# Patient Record
Sex: FEMALE | Race: WHITE | NOT HISPANIC OR LATINO | Employment: OTHER | URBAN - METROPOLITAN AREA
[De-identification: names, ages, dates, MRNs, and addresses within clinical notes are randomized per-mention and may not be internally consistent; named-entity substitution may affect disease eponyms.]

---

## 2023-05-05 ENCOUNTER — TELEPHONE (OUTPATIENT)
Dept: PRIMARY CARE | Facility: CLINIC | Age: 78
End: 2023-05-05
Payer: MEDICARE

## 2023-05-09 DIAGNOSIS — Z87.891 HISTORY OF TOBACCO ABUSE: ICD-10-CM

## 2023-05-09 DIAGNOSIS — I10 BENIGN ESSENTIAL HYPERTENSION: ICD-10-CM

## 2023-05-09 DIAGNOSIS — E78.5 DYSLIPIDEMIA: Primary | ICD-10-CM

## 2023-05-12 ENCOUNTER — LAB (OUTPATIENT)
Dept: LAB | Facility: LAB | Age: 78
End: 2023-05-12
Payer: MEDICARE

## 2023-05-12 DIAGNOSIS — Z87.891 HISTORY OF TOBACCO ABUSE: ICD-10-CM

## 2023-05-12 DIAGNOSIS — I10 BENIGN ESSENTIAL HYPERTENSION: ICD-10-CM

## 2023-05-12 DIAGNOSIS — E78.5 DYSLIPIDEMIA: ICD-10-CM

## 2023-05-12 LAB
ALANINE AMINOTRANSFERASE (SGPT) (U/L) IN SER/PLAS: 79 U/L (ref 7–45)
ANION GAP IN SER/PLAS: 13 MMOL/L (ref 10–20)
ASPARTATE AMINOTRANSFERASE (SGOT) (U/L) IN SER/PLAS: 45 U/L (ref 9–39)
CALCIUM (MG/DL) IN SER/PLAS: 9.4 MG/DL (ref 8.6–10.3)
CARBON DIOXIDE, TOTAL (MMOL/L) IN SER/PLAS: 23 MMOL/L (ref 21–32)
CHLORIDE (MMOL/L) IN SER/PLAS: 108 MMOL/L (ref 98–107)
CHOLESTEROL (MG/DL) IN SER/PLAS: 182 MG/DL (ref 0–199)
CHOLESTEROL IN HDL (MG/DL) IN SER/PLAS: 57.1 MG/DL
CHOLESTEROL/HDL RATIO: 3.2
CREATININE (MG/DL) IN SER/PLAS: 1 MG/DL (ref 0.5–1.05)
ERYTHROCYTE DISTRIBUTION WIDTH (RATIO) BY AUTOMATED COUNT: 13.6 % (ref 11.5–14.5)
ERYTHROCYTE MEAN CORPUSCULAR HEMOGLOBIN CONCENTRATION (G/DL) BY AUTOMATED: 31.7 G/DL (ref 32–36)
ERYTHROCYTE MEAN CORPUSCULAR VOLUME (FL) BY AUTOMATED COUNT: 92 FL (ref 80–100)
ERYTHROCYTES (10*6/UL) IN BLOOD BY AUTOMATED COUNT: 4.58 X10E12/L (ref 4–5.2)
GFR FEMALE: 58 ML/MIN/1.73M2
GLUCOSE (MG/DL) IN SER/PLAS: 85 MG/DL (ref 74–99)
HEMATOCRIT (%) IN BLOOD BY AUTOMATED COUNT: 42 % (ref 36–46)
HEMOGLOBIN (G/DL) IN BLOOD: 13.3 G/DL (ref 12–16)
LDL: 104 MG/DL (ref 0–99)
LEUKOCYTES (10*3/UL) IN BLOOD BY AUTOMATED COUNT: 6.2 X10E9/L (ref 4.4–11.3)
PLATELETS (10*3/UL) IN BLOOD AUTOMATED COUNT: 213 X10E9/L (ref 150–450)
POTASSIUM (MMOL/L) IN SER/PLAS: 3.8 MMOL/L (ref 3.5–5.3)
SODIUM (MMOL/L) IN SER/PLAS: 140 MMOL/L (ref 136–145)
TRIGLYCERIDE (MG/DL) IN SER/PLAS: 103 MG/DL (ref 0–149)
UREA NITROGEN (MG/DL) IN SER/PLAS: 19 MG/DL (ref 6–23)
VLDL: 21 MG/DL (ref 0–40)

## 2023-05-12 PROCEDURE — 80048 BASIC METABOLIC PNL TOTAL CA: CPT

## 2023-05-12 PROCEDURE — 84450 TRANSFERASE (AST) (SGOT): CPT

## 2023-05-12 PROCEDURE — 36415 COLL VENOUS BLD VENIPUNCTURE: CPT

## 2023-05-12 PROCEDURE — 85027 COMPLETE CBC AUTOMATED: CPT

## 2023-05-12 PROCEDURE — 84460 ALANINE AMINO (ALT) (SGPT): CPT

## 2023-05-12 PROCEDURE — 80061 LIPID PANEL: CPT

## 2023-05-15 PROBLEM — H53.8 BLURRY VISION: Status: ACTIVE | Noted: 2023-05-15

## 2023-05-15 PROBLEM — E78.5 DYSLIPIDEMIA: Status: ACTIVE | Noted: 2023-05-15

## 2023-05-15 PROBLEM — Z72.0 TOBACCO ABUSE: Status: ACTIVE | Noted: 2023-05-15

## 2023-05-15 PROBLEM — F32.A DEPRESSION: Status: ACTIVE | Noted: 2023-05-15

## 2023-05-15 PROBLEM — E55.9 VITAMIN D DEFICIENCY: Status: ACTIVE | Noted: 2023-05-15

## 2023-05-15 PROBLEM — H47.20 BILATERAL OPTIC NERVE ATROPHY: Status: ACTIVE | Noted: 2023-05-15

## 2023-05-15 PROBLEM — I10 BENIGN ESSENTIAL HTN: Status: ACTIVE | Noted: 2023-05-15

## 2023-05-15 NOTE — PROGRESS NOTES
Subjective   Roslyn Cabrera is a 77 y.o. female who presents for follow-up.  HPI  77-year-old female known history of hypertension dyslipidemia tobacco abuse rheumatoid arthritis patient is here for follow-up, denies chest pain shortness of breath fever chills nausea vomiting constipation diarrhea this urgency or frequency.  Takes medication prescribed.  Review of Systems  10 system review pertinent as above  Objective     Visit Vitals  /80   Pulse 76   Temp 36.5 °C (97.7 °F)   Resp 16      Physical Exam  HEENT: Atraumatic normocephalic the pupils are equal and round and reactive to light the sclerae nonicteric extraocular motion are intact.  Neck: Is supple without JVD no carotid bruits the trachea is midline there are no masses pulses are equal and bilateral with normal upstroke.  Skin: Normal.  Skin good texture.  Moist.  Good turgor.  No lesions, no rashes.  Lymph: No lymphadenopathy appreciated, no masses, no lesions  Lungs: Are clear to auscultation and percussion, good breath sounds bilaterally, no rhonchi, no wheezing, good diaphragmatic excursion.  Heart: Normal rate and normal rhythm S1, S2, no S3, no gallop, murmur or rub.  Abdomen: Soft, nontender, no organomegaly, good bowel sounds.    Extremities: Full range of motion, good pulses bilateral.  No cyanosis, no clubbing or edema.  Neuro: Cranial nerves II-XII are grossly intact there is no sensory or motor deficits.  Able to move all extremities.      Assessment/Plan     Rt Inguinal Hernia  General surgery   Dr vasquez no new issues to report   surgery completed Sep 9th    Tobacco abuse  Xray chest consulted with patient re smoking secession     B12 deficiency  B12 1000 mcg monthly 12/09/2022  Needs replaced     Glaucoma bilateral green laser treatment  will follow with Ophthalmology 08/08/22  Dr. Wells  on eye drops Timolol and Latanoprost        Hypertension   No added salt diet, do not and salt to your food  Try to exercise every other day for  30 minutes  Losartan to 100 mg daily  Carvedilol 12.5 mg twice daily   And the hydralazine 25 mg p.o.     tobacco abuse  consulted with patient  must stop smoking immediately  consulted regarding smoking secession  we spoke at great length regarding smoking secession     post knee replacement april 30 th  doing well. He is to follow with orthopedic surgery   Dr Gastelum doing well no new complaints     Cholesterol   Continue with the low-fat, low-cholesterol diet,  I recommended Mediterranean diet, which include fish, chicken, vegetables and olive oil  Exercise daily for 30 minutes at least 3 times a week  Continue home medications atorvastatin, calcium 10 mg Increase to Monday wed and Friday  needs lipid panel     Return in four months     Problem List Items Addressed This Visit          Nervous    Bilateral optic nerve atrophy       Circulatory    Benign essential HTN    Relevant Orders    XR chest 2 views       Endocrine/Metabolic    Vitamin D deficiency       Other    Depression    Blurry vision    Dyslipidemia    Tobacco abuse - Primary    Relevant Orders    XR chest 2 views     Other Visit Diagnoses       B12 deficiency        Relevant Medications    cyanocobalamin (Vitamin B-12) injection 1,000 mcg (Start on 5/17/2023 12:00 PM)    Other Relevant Orders    Vitamin B12              Oscar Milner MD

## 2023-05-17 ENCOUNTER — OFFICE VISIT (OUTPATIENT)
Dept: PRIMARY CARE | Facility: CLINIC | Age: 78
End: 2023-05-17
Payer: MEDICARE

## 2023-05-17 VITALS
WEIGHT: 129 LBS | HEIGHT: 59 IN | TEMPERATURE: 97.7 F | RESPIRATION RATE: 16 BRPM | HEART RATE: 76 BPM | BODY MASS INDEX: 26 KG/M2 | SYSTOLIC BLOOD PRESSURE: 128 MMHG | DIASTOLIC BLOOD PRESSURE: 80 MMHG

## 2023-05-17 DIAGNOSIS — E78.5 DYSLIPIDEMIA: ICD-10-CM

## 2023-05-17 DIAGNOSIS — Z72.0 TOBACCO ABUSE: Primary | ICD-10-CM

## 2023-05-17 DIAGNOSIS — I10 BENIGN ESSENTIAL HTN: ICD-10-CM

## 2023-05-17 DIAGNOSIS — E55.9 VITAMIN D DEFICIENCY: ICD-10-CM

## 2023-05-17 DIAGNOSIS — F32.A DEPRESSION, UNSPECIFIED DEPRESSION TYPE: ICD-10-CM

## 2023-05-17 DIAGNOSIS — H47.20 BILATERAL OPTIC NERVE ATROPHY: ICD-10-CM

## 2023-05-17 DIAGNOSIS — H53.8 BLURRY VISION: ICD-10-CM

## 2023-05-17 DIAGNOSIS — E53.8 B12 DEFICIENCY: ICD-10-CM

## 2023-05-17 PROCEDURE — 99214 OFFICE O/P EST MOD 30 MIN: CPT | Performed by: INTERNAL MEDICINE

## 2023-05-17 PROCEDURE — 1159F MED LIST DOCD IN RCRD: CPT | Performed by: INTERNAL MEDICINE

## 2023-05-17 PROCEDURE — 3074F SYST BP LT 130 MM HG: CPT | Performed by: INTERNAL MEDICINE

## 2023-05-17 PROCEDURE — 82607 VITAMIN B-12: CPT | Performed by: INTERNAL MEDICINE

## 2023-05-17 PROCEDURE — 96372 THER/PROPH/DIAG INJ SC/IM: CPT | Performed by: INTERNAL MEDICINE

## 2023-05-17 PROCEDURE — 4004F PT TOBACCO SCREEN RCVD TLK: CPT | Performed by: INTERNAL MEDICINE

## 2023-05-17 PROCEDURE — 3079F DIAST BP 80-89 MM HG: CPT | Performed by: INTERNAL MEDICINE

## 2023-05-17 RX ORDER — LOSARTAN POTASSIUM 100 MG/1
1 TABLET ORAL DAILY
COMMUNITY
Start: 2018-07-09 | End: 2024-02-27 | Stop reason: SDUPTHER

## 2023-05-17 RX ORDER — LATANOPROST 50 UG/ML
SOLUTION/ DROPS OPHTHALMIC
COMMUNITY
End: 2023-10-06 | Stop reason: SDUPTHER

## 2023-05-17 RX ORDER — ATORVASTATIN CALCIUM 10 MG/1
1 TABLET, FILM COATED ORAL DAILY
COMMUNITY
Start: 2018-05-06 | End: 2024-01-15

## 2023-05-17 RX ORDER — CHOLECALCIFEROL (VITAMIN D3) 125 MCG
CAPSULE ORAL
COMMUNITY
End: 2024-03-21 | Stop reason: WASHOUT

## 2023-05-17 RX ORDER — CYANOCOBALAMIN 1000 UG/ML
1000 INJECTION, SOLUTION INTRAMUSCULAR; SUBCUTANEOUS ONCE
Status: COMPLETED | OUTPATIENT
Start: 2023-05-17 | End: 2023-05-17

## 2023-05-17 RX ORDER — HYDRALAZINE HYDROCHLORIDE 25 MG/1
1 TABLET, FILM COATED ORAL 2 TIMES DAILY
COMMUNITY
Start: 2022-07-29 | End: 2024-02-27 | Stop reason: SDUPTHER

## 2023-05-17 RX ORDER — TIMOLOL MALEATE 5 MG/ML
1 SOLUTION/ DROPS OPHTHALMIC DAILY
COMMUNITY
End: 2024-02-27

## 2023-05-17 RX ORDER — CARVEDILOL 12.5 MG/1
1 TABLET ORAL 2 TIMES DAILY
COMMUNITY
Start: 2020-09-23 | End: 2024-02-27 | Stop reason: SDUPTHER

## 2023-05-17 RX ORDER — TRIAMCINOLONE ACETONIDE 55 UG/1
2 SPRAY, METERED NASAL DAILY
COMMUNITY
Start: 2023-03-06 | End: 2024-03-21 | Stop reason: WASHOUT

## 2023-05-17 RX ORDER — EPINEPHRINE 0.22MG
AEROSOL WITH ADAPTER (ML) INHALATION
COMMUNITY

## 2023-05-17 RX ADMIN — CYANOCOBALAMIN 1000 MCG: 1000 INJECTION, SOLUTION INTRAMUSCULAR; SUBCUTANEOUS at 12:10

## 2023-05-17 ASSESSMENT — PAIN SCALES - GENERAL: PAINLEVEL: 0-NO PAIN

## 2023-05-19 ENCOUNTER — TELEPHONE (OUTPATIENT)
Dept: PRIMARY CARE | Facility: CLINIC | Age: 78
End: 2023-05-19
Payer: MEDICARE

## 2023-10-06 DIAGNOSIS — H40.20X0 PRIMARY ANGLE CLOSURE GLAUCOMA OF BOTH EYES, UNSPECIFIED GLAUCOMA STAGE, UNSPECIFIED PRIMARY ANGLE-CLOSURE GLAUCOMA TYPE: Primary | ICD-10-CM

## 2023-10-09 RX ORDER — LATANOPROST 50 UG/ML
SOLUTION/ DROPS OPHTHALMIC
Qty: 7.5 ML | Refills: 3 | Status: SHIPPED | OUTPATIENT
Start: 2023-10-09 | End: 2024-02-27 | Stop reason: SDUPTHER

## 2023-10-20 PROBLEM — E53.8 COBALAMIN DEFICIENCY: Status: ACTIVE | Noted: 2023-10-20

## 2023-10-20 PROBLEM — E78.5 HYPERLIPIDEMIA: Status: ACTIVE | Noted: 2023-10-20

## 2023-10-20 PROBLEM — H53.19 VISUAL DISTORTIONS: Status: ACTIVE | Noted: 2023-10-20

## 2023-10-20 PROBLEM — M81.0 OP (OSTEOPOROSIS): Status: ACTIVE | Noted: 2023-10-20

## 2023-10-20 PROBLEM — L82.0 INFLAMED SEBORRHEIC KERATOSIS: Status: ACTIVE | Noted: 2020-06-04

## 2023-10-20 PROBLEM — F32.A ANXIETY AND DEPRESSION: Status: ACTIVE | Noted: 2023-10-20

## 2023-10-20 PROBLEM — S89.91XA TRAUMATIC INJURY OF RIGHT LOWER EXTREMITY: Status: ACTIVE | Noted: 2023-10-20

## 2023-10-20 PROBLEM — L60.9 NAIL ABNORMALITY: Status: ACTIVE | Noted: 2023-10-20

## 2023-10-20 PROBLEM — M85.80 OSTEOPENIA: Status: ACTIVE | Noted: 2023-10-20

## 2023-10-20 PROBLEM — T81.30XA WOUND DEHISCENCE: Status: ACTIVE | Noted: 2023-10-20

## 2023-10-20 PROBLEM — M06.9 RHEUMATOID ARTHRITIS (MULTI): Status: ACTIVE | Noted: 2023-10-20

## 2023-10-20 PROBLEM — L03.116 CELLULITIS OF LEG, LEFT: Status: ACTIVE | Noted: 2023-10-20

## 2023-10-20 PROBLEM — L81.4 OTHER MELANIN HYPERPIGMENTATION: Status: ACTIVE | Noted: 2020-06-04

## 2023-10-20 PROBLEM — A15.9 TUBERCULOSIS: Status: ACTIVE | Noted: 2023-10-20

## 2023-10-20 PROBLEM — R44.2: Status: ACTIVE | Noted: 2023-10-20

## 2023-10-20 PROBLEM — H40.1130 PRIMARY OPEN ANGLE GLAUCOMA (POAG) OF BOTH EYES: Status: ACTIVE | Noted: 2023-10-20

## 2023-10-20 PROBLEM — H90.3 SENSORINEURAL HEARING LOSS, BILATERAL: Status: ACTIVE | Noted: 2023-10-20

## 2023-10-20 PROBLEM — H54.7 VISION IMPAIRMENT: Status: ACTIVE | Noted: 2023-10-20

## 2023-10-20 PROBLEM — M17.0 OSTEOARTHRITIS OF BOTH KNEES: Status: ACTIVE | Noted: 2023-10-20

## 2023-10-20 PROBLEM — H35.3132 INTERMEDIATE STAGE NONEXUDATIVE AGE-RELATED MACULAR DEGENERATION OF BOTH EYES: Status: ACTIVE | Noted: 2023-10-20

## 2023-10-20 PROBLEM — L03.115 CELLULITIS OF RIGHT LOWER EXTREMITY: Status: ACTIVE | Noted: 2023-10-20

## 2023-10-20 PROBLEM — L03.90 WOUND CELLULITIS: Status: ACTIVE | Noted: 2023-10-20

## 2023-10-20 PROBLEM — M25.473 ANKLE EDEMA: Status: ACTIVE | Noted: 2023-10-20

## 2023-10-20 PROBLEM — F41.9 ANXIETY AND DEPRESSION: Status: ACTIVE | Noted: 2023-10-20

## 2023-10-20 PROBLEM — K40.90 RIGHT INGUINAL HERNIA: Status: ACTIVE | Noted: 2023-10-20

## 2023-10-20 PROBLEM — D18.01 HEMANGIOMA OF SKIN AND SUBCUTANEOUS TISSUE: Status: ACTIVE | Noted: 2020-06-04

## 2023-10-20 PROBLEM — I10 UNCONTROLLED HYPERTENSION: Status: ACTIVE | Noted: 2023-10-20

## 2023-10-20 RX ORDER — CEPHALEXIN 500 MG/1
500 CAPSULE ORAL 2 TIMES DAILY
COMMUNITY
Start: 2023-08-11 | End: 2024-01-15 | Stop reason: ALTCHOICE

## 2023-10-20 RX ORDER — OXYCODONE HYDROCHLORIDE 5 MG/1
5 TABLET ORAL EVERY 6 HOURS PRN
COMMUNITY
Start: 2023-08-11 | End: 2024-03-21 | Stop reason: WASHOUT

## 2023-10-24 ENCOUNTER — OFFICE VISIT (OUTPATIENT)
Dept: GYNECOLOGIC ONCOLOGY | Facility: CLINIC | Age: 78
End: 2023-10-24
Payer: MEDICARE

## 2023-10-24 VITALS
SYSTOLIC BLOOD PRESSURE: 151 MMHG | WEIGHT: 129.63 LBS | TEMPERATURE: 97 F | RESPIRATION RATE: 16 BRPM | BODY MASS INDEX: 26.18 KG/M2 | HEART RATE: 59 BPM | DIASTOLIC BLOOD PRESSURE: 80 MMHG | OXYGEN SATURATION: 96 %

## 2023-10-24 DIAGNOSIS — C53.9 MALIGNANT NEOPLASM OF CERVIX, UNSPECIFIED SITE (MULTI): ICD-10-CM

## 2023-10-24 DIAGNOSIS — Z12.31 OTHER SCREENING MAMMOGRAM: Primary | ICD-10-CM

## 2023-10-24 PROCEDURE — 87624 HPV HI-RISK TYP POOLED RSLT: CPT | Performed by: NURSE PRACTITIONER

## 2023-10-24 PROCEDURE — 3077F SYST BP >= 140 MM HG: CPT | Performed by: NURSE PRACTITIONER

## 2023-10-24 PROCEDURE — 4004F PT TOBACCO SCREEN RCVD TLK: CPT | Performed by: NURSE PRACTITIONER

## 2023-10-24 PROCEDURE — 3079F DIAST BP 80-89 MM HG: CPT | Performed by: NURSE PRACTITIONER

## 2023-10-24 PROCEDURE — 99214 OFFICE O/P EST MOD 30 MIN: CPT | Performed by: NURSE PRACTITIONER

## 2023-10-24 PROCEDURE — 88175 CYTOPATH C/V AUTO FLUID REDO: CPT | Mod: TC,GCY | Performed by: NURSE PRACTITIONER

## 2023-10-24 PROCEDURE — 1159F MED LIST DOCD IN RCRD: CPT | Performed by: NURSE PRACTITIONER

## 2023-10-24 PROCEDURE — 1126F AMNT PAIN NOTED NONE PRSNT: CPT | Performed by: NURSE PRACTITIONER

## 2023-10-24 ASSESSMENT — PATIENT HEALTH QUESTIONNAIRE - PHQ9
4. FEELING TIRED OR HAVING LITTLE ENERGY: NOT AT ALL
1. LITTLE INTEREST OR PLEASURE IN DOING THINGS: 0
2. FEELING DOWN, DEPRESSED OR HOPELESS: NOT AT ALL
10. IF YOU CHECKED OFF ANY PROBLEMS, HOW DIFFICULT HAVE THESE PROBLEMS MADE IT FOR YOU TO DO YOUR WORK, TAKE CARE OF THINGS AT HOME, OR GET ALONG WITH OTHER PEOPLE: NOT DIFFICULT AT ALL
4. FEELING TIRED OR HAVING LITTLE ENERGY: 0
1. LITTLE INTEREST OR PLEASURE IN DOING THINGS: NOT AT ALL
6. FEELING BAD ABOUT YOURSELF - OR THAT YOU ARE A FAILURE OR HAVE LET YOURSELF OR YOUR FAMILY DOWN: NOT AT ALL
3. TROUBLE FALLING OR STAYING ASLEEP OR SLEEPING TOO MUCH: 0
7. TROUBLE CONCENTRATING ON THINGS, SUCH AS READING THE NEWSPAPER OR WATCHING TELEVISION: NOT AT ALL
SUM OF ALL RESPONSES TO PHQ QUESTIONS 1-9: 0
2. FEELING DOWN, DEPRESSED OR HOPELESS: NOT AT ALL
9. THOUGHTS THAT YOU WOULD BE BETTER OFF DEAD, OR OF HURTING YOURSELF: NOT AT ALL
1. LITTLE INTEREST OR PLEASURE IN DOING THINGS: NOT AT ALL
9. THOUGHTS THAT YOU WOULD BE BETTER OFF DEAD, OR OF HURTING YOURSELF: 0
9. THOUGHTS THAT YOU WOULD BE BETTER OFF DEAD, OR OF HURTING YOURSELF: NOT AT ALL
3. TROUBLE FALLING OR STAYING ASLEEP OR SLEEPING TOO MUCH: NOT AT ALL
SUM OF ALL RESPONSES TO PHQ9 QUESTIONS 1 AND 2: 0
2. FEELING DOWN, DEPRESSED, IRRITABLE, OR HOPELESS: 0
6. FEELING BAD ABOUT YOURSELF - OR THAT YOU ARE A FAILURE OR HAVE LET YOURSELF OR YOUR FAMILY DOWN: NOT AT ALL
1. LITTLE INTEREST OR PLEASURE IN DOING THINGS: NOT AT ALL
SUM OF ALL RESPONSES TO PHQ QUESTIONS 1-9: 0
3. TROUBLE FALLING OR STAYING ASLEEP OR SLEEPING TOO MUCH: NOT AT ALL
10. IF YOU CHECKED OFF ANY PROBLEMS, HOW DIFFICULT HAVE THESE PROBLEMS MADE IT FOR YOU TO DO YOUR WORK, TAKE CARE OF THINGS AT HOME, OR GET ALONG WITH OTHER PEOPLE: NOT DIFFICULT AT ALL
8. MOVING OR SPEAKING SO SLOWLY THAT OTHER PEOPLE COULD HAVE NOTICED. OR THE OPPOSITE, BEING SO FIGETY OR RESTLESS THAT YOU HAVE BEEN MOVING AROUND A LOT MORE THAN USUAL: 0
5. POOR APPETITE OR OVEREATING: NOT AT ALL
2. FEELING DOWN, DEPRESSED, IRRITABLE, OR HOPELESS: NOT AT ALL
7. TROUBLE CONCENTRATING ON THINGS, SUCH AS READING THE NEWSPAPER OR WATCHING TELEVISION: NOT AT ALL
5. POOR APPETITE OR OVEREATING: NOT AT ALL
5. POOR APPETITE OR OVEREATING: 0
7. TROUBLE CONCENTRATING ON THINGS, SUCH AS READING THE NEWSPAPER OR WATCHING TELEVISION: 0
4. FEELING TIRED OR HAVING LITTLE ENERGY: NOT AT ALL
8. MOVING OR SPEAKING SO SLOWLY THAT OTHER PEOPLE COULD HAVE NOTICED. OR THE OPPOSITE, BEING SO FIGETY OR RESTLESS THAT YOU HAVE BEEN MOVING AROUND A LOT MORE THAN USUAL: NOT AT ALL
6. FEELING BAD ABOUT YOURSELF - OR THAT YOU ARE A FAILURE OR HAVE LET YOURSELF OR YOUR FAMILY DOWN: 0
8. MOVING OR SPEAKING SO SLOWLY THAT OTHER PEOPLE COULD HAVE NOTICED. OR THE OPPOSITE, BEING SO FIGETY OR RESTLESS THAT YOU HAVE BEEN MOVING AROUND A LOT MORE THAN USUAL: NOT AT ALL

## 2023-10-24 ASSESSMENT — ENCOUNTER SYMPTOMS
OCCASIONAL FEELINGS OF UNSTEADINESS: 0
LOSS OF SENSATION IN FEET: 0
DEPRESSION: 0

## 2023-10-24 ASSESSMENT — PAIN SCALES - GENERAL: PAINLEVEL: 0-NO PAIN

## 2023-10-24 NOTE — PROGRESS NOTES
Patient ID: Roslyn Cabrera is a 78 y.o. female.  Primary Care Provider: Oscar Milner MD    Subjective    Patient with long standing history of cervical dysplasia with definitive total vaginal hysterectomy performed on 5/30/13.      Objective    Visit Vitals  /80   Pulse 59   Temp 36.1 °C (97 °F)   Resp 16        Interval history:  Patient is a 78 year old female with cervical dysplasia s/p vaginal hysterectomy 5/30/13.  Last pap 10/22 was negative, HPV-.  Here for annual pap.  Patient denies any vaginal bleeding, pink tinged discharge. Patient denies any constipation or diarrhea.  Appetite has been good.  Energy levels are baseline.  Patient denies hematuria.  Patient denies urinary leakage or incontinence. Patient has glaucoma, having light sensitivity, not able to drive at night.  Patient overdue for mammogram.  Patient is not currently sexually active.       Physical Exam:    Constitutional: Doing well. KODY  Eyes: PERRL  ENMT: Moist mucus membranes  Head/Neck: Supple. Symmetrical  Cardiovascular: Regular, rate and rhythm. 2+ equal pulses of the extremities  Respiratory/Thorax: CTA. RRR. Chest rise symmetrical.  Gastrointestinal: Non-distended, soft, non-tender  Genitourinary:   Normal external female genitalia. No vulvar lesions noted  Speculum exam: Smooth vaginal walls without lesions or masses. Vaginal cuff visualized without lesions. Surgically absent cervix.  Bimanual exam: Smooth vaginal wall without lesions or masses.  Surgically absent uterus, cervix, and adnexa.  Rectovaginal exam: smooth rectovaginal septum without lesions or masses  Musculoskeletal: ROM intact, no joint swelling, normal strength  Extremities: No edema  Neurological: Alert and oriented x 3. Pleasant and cooperative.  Lymphatic: No lymphadenopathy. No lymphedema  Psychological: Appropriate mood and behavior  Skin: Warm and dry, no lesions, no rashes    A complete review of systems was performed and all systems were normal except  what is noted in the interval history.          Assessment/Plan   Patient is a 78 year old female with cervical dysplasia s/p vaginal hysterectomy 5/30/13. KODY 10 years.     PLAN:  Pap today, F/U results  If negative, F/U in 1 year or as needed  Mammogram ordered  Physical examination was within normal limits today.  She is currently KODY.  We reviewed signs and symptoms of possible recurrence with the patient and she will call our office should she experience any of these.

## 2023-11-14 LAB
CYTOLOGY CMNT CVX/VAG CYTO-IMP: NORMAL
HPV HR 12 DNA GENITAL QL NAA+PROBE: NEGATIVE
HPV HR GENOTYPES PNL CVX NAA+PROBE: NEGATIVE
HPV16 DNA SPEC QL NAA+PROBE: NEGATIVE
HPV18 DNA SPEC QL NAA+PROBE: NEGATIVE
LAB AP HISTORY OF MALIGNANCY: NORMAL
LAB AP HPV GENOTYPE QUESTION: YES
LAB AP HPV HR: NORMAL
LABORATORY COMMENT REPORT: NORMAL
PATH REPORT.TOTAL CANCER: NORMAL

## 2023-11-15 ENCOUNTER — TELEPHONE (OUTPATIENT)
Dept: GYNECOLOGIC ONCOLOGY | Facility: HOSPITAL | Age: 78
End: 2023-11-15
Payer: MEDICARE

## 2023-11-15 NOTE — TELEPHONE ENCOUNTER
Phoned patient to notify that pap results are negative and that Bhumi Carl CNP recommends follow up in 1 year.

## 2023-11-20 ENCOUNTER — APPOINTMENT (OUTPATIENT)
Dept: RADIOLOGY | Facility: CLINIC | Age: 78
End: 2023-11-20
Payer: MEDICARE

## 2023-12-04 ENCOUNTER — TELEPHONE (OUTPATIENT)
Dept: PRIMARY CARE | Facility: CLINIC | Age: 78
End: 2023-12-04
Payer: MEDICARE

## 2023-12-04 DIAGNOSIS — I10 BENIGN ESSENTIAL HTN: Primary | ICD-10-CM

## 2023-12-04 DIAGNOSIS — F32.A ANXIETY AND DEPRESSION: ICD-10-CM

## 2023-12-04 DIAGNOSIS — M06.9 RHEUMATOID ARTHRITIS INVOLVING MULTIPLE SITES, UNSPECIFIED WHETHER RHEUMATOID FACTOR PRESENT (MULTI): ICD-10-CM

## 2023-12-04 DIAGNOSIS — F41.9 ANXIETY AND DEPRESSION: ICD-10-CM

## 2023-12-04 DIAGNOSIS — E55.9 VITAMIN D DEFICIENCY: ICD-10-CM

## 2023-12-04 DIAGNOSIS — E78.5 DYSLIPIDEMIA: ICD-10-CM

## 2023-12-07 ENCOUNTER — LAB (OUTPATIENT)
Dept: LAB | Facility: LAB | Age: 78
End: 2023-12-07
Payer: MEDICARE

## 2023-12-07 DIAGNOSIS — M06.9 RHEUMATOID ARTHRITIS INVOLVING MULTIPLE SITES, UNSPECIFIED WHETHER RHEUMATOID FACTOR PRESENT (MULTI): ICD-10-CM

## 2023-12-07 DIAGNOSIS — E78.5 DYSLIPIDEMIA: ICD-10-CM

## 2023-12-07 DIAGNOSIS — I10 BENIGN ESSENTIAL HTN: ICD-10-CM

## 2023-12-07 DIAGNOSIS — E55.9 VITAMIN D DEFICIENCY: ICD-10-CM

## 2023-12-07 LAB
25(OH)D3 SERPL-MCNC: 94 NG/ML (ref 30–100)
ALT SERPL W P-5'-P-CCNC: 12 U/L (ref 7–45)
ANION GAP SERPL CALC-SCNC: 11 MMOL/L (ref 10–20)
AST SERPL W P-5'-P-CCNC: 13 U/L (ref 9–39)
BUN SERPL-MCNC: 22 MG/DL (ref 6–23)
CALCIUM SERPL-MCNC: 9.3 MG/DL (ref 8.6–10.3)
CHLORIDE SERPL-SCNC: 109 MMOL/L (ref 98–107)
CHOLEST SERPL-MCNC: 186 MG/DL (ref 0–199)
CHOLESTEROL/HDL RATIO: 2.8
CO2 SERPL-SCNC: 25 MMOL/L (ref 21–32)
CREAT SERPL-MCNC: 1.08 MG/DL (ref 0.5–1.05)
ERYTHROCYTE [DISTWIDTH] IN BLOOD BY AUTOMATED COUNT: 14.3 % (ref 11.5–14.5)
GFR SERPL CREATININE-BSD FRML MDRD: 53 ML/MIN/1.73M*2
GLUCOSE SERPL-MCNC: 99 MG/DL (ref 74–99)
HCT VFR BLD AUTO: 43.2 % (ref 36–46)
HDLC SERPL-MCNC: 66.5 MG/DL
HGB BLD-MCNC: 13.9 G/DL (ref 12–16)
LDLC SERPL CALC-MCNC: 97 MG/DL
MCH RBC QN AUTO: 30 PG (ref 26–34)
MCHC RBC AUTO-ENTMCNC: 32.2 G/DL (ref 32–36)
MCV RBC AUTO: 93 FL (ref 80–100)
NON HDL CHOLESTEROL: 120 MG/DL (ref 0–149)
NRBC BLD-RTO: 0 /100 WBCS (ref 0–0)
PLATELET # BLD AUTO: 205 X10*3/UL (ref 150–450)
POTASSIUM SERPL-SCNC: 4.2 MMOL/L (ref 3.5–5.3)
RBC # BLD AUTO: 4.63 X10*6/UL (ref 4–5.2)
SODIUM SERPL-SCNC: 141 MMOL/L (ref 136–145)
TRIGL SERPL-MCNC: 111 MG/DL (ref 0–149)
VLDL: 22 MG/DL (ref 0–40)
WBC # BLD AUTO: 8.8 X10*3/UL (ref 4.4–11.3)

## 2023-12-07 PROCEDURE — 80048 BASIC METABOLIC PNL TOTAL CA: CPT

## 2023-12-07 PROCEDURE — 36415 COLL VENOUS BLD VENIPUNCTURE: CPT

## 2023-12-07 PROCEDURE — 80061 LIPID PANEL: CPT

## 2023-12-07 PROCEDURE — 82306 VITAMIN D 25 HYDROXY: CPT

## 2023-12-07 PROCEDURE — 84460 ALANINE AMINO (ALT) (SGPT): CPT

## 2023-12-07 PROCEDURE — 85027 COMPLETE CBC AUTOMATED: CPT

## 2023-12-07 PROCEDURE — 84450 TRANSFERASE (AST) (SGOT): CPT

## 2023-12-07 NOTE — PROGRESS NOTES
Subjective   Roslyn Cabrera is a 78 y.o. female who presents for follow-up.  HPI  77-year-old female known history of hypertension dyslipidemia tobacco abuse rheumatoid arthritis patient is here for follow-up, denies chest pain shortness of breath fever chills nausea vomiting constipation diarrhea this urgency or frequency.  Takes medication prescribed.  Review of Systems  10 system review pertinent as above  Objective     Visit Vitals  /80   Pulse 78   Temp 36.8 °C (98.2 °F)   Resp 16      Physical Exam  HEENT: Atraumatic normocephalic the pupils are equal and round and reactive to light the sclerae nonicteric extraocular motion are intact.  Neck: Is supple without JVD no carotid bruits the trachea is midline there are no masses pulses are equal and bilateral with normal upstroke.  Skin: Normal.  Skin good texture.  Moist.  Good turgor.  No lesions, no rashes.  Lymph: No lymphadenopathy appreciated, no masses, no lesions  Lungs: Are clear to auscultation and percussion, good breath sounds bilaterally, no rhonchi, no wheezing, good diaphragmatic excursion.  Heart: Normal rate and normal rhythm S1, S2, no S3, no gallop, murmur or rub.  Abdomen: Soft, nontender, no organomegaly, good bowel sounds.    Extremities: Full range of motion, good pulses bilateral.  No cyanosis, no clubbing or edema.  Neuro: Cranial nerves II-XII are grossly intact there is no sensory or motor deficits.  Able to move all extremities.      Assessment/Plan     Here for follow-up fasting blood works  Reviewed  BW 12/2023  All questions answered to satisfaction     Immunizations  Flu vaccine local pharmacy  Pneumonia vaccine 12/2022 Next 2027  [place] vaccine declined  Shingles vaccine 2/2    Prevention  Colonoscopy cologurd negative   Mammogram per Gyn  Bone density per gyn    Gyn Follows with Bhumi Carl   Routine follow.     History of glaucoma bilateral green laser treatment  will follow with Ophthalmology 08/08/22  Dr. Wells  on  eye drops Timolol and Latanoprost      Hypertension well controlled  High on occasion due to anxiety   No added salt diet, do not and salt to your food  Try to exercise every other day for 30 minutes  Continue current medications    tobacco abuse not ready to quit  consulted with patient  must stop smoking immediately  consulted regarding smoking secession  we spoke at great length regarding smoking secession     post knee replacement april 30 th   doing well. He is to follow with orthopedic surgery   Dr Gastelum doing well no new complaints     Cholesterol stable  Continue with the low-fat, low-cholesterol diet,  I recommended Mediterranean diet, which include fish, chicken, vegetables and olive oil  Exercise daily for 30 minutes at least 3 times a week  Continue home medications atorvastatin, calcium 10 mg Increase to Monday wed and Friday  needs lipid panel     Return in four months     Problem List Items Addressed This Visit    None      Oscar Milner MD

## 2023-12-12 ENCOUNTER — OFFICE VISIT (OUTPATIENT)
Dept: PRIMARY CARE | Facility: CLINIC | Age: 78
End: 2023-12-12
Payer: MEDICARE

## 2023-12-12 VITALS
TEMPERATURE: 98.2 F | HEART RATE: 78 BPM | SYSTOLIC BLOOD PRESSURE: 132 MMHG | WEIGHT: 133 LBS | RESPIRATION RATE: 16 BRPM | DIASTOLIC BLOOD PRESSURE: 80 MMHG | HEIGHT: 59 IN | BODY MASS INDEX: 26.81 KG/M2

## 2023-12-12 DIAGNOSIS — E78.5 DYSLIPIDEMIA: ICD-10-CM

## 2023-12-12 DIAGNOSIS — E55.9 VITAMIN D DEFICIENCY: ICD-10-CM

## 2023-12-12 DIAGNOSIS — I10 BENIGN ESSENTIAL HTN: Primary | ICD-10-CM

## 2023-12-12 DIAGNOSIS — E78.2 MODERATE MIXED HYPERLIPIDEMIA NOT REQUIRING STATIN THERAPY: ICD-10-CM

## 2023-12-12 PROCEDURE — 3075F SYST BP GE 130 - 139MM HG: CPT | Performed by: INTERNAL MEDICINE

## 2023-12-12 PROCEDURE — 4004F PT TOBACCO SCREEN RCVD TLK: CPT | Performed by: INTERNAL MEDICINE

## 2023-12-12 PROCEDURE — 1159F MED LIST DOCD IN RCRD: CPT | Performed by: INTERNAL MEDICINE

## 2023-12-12 PROCEDURE — 3079F DIAST BP 80-89 MM HG: CPT | Performed by: INTERNAL MEDICINE

## 2023-12-12 PROCEDURE — 1126F AMNT PAIN NOTED NONE PRSNT: CPT | Performed by: INTERNAL MEDICINE

## 2023-12-12 PROCEDURE — 99214 OFFICE O/P EST MOD 30 MIN: CPT | Performed by: INTERNAL MEDICINE

## 2023-12-12 ASSESSMENT — PAIN SCALES - GENERAL: PAINLEVEL: 0-NO PAIN

## 2024-01-15 ENCOUNTER — APPOINTMENT (OUTPATIENT)
Dept: RADIOLOGY | Facility: HOSPITAL | Age: 79
DRG: 041 | End: 2024-01-15
Payer: MEDICARE

## 2024-01-15 ENCOUNTER — CLINICAL SUPPORT (OUTPATIENT)
Dept: EMERGENCY MEDICINE | Facility: HOSPITAL | Age: 79
DRG: 041 | End: 2024-01-15
Payer: MEDICARE

## 2024-01-15 ENCOUNTER — HOSPITAL ENCOUNTER (INPATIENT)
Facility: HOSPITAL | Age: 79
LOS: 5 days | Discharge: NURSING, REHAB OR LONG-TERM CARE | DRG: 041 | End: 2024-01-20
Attending: EMERGENCY MEDICINE | Admitting: STUDENT IN AN ORGANIZED HEALTH CARE EDUCATION/TRAINING PROGRAM
Payer: MEDICARE

## 2024-01-15 DIAGNOSIS — I10 BENIGN ESSENTIAL HTN: ICD-10-CM

## 2024-01-15 DIAGNOSIS — I63.9 ACUTE ISCHEMIC STROKE (MULTI): Primary | ICD-10-CM

## 2024-01-15 DIAGNOSIS — R47.01 APHASIA: ICD-10-CM

## 2024-01-15 DIAGNOSIS — I63.412 STROKE DUE TO EMBOLISM OF LEFT MIDDLE CEREBRAL ARTERY (MULTI): ICD-10-CM

## 2024-01-15 LAB
ALBUMIN SERPL BCP-MCNC: 4.1 G/DL (ref 3.4–5)
ALP SERPL-CCNC: 84 U/L (ref 33–136)
ALT SERPL W P-5'-P-CCNC: 10 U/L (ref 7–45)
AMPHETAMINES UR QL SCN: NORMAL
ANION GAP SERPL CALC-SCNC: 13 MMOL/L (ref 10–20)
APPEARANCE UR: CLEAR
APTT PPP: 26 SECONDS (ref 27–38)
AST SERPL W P-5'-P-CCNC: 15 U/L (ref 9–39)
ATRIAL RATE: 72 BPM
BARBITURATES UR QL SCN: NORMAL
BASOPHILS # BLD AUTO: 0.04 X10*3/UL (ref 0–0.1)
BASOPHILS NFR BLD AUTO: 0.4 %
BENZODIAZ UR QL SCN: NORMAL
BILIRUB SERPL-MCNC: 0.4 MG/DL (ref 0–1.2)
BILIRUB UR STRIP.AUTO-MCNC: NEGATIVE MG/DL
BNP SERPL-MCNC: 115 PG/ML (ref 0–99)
BUN SERPL-MCNC: 11 MG/DL (ref 6–23)
BZE UR QL SCN: NORMAL
CALCIUM SERPL-MCNC: 9.2 MG/DL (ref 8.6–10.6)
CANNABINOIDS UR QL SCN: NORMAL
CARDIAC TROPONIN I PNL SERPL HS: 8 NG/L (ref 0–34)
CHLORIDE SERPL-SCNC: 106 MMOL/L (ref 98–107)
CO2 SERPL-SCNC: 24 MMOL/L (ref 21–32)
COLOR UR: NORMAL
CREAT SERPL-MCNC: 0.82 MG/DL (ref 0.5–1.05)
EGFRCR SERPLBLD CKD-EPI 2021: 74 ML/MIN/1.73M*2
EOSINOPHIL # BLD AUTO: 0.14 X10*3/UL (ref 0–0.4)
EOSINOPHIL NFR BLD AUTO: 1.5 %
ERYTHROCYTE [DISTWIDTH] IN BLOOD BY AUTOMATED COUNT: 14.1 % (ref 11.5–14.5)
EST. AVERAGE GLUCOSE BLD GHB EST-MCNC: 117 MG/DL
FENTANYL+NORFENTANYL UR QL SCN: NORMAL
GLUCOSE BLD MANUAL STRIP-MCNC: 90 MG/DL (ref 74–99)
GLUCOSE SERPL-MCNC: 92 MG/DL (ref 74–99)
GLUCOSE UR STRIP.AUTO-MCNC: NEGATIVE MG/DL
HBA1C MFR BLD: 5.7 %
HCT VFR BLD AUTO: 40 % (ref 36–46)
HGB BLD-MCNC: 13.2 G/DL (ref 12–16)
IMM GRANULOCYTES # BLD AUTO: 0.04 X10*3/UL (ref 0–0.5)
IMM GRANULOCYTES NFR BLD AUTO: 0.4 % (ref 0–0.9)
INR PPP: 1 (ref 0.9–1.1)
KETONES UR STRIP.AUTO-MCNC: NEGATIVE MG/DL
LEUKOCYTE ESTERASE UR QL STRIP.AUTO: NEGATIVE
LYMPHOCYTES # BLD AUTO: 1.79 X10*3/UL (ref 0.8–3)
LYMPHOCYTES NFR BLD AUTO: 19 %
MCH RBC QN AUTO: 29.1 PG (ref 26–34)
MCHC RBC AUTO-ENTMCNC: 33 G/DL (ref 32–36)
MCV RBC AUTO: 88 FL (ref 80–100)
MONOCYTES # BLD AUTO: 1.13 X10*3/UL (ref 0.05–0.8)
MONOCYTES NFR BLD AUTO: 12 %
NEUTROPHILS # BLD AUTO: 6.26 X10*3/UL (ref 1.6–5.5)
NEUTROPHILS NFR BLD AUTO: 66.7 %
NITRITE UR QL STRIP.AUTO: NEGATIVE
NRBC BLD-RTO: 0 /100 WBCS (ref 0–0)
OPIATES UR QL SCN: NORMAL
OXYCODONE+OXYMORPHONE UR QL SCN: NORMAL
P AXIS: 50 DEGREES
P OFFSET: 200 MS
P ONSET: 149 MS
PCP UR QL SCN: NORMAL
PH UR STRIP.AUTO: 6 [PH]
PLATELET # BLD AUTO: 160 X10*3/UL (ref 150–450)
POTASSIUM SERPL-SCNC: 3.9 MMOL/L (ref 3.5–5.3)
PR INTERVAL: 150 MS
PROT SERPL-MCNC: 5.9 G/DL (ref 6.4–8.2)
PROT UR STRIP.AUTO-MCNC: NEGATIVE MG/DL
PROTHROMBIN TIME: 10.7 SECONDS (ref 9.8–12.8)
Q ONSET: 224 MS
QRS COUNT: 12 BEATS
QRS DURATION: 86 MS
QT INTERVAL: 412 MS
QTC CALCULATION(BAZETT): 451 MS
QTC FREDERICIA: 438 MS
R AXIS: 8 DEGREES
RBC # BLD AUTO: 4.54 X10*6/UL (ref 4–5.2)
RBC # UR STRIP.AUTO: NEGATIVE /UL
SODIUM SERPL-SCNC: 139 MMOL/L (ref 136–145)
SP GR UR STRIP.AUTO: 1.02
T AXIS: 29 DEGREES
T OFFSET: 430 MS
UROBILINOGEN UR STRIP.AUTO-MCNC: <2 MG/DL
VENTRICULAR RATE: 72 BPM
WBC # BLD AUTO: 9.4 X10*3/UL (ref 4.4–11.3)

## 2024-01-15 PROCEDURE — 70496 CT ANGIOGRAPHY HEAD: CPT | Performed by: STUDENT IN AN ORGANIZED HEALTH CARE EDUCATION/TRAINING PROGRAM

## 2024-01-15 PROCEDURE — 70553 MRI BRAIN STEM W/O & W/DYE: CPT

## 2024-01-15 PROCEDURE — 70551 MRI BRAIN STEM W/O DYE: CPT

## 2024-01-15 PROCEDURE — 2550000001 HC RX 255 CONTRASTS: Performed by: EMERGENCY MEDICINE

## 2024-01-15 PROCEDURE — 70498 CT ANGIOGRAPHY NECK: CPT

## 2024-01-15 PROCEDURE — 83880 ASSAY OF NATRIURETIC PEPTIDE: CPT

## 2024-01-15 PROCEDURE — 80307 DRUG TEST PRSMV CHEM ANLYZR: CPT | Performed by: STUDENT IN AN ORGANIZED HEALTH CARE EDUCATION/TRAINING PROGRAM

## 2024-01-15 PROCEDURE — 84484 ASSAY OF TROPONIN QUANT: CPT | Performed by: STUDENT IN AN ORGANIZED HEALTH CARE EDUCATION/TRAINING PROGRAM

## 2024-01-15 PROCEDURE — 85610 PROTHROMBIN TIME: CPT | Performed by: STUDENT IN AN ORGANIZED HEALTH CARE EDUCATION/TRAINING PROGRAM

## 2024-01-15 PROCEDURE — 36415 COLL VENOUS BLD VENIPUNCTURE: CPT | Performed by: STUDENT IN AN ORGANIZED HEALTH CARE EDUCATION/TRAINING PROGRAM

## 2024-01-15 PROCEDURE — 93005 ELECTROCARDIOGRAM TRACING: CPT

## 2024-01-15 PROCEDURE — 83036 HEMOGLOBIN GLYCOSYLATED A1C: CPT

## 2024-01-15 PROCEDURE — 81003 URINALYSIS AUTO W/O SCOPE: CPT | Performed by: STUDENT IN AN ORGANIZED HEALTH CARE EDUCATION/TRAINING PROGRAM

## 2024-01-15 PROCEDURE — A9575 INJ GADOTERATE MEGLUMI 0.1ML: HCPCS | Performed by: EMERGENCY MEDICINE

## 2024-01-15 PROCEDURE — 80053 COMPREHEN METABOLIC PANEL: CPT | Performed by: STUDENT IN AN ORGANIZED HEALTH CARE EDUCATION/TRAINING PROGRAM

## 2024-01-15 PROCEDURE — 2500000001 HC RX 250 WO HCPCS SELF ADMINISTERED DRUGS (ALT 637 FOR MEDICARE OP)

## 2024-01-15 PROCEDURE — 85730 THROMBOPLASTIN TIME PARTIAL: CPT | Performed by: STUDENT IN AN ORGANIZED HEALTH CARE EDUCATION/TRAINING PROGRAM

## 2024-01-15 PROCEDURE — 82947 ASSAY GLUCOSE BLOOD QUANT: CPT

## 2024-01-15 PROCEDURE — 85025 COMPLETE CBC W/AUTO DIFF WBC: CPT | Performed by: STUDENT IN AN ORGANIZED HEALTH CARE EDUCATION/TRAINING PROGRAM

## 2024-01-15 PROCEDURE — 93010 ELECTROCARDIOGRAM REPORT: CPT | Performed by: EMERGENCY MEDICINE

## 2024-01-15 PROCEDURE — 70498 CT ANGIOGRAPHY NECK: CPT | Performed by: STUDENT IN AN ORGANIZED HEALTH CARE EDUCATION/TRAINING PROGRAM

## 2024-01-15 PROCEDURE — 70553 MRI BRAIN STEM W/O & W/DYE: CPT | Performed by: STUDENT IN AN ORGANIZED HEALTH CARE EDUCATION/TRAINING PROGRAM

## 2024-01-15 PROCEDURE — 2550000001 HC RX 255 CONTRASTS: Performed by: STUDENT IN AN ORGANIZED HEALTH CARE EDUCATION/TRAINING PROGRAM

## 2024-01-15 PROCEDURE — 70496 CT ANGIOGRAPHY HEAD: CPT

## 2024-01-15 PROCEDURE — 70450 CT HEAD/BRAIN W/O DYE: CPT

## 2024-01-15 PROCEDURE — 99291 CRITICAL CARE FIRST HOUR: CPT | Performed by: EMERGENCY MEDICINE

## 2024-01-15 PROCEDURE — 70551 MRI BRAIN STEM W/O DYE: CPT | Performed by: STUDENT IN AN ORGANIZED HEALTH CARE EDUCATION/TRAINING PROGRAM

## 2024-01-15 PROCEDURE — 1200000002 HC GENERAL ROOM WITH TELEMETRY DAILY

## 2024-01-15 RX ORDER — TIMOLOL MALEATE 5 MG/ML
1 SOLUTION/ DROPS OPHTHALMIC DAILY
Status: DISCONTINUED | OUTPATIENT
Start: 2024-01-16 | End: 2024-01-20 | Stop reason: HOSPADM

## 2024-01-15 RX ORDER — HYDRALAZINE HYDROCHLORIDE 20 MG/ML
10 INJECTION INTRAMUSCULAR; INTRAVENOUS
Status: DISCONTINUED | OUTPATIENT
Start: 2024-01-15 | End: 2024-01-17

## 2024-01-15 RX ORDER — LABETALOL HYDROCHLORIDE 5 MG/ML
10 INJECTION, SOLUTION INTRAVENOUS EVERY 10 MIN PRN
Status: DISCONTINUED | OUTPATIENT
Start: 2024-01-15 | End: 2024-01-17

## 2024-01-15 RX ORDER — PANTOPRAZOLE SODIUM 40 MG/1
40 TABLET, DELAYED RELEASE ORAL
Status: DISCONTINUED | OUTPATIENT
Start: 2024-01-16 | End: 2024-01-20 | Stop reason: HOSPADM

## 2024-01-15 RX ORDER — HYDRALAZINE HYDROCHLORIDE 25 MG/1
25 TABLET, FILM COATED ORAL EVERY 6 HOURS PRN
Status: DISCONTINUED | OUTPATIENT
Start: 2024-01-17 | End: 2024-01-17

## 2024-01-15 RX ORDER — LATANOPROST 50 UG/ML
1 SOLUTION/ DROPS OPHTHALMIC NIGHTLY
Status: DISCONTINUED | OUTPATIENT
Start: 2024-01-15 | End: 2024-01-20 | Stop reason: HOSPADM

## 2024-01-15 RX ORDER — LEVETIRACETAM 500 MG/1
500 TABLET ORAL 2 TIMES DAILY
Status: DISCONTINUED | OUTPATIENT
Start: 2024-01-16 | End: 2024-01-16

## 2024-01-15 RX ORDER — CARVEDILOL 12.5 MG/1
12.5 TABLET ORAL 2 TIMES DAILY
Status: DISCONTINUED | OUTPATIENT
Start: 2024-01-15 | End: 2024-01-20 | Stop reason: HOSPADM

## 2024-01-15 RX ORDER — POLYETHYLENE GLYCOL 3350 17 G/17G
17 POWDER, FOR SOLUTION ORAL DAILY
Status: DISCONTINUED | OUTPATIENT
Start: 2024-01-15 | End: 2024-01-20 | Stop reason: HOSPADM

## 2024-01-15 RX ORDER — LOSARTAN POTASSIUM 100 MG/1
100 TABLET ORAL DAILY
Status: DISCONTINUED | OUTPATIENT
Start: 2024-01-16 | End: 2024-01-19

## 2024-01-15 RX ORDER — GADOTERATE MEGLUMINE 376.9 MG/ML
12 INJECTION INTRAVENOUS
Status: COMPLETED | OUTPATIENT
Start: 2024-01-15 | End: 2024-01-15

## 2024-01-15 RX ORDER — CHOLECALCIFEROL (VITAMIN D3) 25 MCG
2000 TABLET ORAL DAILY
Status: DISCONTINUED | OUTPATIENT
Start: 2024-01-16 | End: 2024-01-20 | Stop reason: HOSPADM

## 2024-01-15 RX ORDER — HYDRALAZINE HYDROCHLORIDE 25 MG/1
25 TABLET, FILM COATED ORAL 2 TIMES DAILY
Status: DISCONTINUED | OUTPATIENT
Start: 2024-01-15 | End: 2024-01-20 | Stop reason: HOSPADM

## 2024-01-15 RX ORDER — ASPIRIN 81 MG/1
81 TABLET ORAL DAILY
Status: DISCONTINUED | OUTPATIENT
Start: 2024-01-15 | End: 2024-01-20 | Stop reason: HOSPADM

## 2024-01-15 RX ORDER — LEVETIRACETAM 15 MG/ML
1500 INJECTION INTRAVASCULAR ONCE
Status: COMPLETED | OUTPATIENT
Start: 2024-01-15 | End: 2024-01-16

## 2024-01-15 RX ORDER — ATORVASTATIN CALCIUM 40 MG/1
40 TABLET, FILM COATED ORAL NIGHTLY
Status: DISCONTINUED | OUTPATIENT
Start: 2024-01-15 | End: 2024-01-20 | Stop reason: HOSPADM

## 2024-01-15 RX ORDER — PANTOPRAZOLE SODIUM 40 MG/10ML
40 INJECTION, POWDER, LYOPHILIZED, FOR SOLUTION INTRAVENOUS
Status: DISCONTINUED | OUTPATIENT
Start: 2024-01-16 | End: 2024-01-20 | Stop reason: HOSPADM

## 2024-01-15 RX ADMIN — IOHEXOL 78 ML: 350 INJECTION, SOLUTION INTRAVENOUS at 18:20

## 2024-01-15 RX ADMIN — GADOTERATE MEGLUMINE 12 ML: 376.9 INJECTION INTRAVENOUS at 22:21

## 2024-01-15 RX ADMIN — ATORVASTATIN CALCIUM 40 MG: 40 TABLET, FILM COATED ORAL at 21:15

## 2024-01-15 RX ADMIN — HYDRALAZINE HYDROCHLORIDE 25 MG: 25 TABLET ORAL at 21:14

## 2024-01-15 RX ADMIN — CARVEDILOL 12.5 MG: 12.5 TABLET, FILM COATED ORAL at 21:15

## 2024-01-15 ASSESSMENT — LIFESTYLE VARIABLES
EVER HAD A DRINK FIRST THING IN THE MORNING TO STEADY YOUR NERVES TO GET RID OF A HANGOVER: NO
REASON UNABLE TO ASSESS: NO
EVER FELT BAD OR GUILTY ABOUT YOUR DRINKING: NO
HAVE YOU EVER FELT YOU SHOULD CUT DOWN ON YOUR DRINKING: NO
HAVE PEOPLE ANNOYED YOU BY CRITICIZING YOUR DRINKING: NO

## 2024-01-15 ASSESSMENT — VISUAL ACUITY: METHOD_CF: 1

## 2024-01-15 ASSESSMENT — ACTIVITIES OF DAILY LIVING (ADL)
HEARING - LEFT EAR: FUNCTIONAL
PATIENT'S MEMORY ADEQUATE TO SAFELY COMPLETE DAILY ACTIVITIES?: NO
WALKS IN HOME: INDEPENDENT
DRESSING YOURSELF: INDEPENDENT
JUDGMENT_ADEQUATE_SAFELY_COMPLETE_DAILY_ACTIVITIES: NO
TOILETING: INDEPENDENT
GROOMING: INDEPENDENT
BATHING: INDEPENDENT
HEARING - RIGHT EAR: FUNCTIONAL
FEEDING YOURSELF: INDEPENDENT
ADEQUATE_TO_COMPLETE_ADL: YES

## 2024-01-15 ASSESSMENT — COGNITIVE AND FUNCTIONAL STATUS - GENERAL
MOBILITY SCORE: 24
PATIENT BASELINE BEDBOUND: NO
DAILY ACTIVITIY SCORE: 24

## 2024-01-15 ASSESSMENT — PAIN SCALES - GENERAL
PAINLEVEL_OUTOF10: 0 - NO PAIN

## 2024-01-15 ASSESSMENT — PAIN - FUNCTIONAL ASSESSMENT
PAIN_FUNCTIONAL_ASSESSMENT: 0-10
PAIN_FUNCTIONAL_ASSESSMENT: 0-10

## 2024-01-15 NOTE — H&P
History Of Present Illness  Roslyn Cabrera is a 78 y.o. 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 Per mobile stroke team, patient went to feed her cats around 1200 and couldn't remember how to do so. Patient then took a nap and woke up around 1630 and called EMS. Mobile stroke reported a NIHSS of 3 scored for slight LLE drift, aphasia, and dysarthria. Pt was transferred to [facility] for stroke eval. BAT was called at 6:07pm On neurology exam,, NIHSS of 1 scored for aphasia. CT head and CTAH*N were repeated - and known LVO was found.      At baseline, patient lives alone and is able to perform all ADLs without assistance.   MRS:0  Last known well: 11:30AM  Had stroke symptoms resolved at time of presentation: No  No TNK as OOW, no MT as no LVO    Past Medical History  Past Medical History:   Diagnosis Date    Cataract extraction status, left eye 03/09/2016    Status post left cataract extraction    Cataract extraction status, right eye 03/09/2016    Status post right cataract extraction    Pain in leg, unspecified 03/18/2015    Leg pain    Peripheral vascular disease, unspecified (CMS/[facility]) 06/08/2016    Claudication    Personal history of nicotine dependence 03/18/2015    Quit smoking    Sciatica, unspecified side 09/16/2015    Acute sciatica    Tobacco use 06/08/2016    Tobacco abuse     Surgical History  Past Surgical History:   Procedure Laterality Date    OTHER SURGICAL HISTORY  02/24/2022    Knee replacement    OTHER SURGICAL HISTORY  02/24/2022    Cataract surgery     Social History  Social History     Tobacco Use    Smoking status: Every Day     Packs/day: .5     Types: Cigarettes     Passive exposure: Past    Smokeless tobacco: Never   Substance Use Topics    Alcohol use: Never    Drug use: Never     Allergies  Patient has no known  allergies.  Home Medications  (Not in a hospital admission)      Review of Systems  Neurological Exam  Mental Status  Awake, alert and oriented to person, place and time. Oriented to person, place, time and situation. Expressive aphasia present. Follows complex commands.    Cranial Nerves  CN II: Near vision tested. Right visual acuity: Counts fingers. Left visual acuity: Counts fingers.  CN III, IV, VI: Extraocular movements intact bilaterally. Pupils equal round and reactive to light bilaterally.  CN V: Facial sensation is normal.  CN VII: Full and symmetric facial movement.  CN VIII: Hearing is normal.  CN IX, X: Palate elevates symmetrically  CN XI: Shoulder shrug strength is normal.  CN XII: Tongue midline without atrophy or fasciculations.    Motor   Strength is 5/5 throughout all four extremities.    Physical Exam  Eyes:      Extraocular Movements: Extraocular movements intact.      Pupils: Pupils are equal, round, and reactive to light.   Neurological:      Motor: Motor strength is normal.    Reflexes 2+ throughout  FTN intact on RUE, was not able to test LUE    Last Recorded Vitals  Blood pressure (!) 196/96, pulse 69, temperature 36.6 °C (97.8 °F), resp. rate 16, height 1.524 m (5'), weight 59 kg (130 lb), SpO2 95 %.        Relevant Results  Scheduled medications    Continuous medications    PRN medications    Results for orders placed or performed during the hospital encounter of 01/15/24 (from the past 24 hour(s))   CBC and Auto Differential   Result Value Ref Range    WBC 9.4 4.4 - 11.3 x10*3/uL    nRBC 0.0 0.0 - 0.0 /100 WBCs    RBC 4.54 4.00 - 5.20 x10*6/uL    Hemoglobin 13.2 12.0 - 16.0 g/dL    Hematocrit 40.0 36.0 - 46.0 %    MCV 88 80 - 100 fL    MCH 29.1 26.0 - 34.0 pg    MCHC 33.0 32.0 - 36.0 g/dL    RDW 14.1 11.5 - 14.5 %    Platelets 160 150 - 450 x10*3/uL    Neutrophils % 66.7 40.0 - 80.0 %    Immature Granulocytes %, Automated 0.4 0.0 - 0.9 %    Lymphocytes % 19.0 13.0 - 44.0 %    Monocytes %  "12.0 2.0 - 10.0 %    Eosinophils % 1.5 0.0 - 6.0 %    Basophils % 0.4 0.0 - 2.0 %    Neutrophils Absolute 6.26 (H) 1.60 - 5.50 x10*3/uL    Immature Granulocytes Absolute, Automated 0.04 0.00 - 0.50 x10*3/uL    Lymphocytes Absolute 1.79 0.80 - 3.00 x10*3/uL    Monocytes Absolute 1.13 (H) 0.05 - 0.80 x10*3/uL    Eosinophils Absolute 0.14 0.00 - 0.40 x10*3/uL    Basophils Absolute 0.04 0.00 - 0.10 x10*3/uL   Protime-INR   Result Value Ref Range    Protime 10.7 9.8 - 12.8 seconds    INR 1.0 0.9 - 1.1   APTT   Result Value Ref Range    aPTT 26 (L) 27 - 38 seconds       NIH Stroke Scale  1A. Level of Consciousness: Alert, Keenly Responsive  1B. Ask Month and Age: Both Questions Right  1C. Blink Eyes & Squeeze Hands: Performs Both Tasks  2. Best Gaze: Normal  3. Visual: No Visual Loss  4. Facial Palsy: Normal Symmetrical Movements  5A. Motor - Left Arm: No Drift  5B. Motor - Right Arm: No Drift  6A. Motor - Left Leg: No Drift  6B. Motor - Right Leg: No Drift  7. Limb Ataxia: Absent  8. Sensory Loss: Normal  9. Best Language: Mild-to-Moderate Aphasia  10. Dysarthria: Normal  11. Extinction and Inattention: No Abnormality  NIH Stroke Scale: 1           [place] Coma Scale  Best Eye Response: Spontaneous  Best Verbal Response: Oriented  Best Motor Response: Follows commands  [place] Coma Scale Score: 15                 No MRI head results found for the past 14 days  No CT head results found for the past 14 days  No echocardiogram results found for the past 14 days        No results found for: \"BNP\"  I have personally reviewed the following imaging results CT BRAIN ATTACK WO IVCON    Result Date: 1/15/2024  * * *Final Report* * * DATE OF EXAM: Meet 15 2024  5:32PM   MUC   0502  -  CT BRAIN ATTACK WO IVCON  / ACCESSION #  [id number] PROCEDURE REASON: Focal neuro deficit, new, fixed, or worsening, < 4.5 hours, stroke suspected      * * * * Physician Interpretation * * * *  EXAMINATION: CT BRAIN ATTACK WO IVCON CLINICAL HISTORY: " Slurred speech Brain attack. TECHNIQUE: Routine CT of the brain without IV contrast. MQ: CTBA_4 CT Radiation dose: Integrated CT Dose-Length Product (DLP) for this visit =  1022 mGy*cm CT Dose Reduction Employed: No dose reduction techniques were required COMPARISON: None. RESULT: Acute ischemic change: Subtle hypoattenuation with indistinct gray-white differentiation in the left inferior parietal lobule and temporoparietal junction.  ASPECT Score = 9 Hemorrhage: No evidence of acute intracranial hemorrhage. ECASS hemorrhagic transformation score: Not Applicable Mass Lesion / Mass Effect: There is no evidence of an intracranial mass   or extraaxial fluid collection.   No significant mass effect. Chronic change: Scattered patchy foci of low attenuation are present within white matter which is a nonspecific finding but likely represents mild microvascular ischemia. Parenchyma: There is moderate generalized volume loss for age.  The brain parenchyma is otherwise within normal limits for age. Ventricles: The lateral, third, and fourth ventricles are enlarged, which could be seen in the setting of normal pressure hydrocephalus. Other: The visualized calvarium, skull base, orbits and extracranial soft tissues are normal.  (topogram) images: No significant findings.    IMPRESSION: Subtle transcortical low-attenuation in the left inferior parietal lobule and temporoparietal junction could represent acute infarct or volume averaging. CRITICAL TEST/RESULTS: Notification initiated at 1/15/2024 5:35 PM.   Communicated with Dr. Moscoso on 1/15/2024 5:35 PM . CR_1 : BISHNU   Transcribe Date/Time: Meet 15 2024  5:37P Dictated by : MARCO ANTONIO BRANDT MD This examination was interpreted and the report reviewed and electronically signed by: MARCO ANTONIO BRANDT MD on Meet 15 2024  5:41PM  EST  .     Stroke Alert CT/MRI review: Was interpreted as it was being performed     IV Thrombolysis IV Thrombolysis Checklist  IV  Thrombolysis Given: No; Thrombolysis contraindication reason: Time from Last Known Well (or stroke onset) is >4.5 hours      Assessment/Plan   Active Problems:  There are no active Hospital Problems.    Mr. Roslyn Cabrera is a 79 YO F with PMH of HTN, HLD, tobacco abuse, glaucoma complicated by visual impairment presenting with acute onset confusion and speech difficulty. On examination, patient found to have persistent wernicke's type aphasia (NIHSS 1). Per Hx - pt had Left sided weakness with drift which had resolved when examined by Neurology at [facility]. CT head w/o contrast shows global atrophy and advanced white matter disease. CTA shows no significant evidence of intracranial or extracranial disease. MR brain from 03/22 shows left occipital encephalomalacia suggestive of old infarct. Patient has history of visual hallucinations previously thought to be secondary to glaucoma and macular degeneration. At this time, we wonder if what we are witnessing is a inter/leander-ictal phenomenon secondary to seizure in the left hemisphere where a visual aura would be largely overlooked. Given history of positive T-spot with unknown treatment status, we would like contrasted imaging work-up prior to EEG.     Plan  #Wernicke's Type Aphasia of unclear etiology  #Encephalopathy   :: Stroke vs. Seizure vs. Infectious lesion  ::LDL 12/7/23: 97  ::Echo 2019: 70-75% EF, increased LV mass  - HbA1c sent  - Follow up lipid panel  - Follow up complete echo  - MR with contrast to r/o infectious lesions  - >1hr EEG following Imaging to evaluate for sz  - Sz precuations in place: Do not to drive, use power tools or operate heavy machinery, and should not be on ladders. Use the shower and not the bath. Likewise, refrain from any activity which could result in injury to themselves or others if they had a seizure or lost consciousness. These restrictions should continue until instructed by a doctor to do otherwise.  - Load Keppra 1.5 g IV  stat  - Start Keppra 500 mg BID (PO) following IV load    #History of Stroke  #Cerebrovascular disease  - Old lesions seen on CT head as well as MRI on Left thalamus, L occipital lobe  - Start Aspirin 81mg daily    #HLD  - Increase home Atorvastatin to 40 mg QHS    #Hx ofHTN  #Hypertensive Emergency  - SBP goal < 220  - Home hypertensives held   - PRN Hydralazine and Labetolol   - Home:  PO 25mg twice a day,  losartan PO 100mg every day, carvedilol PO 12.5mg every day     #Hx of Glaucoma  - Continue home latanoprost .005% 1 drop in each eye once a day  - Continue home timolol 0.5% 1 drop into each eye once a day    #Vitamin D, B12 deficiency  - Continue home cholecalciferol PO 125MCG once a day  - Last B12 shot in 05/23    F: Bolus PRN  E: K>4 Mg>2 Phos>3  N: Normal Diet  C: Full (Assumed)  A: PIV  P: SCDs    Gwyn Mendoza, MS3    I have reviewed and edited the information above and I agree with the assessment and plan as outlined by the medical student.    Briefly this is a 78-year-old female with significant vascular risk factors as well as history of tuberculosis treatment status unknown as well as bilateral optic nerve degeneration (follows with Dr. Dumont) who presents with acute onset speech difficulties.  Presented as a brain attack found to have Warnicke's type aphasia with NIH SS 1.  TNK deferred as patient was outside of the treatment window at time of presentation.  MRI brain without contrast reveals DWI signal in the left parietal/occipital with ADC correlate.  FLAIR sequences show mild involvement in the same area with some poorly demarcated edematous signal.  Vessel imaging grossly unremarkable.  Patient appears to have a clear mismatch between her clinical presentation and imaging to date.  Our differential at this time remains unilateral PRES versus acute infarct.  At this time we will proceed with routine stroke care with additional studies including contrasted MRI and EEG.    Case discussed briefly  with Dr. Jaquez. Plan finalized one signed by attending.     Shameka Cordero MD  PGY-3 Neurology  Stroke [id number]

## 2024-01-15 NOTE — ED TRIAGE NOTES
Patient to ED with [facility] mobile stroke. Squad reports patient's last known well around 1130 today. Patient reports sudden onset confusion- went to feed her cats and couldn't remember how to do so. Patient then took a nap and woke up around 1630 and called EMS. Mobile stroke reportsd an NIH of 3- scored for slight LLE drift, aphasia, and dysarthria. Patient straight to CT with stroke team. NIH of 1- scored for aphasia. No immediate intervention per neuro-stroke at bedside.

## 2024-01-16 ENCOUNTER — APPOINTMENT (OUTPATIENT)
Dept: NEUROLOGY | Facility: HOSPITAL | Age: 79
DRG: 041 | End: 2024-01-16
Payer: MEDICARE

## 2024-01-16 ENCOUNTER — APPOINTMENT (OUTPATIENT)
Dept: CARDIOLOGY | Facility: HOSPITAL | Age: 79
DRG: 041 | End: 2024-01-16
Payer: MEDICARE

## 2024-01-16 PROBLEM — I63.9 ACUTE ISCHEMIC STROKE (MULTI): Status: ACTIVE | Noted: 2024-01-15

## 2024-01-16 PROBLEM — I63.412 STROKE DUE TO EMBOLISM OF LEFT MIDDLE CEREBRAL ARTERY (MULTI): Status: ACTIVE | Noted: 2024-01-15

## 2024-01-16 LAB
AORTIC VALVE PEAK VELOCITY: 1.41
AV PEAK GRADIENT: 8
AVA (PEAK VEL): 2.87
BODY SURFACE AREA: 1.61 M2
EJECTION FRACTION APICAL 4 CHAMBER: 77.5
EJECTION FRACTION: 79
GLUCOSE BLD MANUAL STRIP-MCNC: 148 MG/DL (ref 74–99)
GLUCOSE BLD MANUAL STRIP-MCNC: 77 MG/DL (ref 74–99)
HOLD SPECIMEN: NORMAL
LEFT ATRIUM VOLUME AREA LENGTH INDEX BSA: 53
LEFT VENTRICLE INTERNAL DIMENSION DIASTOLE: 4.3 (ref 3.5–6)
LEFT VENTRICULAR OUTFLOW TRACT DIAMETER: 2
MITRAL VALVE E/A RATIO: 0.59
MITRAL VALVE E/E' RATIO: 17.11
RIGHT VENTRICLE FREE WALL PEAK S': 12.8
RIGHT VENTRICLE PEAK SYSTOLIC PRESSURE: 26
TRICUSPID ANNULAR PLANE SYSTOLIC EXCURSION: 1.9

## 2024-01-16 PROCEDURE — 95813 EEG EXTND MNTR 61-119 MIN: CPT

## 2024-01-16 PROCEDURE — 99222 1ST HOSP IP/OBS MODERATE 55: CPT | Performed by: PHYSICIAN ASSISTANT

## 2024-01-16 PROCEDURE — 97535 SELF CARE MNGMENT TRAINING: CPT | Mod: GO

## 2024-01-16 PROCEDURE — 1200000002 HC GENERAL ROOM WITH TELEMETRY DAILY

## 2024-01-16 PROCEDURE — 2500000004 HC RX 250 GENERAL PHARMACY W/ HCPCS (ALT 636 FOR OP/ED): Mod: MUE

## 2024-01-16 PROCEDURE — 0JH602Z INSERTION OF MONITORING DEVICE INTO CHEST SUBCUTANEOUS TISSUE AND FASCIA, OPEN APPROACH: ICD-10-PCS | Performed by: INTERNAL MEDICINE

## 2024-01-16 PROCEDURE — 97530 THERAPEUTIC ACTIVITIES: CPT | Mod: GP | Performed by: PHYSICAL THERAPIST

## 2024-01-16 PROCEDURE — 33285 INSJ SUBQ CAR RHYTHM MNTR: CPT | Performed by: INTERNAL MEDICINE

## 2024-01-16 PROCEDURE — 2500000001 HC RX 250 WO HCPCS SELF ADMINISTERED DRUGS (ALT 637 FOR MEDICARE OP)

## 2024-01-16 PROCEDURE — 97162 PT EVAL MOD COMPLEX 30 MIN: CPT | Mod: GP | Performed by: PHYSICAL THERAPIST

## 2024-01-16 PROCEDURE — 2500000004 HC RX 250 GENERAL PHARMACY W/ HCPCS (ALT 636 FOR OP/ED)

## 2024-01-16 PROCEDURE — 93306 TTE W/DOPPLER COMPLETE: CPT | Performed by: INTERNAL MEDICINE

## 2024-01-16 PROCEDURE — 97165 OT EVAL LOW COMPLEX 30 MIN: CPT | Mod: GO

## 2024-01-16 PROCEDURE — C1764 EVENT RECORDER, CARDIAC: HCPCS | Performed by: INTERNAL MEDICINE

## 2024-01-16 PROCEDURE — 95813 EEG EXTND MNTR 61-119 MIN: CPT | Performed by: PSYCHIATRY & NEUROLOGY

## 2024-01-16 PROCEDURE — 99223 1ST HOSP IP/OBS HIGH 75: CPT | Performed by: STUDENT IN AN ORGANIZED HEALTH CARE EDUCATION/TRAINING PROGRAM

## 2024-01-16 PROCEDURE — 2500000004 HC RX 250 GENERAL PHARMACY W/ HCPCS (ALT 636 FOR OP/ED): Performed by: INTERNAL MEDICINE

## 2024-01-16 PROCEDURE — 2780000003 HC OR 278 NO HCPCS: Performed by: INTERNAL MEDICINE

## 2024-01-16 PROCEDURE — 92523 SPEECH SOUND LANG COMPREHEN: CPT | Mod: GN

## 2024-01-16 PROCEDURE — 93306 TTE W/DOPPLER COMPLETE: CPT

## 2024-01-16 PROCEDURE — 82947 ASSAY GLUCOSE BLOOD QUANT: CPT

## 2024-01-16 PROCEDURE — 2500000004 HC RX 250 GENERAL PHARMACY W/ HCPCS (ALT 636 FOR OP/ED): Performed by: STUDENT IN AN ORGANIZED HEALTH CARE EDUCATION/TRAINING PROGRAM

## 2024-01-16 DEVICE — ICM LNQ22 LINQ II USA
Type: IMPLANTABLE DEVICE | Site: CHEST  WALL | Status: FUNCTIONAL
Brand: LINQ II™

## 2024-01-16 RX ORDER — BUPIVACAINE HYDROCHLORIDE 5 MG/ML
INJECTION, SOLUTION EPIDURAL; INTRACAUDAL AS NEEDED
Status: DISCONTINUED | OUTPATIENT
Start: 2024-01-16 | End: 2024-01-16 | Stop reason: HOSPADM

## 2024-01-16 RX ADMIN — LEVETIRACETAM 1500 MG: 15 INJECTION INTRAVASCULAR at 02:07

## 2024-01-16 RX ADMIN — Medication 2000 UNITS: at 08:48

## 2024-01-16 RX ADMIN — TIMOLOL MALEATE 1 DROP: 5 SOLUTION/ DROPS OPHTHALMIC at 12:18

## 2024-01-16 RX ADMIN — PERFLUTREN 1.5 ML OF DILUTION: 6.52 INJECTION, SUSPENSION INTRAVENOUS at 14:54

## 2024-01-16 RX ADMIN — ASPIRIN 81 MG: 81 TABLET, COATED ORAL at 08:48

## 2024-01-16 RX ADMIN — LATANOPROST 1 DROP: 50 SOLUTION OPHTHALMIC at 20:18

## 2024-01-16 RX ADMIN — PANTOPRAZOLE SODIUM 40 MG: 40 TABLET, DELAYED RELEASE ORAL at 08:48

## 2024-01-16 RX ADMIN — POLYETHYLENE GLYCOL 3350 17 G: 17 POWDER, FOR SOLUTION ORAL at 08:57

## 2024-01-16 RX ADMIN — ATORVASTATIN CALCIUM 40 MG: 40 TABLET, FILM COATED ORAL at 21:06

## 2024-01-16 SDOH — ECONOMIC STABILITY: INCOME INSECURITY: IN THE LAST 12 MONTHS, WAS THERE A TIME WHEN YOU WERE NOT ABLE TO PAY THE MORTGAGE OR RENT ON TIME?: NO

## 2024-01-16 SDOH — SOCIAL STABILITY: SOCIAL INSECURITY: WITHIN THE LAST YEAR, HAVE YOU BEEN HUMILIATED OR EMOTIONALLY ABUSED IN OTHER WAYS BY YOUR PARTNER OR EX-PARTNER?: NO

## 2024-01-16 SDOH — SOCIAL STABILITY: SOCIAL NETWORK
DO YOU BELONG TO ANY CLUBS OR ORGANIZATIONS SUCH AS CHURCH GROUPS UNIONS, FRATERNAL OR ATHLETIC GROUPS, OR SCHOOL GROUPS?: NO

## 2024-01-16 SDOH — ECONOMIC STABILITY: INCOME INSECURITY: HOW HARD IS IT FOR YOU TO PAY FOR THE VERY BASICS LIKE FOOD, HOUSING, MEDICAL CARE, AND HEATING?: VERY HARD

## 2024-01-16 SDOH — SOCIAL STABILITY: SOCIAL INSECURITY: ABUSE: ADULT

## 2024-01-16 SDOH — SOCIAL STABILITY: SOCIAL NETWORK: ARE YOU MARRIED, WIDOWED, DIVORCED, SEPARATED, NEVER MARRIED, OR LIVING WITH A PARTNER?: NEVER MARRIED

## 2024-01-16 SDOH — SOCIAL STABILITY: SOCIAL INSECURITY: ARE YOU OR HAVE YOU BEEN THREATENED OR ABUSED PHYSICALLY, EMOTIONALLY, OR SEXUALLY BY ANYONE?: NO

## 2024-01-16 SDOH — HEALTH STABILITY: PHYSICAL HEALTH: ON AVERAGE, HOW MANY DAYS PER WEEK DO YOU ENGAGE IN MODERATE TO STRENUOUS EXERCISE (LIKE A BRISK WALK)?: 5 DAYS

## 2024-01-16 SDOH — SOCIAL STABILITY: SOCIAL INSECURITY
WITHIN THE LAST YEAR, HAVE TO BEEN RAPED OR FORCED TO HAVE ANY KIND OF SEXUAL ACTIVITY BY YOUR PARTNER OR EX-PARTNER?: NO

## 2024-01-16 SDOH — ECONOMIC STABILITY: FOOD INSECURITY: WITHIN THE PAST 12 MONTHS, THE FOOD YOU BOUGHT JUST DIDN'T LAST AND YOU DIDN'T HAVE MONEY TO GET MORE.: NEVER TRUE

## 2024-01-16 SDOH — ECONOMIC STABILITY: HOUSING INSECURITY
IN THE LAST 12 MONTHS, WAS THERE A TIME WHEN YOU DID NOT HAVE A STEADY PLACE TO SLEEP OR SLEPT IN A SHELTER (INCLUDING NOW)?: NO

## 2024-01-16 SDOH — SOCIAL STABILITY: SOCIAL INSECURITY
WITHIN THE LAST YEAR, HAVE YOU BEEN KICKED, HIT, SLAPPED, OR OTHERWISE PHYSICALLY HURT BY YOUR PARTNER OR EX-PARTNER?: NO

## 2024-01-16 SDOH — HEALTH STABILITY: MENTAL HEALTH: HOW OFTEN DO YOU HAVE 6 OR MORE DRINKS ON ONE OCCASION?: NEVER

## 2024-01-16 SDOH — SOCIAL STABILITY: SOCIAL INSECURITY: HAS ANYONE EVER THREATENED TO HURT YOUR FAMILY OR YOUR PETS?: NO

## 2024-01-16 SDOH — HEALTH STABILITY: PHYSICAL HEALTH: ON AVERAGE, HOW MANY DAYS PER WEEK DO YOU ENGAGE IN MODERATE TO STRENUOUS EXERCISE (LIKE A BRISK WALK)?: 0 DAYS

## 2024-01-16 SDOH — ECONOMIC STABILITY: FOOD INSECURITY: WITHIN THE PAST 12 MONTHS, YOU WORRIED THAT YOUR FOOD WOULD RUN OUT BEFORE YOU GOT MONEY TO BUY MORE.: NEVER TRUE

## 2024-01-16 SDOH — SOCIAL STABILITY: SOCIAL INSECURITY: DO YOU FEEL UNSAFE GOING BACK TO THE PLACE WHERE YOU ARE LIVING?: NO

## 2024-01-16 SDOH — SOCIAL STABILITY: SOCIAL NETWORK: HOW OFTEN DO YOU ATTEND CHURCH OR RELIGIOUS SERVICES?: NEVER

## 2024-01-16 SDOH — SOCIAL STABILITY: SOCIAL INSECURITY: DOES ANYONE TRY TO KEEP YOU FROM HAVING/CONTACTING OTHER FRIENDS OR DOING THINGS OUTSIDE YOUR HOME?: NO

## 2024-01-16 SDOH — HEALTH STABILITY: MENTAL HEALTH
STRESS IS WHEN SOMEONE FEELS TENSE, NERVOUS, ANXIOUS, OR CAN'T SLEEP AT NIGHT BECAUSE THEIR MIND IS TROUBLED. HOW STRESSED ARE YOU?: ONLY A LITTLE

## 2024-01-16 SDOH — SOCIAL STABILITY: SOCIAL NETWORK: HOW OFTEN DO YOU GET TOGETHER WITH FRIENDS OR RELATIVES?: MORE THAN THREE TIMES A WEEK

## 2024-01-16 SDOH — SOCIAL STABILITY: SOCIAL INSECURITY: WITHIN THE LAST YEAR, HAVE YOU BEEN AFRAID OF YOUR PARTNER OR EX-PARTNER?: NO

## 2024-01-16 SDOH — SOCIAL STABILITY: SOCIAL INSECURITY: WERE YOU ABLE TO COMPLETE ALL THE BEHAVIORAL HEALTH SCREENINGS?: YES

## 2024-01-16 SDOH — HEALTH STABILITY: MENTAL HEALTH
STRESS IS WHEN SOMEONE FEELS TENSE, NERVOUS, ANXIOUS, OR CAN'T SLEEP AT NIGHT BECAUSE THEIR MIND IS TROUBLED. HOW STRESSED ARE YOU?: NOT AT ALL

## 2024-01-16 SDOH — ECONOMIC STABILITY: TRANSPORTATION INSECURITY
IN THE PAST 12 MONTHS, HAS THE LACK OF TRANSPORTATION KEPT YOU FROM MEDICAL APPOINTMENTS OR FROM GETTING MEDICATIONS?: NO

## 2024-01-16 SDOH — SOCIAL STABILITY: SOCIAL NETWORK
DO YOU BELONG TO ANY CLUBS OR ORGANIZATIONS SUCH AS CHURCH GROUPS UNIONS, FRATERNAL OR ATHLETIC GROUPS, OR SCHOOL GROUPS?: YES

## 2024-01-16 SDOH — HEALTH STABILITY: PHYSICAL HEALTH: ON AVERAGE, HOW MANY MINUTES DO YOU ENGAGE IN EXERCISE AT THIS LEVEL?: 50 MIN

## 2024-01-16 SDOH — ECONOMIC STABILITY: TRANSPORTATION INSECURITY
IN THE PAST 12 MONTHS, HAS LACK OF TRANSPORTATION KEPT YOU FROM MEETINGS, WORK, OR FROM GETTING THINGS NEEDED FOR DAILY LIVING?: NO

## 2024-01-16 SDOH — ECONOMIC STABILITY: HOUSING INSECURITY: IN THE LAST 12 MONTHS, HOW MANY PLACES HAVE YOU LIVED?: 1

## 2024-01-16 SDOH — SOCIAL STABILITY: SOCIAL INSECURITY: HAVE YOU HAD THOUGHTS OF HARMING ANYONE ELSE?: NO

## 2024-01-16 SDOH — HEALTH STABILITY: MENTAL HEALTH: HOW OFTEN DO YOU HAVE A DRINK CONTAINING ALCOHOL?: NEVER

## 2024-01-16 SDOH — SOCIAL STABILITY: SOCIAL NETWORK: HOW OFTEN DO YOU ATTENT MEETINGS OF THE CLUB OR ORGANIZATION YOU BELONG TO?: NEVER

## 2024-01-16 SDOH — SOCIAL STABILITY: SOCIAL NETWORK
IN A TYPICAL WEEK, HOW MANY TIMES DO YOU TALK ON THE PHONE WITH FAMILY, FRIENDS, OR NEIGHBORS?: MORE THAN THREE TIMES A WEEK

## 2024-01-16 SDOH — SOCIAL STABILITY: SOCIAL NETWORK: HOW OFTEN DO YOU GET TOGETHER WITH FRIENDS OR RELATIVES?: ONCE A WEEK

## 2024-01-16 SDOH — ECONOMIC STABILITY: INCOME INSECURITY: IN THE PAST 12 MONTHS, HAS THE ELECTRIC, GAS, OIL, OR WATER COMPANY THREATENED TO SHUT OFF SERVICE IN YOUR HOME?: NO

## 2024-01-16 SDOH — SOCIAL STABILITY: SOCIAL INSECURITY: DO YOU FEEL ANYONE HAS EXPLOITED OR TAKEN ADVANTAGE OF YOU FINANCIALLY OR OF YOUR PERSONAL PROPERTY?: NO

## 2024-01-16 SDOH — HEALTH STABILITY: MENTAL HEALTH: HOW MANY STANDARD DRINKS CONTAINING ALCOHOL DO YOU HAVE ON A TYPICAL DAY?: PATIENT DOES NOT DRINK

## 2024-01-16 SDOH — HEALTH STABILITY: PHYSICAL HEALTH: ON AVERAGE, HOW MANY MINUTES DO YOU ENGAGE IN EXERCISE AT THIS LEVEL?: 0 MIN

## 2024-01-16 SDOH — SOCIAL STABILITY: SOCIAL NETWORK: HOW OFTEN DO YOU ATTENT MEETINGS OF THE CLUB OR ORGANIZATION YOU BELONG TO?: MORE THAN 4 TIMES PER YEAR

## 2024-01-16 SDOH — SOCIAL STABILITY: SOCIAL INSECURITY: ARE THERE ANY APPARENT SIGNS OF INJURIES/BEHAVIORS THAT COULD BE RELATED TO ABUSE/NEGLECT?: NO

## 2024-01-16 SDOH — SOCIAL STABILITY: SOCIAL NETWORK: IN A TYPICAL WEEK, HOW MANY TIMES DO YOU TALK ON THE PHONE WITH FAMILY, FRIENDS, OR NEIGHBORS?: ONCE A WEEK

## 2024-01-16 SDOH — SOCIAL STABILITY: SOCIAL NETWORK: HOW OFTEN DO YOU ATTEND CHURCH OR RELIGIOUS SERVICES?: MORE THAN 4 TIMES PER YEAR

## 2024-01-16 ASSESSMENT — PATIENT HEALTH QUESTIONNAIRE - PHQ9
1. LITTLE INTEREST OR PLEASURE IN DOING THINGS: NOT AT ALL
2. FEELING DOWN, DEPRESSED OR HOPELESS: NOT AT ALL
SUM OF ALL RESPONSES TO PHQ9 QUESTIONS 1 & 2: 0

## 2024-01-16 ASSESSMENT — COGNITIVE AND FUNCTIONAL STATUS - GENERAL
CLIMB 3 TO 5 STEPS WITH RAILING: TOTAL
MOVING TO AND FROM BED TO CHAIR: A LITTLE
DRESSING REGULAR LOWER BODY CLOTHING: A LITTLE
PERSONAL GROOMING: A LITTLE
MOVING TO AND FROM BED TO CHAIR: A LITTLE
EATING MEALS: A LITTLE
MOBILITY SCORE: 16
TURNING FROM BACK TO SIDE WHILE IN FLAT BAD: A LITTLE
MOVING FROM LYING ON BACK TO SITTING ON SIDE OF FLAT BED WITH BEDRAILS: A LITTLE
HELP NEEDED FOR BATHING: A LITTLE
DRESSING REGULAR UPPER BODY CLOTHING: A LITTLE
TURNING FROM BACK TO SIDE WHILE IN FLAT BAD: A LITTLE
EATING MEALS: A LITTLE
STANDING UP FROM CHAIR USING ARMS: A LITTLE
WALKING IN HOSPITAL ROOM: A LITTLE
PERSONAL GROOMING: A LITTLE
DRESSING REGULAR LOWER BODY CLOTHING: A LITTLE
TOILETING: A LITTLE
MOVING FROM LYING ON BACK TO SITTING ON SIDE OF FLAT BED WITH BEDRAILS: A LITTLE
STANDING UP FROM CHAIR USING ARMS: A LITTLE
DAILY ACTIVITIY SCORE: 18
TOILETING: A LITTLE
MOBILITY SCORE: 16
DRESSING REGULAR UPPER BODY CLOTHING: A LITTLE
HELP NEEDED FOR BATHING: A LITTLE
WALKING IN HOSPITAL ROOM: A LITTLE
DAILY ACTIVITIY SCORE: 18
CLIMB 3 TO 5 STEPS WITH RAILING: TOTAL

## 2024-01-16 ASSESSMENT — COLUMBIA-SUICIDE SEVERITY RATING SCALE - C-SSRS
1. IN THE PAST MONTH, HAVE YOU WISHED YOU WERE DEAD OR WISHED YOU COULD GO TO SLEEP AND NOT WAKE UP?: NO
2. HAVE YOU ACTUALLY HAD ANY THOUGHTS OF KILLING YOURSELF?: NO
6. HAVE YOU EVER DONE ANYTHING, STARTED TO DO ANYTHING, OR PREPARED TO DO ANYTHING TO END YOUR LIFE?: NO

## 2024-01-16 ASSESSMENT — LIFESTYLE VARIABLES
SKIP TO QUESTIONS 9-10: 1
PRESCIPTION_ABUSE_PAST_12_MONTHS: NO
SKIP TO QUESTIONS 9-10: 1
SUBSTANCE_ABUSE_PAST_12_MONTHS: NO
AUDIT-C TOTAL SCORE: 0
AUDIT-C TOTAL SCORE: 0
HOW MANY STANDARD DRINKS CONTAINING ALCOHOL DO YOU HAVE ON A TYPICAL DAY: PATIENT DOES NOT DRINK
AUDIT-C TOTAL SCORE: 0
HOW OFTEN DO YOU HAVE 6 OR MORE DRINKS ON ONE OCCASION: NEVER
HOW OFTEN DO YOU HAVE A DRINK CONTAINING ALCOHOL: NEVER

## 2024-01-16 ASSESSMENT — PAIN - FUNCTIONAL ASSESSMENT
PAIN_FUNCTIONAL_ASSESSMENT: 0-10

## 2024-01-16 ASSESSMENT — ACTIVITIES OF DAILY LIVING (ADL)
LACK_OF_TRANSPORTATION: NO
HOME_MANAGEMENT_TIME_ENTRY: 10
LACK_OF_TRANSPORTATION: NO

## 2024-01-16 ASSESSMENT — PAIN SCALES - GENERAL
PAINLEVEL_OUTOF10: 0 - NO PAIN

## 2024-01-16 NOTE — ED PROVIDER NOTES
HPI   Chief Complaint   Patient presents with    Stroke       HPI     Patient is a 78-year-old female with a past medical history of hypertension, dyslipidemia, rheumatoid arthritis, glaucoma, latent TB presenting to the emergency department as a code stroke.  Patient was initially picked up by the mobile stroke team and reports that the patient's last known normal was 1200 on 1/15/2024.  It took a nap and then woke up with new symptoms of left lower extremity drift, aphasia, and dysarthria.  CT head was reported by the mobile stroke team is being negative in their unit.  On presentation, patient's NIH was 1 for aphasia, GCS of 14 1 off for confusion.  No outside historians immediately available for further history except for EMS.  Patient was sent to CT for further evaluation as a code stroke.  Van positive given her borderline left lower extremity drift combined with aphasia.               Friend Coma Scale Score: 14         NIH Stroke Scale: 3          Patient History   Past Medical History:   Diagnosis Date    Cataract extraction status, left eye 03/09/2016    Status post left cataract extraction    Cataract extraction status, right eye 03/09/2016    Status post right cataract extraction    COPD (chronic obstructive pulmonary disease) (CMS/[facility])     Hypertension     Pain in leg, unspecified 03/18/2015    Leg pain    Peripheral vascular disease, unspecified (CMS/[facility]) 06/08/2016    Claudication    Personal history of nicotine dependence 03/18/2015    Quit smoking    Sciatica, unspecified side 09/16/2015    Acute sciatica    Tobacco use 06/08/2016    Tobacco abuse     Past Surgical History:   Procedure Laterality Date    EYE SURGERY      JOINT REPLACEMENT      OTHER SURGICAL HISTORY  02/24/2022    Knee replacement    OTHER SURGICAL HISTORY  02/24/2022    Cataract surgery     No family history on file.  Social History     Tobacco Use    Smoking status: Every Day     Packs/day: .5     Types: Cigarettes     Passive  exposure: Past    Smokeless tobacco: Never   Substance Use Topics    Alcohol use: Never    Drug use: Never       Physical Exam   ED Triage Vitals   Temp Heart Rate Resp BP   01/15/24 1829 01/15/24 1829 01/15/24 1829 01/15/24 1829   36.6 °C (97.8 °F) 72 16 (!) 113/96      SpO2 Temp Source Heart Rate Source Patient Position   01/15/24 1829 01/15/24 2341 01/15/24 1858 01/15/24 1858   95 % Temporal Monitor Lying      BP Location FiO2 (%)     01/15/24 1858 --     Left arm        Physical Exam  Constitutional:       General: She is not in acute distress.     Appearance: She is normal weight. She is not toxic-appearing or diaphoretic.   HENT:      Head: Normocephalic and atraumatic.      Nose: Nose normal.   Eyes:      General: No scleral icterus.        Right eye: No discharge.         Left eye: No discharge.      Conjunctiva/sclera: Conjunctivae normal.   Cardiovascular:      Rate and Rhythm: Normal rate.      Heart sounds: No murmur heard.     No friction rub. No gallop.   Pulmonary:      Effort: No respiratory distress.      Breath sounds: Normal breath sounds.   Abdominal:      General: There is no distension.      Palpations: Abdomen is soft.   Musculoskeletal:         General: No deformity or signs of injury.      Cervical back: Neck supple. No rigidity.   Skin:     General: Skin is warm and dry.   Neurological:      General: No focal deficit present.      Mental Status: She is alert and oriented to person, place, and time.      Comments: Confused, GCS of 14.  NIH stroke scale of 1 for aphasia.  5 out of 5 strength of bilateral biceps, triceps, dorsiflexion, plantarflexion.  Intact sensation to light touch in all 4 extremities.  No facial droop, arm drift.   Psychiatric:         Mood and Affect: Mood normal.         Behavior: Behavior normal.         ED Course & MDM   Diagnoses as of 01/16/24 0151   Aphasia   EKG showing normal sinus rate and rhythm, normal axis, normal intervals, no signs of acute ST elevation or  depression.  T wave inversions in leads V1-V6 and 3 are unchanged when compared to previous EKG 25 August 2022    Medical Decision Making  Patient is a 78-year-old female presenting to the emergency department initially as a code stroke.  Patient was outside of the treatment window for TNK, so this was not given.  VAN positive for send so sent for CT head and CTA imaging.  This resulted and showed no evidence of a large vessel occlusion.  Given this not a thrombectomy candidate.  Patient was discussed with neurology who had a greater suspicion for a possible seizure or other nonacute etiology.  Patient was sent for MRI imaging which resulted and did show more definitive evidence of a stroke.  Patient will be admitted to the neurology service for further Po stroke management as well as further EEG and MRI with contrast to evaluate for possible alternative etiologies to explain the patient's symptoms.  Patient was admitted in hemodynamically stable condition for further evaluation and management as well as close neurological monitoring.    Procedure  Critical Care    Performed by: Abhi Aguilar MD MPH  Authorized by: Abhi Aguilar MD MPH    Critical care provider statement:     Critical care time (minutes):  35    Critical care time was exclusive of:  Separately billable procedures and treating other patients and teaching time    Critical care was necessary to treat or prevent imminent or life-threatening deterioration of the following conditions:  CNS failure or compromise (acute stroke)    Critical care was time spent personally by me on the following activities:  Blood draw for specimens, discussions with consultants, examination of patient, interpretation of cardiac output measurements, pulse oximetry, re-evaluation of patient's condition, review of old charts, ordering and review of radiographic studies and ordering and review of laboratory studies    I assumed direction of critical care for this  patient from another provider in my specialty: no      Care discussed with: admitting provider         Kevyn Rodriguez MD  Resident  01/16/24 0157      -------------------------------------------  This patient was seen by the resident physician.  I have seen and examined the patient, agree with the workup, evaluation, management and diagnosis. I reviewed and edited the above documentation where necessary.     I have looked at the EKG and agree with the interpretation.    Abhi Aguilar MD   Attending Physician        Abhi Aguilar MD MPH  01/17/24 9712       Abhi Aguilar MD MPH  01/17/24 2473

## 2024-01-16 NOTE — PROGRESS NOTES
Occupational Therapy                 Therapy Communication Note    Patient Name: Roslyn Cabrera  MRN: [id number]  Today's Date: 1/16/2024     Discipline: Occupational Therapy    Missed Visit Reason: Missed Visit Reason:  (hold per RN 1 HR EEG will re att time permitting)    Missed Time: Attempt

## 2024-01-16 NOTE — PROGRESS NOTES
"Speech-Language Pathology  Adult Inpatient Speech/Language/Cognitive Evaluation    Patient Name: Roslyn Cabrera  MRN: [id number]  Today's Date: 2024   Start Time: 1200  Stop Time: 1230  Time Calculation (min): 30    History of Present Illness:   79 YO F with PMH of HTN, HLD, tobacco abuse, glaucoma complicated by visual impairment presenting with acute onset confusion and speech difficulty. On examination, patient found to have persistent wernicke's type aphasia (NIHSS 1). Per Hx - pt had Left sided weakness with drift which had resolved when examined by Neurology at [facility]. CT head w/o contrast shows global atrophy and advanced white matter disease. CTA shows no significant evidence of intracranial or extracranial disease. MR brain from  shows left occipital encephalomalacia suggestive of old infarct. Repeat MR with contrast shows new diffusion restriction in left parietal and left occipital regions in distal MCA distribution. Patient has history of visual hallucinations previously thought to be secondary to glaucoma and macular degeneration. Patient's wernicke's type aphasia can be attributed to affected infarcted region.     Assessment:   Language evaluation completed.     Receptive Language:  Simple commands:   0/4  Biographical y/n:   4/4 - answered \"oui\" with all affirmative answers.   Complex y/n:   0/4    Expressive Language:   Automatic speech:  0/3 - attempted answers in Turkish  Word repetition:   0/4  Responsive namin/4  Confrontational namin/5    Written Language:  Unable to write name  Unable to write birthday    Patient able to converse in daily pleasantries appropriately; spontaneous speech largely appropriate with intermittent moments of anomia/neologisms.      When demands placed on patient for language evaluation, patient speaking in Turkish with fluency and neologisms.  Perseveration noted throughout session.  Family present stating patient did take Turkish lessons but was not " "primary language.  Patient demonstrated awareness of errors throughout session.     During simple commands task (e.g., 'point to the ceiling') patient would shift eyes up to ceiling and say \"ok.\"  Modeling of point for requested behavior; patient required hand over hand to achieve.  No carryover with subsequent tasks.      Patient is demonstrating fluent aphasia with characteristics of bilingual aphasia and ?ideomotor apraxia.       Recommend Speech Therapy Services:   Yes; Targeting Language    Goal(s):   Patient will participate in speech/language treatment sessions in 100% of visits during length of stay.     Plan:  SLP Services Indicated: Yes  Frequency: 2x week  Discussed POC with patient and daughter  SLP - OK to Discharge    Pain:   0-10  0 = No pain.     Inpatient Education:  Extensive education provided to patient and daughter regarding current speech/language/cognitive function, recommendations/results, and POC.      Consultations/Referrals/Coordination of Services:   N/A    "

## 2024-01-16 NOTE — PROGRESS NOTES
"Roslyn Cabrera is a 78 y.o. female on day 1 of admission presenting with Aphasia.    Subjective     Interval Events- None       Objective     Last Recorded Vitals  Blood pressure 125/77, pulse 67, temperature 36.2 °C (97.2 °F), resp. rate 18, height 1.575 m (5' 2\"), weight 59.4 kg (131 lb 1 oz), SpO2 91 %.    Physical Exam  Eyes:      Extraocular Movements: Extraocular movements intact.      Pupils: Pupils are equal, round, and reactive to light.   Cardiovascular:      Rate and Rhythm: Normal rate and regular rhythm.   Pulmonary:      Effort: Pulmonary effort is normal.      Breath sounds: Normal breath sounds. No wheezing or rales.   Abdominal:      General: Abdomen is flat.      Palpations: Abdomen is soft.   Musculoskeletal:         General: No swelling.   Neurological:      Motor: Motor strength is normal.     Deep Tendon Reflexes:      Reflex Scores:       Tricep reflexes are 2+ on the right side and 2+ on the left side.       Bicep reflexes are 2+ on the right side and 2+ on the left side.       Brachioradialis reflexes are 2+ on the right side and 2+ on the left side.       Patellar reflexes are 2+ on the right side and 2+ on the left side.       Achilles reflexes are 2+ on the right side and 2+ on the left side.      Neurological Exam  Mental Status   Level of consciousness: Oriented to self and place, limited by mixed aphasia. Mixed aphasia present. Obeys / mimics some commands.    Cranial Nerves  CN II: Visual fields full to confrontation.  CN III, IV, VI: Extraocular movements intact bilaterally. Pupils equal round and reactive to light bilaterally.  CN V: Facial sensation is normal.  CN VII: Full and symmetric facial movement.  CN VIII: Hearing is normal.    Motor   Strength is 5/5 throughout all four extremities.    Sensory  Light touch is normal in upper and lower extremities.     Reflexes                                            Right                      Left  Brachioradialis                    " 2+                         2+  Biceps                                 2+                         2+  Triceps                                2+                         2+  Patellar                                2+                         2+  Achilles                                2+                         2+  Right Plantar: mute  Left Plantar: mute    Coordination  Right: Finger-to-nose normal.Left: Finger-to-nose normal.        NIH Stroke Scale  1A. Level of Consciousness: Alert, Keenly Responsive  1B. Ask Month and Age: No Questions Right  1C. Blink Eyes & Squeeze Hands: Performs Both Tasks  2. Best Gaze: Normal  3. Visual: No Visual Loss  4. Facial Palsy: Normal Symmetrical Movements  5A. Motor - Left Arm: No Drift  5B. Motor - Right Arm: No Drift  6A. Motor - Left Leg: No Drift  6B. Motor - Right Leg: No Drift  7. Limb Ataxia: Absent  8. Sensory Loss: Normal  9. Best Language: Mild-to-Moderate Aphasia  10. Dysarthria: Normal  11. Extinction and Inattention: No Abnormality  NIH Stroke Scale: 3           Pk Coma Scale  Best Eye Response: Spontaneous  Best Verbal Response: Confused  Best Motor Response: Follows commands  [place] Coma Scale Score: 14            Scheduled medications  aspirin, 81 mg, oral, Daily  atorvastatin, 40 mg, oral, Nightly  [Held by provider] carvedilol, 12.5 mg, oral, BID  cholecalciferol, 2,000 Units, oral, Daily  [Held by provider] hydrALAZINE, 25 mg, oral, BID  latanoprost, 1 drop, Both Eyes, Nightly  levETIRAcetam, 500 mg, oral, BID  [Held by provider] losartan, 100 mg, oral, Daily  pantoprazole, 40 mg, oral, Daily before breakfast   Or  pantoprazole, 40 mg, intravenous, Daily before breakfast  polyethylene glycol, 17 g, oral, Daily  timolol, 1 drop, Both Eyes, Daily      Continuous medications     PRN medications  PRN medications: hydrALAZINE **FOLLOWED BY** [START ON 1/17/2024] hydrALAZINE, labetaloL, oxygen    Relevant Results  Results for orders placed or performed during the  hospital encounter of 01/15/24 (from the past 24 hour(s))   CBC and Auto Differential   Result Value Ref Range    WBC 9.4 4.4 - 11.3 x10*3/uL    nRBC 0.0 0.0 - 0.0 /100 WBCs    RBC 4.54 4.00 - 5.20 x10*6/uL    Hemoglobin 13.2 12.0 - 16.0 g/dL    Hematocrit 40.0 36.0 - 46.0 %    MCV 88 80 - 100 fL    MCH 29.1 26.0 - 34.0 pg    MCHC 33.0 32.0 - 36.0 g/dL    RDW 14.1 11.5 - 14.5 %    Platelets 160 150 - 450 x10*3/uL    Neutrophils % 66.7 40.0 - 80.0 %    Immature Granulocytes %, Automated 0.4 0.0 - 0.9 %    Lymphocytes % 19.0 13.0 - 44.0 %    Monocytes % 12.0 2.0 - 10.0 %    Eosinophils % 1.5 0.0 - 6.0 %    Basophils % 0.4 0.0 - 2.0 %    Neutrophils Absolute 6.26 (H) 1.60 - 5.50 x10*3/uL    Immature Granulocytes Absolute, Automated 0.04 0.00 - 0.50 x10*3/uL    Lymphocytes Absolute 1.79 0.80 - 3.00 x10*3/uL    Monocytes Absolute 1.13 (H) 0.05 - 0.80 x10*3/uL    Eosinophils Absolute 0.14 0.00 - 0.40 x10*3/uL    Basophils Absolute 0.04 0.00 - 0.10 x10*3/uL   Troponin I, High Sensitivity   Result Value Ref Range    Troponin I, High Sensitivity 8 0 - 34 ng/L   Protime-INR   Result Value Ref Range    Protime 10.7 9.8 - 12.8 seconds    INR 1.0 0.9 - 1.1   APTT   Result Value Ref Range    aPTT 26 (L) 27 - 38 seconds   Hemoglobin A1C   Result Value Ref Range    Hemoglobin A1C 5.7 (H) see below %    Estimated Average Glucose 117 Not Established mg/dL   B-Type Natriuretic Peptide   Result Value Ref Range     (H) 0 - 99 pg/mL   ECG 12 lead   Result Value Ref Range    Ventricular Rate 72 BPM    Atrial Rate 72 BPM    UT Interval 150 ms    QRS Duration 86 ms    QT Interval 412 ms    QTC Calculation(Bazett) 451 ms    P Axis 50 degrees    R Axis 8 degrees    T Axis 29 degrees    QRS Count 12 beats    Q Onset 224 ms    P Onset 149 ms    P Offset 200 ms    T Offset 430 ms    QTC Fredericia 438 ms   Drug Screen, Urine   Result Value Ref Range    Amphetamine Screen, Urine Presumptive Negative Presumptive Negative    Barbiturate  Screen, Urine Presumptive Negative Presumptive Negative    Benzodiazepines Screen, Urine Presumptive Negative Presumptive Negative    Cannabinoid Screen, Urine Presumptive Negative Presumptive Negative    Cocaine Metabolite Screen, Urine Presumptive Negative Presumptive Negative    Fentanyl Screen, Urine Presumptive Negative Presumptive Negative    Opiate Screen, Urine Presumptive Negative Presumptive Negative    Oxycodone Screen, Urine Presumptive Negative Presumptive Negative    PCP Screen, Urine Presumptive Negative Presumptive Negative   Urinalysis with Reflex Culture and Microscopic   Result Value Ref Range    Color, Urine Straw Straw, Yellow    Appearance, Urine Clear Clear    Specific Gravity, Urine 1.020 1.005 - 1.035    pH, Urine 6.0 5.0, 5.5, 6.0, 6.5, 7.0, 7.5, 8.0    Protein, Urine NEGATIVE NEGATIVE mg/dL    Glucose, Urine NEGATIVE NEGATIVE mg/dL    Blood, Urine NEGATIVE NEGATIVE    Ketones, Urine NEGATIVE NEGATIVE mg/dL    Bilirubin, Urine NEGATIVE NEGATIVE    Urobilinogen, Urine <2.0 <2.0 mg/dL    Nitrite, Urine NEGATIVE NEGATIVE    Leukocyte Esterase, Urine NEGATIVE NEGATIVE   POCT GLUCOSE   Result Value Ref Range    POCT Glucose 90 74 - 99 mg/dL   Comprehensive metabolic panel   Result Value Ref Range    Glucose 92 74 - 99 mg/dL    Sodium 139 136 - 145 mmol/L    Potassium 3.9 3.5 - 5.3 mmol/L    Chloride 106 98 - 107 mmol/L    Bicarbonate 24 21 - 32 mmol/L    Anion Gap 13 10 - 20 mmol/L    Urea Nitrogen 11 6 - 23 mg/dL    Creatinine 0.82 0.50 - 1.05 mg/dL    eGFR 74 >60 mL/min/1.73m*2    Calcium 9.2 8.6 - 10.6 mg/dL    Albumin 4.1 3.4 - 5.0 g/dL    Alkaline Phosphatase 84 33 - 136 U/L    Total Protein 5.9 (L) 6.4 - 8.2 g/dL    AST 15 9 - 39 U/L    Bilirubin, Total 0.4 0.0 - 1.2 mg/dL    ALT 10 7 - 45 U/L   Extra Urine Gray Tube   Result Value Ref Range    Extra Tube Hold for add-ons.      MR brain w and wo IV contrast    Result Date: 1/15/2024  Redemonstration of left parieto-occipital infarct,  essentially unchanged in appearance to prior same day MRI. No evidence of hemorrhagic transformation or pathologic intracranial enhancement.   I personally reviewed the images/study and I agree with the resident Gwyn Rodriguez's findings as stated. This study was interpreted at [place], [place].   MACRO: None   Signed by: Jayne De La Vega 1/15/2024 11:18 PM Dictation workstation:   [id number]    MR brain wo IV contrast    Result Date: 1/15/2024  1. Acute or early subacute infarction is present in the left parietal lobe, with some involvement of the left occipital lobe. There is slight local mass effect with effacement of the adjacent sulci, without evidence of midline shift or hemorrhagic transformation. 2. Scattered areas of hyperintense FLAIR and T2 signal are present in the periventricular and subcortical white matter of bilateral cerebral hemispheres, nonspecific findings favored to represent sequela of microvascular disease.     I personally reviewed the images/study and I agree with the resident Gwyn Rodriguez's findings as stated. This study was interpreted at [facility], [place], [place].   MACRO: Gwyn Rodriguez discussed the significance and urgency of this critical finding by telephone with Gwyn DUNBAR on 1/15/2024 at 8:26 pm.  (**-RCF-**) Findings:  See findings.   Signed by: Jayne De La Vega 1/15/2024 9:05 PM Dictation workstation:   [id number]    ECG 12 lead    Result Date: 1/15/2024  Normal sinus rhythm ST & T wave abnormality, consider anterolateral ischemia Abnormal ECG When compared with ECG of 15-DILAN-2024 18:40, No significant change was found See ED provider note for full interpretation and clinical correlation Confirmed by Sylvia Landrum ([id number]) on 1/15/2024 7:58:55 PM    CT brain attack head wo IV contrast    Result Date: 1/15/2024  1. Noncontrast CT images of the head demonstrate  subtle loss of gray-white differentiation in the left parietal lobe, suspicious for acute to early subacute infarct no evidence of hemorrhage is present, MRI of the brain can be considered for further characterization. 2. No large vessel occlusion is identified in the intracranial circulation, although early venous contamination limits evaluation of small cortical arteries. 3. Atherosclerotic plaques are present in the extracranial internal carotid arteries bilaterally, contributing up to 20-30% stenosis by NASCET criteria on the right and up to 10-20% stenosis by NASCET criteria on the left. 4. Subtle attenuation changes present in the periventricular and subcortical white matter of bilateral cerebral hemispheres are nonspecific, but favored to represent sequela of microvascular disease. 5. Right thyroid nodule, incompletely characterized on current exam, consider dedicated ultrasound of the thyroid for further evaluation.          Assessment/Plan      Principal Problem:    Aphasia    Mr. Roslyn Cabrera is a 77 YO F with PMH of HTN, HLD, tobacco abuse, glaucoma complicated by visual impairment presenting with acute onset confusion and speech difficulty. On examination, patient found to have persistent wernicke's type aphasia (NIHSS 1). Per Hx - pt had Left sided weakness with drift which had resolved when examined by Neurology at [facility]. CT head w/o contrast shows global atrophy and advanced white matter disease. CTA shows no significant evidence of intracranial or extracranial disease. MR brain from 03/22 shows left occipital encephalomalacia suggestive of old infarct. Repeat MR with contrast shows new diffusion restriction in left parietal and left occipital regions in distal MCA distribution. Patient has history of visual hallucinations previously thought to be secondary to glaucoma and macular degeneration. Patient's wernicke's type aphasia can be attributed to affected infarcted region.    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  ::LDL 12/7/23: 97  ::Echo 2019: 70-75% EF, increased LV mass  - CTH with L parieto-occipital hypodensity  - MRI with wedge shaped territory in the L parieto-occipital region with diffusion restriction, no contrast enhancement or bleeding  - HbA1c 5.7%  - Follow up lipid panel  - Follow up complete echo  - No concern for seizures, no need for EEG or Keppra  - Start Aspirin 81mg daily, continue home Atorvastatin 40mg at bedtime   - EP consult for loop recorder placement  - PT, OT, speech therapy       #Hx ofHTN  #Hypertensive Emergency  - SBP goal < 220 for 24h (until 6pm 1/16)  - Home hypertensives held   - PRN Hydralazine and Labetolol   - Home: hydralazine PO 25mg twice a day,  losartan PO 100mg every day, carvedilol PO 12.5mg twice a day      #Hx of Glaucoma  - Continue home latanoprost .005% 1 drop in each eye once a day  - Continue home timolol 0.5% 1 drop into each eye once a day     #Vitamin D, B12 deficiency  - Continue home cholecalciferol PO 125MCG once a day  - Last B12 shot in 05/23     F: Bolus PRN  E: K>4 Mg>2 Phos>3  N: Normal Diet  A: PIV  P: SCDs     NOK: daughterJeimy ([phone])  CODE STATUS: DNR, DNI (confirmed on admission with pt and family)      Gwyn Mendoza    Reviewed by Thiago Monsalve, PGY2 Neurology  Neurology PGY-2  Stroke team pager [phone]

## 2024-01-16 NOTE — CARE PLAN
The patient's goals for the shift include free from falls    The clinical goals for the shift include pt will participate in neuro exam this shift    Over the shift, the patient made progress towards goals with no barriers

## 2024-01-16 NOTE — PROGRESS NOTES
01/16/24        Transitional Care Coordination Progress Note:   Patient discussed during interdisciplinary rounds.   Team members present: RN JONI SOSA MD   Plan per Medical/Surgical team: PMHx of hypertension, dyslipidemia, rheumatoid arthritis, glaucoma and visual impairment and distortion,   Discharge disposition: TBD   Status-Inpatient   Payer- Payor: [employer] [facility] MEDICARE / Plan: [employer] [facility] MEDICARE / Product Type: *No Product type* /    Potential Barriers: None   ADOD: 1/19/2024  Samson Weber RN [facility] [phone]

## 2024-01-16 NOTE — PROGRESS NOTES
Occupational Therapy    Evaluation/Treatment    Patient Name: Roslyn Cabrera  MRN: [id number]  : 1945  Today's Date: 24  Time Calculation  Start Time: 1106  Stop Time: 1134  Time Calculation (min): 28 min       Assessment:  OT Assessment: difficulty I/ADLs, safety, fxnl mob  Prognosis: Good  Evaluation/Treatment Tolerance: Patient tolerated treatment well  Medical Staff Made Aware: Yes  End of Session Communication: Bedside nurse  End of Session Patient Position: Bed, 3 rail up, Alarm on (dtr present)  OT Assessment Results: Decreased ADL status, Decreased cognition, Decreased endurance, Visual deficit, Decreased fine motor control, Decreased functional mobility, Decreased gross motor control, Decreased IADLs, Decreased safe judgment during ADL  Prognosis: Good  Evaluation/Treatment Tolerance: Patient tolerated treatment well  Medical Staff Made Aware: Yes  Strengths: Attitude of self  Plan:  Treatment Interventions: ADL retraining, Visual perceptual retraining, Functional transfer training, Endurance training, Cognitive reorientation, Patient/family training, Equipment evaluation/education, Neuromuscular reeducation, Compensatory technique education, Fine motor coordination activities  OT Frequency: 3 times per week  OT Discharge Recommendations: High intensity level of continued care  OT Recommended Transfer Status: Minimal assist, Assist of 1  OT - OK to Discharge: Yes  Treatment Interventions: ADL retraining, Visual perceptual retraining, Functional transfer training, Endurance training, Cognitive reorientation, Patient/family training, Equipment evaluation/education, Neuromuscular reeducation, Compensatory technique education, Fine motor coordination activities    Subjective   Current Problem:  1. Acute ischemic stroke (CMS/HCC)  Transthoracic Echo (TTE) Complete    Transthoracic Echo (TTE) Complete    CANCELED: Transthoracic Echo (TTE) Complete    CANCELED: Transthoracic Echo (TTE) Complete       2. Aphasia        3. 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 On examination, patient found to have persistent wernicke's type aphasia (NIHSS 1)  Missed Visit: Yes  Missed Visit Reason:  (hold per RN 1 HR EEG will re att time permitting)  Family/Caregiver Present: Yes  Caregiver Feedback: dtr agreeable to rehab post d/c, family works  Prior to Session Communication: Bedside nurse  Patient Position Received: Bed, 3 rail up, Alarm on  General Comment: pt impulsive in session, appears expressive aphasia >receptive but decreased insights to deficits. Mod-max VC's to slow down movement 2/2 appears R side decreased attention  Precautions:  Medical Precautions: Fall precautions, Cardiac precautions  Vital Signs:  Heart Rate: 67  BP: 100/65  BP Location: Right arm  Patient Position: Lying  Pain:  Pain Assessment  Pain Assessment: 0-10  Pain Score: 0 - No pain    Objective   Cognition:  Overall Cognitive Status: Impaired  Orientation Level:  (Pt able to reply correctly to yes/no questions, with increased frustation decreased speech. Pt OX3 with yes/no options)  Problem Solving: Exceptions to WFL  Complex Functional Tasks: Impaired  Managing Finances: Impaired  Managing Medications: Impaired  Simple Functional Tasks: Impaired  Verbal Reasoning Skills: Impaired  Abstract Reasoning:  (OT asked pt to draw clock 2x, pt unable to complete dircle, 12 and 1 next to each other in lower middle of clock, other numbers illegible.)  Safety/Judgement: Exceptions to WFL  Complex  "Functional Tasks: Moderate  Novel Situations: Minimal  Routine Tasks: Moderate  Insight: Moderate  Flexibility of Thought: Reduced flexibility  Organization: Mildly disorganized  Cognition Test Scores  Cognition Tests:  (pt did report in session \"definitely better today than yesterday\" other sentences appear expressive aphasia)        Home Living:  Type of Home: Condo  Lives With: Alone  Home Adaptive Equipment:  (WhW, cane, grab bars shower, shower chair)  Home Layout:  (elevator, 1 level condo, walkin shower)  Prior Function:  Level of [place]: Independent with ADLs and functional transfers, Independent with homemaking with ambulation  Vocational: Retired  Leisure: cats  Hand Dominance: Right  IADL History:  IADL Comments: I I/ADLs, +drives  ADL:  Eating Assistance: Stand by (drank I, anticipated setup required R side)  Grooming Assistance:  (hand washing CGA balance standing sink side, mod tactile and verbal cues to find soap dispenser on R side, OT had to tap paper towels on R side to find dispenser)  Bathing Assistance:  (min A antiicpated)  UE Dressing Assistance:  (adjusted gown standing min A balance)  LE Dressing Assistance:  (min A anticipated)  Toileting Assistance with Device:  (min A transfer 2x att for safety, seated toileting CGA, mod A un wrap toilet paper around hands and leander care seated CGA)  Functional Deficit: Setup, Steadying, Verbal cueing, Supervision/safety  Activities of Daily Living:      Grooming  Grooming Level of Assistance:  (hand washing CGA balance standing sink side, mod tactile and verbal cues to find soap dispenser on R side, OT had to tap paper towels on R side to find dispenser)       Toileting  Toileting Level of Assistance:  (min A transfer 2x att for safety, seated toileting CGA, mod A un wrap toilet paper around hands and leander care seated CGA)  Activity Tolerance:  Endurance:  (good)  Bed Mobility/Transfers: Bed Mobility  Bed Mobility: Yes  Bed Mobility 1  Level of " Assistance 1:  (sup/sit CGA, VC's to slow down movement and monitor proprioception)           Ambulation/Gait Training:  Ambulation/Gait Training  Ambulation/Gait Training Performed:  (pt performed fxnl mob to/from bed around room/bathroom min A HHA impulsive)  Sitting Balance:  Static Sitting Balance  Static Sitting-Level of Assistance:  (cga)  Dynamic Sitting Balance  Dynamic Sitting-Balance:  (cga)  Standing Balance:  Static Standing Balance  Static Standing-Level of Assistance: Contact guard  Dynamic Standing Balance  Dynamic Standing-Balance:  (min)         Balance/Neuromuscular Re-Education  Balance/Neuromuscular Re-Education Activity Performed:  (OT provided education and cues for relaxation techniques with increased frustation and decreased speech with attempting to move quickly. Education provided with fatigue and overstimulaiton speech may wax/wean)     Vision:Vision - Basic Assessment  Current Vision:  (appears R side decreased attention, OT had to place pen past midline on L side for pt to grasp it in front of her)  Sensation:  Light Touch: No apparent deficits  Strength:  Strength Comments: BUE WFL      Hand Function:  Hand Function  Gross Grasp: Functional  Extremities: RUE   RUE : Within Functional Limits and LUE   LUE: Within Functional Limits      Outcome Measures: [facility] Daily Activity  Putting on and taking off regular lower body clothing: A little  Bathing (including washing, rinsing, drying): A little  Putting on and taking off regular upper body clothing: A little  Toileting, which includes using toilet, bedpan or urinal: A little  Taking care of personal grooming such as brushing teeth: A little  Eating Meals: A little  Daily Activity - Total Score: 18         and OT Adult Other Outcome Measures  4AT: -    Education Documentation  Body Mechanics, taught by Tran Cerrato OT at 1/16/2024  1:01 PM.  Learner: Family, Patient  Readiness: Acceptance  Method: Explanation  Response: Verbalizes  Understanding, Needs Reinforcement    Precautions, taught by Tran Cerrato OT at 1/16/2024  1:01 PM.  Learner: Family, Patient  Readiness: Acceptance  Method: Explanation  Response: Verbalizes Understanding, Needs Reinforcement    ADL Training, taught by Tran Cerrato OT at 1/16/2024  1:01 PM.  Learner: Family, Patient  Readiness: Acceptance  Method: Explanation  Response: Verbalizes Understanding, Needs Reinforcement    Education Comments  No comments found.        OP EDUCATION:       Goals:  Encounter Problems       Encounter Problems (Active)       ADLs       Patient will perform UB and LB bathing  with independent level        Start:  01/16/24    Expected End:  02/06/24            Patient with complete upper body dressing with independent        Start:  01/16/24    Expected End:  02/06/24            Patient with complete lower body dressing with independent        Start:  01/16/24    Expected End:  02/06/24            Patient will complete daily grooming tasks  with independent        Start:  01/16/24    Expected End:  02/06/24            Patient will complete toileting including hygiene clothing management/hygiene with independent level       Start:  01/16/24    Expected End:  02/06/24               COGNITION/SAFETY       Patient will score WFL on standardized cognitive assessment with min cues and within reasonable time frame       Start:  01/16/24    Expected End:  02/06/24               MOBILITY       Patient will perform Functional mobility max Household distances/Community Distances with independent level of assistance       Start:  01/16/24    Expected End:  02/06/24               TRANSFERS       Patient will perform bed mobility independent       Start:  01/16/24    Expected End:  02/06/24            Patient will complete functional transfersindependent level of assistance.       Start:  01/16/24    Expected End:  02/06/24               VISION       Patient will complete  Visual Scanning Task task  regarding all areas of activity with independent level of assistance.       Start:  01/16/24    Expected End:  02/06/24

## 2024-01-16 NOTE — CONSULTS
Cardiac Electrophysiology Consult    77yo F with HTN, HLD, RA, ongoing tobacco abuse and glaucoma complicated by visual impairment presenting with acute onset confusion and speech difficulty. Found to have persistent wernicke's type aphasia (NIHSS 1). Per Hx - pt had Left sided weakness with drift which had resolved when examined by Neurology at [facility]. CT head w/o contrast shows global atrophy and advanced white matter disease. CTA shows no significant evidence of intracranial or extracranial disease. MR brain from 03/22 shows left occipital encephalomalacia suggestive of old infarct. Repeat MR with contrast shows new diffusion restriction in left parietal and left occipital regions in distal MCA distribution. Patient has history of visual hallucinations previously thought to be secondary to glaucoma and macular degeneration. Aphasia can be attributed to affected infarcted region.     EP being consulted for loop recorder (ILR) implant for cryptogenic stroke. Pt aphasic thus hx obtained by chart review and daughter (dtr)Wendi at bedside. Per dtr, pt has no hx of AFib or any arrhythmia, palps, CP, syncope, PND, orthopnea, or LE edema. Pt lives alone independently in an apartment Reportedly + TB test a year ago but dtr states no treatment since CXR negative (CXR report from 5/2023 was NAD). EKG 1/15/24 shows SR, NL intervals with precordial and lead III TWI more prominent than 8/25/22. Tele here with SR 60s-70s, no AF by alarm review. No prior MI or LHC per dtr. Pt to have complete Echo today.    CARDIAC studies:   -Lexiscan nuc stress 2019(pre-op): NL perfusion, NL EF  -Echo 3/2019: Hyperdynamic EF 70-75%, increased LV mass/NL cavity size, mod incre LV septal and mildly incre posterior wall thickness, DD, NL RV size/fxn, mild LAE/NL RA, mod MAC/mild MR, tr-mild TR, rvsp 24.9mm, no effusion    PMH/PSH: As above    FH: no known AF    SH: lives in apt independently  Has smoked cigarettes for at least 50 yrs per dtr;  "rare EtOH, no recreational drugs       Allergies:  No Known Allergies   Outpatient Medications:  Current Outpatient Medications   Medication Instructions    carvedilol (Coreg) 12.5 mg tablet 1 tablet, oral, 2 times daily    cholecalciferol (Vitamin D-3) 125 MCG (5000 UT) capsule oral    coenzyme Q-10 100 mg capsule oral    hydrALAZINE (Apresoline) 25 mg tablet 1 tablet, oral, 2 times daily    latanoprost (Xalatan) 0.005 % ophthalmic solution INSTILL 1 DROP INTO EACH AFFECTED EYE EVERYDAY    losartan (Cozaar) 100 mg tablet 1 tablet, oral, Daily    Nasacort 55 mcg nasal inhaler 2 sprays, nasal, Daily    oxyCODONE (ROXICODONE) 5 mg, oral, Every 6 hours PRN    timolol (Timoptic) 0.5 % ophthalmic solution 1 drop, Both Eyes, Daily      Last Recorded Vitals:      1/15/2024    11:41 PM 1/16/2024    12:00 AM 1/16/2024     1:00 AM 1/16/2024     4:00 AM 1/16/2024     8:24 AM 1/16/2024    11:06 AM 1/16/2024    11:52 AM   Vitals   Systolic 146 114  108 125 100 101   Diastolic 94 91  54 77 65 59   Heart Rate 74 77  67 67 67 61   Temp 36.9 °C (98.4 °F) 37.8 °C (100 °F)  36.8 °C (98.2 °F) 36.2 °C (97.2 °F)  36.3 °C (97.3 °F)   Resp 18 20  18 18  18   Height (in) 1.575 m (5' 2\")         Weight (lb)   131.06       BMI 23.78 kg/m2  23.97 kg/m2       BSA (m2) 1.61 m2  1.61 m2        Visit Vitals  /59   Pulse 61   Temp 36.3 °C (97.3 °F)   Resp 18   Ht 1.575 m (5' 2\")   Wt 59.4 kg (131 lb 1 oz)   LMP  (LMP Unknown)   SpO2 94%   BMI 23.97 kg/m²   OB Status Menopausal   Smoking Status Every Day   BSA 1.61 m²      Results from last 7 days   Lab Units 01/15/24  1835   WBC AUTO x10*3/uL 9.4   HEMOGLOBIN g/dL 13.2   HEMATOCRIT % 40.0   PLATELETS AUTO x10*3/uL 160     Results from last 7 days   Lab Units 01/15/24  2123   SODIUM mmol/L 139   POTASSIUM mmol/L 3.9   CHLORIDE mmol/L 106   CO2 mmol/L 24   BUN mg/dL 11   CREATININE mg/dL 0.82   GLUCOSE mg/dL 92   CALCIUM mg/dL 9.2     EKG as per HPI    Echo 1/16/24: technically difficult study " (supine); hyperdynamic EF 70-75%, no WMA, mild to mod cLVH, DD, NL RV size/fxn, severe LAE/NL RA, moderate MAC/trace MR, trace [place]; rvsp 26mm; no signif change from 3/2019.    Physical Exam:  Gen-A+O x 3  Skin-W+D; no rash on L chest  Lungs-CTAB  CV-RRR  Abd-soft, NT/ND, +BS  Extrem-no pedal edema (SCDs on bilat)  Neuro-exam deferred to primary team (neuro stroke)    Assessment/Plan   77yo F with HTN, HLD, glaucoma, ongoing tobacco use with cryptogenic/embolic stroke. EP was consulted for implantable loop recorder (ILR) implant. EKG and tele with SR. Case was d/w Dr Naranjo who reviewed EKGs. No infection concerns per primary team.     Pt is a good candidate for ILR implant; also has severely dilated left atrium making AFib more likely. Daughter is agreeable and will provide consent given pt's aphasia. Will do ILR today 1/16 but after Echo since Echo would be painful if ILR done 1st (and chance of popping it out if Echo done right after implant).     -No specific prep for ILR (no need for NPO as no sedation is used, only local anesthetic to L chest wall)  -Safe for MRIs; there is no interference w/airport security  -Usually steri-strips are placed at time of implant or occasionally skin glue  -Watch for s/s of infection at ILR site  -ILR has typical battery life of ~3 years  -Device nurse will give pt verbal + written instructions post ILR implant; they will set up remote monitoring  - Device clinic: [phone]  -I added post ILR instructions to the AVS  -EP will sign off once ILR implanted  -D/w neuro resident Thiago Monsalve MD and pt's nurse    Code Status:  DNR and No Intubation    I spent 60 minutes in the professional and overall care of this patient.    KIRBY Lovell, PAMosheC, [facility]  Inpatient Cardiac Electrophysiology  Personal pager: [phone] (Tues-Fri)

## 2024-01-16 NOTE — PROGRESS NOTES
Physical Therapy    Physical Therapy Evaluation & Treatment    Patient Name: Roslyn Cabrera  MRN: [id number]  Today's Date: 1/16/2024   Time Calculation  Start Time: 1234  Stop Time: 1258  Time Calculation (min): 24 min    Assessment/Plan   PT Assessment  PT Assessment Results: Decreased strength, Decreased endurance, Impaired balance, Decreased mobility, Decreased cognition, Impaired judgement, Decreased safety awareness  Rehab Prognosis: Good  End of Session Communication: Bedside nurse  End of Session Patient Position: Bed, 3 rail up, Alarm on   IP OR SWING BED PT PLAN  Inpatient or Swing Bed: Inpatient  PT Plan  Treatment/Interventions: Bed mobility, Transfer training, Gait training, Balance training, Neuromuscular re-education, Strengthening, Endurance training, Therapeutic exercise, Therapeutic activity, Home exercise program  PT Plan: Skilled PT  PT Frequency: 5 times per week  PT Discharge Recommendations: High intensity level of continued care  PT Recommended Transfer Status: Assist x1  PT - OK to Discharge: Yes      Subjective     General Visit Information:  General  Reason for Referral: 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 Found to have persistent wernicke's type aphasia (NIHSS 1). Per Hx - pt had Left sided weakness with drift which had resolved when examined by Neurology at [facility].  Family/Caregiver Present: Yes  Caregiver Feedback: Dtr  Prior to Session Communication: Bedside nurse  Patient Position Received: Bed, 3 rail up, Alarm on  General Comment: Pt supine in bed upon arrival and agreeable to participate in PT session. Patient impulsive and required VC's to slow down. Expressive aphasia. Pt is able to identify colors and number of digit,s  but cannot read clock on wall accurately. Has R side decreased attention.  Home  Living:  Home Living  Type of Home: [place]  Lives With: Alone  Home Adaptive Equipment: Walker rolling or standard, Cane  Home Layout: One level (elevator)  Prior Level of Function:  Prior Function Per Pt/Caregiver Report  Level of [place]: Independent with ADLs and functional transfers, Independent with homemaking with ambulation  Vocational: Retired  Hand Dominance: Right  Precautions:  Precautions  Medical Precautions: Fall precautions, Cardiac precautions  Precautions Comment: SBP goal < 220 for 24h  Vital Signs:  Vital Signs  Heart Rate: 79  Resp: 16  BP: 121/64    Objective   Pain:  Pain Assessment  Pain Assessment: 0-10  Pain Score: 0 - No pain  Cognition:  Cognition  Overall Cognitive Status: Impaired  Orientation Level: Disoriented to time, Disoriented to situation    General Assessments:    Sensation  Light Touch: No apparent deficits    Strength  Strength Comments: BLE's 4/5 based on mobility       Static Sitting Balance  Static Sitting-Level of Assistance:  (SBA)  Dynamic Sitting Balance  Dynamic Sitting-Balance:  (SBA)  Static Standing Balance  Static Sitting-Level of Assistance:  (CGA with walker)  Dynamic Standing Balance  Dynamic Standing alance:  (Fahad with walker)       Functional Assessments:  Bed Mobility  Bed Mobility: Yes  Bed Mobility 1  Bed Mobility 1: Supine to sitting  Level of Assistance 1: Contact guard  Bed Mobility Comments 1: HOB elevated  Bed Mobility 2  Bed Mobility  2: Sitting to supine  Level of Assistance 2: Contact guard    Transfers  Transfer: Yes  Transfer 1  Transfer From 1: Sit to  Transfer to 1: Stand  Technique 1: Sit to stand  Transfer Device 1: Walker  Transfer Level of Assistance 1: Minimum assistance  Trials/Comments 1: cues for safe hand placement, sequencing and walker safety (x3 from EOB)  Transfers 2  Transfer From 2: Stand to  Transfer to 2: Sit  Technique 2: Stand to sit  Transfer Device 2: Walker  Transfer Level of Assistance 2: Minimum  assistance    Ambulation/Gait Training  Ambulation/Gait Training Performed: Yes  Ambulation/Gait Training 1  Device 1: Rolling walker  Assistance 1: Minimum assistance, Minimal verbal cues  Comments/Distance (ft) 1: 8 ft and 10 ft (cues for sequencing, safety and walker management)  Extremity/Trunk Assessments:  RLE   RLE : Within Functional Limits  LLE   LLE : Within Functional Limits  Treatments:  Therapeutic Exercise  Therapeutic Exercise Performed: Yes  Therapeutic Exercise Activity 1: Supine BLE AP and seated AP, LAQ 1x10 reps. Cued for techniques.    Ambulation/Gait Training  Ambulation/Gait Training Performed: Yes  Ambulation/Gait Training 1  Device 1: Rolling walker  Assistance 1: Minimum assistance, Minimal verbal cues  Comments/Distance (ft) 1: 8 ft and 10 ft (cues for sequencing, safety and walker management)  Transfers  Transfer: Yes  Transfer 1  Transfer From 1: Sit to  Transfer to 1: Stand  Technique 1: Sit to stand  Transfer Device 1: Walker  Transfer Level of Assistance 1: Minimum assistance  Trials/Comments 1: cues for safe hand placement, sequencing and walker safety (x3 from EOB)  Transfers 2  Transfer From 2: Stand to  Transfer to 2: Sit  Technique 2: Stand to sit  Transfer Device 2: Walker  Transfer Level of Assistance 2: Minimum assistance  Outcome Measures:  [facility] Basic Mobility  Turning from your back to your side while in a flat bed without using bedrails: A little  Moving from lying on your back to sitting on the side of a flat bed without using bedrails: A little  Moving to and from bed to chair (including a wheelchair): A little  Standing up from a chair using your arms (e.g. wheelchair or bedside chair): A little  To walk in hospital room: A little  Climbing 3-5 steps with railing: Total  Basic Mobility - Total Score: 16    Encounter Problems       Encounter Problems (Active)       Mobility       Pt will be SBA for ambulation 75 ft with LRAD (Progressing)       Start:  01/16/24     Expected End:  01/30/24               Pain - Adult          Transfers       Pt will be SBA for sit to stand and bed to chair transfers with LRAD (Progressing)       Start:  01/16/24    Expected End:  01/30/24               Transfers       Pt will be Mario with bed mobility (Progressing)       Start:  01/16/24    Expected End:  01/30/24                   Education Documentation  Precautions, taught by Kelsie Johnson PT at 1/16/2024  4:12 PM.  Learner: Patient  Readiness: Acceptance  Method: Explanation, Demonstration  Response: Needs Reinforcement, Verbalizes Understanding    Mobility Training, taught by Kelsie Johnson PT at 1/16/2024  4:12 PM.  Learner: Patient  Readiness: Acceptance  Method: Explanation, Demonstration  Response: Needs Reinforcement, Verbalizes Understanding    Education Comments  No comments found.

## 2024-01-16 NOTE — PROGRESS NOTES
Spoke with patient daughter Jeimy: [phone]  and foc is [facility] referral sent

## 2024-01-16 NOTE — DISCHARGE INSTRUCTIONS
Instructions after Implantable Loop Recorder (ILR) placement:    Incision:  Please keep your incision dry and open to air for 5 days after implant. After 5 days, you may wash the site with soap and water and remove paper steri-strips that cover your incision.  Inspect your incision each day.  If you note increased redness, swelling, or drainage, or if you develop a fever, please call your doctor IMMEDIATELY.     Pain:  It is normal for the area around the incision to be tender for a few weeks following implant.  Pain relievers such as Tylenol are usually sufficient for pain relief.  The pain should get better each day, if it gets worse, please contact your doctor.    Activity -First week after implant: Do not pick-up weights greater than 15 lbs; and avoid activities such as golf or swimming.    ID Card:  It is important that you carry your ID card with you at all times. Inform all doctors and healthcare providers that you have an implanted heart monitor.      Electromagnetic Interference:    Microwave ovens are safe to use. MRI machines are safe. There is no interference with airport security. You may call the device clinic or the patient service department of the  with questions about specific electrical appliances and interference problems.    Please keep your doctor informed about changes in your incision or how you are feeling.      Home Monitoring:  You will be provided with a home monitor that will automatically send us information on scheduled dates. There will be automatic downloads every 3 months. Your device will automatically send our device clinic information also if your heart rates are slow, fast or out of rhythm. In addition you may send in transmissions when you have symptoms. You are more than welcome to use this feature, all we ask is a courtesy phone call with our nurse on call so we know what to look for and discuss the findings with you.  Our number is: Device Clinic: [phone].  Call for any questions or concerns.    It is your responsibility to make and keep appointments. After each visit on your way out, make an appointment for your next visit. Please follow the recommendations of your doctor for the frequency of appointments.    Appointment Scheduling: [phone]

## 2024-01-16 NOTE — CARE PLAN
Problem: Fall/Injury  Goal: Not fall by end of shift  Outcome: Progressing     Problem: Pain - Adult  Goal: Verbalizes/displays adequate comfort level or baseline comfort level  Outcome: Progressing     Problem: Safety - Adult  Goal: Free from fall injury  Outcome: Progressing   The patient's goals for the shift include free from falls    The clinical goals for the shift include free from falls    Over the shift, the patient did make progress toward the following goals.

## 2024-01-17 ENCOUNTER — TELEPHONE (OUTPATIENT)
Dept: PRIMARY CARE | Facility: CLINIC | Age: 79
End: 2024-01-17
Payer: MEDICARE

## 2024-01-17 LAB
ALBUMIN SERPL BCP-MCNC: 3.8 G/DL (ref 3.4–5)
ANION GAP SERPL CALC-SCNC: 14 MMOL/L (ref 10–20)
BASOPHILS # BLD AUTO: 0.05 X10*3/UL (ref 0–0.1)
BASOPHILS NFR BLD AUTO: 0.6 %
BUN SERPL-MCNC: 15 MG/DL (ref 6–23)
CALCIUM SERPL-MCNC: 9.1 MG/DL (ref 8.6–10.6)
CHLORIDE SERPL-SCNC: 109 MMOL/L (ref 98–107)
CO2 SERPL-SCNC: 22 MMOL/L (ref 21–32)
CREAT SERPL-MCNC: 0.99 MG/DL (ref 0.5–1.05)
EGFRCR SERPLBLD CKD-EPI 2021: 59 ML/MIN/1.73M*2
EOSINOPHIL # BLD AUTO: 0.21 X10*3/UL (ref 0–0.4)
EOSINOPHIL NFR BLD AUTO: 2.5 %
ERYTHROCYTE [DISTWIDTH] IN BLOOD BY AUTOMATED COUNT: 13.7 % (ref 11.5–14.5)
GLUCOSE BLD MANUAL STRIP-MCNC: 101 MG/DL (ref 74–99)
GLUCOSE BLD MANUAL STRIP-MCNC: 118 MG/DL (ref 74–99)
GLUCOSE BLD MANUAL STRIP-MCNC: 182 MG/DL (ref 74–99)
GLUCOSE BLD MANUAL STRIP-MCNC: 95 MG/DL (ref 74–99)
GLUCOSE SERPL-MCNC: 107 MG/DL (ref 74–99)
HCT VFR BLD AUTO: 40.4 % (ref 36–46)
HGB BLD-MCNC: 13.3 G/DL (ref 12–16)
IMM GRANULOCYTES # BLD AUTO: 0.02 X10*3/UL (ref 0–0.5)
IMM GRANULOCYTES NFR BLD AUTO: 0.2 % (ref 0–0.9)
LYMPHOCYTES # BLD AUTO: 2.66 X10*3/UL (ref 0.8–3)
LYMPHOCYTES NFR BLD AUTO: 31.3 %
MAGNESIUM SERPL-MCNC: 2.11 MG/DL (ref 1.6–2.4)
MCH RBC QN AUTO: 29.4 PG (ref 26–34)
MCHC RBC AUTO-ENTMCNC: 32.9 G/DL (ref 32–36)
MCV RBC AUTO: 89 FL (ref 80–100)
MONOCYTES # BLD AUTO: 0.93 X10*3/UL (ref 0.05–0.8)
MONOCYTES NFR BLD AUTO: 10.9 %
NEUTROPHILS # BLD AUTO: 4.63 X10*3/UL (ref 1.6–5.5)
NEUTROPHILS NFR BLD AUTO: 54.5 %
NRBC BLD-RTO: 0 /100 WBCS (ref 0–0)
PHOSPHATE SERPL-MCNC: 3.7 MG/DL (ref 2.5–4.9)
PLATELET # BLD AUTO: 195 X10*3/UL (ref 150–450)
POTASSIUM SERPL-SCNC: 4 MMOL/L (ref 3.5–5.3)
RBC # BLD AUTO: 4.53 X10*6/UL (ref 4–5.2)
SODIUM SERPL-SCNC: 141 MMOL/L (ref 136–145)
WBC # BLD AUTO: 8.5 X10*3/UL (ref 4.4–11.3)

## 2024-01-17 PROCEDURE — 83735 ASSAY OF MAGNESIUM: CPT

## 2024-01-17 PROCEDURE — 94760 N-INVAS EAR/PLS OXIMETRY 1: CPT

## 2024-01-17 PROCEDURE — 2500000001 HC RX 250 WO HCPCS SELF ADMINISTERED DRUGS (ALT 637 FOR MEDICARE OP)

## 2024-01-17 PROCEDURE — 36415 COLL VENOUS BLD VENIPUNCTURE: CPT

## 2024-01-17 PROCEDURE — 99233 SBSQ HOSP IP/OBS HIGH 50: CPT

## 2024-01-17 PROCEDURE — 2500000004 HC RX 250 GENERAL PHARMACY W/ HCPCS (ALT 636 FOR OP/ED): Mod: MUE

## 2024-01-17 PROCEDURE — 82947 ASSAY GLUCOSE BLOOD QUANT: CPT

## 2024-01-17 PROCEDURE — 1200000002 HC GENERAL ROOM WITH TELEMETRY DAILY

## 2024-01-17 PROCEDURE — 85025 COMPLETE CBC W/AUTO DIFF WBC: CPT

## 2024-01-17 PROCEDURE — 80069 RENAL FUNCTION PANEL: CPT

## 2024-01-17 RX ADMIN — Medication 2000 UNITS: at 09:10

## 2024-01-17 RX ADMIN — ASPIRIN 81 MG: 81 TABLET, COATED ORAL at 09:10

## 2024-01-17 RX ADMIN — ATORVASTATIN CALCIUM 40 MG: 40 TABLET, FILM COATED ORAL at 20:12

## 2024-01-17 RX ADMIN — LATANOPROST 1 DROP: 50 SOLUTION OPHTHALMIC at 20:19

## 2024-01-17 RX ADMIN — PANTOPRAZOLE SODIUM 40 MG: 40 TABLET, DELAYED RELEASE ORAL at 06:31

## 2024-01-17 RX ADMIN — CARVEDILOL 12.5 MG: 12.5 TABLET, FILM COATED ORAL at 09:29

## 2024-01-17 RX ADMIN — CARVEDILOL 12.5 MG: 12.5 TABLET, FILM COATED ORAL at 20:12

## 2024-01-17 RX ADMIN — POLYETHYLENE GLYCOL 3350 17 G: 17 POWDER, FOR SOLUTION ORAL at 09:17

## 2024-01-17 RX ADMIN — TIMOLOL MALEATE 1 DROP: 5 SOLUTION/ DROPS OPHTHALMIC at 09:00

## 2024-01-17 ASSESSMENT — PAIN - FUNCTIONAL ASSESSMENT
PAIN_FUNCTIONAL_ASSESSMENT: 0-10
PAIN_FUNCTIONAL_ASSESSMENT: 0-10

## 2024-01-17 ASSESSMENT — PAIN SCALES - GENERAL
PAINLEVEL_OUTOF10: 0 - NO PAIN
PAINLEVEL_OUTOF10: 0 - NO PAIN

## 2024-01-17 NOTE — TELEPHONE ENCOUNTER
Pt daughter Jeimy called, Roslyn is in the hospital and she is requesting to speak with you regarding her mom care  Please call once you have time. [phone]

## 2024-01-17 NOTE — CARE PLAN
The patient's goals for the shift include free from falls    The clinical goals for the shift include pt will remain HDS this shift    Over the shift, the patient made progress toward the following goals. Barriers to progression include impulsivity and expressive aphasia. Recommendations to address these barriers include increased rounding, bed alarm on while family is not present in room.

## 2024-01-17 NOTE — PROGRESS NOTES
"Roslyn Cabrera is a 78 y.o. female on day 2 of admission presenting with Aphasia.    Subjective     Interval Events- EEG showed left hemisphere structural defect with no evidence of epileptiform activity  Echo was significant for severe dilation of LA.   Loop recorder was placed by EP yesterday. No Afib on telemetry.       Objective     Last Recorded Vitals  Blood pressure 136/75, pulse 66, temperature 36.5 °C (97.7 °F), temperature source Temporal, resp. rate 18, height 1.575 m (5' 2\"), weight 59.4 kg (131 lb 1 oz), SpO2 92 %.    Physical Exam  Eyes:      Extraocular Movements: Extraocular movements intact.      Pupils: Pupils are equal, round, and reactive to light.   Cardiovascular:      Rate and Rhythm: Normal rate and regular rhythm.   Pulmonary:      Effort: Pulmonary effort is normal.      Breath sounds: Normal breath sounds. No wheezing or rales.   Abdominal:      General: Abdomen is flat.      Palpations: Abdomen is soft.   Musculoskeletal:         General: No swelling.   Neurological:      Motor: Motor strength is normal.    Neurological Exam  Mental Status  Awake, alert and oriented to person, place and time. Level of consciousness: Oriented to self and place, limited by mixed aphasia. Mixed aphasia present. Able to repeat, read and write. Follows complex commands. Obeys / mimics some commands.    Cranial Nerves  CN II: Visual fields full to confrontation.  CN III, IV, VI: Extraocular movements intact bilaterally. Pupils equal round and reactive to light bilaterally.  CN V: Facial sensation is normal.  CN VII: Full and symmetric facial movement.  CN VIII: Hearing is normal.    Motor   Strength is 5/5 throughout all four extremities.    Sensory  Light touch is normal in upper and lower extremities.     Coordination  Right: Finger-to-nose normal.Left: Finger-to-nose normal.        NIH Stroke Scale  1A. Level of Consciousness: Alert, Keenly Responsive  1B. Ask Month and Age: Both Questions Right  1C. Blink " Eyes & Squeeze Hands: Performs Both Tasks  2. Best Gaze: Normal  3. Visual: No Visual Loss  4. Facial Palsy: Normal Symmetrical Movements  5A. Motor - Left Arm: No Drift  5B. Motor - Right Arm: No Drift  6A. Motor - Left Leg: No Drift  6B. Motor - Right Leg: No Drift  7. Limb Ataxia: Absent  8. Sensory Loss: Normal  9. Best Language: Mild-to-Moderate Aphasia  10. Dysarthria: Normal  11. Extinction and Inattention: No Abnormality  NIH Stroke Scale: 1           Pk Coma Scale  Best Eye Response: Spontaneous  Best Verbal Response: Confused  Best Motor Response: Follows commands  Pk Coma Scale Score: 14            Scheduled medications  aspirin, 81 mg, oral, Daily  atorvastatin, 40 mg, oral, Nightly  carvedilol, 12.5 mg, oral, BID  cholecalciferol, 2,000 Units, oral, Daily  [Held by provider] hydrALAZINE, 25 mg, oral, BID  latanoprost, 1 drop, Both Eyes, Nightly  [Held by provider] losartan, 100 mg, oral, Daily  pantoprazole, 40 mg, oral, Daily before breakfast   Or  pantoprazole, 40 mg, intravenous, Daily before breakfast  polyethylene glycol, 17 g, oral, Daily  timolol, 1 drop, Both Eyes, Daily      Continuous medications     PRN medications  PRN medications: oxygen    Relevant Results  Results for orders placed or performed during the hospital encounter of 01/15/24 (from the past 24 hour(s))   Transthoracic Echo (TTE) Complete   Result Value Ref Range    AV pk melanie 1.41     LVOT diam 2.00     LV biplane EF 79     MV avg E/e' ratio 17.11     MV E/A ratio 0.59     LA vol index A/L 53.0     Tricuspid annular plane systolic excursion 1.9     RV free wall pk S' 12.80     LVIDd 4.30     RVSP 26.0     Aortic Valve Area by Continuity of Peak Velocity 2.87     AV pk grad 8.0     LV A4C EF 77.5    Cardiac Device Check - Inpatient   Result Value Ref Range    BSA 1.61 m2   POCT GLUCOSE   Result Value Ref Range    POCT Glucose 77 74 - 99 mg/dL   POCT GLUCOSE   Result Value Ref Range    POCT Glucose 148 (H) 74 - 99 mg/dL      Electrophysiology procedure    Result Date: 1/16/2024  Loop recorder implantation Loop recorder implanted: Medtronic LINQ II LNQ22 Serial No: [id number] The indications, risks, benefits, alternatives, and details of the procedure were explained to the patient who expressed understanding of the risks including but are not limited to: pain, bleeding, and infection for which the risk is less than 1%. More serious risks, including cardiac perforation and tamponade, vascular trauma, and/or death, for which the overall risk ranges 0.1-1%. After all questions were answered, the patient provided informed and written consent. The patient was prepped and draped in the usual sterile fashion. Local anesthesia with 1% lidocaine was achieved, and a small incision was made at the left fourth intercostal space. A dissection plane was created subcutaneously in the direction of the apex of the heart, and the implantable loop recorder was introduced under the skin. The edges of the incision were approximated and (suture/Dermabond) with an island dressing was applied. Hemostasis was achieved. Conclusion: Successful implantation of an implantable loop recorder. The patient tolerated the procedure well with no immediate complications.     Transthoracic Echo (TTE) Complete    Result Date: 1/16/2024   [facility], [address] [place] [address]                Tel [phone] and Fax [phone] TRANSTHORACIC ECHOCARDIOGRAM REPORT  Patient Name:      RENEE Villarreal Physician:    [id number] Arash Amezquita MD Study Date:        1/16/2024           Ordering Provider:    [id number] SHAHNAZ SAUNDERS MRN/PID:           [id number]            Fellow: Accession#:        [id number]        Nurse:                Jeimy Jain RN Date of Birth/Age: 1945 / 78 years Sonographer:          Errol Ureña                                                               [facility] Gender:            F                   Additional Staff: Height:            157.48 cm           Admit Date:           1/15/2024 Weight:            59.42 kg            Admission Status:     Inpatient -                                                              Priority discharge BSA:               1.60 m2             Encounter#:           [id number]                                        Department Location:  [facility] Non                                                              Invasive Blood Pressure: 101 /59 mmHg Study Type:    TRANSTHORACIC ECHO (TTE) COMPLETE Diagnosis/ICD: Cerebral Infarction, unspecified-I63.9; Cerebral infarction due                to embolism of left middle cerebral artery-I63.412 Indication:    acute ischemic stroke, stroke due to embolism of left middle                cerebral artery CPT Code:      Echo Complete w Full Doppler-[id number] Patient History: Pertinent         Smoker, HTN, HLD, RA, wernicke's aphasia, hypertensive History:          emergency. Study Detail: The following Echo studies were performed: 2D, M-Mode, Doppler and               color flow. Technically challenging study due to patient lying in               supine position. Definity used as a contrast agent for endocardial               border definition and agitated saline used as a contrast agent for               intraseptal flow evaluation. Total contrast used for this               procedure was 1.5 mL via IV push. Unable to obtain suprasternal               notch view.  PHYSICIAN INTERPRETATION: Left Ventricle: The left ventricular systolic function is hyperdynamic, with an estimated ejection fraction of 70-75%. There are no regional wall motion abnormalities. The left ventricular cavity size is normal. There is mild to moderate concentric left ventricular hypertrophy. Spectral Doppler shows an impaired relaxation pattern of left ventricular diastolic filling. There are elevated left atrial and left  ventricular end diastolic pressures. Left Atrium: The left atrium is severely dilated. Right Ventricle: The right ventricle is normal in size. There is normal right ventricular global systolic function. Right Atrium: The right atrium is normal in size. Aortic Valve: The aortic valve appears structurally normal. There is no evidence of aortic valve regurgitation. The peak instantaneous gradient of the aortic valve is 8.0 mmHg. Mitral Valve: The mitral valve is normal in structure. There is moderate mitral annular calcification. There is trace mitral valve regurgitation. Tricuspid Valve: The tricuspid valve is structurally normal. No evidence of tricuspid regurgitation. The Doppler estimated RVSP is within normal limits at 26.0 mmHg. Pulmonic Valve: The pulmonic valve is structurally normal. There is trace pulmonic valve regurgitation. Pericardium: There is no pericardial effusion noted. Aorta: The aortic root is normal. Systemic Veins: The inferior vena cava appears to be of normal size. There is IVC inspiratory collapse greater than 50%. In comparison to the previous echocardiogram(s): Compared with study from 3/27/2019, no significant change.  CONCLUSIONS:  1. Left ventricular systolic function is hyperdynamic with a 70-75% estimated ejection fraction.  2. There is mild to moderate concentric left ventricular hypertrophy.  3. Spectral Doppler shows an impaired relaxation pattern of left ventricular diastolic filling.  4. There are elevated left atrial and left ventricular end diastolic pressures.  5. The left atrium is severely dilated.  6. RVSP within normal limits.  7. There is moderate mitral annular calcification. QUANTITATIVE DATA SUMMARY: 2D MEASUREMENTS:                           Normal Ranges: Ao Root d:     3.50 cm    (2.0-3.7cm) LAs:           4.00 cm    (2.7-4.0cm) IVSd:          1.30 cm    (0.6-1.1cm) LVPWd:         1.30 cm    (0.6-1.1cm) LVIDd:         4.30 cm    (3.9-5.9cm) LVIDs:         2.70 cm LV  Mass Index: 130.1 g/m2 LV % FS        37.2 % LA VOLUME:                               Normal Ranges: LA Vol A4C:        83.1 ml    (22+/-6mL/m2) LA Vol A2C:        85.6 ml LA Vol BP:         84.6 ml LA Vol Index A4C:  52.0ml/m2 LA Vol Index A2C:  53.6 ml/m2 LA Vol Index BP:   53.0 ml/m2 LA Area A4C:       24.1 cm2 LA Area A2C:       24.4 cm2 LA Major Axis A4C: 5.9 cm LA Major Axis A2C: 5.9 cm LA Volume Index:   52.9 ml/m2 AORTA MEASUREMENTS:                    Normal Ranges: Asc Ao, d: 3.40 cm (2.1-3.4cm) LV SYSTOLIC FUNCTION BY 2D PLANIMETRY (MOD):                     Normal Ranges: EF-A4C View: 77.5 % (>=55%) EF-A2C View: 80.8 % EF-Biplane:  79.1 % LV DIASTOLIC FUNCTION:                           Normal Ranges: MV Peak E:    0.87 m/s    (0.7-1.2 m/s) MV Peak A:    1.47 m/s    (0.42-0.7 m/s) E/A Ratio:    0.59        (1.0-2.2) MV e'         0.05 m/s    (>8.0) MV lateral e' 0.05 m/s MV medial e'  0.05 m/s MV A Dur:     145.00 msec E/e' Ratio:   17.11       (<8.0) a'            0.09 m/s MV DT:        321 msec    (150-240 msec) MITRAL VALVE:                 Normal Ranges: MV DT: 321 msec (150-240msec) AORTIC VALVE:                         Normal Ranges: AoV Vmax:      1.41 m/s (<=1.7m/s) AoV Peak P.0 mmHg (<20mmHg) LVOT Max Juan:  1.29 m/s (<=1.1m/s) LVOT VTI:      27.10 cm LVOT Diameter: 2.00 cm  (1.8-2.4cm) AoV Area,Vmax: 2.87 cm2 (2.5-4.5cm2)  RIGHT VENTRICLE: TAPSE: 18.7 mm RV s'  0.13 m/s TRICUSPID VALVE/RVSP:                             Normal Ranges: Peak TR Velocity: 2.40 m/s RV Syst Pressure: 26.0 mmHg (< 30mmHg) IVC Diam:         1.80 cm PULMONIC VALVE:                         Normal Ranges: PV Accel Time: 153 msec (>120ms) PV Max Juan:    0.7 m/s  (0.6-0.9m/s) PV Max P.2 mmHg  [id number] Eduardoupradha Amezquita MD Electronically signed on 2024 at 3:29:32 PM  ** Final **     EEG    IMPRESSION This 62 minute awake and sleep EEG is indicative of a structural lesion in the left hemisphere. No epileptiform  abnormalities or seizures noted. A full report will be scanned into the patient's chart at a later time. This report has been interpreted and electronically signed by          Assessment/Plan      Principal Problem:    Aphasia  Active Problems:    Acute ischemic stroke (CMS/HCC)    Stroke due to embolism of left middle cerebral artery (CMS/HCC)    Mr. Roslyn Cabrera is a 79 YO F with PMH of HTN, HLD, tobacco abuse, glaucoma complicated by visual impairment presenting with acute onset confusion and speech difficulty. On examination, patient found to have persistent wernicke's type aphasia (NIHSS 1). Later appeared to be mixed aphasia (impaired fluency, comprehension, and repetition). Per Hx - pt had Left sided weakness with drift which had resolved when examined by Neurology at [facility]. CT head w/o contrast shows global atrophy and advanced white matter disease. CTA shows no significant evidence of intracranial or extracranial disease. MR brain from 03/22 shows left occipital encephalomalacia suggestive of old infarct. Repeat MR with contrast shows new diffusion restriction in left parietal and left occipital regions in distal MCA distribution. Patient's aphasia can be attributed to affected infarcted region. 1h EEG negative for epileptiform discharges. Echo revealed severely dilated left atrium, no afib on telemetry. Pt had implanted loop recorder 1/16.    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  ::LDL 12/7/23: 97  ::Echo 1/17/2024: 70-75% EF, increased LV mass, severe dilation of left atrium  - CTH with L parieto-occipital hypodensity  - MRI with wedge shaped  territory in the L parieto-occipital region with diffusion restriction, no contrast enhancement or bleeding  - HbA1c 5.7%  - Follow up lipid panel  - Start Aspirin 81mg daily, continue home Atorvastatin 40mg at bedtime   - PT, OT, speech therapy  - Implanted loop recorder 1/16, see EP note     #Hx ofHTN  #Hypertensive Emergency  - SBP goal < 220 for 24h (until 6pm 1/16)  - Resume coreg 12.5mg BID 1/17  - PRN Hydralazine and Labetolol   - Home: hydralazine PO 25mg twice a day,  losartan PO 100mg every day, carvedilol PO 12.5mg twice a day      #Hx of Glaucoma  - Continue home latanoprost .005% 1 drop in each eye once a day  - Continue home timolol 0.5% 1 drop into each eye once a day     #Vitamin D, B12 deficiency  - Continue home cholecalciferol PO 125MCG once a day  - Last B12 shot in 05/23     F: Bolus PRN  E: K>4 Mg>2 Phos>3  N: Normal Diet  A: PIV  P: SCDs     NOK: daughterJeimy ([phone])  CODE STATUS: DNR, DNI (confirmed on admission with pt and family)    Gwyn Mendoza, MS3    Reviewed by Thiago Monsalve, Neurology PGY-2  Stroke team pager [phone]

## 2024-01-18 LAB
GLUCOSE BLD MANUAL STRIP-MCNC: 118 MG/DL (ref 74–99)
GLUCOSE BLD MANUAL STRIP-MCNC: 123 MG/DL (ref 74–99)
GLUCOSE BLD MANUAL STRIP-MCNC: 89 MG/DL (ref 74–99)
GLUCOSE BLD MANUAL STRIP-MCNC: 94 MG/DL (ref 74–99)

## 2024-01-18 PROCEDURE — 2500000004 HC RX 250 GENERAL PHARMACY W/ HCPCS (ALT 636 FOR OP/ED)

## 2024-01-18 PROCEDURE — 2500000001 HC RX 250 WO HCPCS SELF ADMINISTERED DRUGS (ALT 637 FOR MEDICARE OP)

## 2024-01-18 PROCEDURE — 97535 SELF CARE MNGMENT TRAINING: CPT | Mod: GO

## 2024-01-18 PROCEDURE — 97110 THERAPEUTIC EXERCISES: CPT | Mod: GP | Performed by: PHYSICAL THERAPIST

## 2024-01-18 PROCEDURE — 97116 GAIT TRAINING THERAPY: CPT | Mod: GP | Performed by: PHYSICAL THERAPIST

## 2024-01-18 PROCEDURE — 1200000002 HC GENERAL ROOM WITH TELEMETRY DAILY

## 2024-01-18 PROCEDURE — 82947 ASSAY GLUCOSE BLOOD QUANT: CPT

## 2024-01-18 PROCEDURE — 97530 THERAPEUTIC ACTIVITIES: CPT | Mod: GO

## 2024-01-18 PROCEDURE — 99233 SBSQ HOSP IP/OBS HIGH 50: CPT

## 2024-01-18 RX ORDER — PANTOPRAZOLE SODIUM 40 MG/1
40 TABLET, DELAYED RELEASE ORAL
Qty: 90 TABLET | Refills: 3 | Status: SHIPPED | OUTPATIENT
Start: 2024-01-19 | End: 2024-01-20 | Stop reason: HOSPADM

## 2024-01-18 RX ORDER — ATORVASTATIN CALCIUM 40 MG/1
40 TABLET, FILM COATED ORAL NIGHTLY
Qty: 90 TABLET | Refills: 3 | Status: SHIPPED | OUTPATIENT
Start: 2024-01-18 | End: 2024-02-27 | Stop reason: SDUPTHER

## 2024-01-18 RX ORDER — ASPIRIN 81 MG/1
81 TABLET ORAL DAILY
Qty: 90 TABLET | Refills: 3 | Status: SHIPPED | OUTPATIENT
Start: 2024-01-19

## 2024-01-18 RX ORDER — AMOXICILLIN 250 MG
1 CAPSULE ORAL 2 TIMES DAILY
Status: DISCONTINUED | OUTPATIENT
Start: 2024-01-18 | End: 2024-01-20 | Stop reason: HOSPADM

## 2024-01-18 RX ADMIN — CARVEDILOL 12.5 MG: 12.5 TABLET, FILM COATED ORAL at 21:14

## 2024-01-18 RX ADMIN — SENNOSIDES AND DOCUSATE SODIUM 1 TABLET: 8.6; 5 TABLET ORAL at 21:14

## 2024-01-18 RX ADMIN — HYDRALAZINE HYDROCHLORIDE 25 MG: 25 TABLET ORAL at 08:23

## 2024-01-18 RX ADMIN — LATANOPROST 1 DROP: 50 SOLUTION OPHTHALMIC at 21:14

## 2024-01-18 RX ADMIN — POLYETHYLENE GLYCOL 3350 17 G: 17 POWDER, FOR SOLUTION ORAL at 08:24

## 2024-01-18 RX ADMIN — SENNOSIDES AND DOCUSATE SODIUM 1 TABLET: 8.6; 5 TABLET ORAL at 08:23

## 2024-01-18 RX ADMIN — Medication 2000 UNITS: at 08:23

## 2024-01-18 RX ADMIN — HYDRALAZINE HYDROCHLORIDE 25 MG: 25 TABLET ORAL at 21:14

## 2024-01-18 RX ADMIN — PANTOPRAZOLE SODIUM 40 MG: 40 TABLET, DELAYED RELEASE ORAL at 08:23

## 2024-01-18 RX ADMIN — TIMOLOL MALEATE 1 DROP: 5 SOLUTION/ DROPS OPHTHALMIC at 08:26

## 2024-01-18 RX ADMIN — ASPIRIN 81 MG: 81 TABLET, COATED ORAL at 08:23

## 2024-01-18 RX ADMIN — ATORVASTATIN CALCIUM 40 MG: 40 TABLET, FILM COATED ORAL at 21:14

## 2024-01-18 RX ADMIN — CARVEDILOL 12.5 MG: 12.5 TABLET, FILM COATED ORAL at 08:23

## 2024-01-18 ASSESSMENT — PAIN - FUNCTIONAL ASSESSMENT
PAIN_FUNCTIONAL_ASSESSMENT: 0-10

## 2024-01-18 ASSESSMENT — COGNITIVE AND FUNCTIONAL STATUS - GENERAL
TOILETING: A LITTLE
DAILY ACTIVITIY SCORE: 18
WALKING IN HOSPITAL ROOM: A LITTLE
STANDING UP FROM CHAIR USING ARMS: A LITTLE
CLIMB 3 TO 5 STEPS WITH RAILING: TOTAL
DRESSING REGULAR LOWER BODY CLOTHING: A LITTLE
MOBILITY SCORE: 16
MOVING TO AND FROM BED TO CHAIR: A LITTLE
DAILY ACTIVITIY SCORE: 18
HELP NEEDED FOR BATHING: A LITTLE
CLIMB 3 TO 5 STEPS WITH RAILING: TOTAL
MOBILITY SCORE: 16
EATING MEALS: A LITTLE
PERSONAL GROOMING: A LITTLE
DRESSING REGULAR LOWER BODY CLOTHING: A LITTLE
MOVING TO AND FROM BED TO CHAIR: A LITTLE
MOVING FROM LYING ON BACK TO SITTING ON SIDE OF FLAT BED WITH BEDRAILS: A LITTLE
STANDING UP FROM CHAIR USING ARMS: A LITTLE
TOILETING: A LITTLE
TURNING FROM BACK TO SIDE WHILE IN FLAT BAD: A LITTLE
EATING MEALS: A LITTLE
HELP NEEDED FOR BATHING: A LITTLE
MOVING FROM LYING ON BACK TO SITTING ON SIDE OF FLAT BED WITH BEDRAILS: A LITTLE
PERSONAL GROOMING: A LITTLE
DRESSING REGULAR UPPER BODY CLOTHING: A LITTLE
TURNING FROM BACK TO SIDE WHILE IN FLAT BAD: A LITTLE
DRESSING REGULAR UPPER BODY CLOTHING: A LITTLE
WALKING IN HOSPITAL ROOM: A LITTLE

## 2024-01-18 ASSESSMENT — ACTIVITIES OF DAILY LIVING (ADL): HOME_MANAGEMENT_TIME_ENTRY: 30

## 2024-01-18 ASSESSMENT — PAIN SCALES - GENERAL
PAINLEVEL_OUTOF10: 0 - NO PAIN

## 2024-01-18 NOTE — DOCUMENTATION CLARIFICATION NOTE
"    PATIENT:               RENEE PIEDRA  ACCT #:                  [id number]  MRN:                       [id number]  :                       1945  ADMIT DATE:       1/15/2024 6:09 PM  DISCH DATE:  RESPONDING PROVIDER #:        [id number]          PROVIDER RESPONSE TEXT:    Midline shift or mass effect without brain compression    CDI QUERY TEXT:    [facility]_Brain Compression    Instruction:    Based on your assessment of the patient and the clinical information, please provide the requested documentation by clicking on the appropriate radio button and enter any additional information if prompted.    Question: Please further clarify if there is a diagnosis related to the clinical information    When answering this query, please exercise your independent professional judgment. The fact that a question is being asked, does not imply that any particular answer is desired or expected.    The patient's clinical indicators include:  Clinical Information: 77 YO F with PMH?of HTN, HLD, tobacco abuse,?glaucoma complicated by visual impairment presenting with acute onset confusion and speech difficulty. MRI  shows new diffusion restriction in left parietal and left occipital regions in distal MCA distribution    Clinical Indicators: 1/15 MRI \"Acute or early subacute infarction is present in the left parietal  lobe, with some involvement of the left occipital lobe. There is  slight local mass effect with effacement of the adjacent sulci,  without evidence of midline shift or hemorrhagic transformation.\"     PN by Dr. Monsalve \"Oriented to self and place, limited by mixed aphasia. Mixed aphasia present. Obeys / mimics some commands.\"  \"CTH with L parieto-occipital hypodensity\" \"MRI with wedge shaped territory in the L parieto-occipital region with diffusion restriction, no contrast enhancement or bleeding\"  \"Mixed Aphasia 2/2 embolic stroke\"    Treatment: MRI, Q 8 hour neuro checks    Risk Factors: Stroke  Options provided:  -- " Brain compression  -- Midline shift or mass effect without brain compression  -- Other - I will add my own diagnosis  -- Refer to Clinical Documentation Reviewer    Query created by: Theresa Barrow on 1/17/2024 7:54 AM      Electronically signed by:  ANNABEL HUFF MD 1/17/2024 7:31 PM

## 2024-01-18 NOTE — CARE PLAN
Problem: Fall/Injury  Goal: Not fall by end of shift  Outcome: Progressing     Problem: Pain - Adult  Goal: Verbalizes/displays adequate comfort level or baseline comfort level  Outcome: Progressing     Problem: Safety - Adult  Goal: Free from fall injury  Outcome: Progressing     Problem: Chronic Conditions and Co-morbidities  Goal: Patient's chronic conditions and co-morbidity symptoms are monitored and maintained or improved  Outcome: Progressing     Problem: General Stroke  Goal: Demonstrate improvement in neurological exam throughout the shift  Outcome: Progressing  Goal: Maintain BP within ordered limits throughout shift  Outcome: Progressing   The patient's goals for the shift include free from falls    The clinical goals for the shift include pt will remain HDS this shift    Over the shift, the patient did make progress toward the following goals.

## 2024-01-18 NOTE — PROGRESS NOTES
"Roslyn Cabrera is a 78 y.o. female on day 3 of admission presenting with Aphasia.    Subjective     Interval Events- No major events    Restarted home hydralazine due to increased blood pressure         Objective     Last Recorded Vitals  Blood pressure 167/81, pulse 55, temperature 36.4 °C (97.5 °F), resp. rate 15, height 1.575 m (5' 2\"), weight 59.4 kg (131 lb 1 oz), SpO2 91 %.    Physical Exam  Eyes:      Extraocular Movements: Extraocular movements intact.      Pupils: Pupils are equal, round, and reactive to light.   Cardiovascular:      Rate and Rhythm: Normal rate and regular rhythm.   Pulmonary:      Effort: Pulmonary effort is normal.      Breath sounds: Normal breath sounds. No wheezing or rales.   Abdominal:      General: Abdomen is flat.      Palpations: Abdomen is soft.   Musculoskeletal:         General: No swelling.   Neurological:      Motor: Motor strength is normal.    Neurological Exam  Mental Status  Awake, alert and oriented to person, place and time. Level of consciousness: Oriented to self and place, limited by mixed aphasia. Mixed aphasia present. Follows complex commands. Obeys / mimics some commands.    Cranial Nerves  CN II: Visual fields full to confrontation.  CN III, IV, VI: Extraocular movements intact bilaterally. Pupils equal round and reactive to light bilaterally.  CN V: Facial sensation is normal.  CN VII: Full and symmetric facial movement.  CN VIII: Hearing is normal.    Motor   Strength is 5/5 throughout all four extremities.    Sensory  Light touch is normal in upper and lower extremities.     Coordination  Right: Finger-to-nose normal.Left: Finger-to-nose normal.    Speech  Improvements in automatic speech      NIH Stroke Scale  1A. Level of Consciousness: Alert, Keenly Responsive  1B. Ask Month and Age: 1 Question Right  1C. Blink Eyes & Squeeze Hands: Performs Both Tasks  2. Best Gaze: Normal  3. Visual: No Visual Loss  4. Facial Palsy: Normal Symmetrical Movements  5A. " Motor - Left Arm: No Drift  5B. Motor - Right Arm: No Drift  6A. Motor - Left Leg: No Drift  6B. Motor - Right Leg: No Drift  7. Limb Ataxia: Absent  8. Sensory Loss: Normal  9. Best Language: Mild-to-Moderate Aphasia  10. Dysarthria: Normal  11. Extinction and Inattention: No Abnormality  NIH Stroke Scale: 2           Pk Coma Scale  Best Eye Response: Spontaneous  Best Verbal Response: Confused  Best Motor Response: Follows commands  Pk Coma Scale Score: 14            Scheduled medications  aspirin, 81 mg, oral, Daily  atorvastatin, 40 mg, oral, Nightly  carvedilol, 12.5 mg, oral, BID  cholecalciferol, 2,000 Units, oral, Daily  hydrALAZINE, 25 mg, oral, BID  latanoprost, 1 drop, Both Eyes, Nightly  [Held by provider] losartan, 100 mg, oral, Daily  pantoprazole, 40 mg, oral, Daily before breakfast   Or  pantoprazole, 40 mg, intravenous, Daily before breakfast  polyethylene glycol, 17 g, oral, Daily  sennosides-docusate sodium, 1 tablet, oral, BID  timolol, 1 drop, Both Eyes, Daily      Continuous medications     PRN medications  PRN medications: oxygen    Relevant Results  Results for orders placed or performed during the hospital encounter of 01/15/24 (from the past 24 hour(s))   Magnesium   Result Value Ref Range    Magnesium 2.11 1.60 - 2.40 mg/dL   Renal Function Panel   Result Value Ref Range    Glucose 107 (H) 74 - 99 mg/dL    Sodium 141 136 - 145 mmol/L    Potassium 4.0 3.5 - 5.3 mmol/L    Chloride 109 (H) 98 - 107 mmol/L    Bicarbonate 22 21 - 32 mmol/L    Anion Gap 14 10 - 20 mmol/L    Urea Nitrogen 15 6 - 23 mg/dL    Creatinine 0.99 0.50 - 1.05 mg/dL    eGFR 59 (L) >60 mL/min/1.73m*2    Calcium 9.1 8.6 - 10.6 mg/dL    Phosphorus 3.7 2.5 - 4.9 mg/dL    Albumin 3.8 3.4 - 5.0 g/dL   CBC and Auto Differential   Result Value Ref Range    WBC 8.5 4.4 - 11.3 x10*3/uL    nRBC 0.0 0.0 - 0.0 /100 WBCs    RBC 4.53 4.00 - 5.20 x10*6/uL    Hemoglobin 13.3 12.0 - 16.0 g/dL    Hematocrit 40.4 36.0 - 46.0 %     MCV 89 80 - 100 fL    MCH 29.4 26.0 - 34.0 pg    MCHC 32.9 32.0 - 36.0 g/dL    RDW 13.7 11.5 - 14.5 %    Platelets 195 150 - 450 x10*3/uL    Neutrophils % 54.5 40.0 - 80.0 %    Immature Granulocytes %, Automated 0.2 0.0 - 0.9 %    Lymphocytes % 31.3 13.0 - 44.0 %    Monocytes % 10.9 2.0 - 10.0 %    Eosinophils % 2.5 0.0 - 6.0 %    Basophils % 0.6 0.0 - 2.0 %    Neutrophils Absolute 4.63 1.60 - 5.50 x10*3/uL    Immature Granulocytes Absolute, Automated 0.02 0.00 - 0.50 x10*3/uL    Lymphocytes Absolute 2.66 0.80 - 3.00 x10*3/uL    Monocytes Absolute 0.93 (H) 0.05 - 0.80 x10*3/uL    Eosinophils Absolute 0.21 0.00 - 0.40 x10*3/uL    Basophils Absolute 0.05 0.00 - 0.10 x10*3/uL   POCT GLUCOSE   Result Value Ref Range    POCT Glucose 118 (H) 74 - 99 mg/dL   POCT GLUCOSE   Result Value Ref Range    POCT Glucose 95 74 - 99 mg/dL   POCT GLUCOSE   Result Value Ref Range    POCT Glucose 101 (H) 74 - 99 mg/dL   POCT GLUCOSE   Result Value Ref Range    POCT Glucose 182 (H) 74 - 99 mg/dL   POCT GLUCOSE   Result Value Ref Range    POCT Glucose 94 74 - 99 mg/dL     Electrophysiology procedure    Result Date: 1/16/2024  Loop recorder implantation Loop recorder implanted: Medtronic LINQ II LNQ22 Serial No: [id number] The indications, risks, benefits, alternatives, and details of the procedure were explained to the patient who expressed understanding of the risks including but are not limited to: pain, bleeding, and infection for which the risk is less than 1%. More serious risks, including cardiac perforation and tamponade, vascular trauma, and/or death, for which the overall risk ranges 0.1-1%. After all questions were answered, the patient provided informed and written consent. The patient was prepped and draped in the usual sterile fashion. Local anesthesia with 1% lidocaine was achieved, and a small incision was made at the left fourth intercostal space. A dissection plane was created subcutaneously in the direction of the apex  of the heart, and the implantable loop recorder was introduced under the skin. The edges of the incision were approximated and (suture/Dermabond) with an island dressing was applied. Hemostasis was achieved. Conclusion: Successful implantation of an implantable loop recorder. The patient tolerated the procedure well with no immediate complications.     Transthoracic Echo (TTE) Complete    Result Date: 1/16/2024   [facility], [address] [place] [address]                Tel [phone] and Fax [phone] TRANSTHORACIC ECHOCARDIOGRAM REPORT  Patient Name:      RENEE PIEDRA    Reading Physician:    [id number] Arash Amezquita MD Study Date:        1/16/2024           Ordering Provider:    [id number] SHAHNAZ SAUNDERS MRN/PID:           [id number]            Fellow: Accession#:        [id number]        Nurse:                Jeimy Jain RN Date of Birth/Age: 1945 / 78 years Sonographer:          Errol Ureña RDCS Gender:            F                   Additional Staff: Height:            157.48 cm           Admit Date:           1/15/2024 Weight:            59.42 kg            Admission Status:     Inpatient -                                                              Priority discharge BSA:               1.60 m2             Encounter#:           [id number]                                        Department Location:  [facility] Non                                                              Invasive Blood Pressure: 101 /59 mmHg Study Type:    TRANSTHORACIC ECHO (TTE) COMPLETE Diagnosis/ICD: Cerebral Infarction, unspecified-I63.9; Cerebral infarction due                to embolism of left middle cerebral artery-I63.412 Indication:    acute ischemic stroke, stroke due to embolism of left middle                cerebral artery CPT Code:      Echo Complete w Full  [id number] Patient History: Pertinent         Smoker, HTN, HLD, RA, wernicke's aphasia, hypertensive History:          emergency. Study Detail: The following Echo studies were performed: 2D, M-Mode, Doppler and               color flow. Technically challenging study due to patient lying in               supine position. Definity used as a contrast agent for endocardial               border definition and agitated saline used as a contrast agent for               intraseptal flow evaluation. Total contrast used for this               procedure was 1.5 mL via IV push. Unable to obtain suprasternal               notch view.  PHYSICIAN INTERPRETATION: Left Ventricle: The left ventricular systolic function is hyperdynamic, with an estimated ejection fraction of 70-75%. There are no regional wall motion abnormalities. The left ventricular cavity size is normal. There is mild to moderate concentric left ventricular hypertrophy. Spectral Doppler shows an impaired relaxation pattern of left ventricular diastolic filling. There are elevated left atrial and left ventricular end diastolic pressures. Left Atrium: The left atrium is severely dilated. Right Ventricle: The right ventricle is normal in size. There is normal right ventricular global systolic function. Right Atrium: The right atrium is normal in size. Aortic Valve: The aortic valve appears structurally normal. There is no evidence of aortic valve regurgitation. The peak instantaneous gradient of the aortic valve is 8.0 mmHg. Mitral Valve: The mitral valve is normal in structure. There is moderate mitral annular calcification. There is trace mitral valve regurgitation. Tricuspid Valve: The tricuspid valve is structurally normal. No evidence of tricuspid regurgitation. The Doppler estimated RVSP is within normal limits at 26.0 mmHg. Pulmonic Valve: The pulmonic valve is structurally normal. There is trace pulmonic valve regurgitation. Pericardium: There is no  pericardial effusion noted. Aorta: The aortic root is normal. Systemic Veins: The inferior vena cava appears to be of normal size. There is IVC inspiratory collapse greater than 50%. In comparison to the previous echocardiogram(s): Compared with study from 3/27/2019, no significant change.  CONCLUSIONS:  1. Left ventricular systolic function is hyperdynamic with a 70-75% estimated ejection fraction.  2. There is mild to moderate concentric left ventricular hypertrophy.  3. Spectral Doppler shows an impaired relaxation pattern of left ventricular diastolic filling.  4. There are elevated left atrial and left ventricular end diastolic pressures.  5. The left atrium is severely dilated.  6. RVSP within normal limits.  7. There is moderate mitral annular calcification. QUANTITATIVE DATA SUMMARY: 2D MEASUREMENTS:                           Normal Ranges: Ao Root d:     3.50 cm    (2.0-3.7cm) LAs:           4.00 cm    (2.7-4.0cm) IVSd:          1.30 cm    (0.6-1.1cm) LVPWd:         1.30 cm    (0.6-1.1cm) LVIDd:         4.30 cm    (3.9-5.9cm) LVIDs:         2.70 cm LV Mass Index: 130.1 g/m2 LV % FS        37.2 % LA VOLUME:                               Normal Ranges: LA Vol A4C:        83.1 ml    (22+/-6mL/m2) LA Vol A2C:        85.6 ml LA Vol BP:         84.6 ml LA Vol Index A4C:  52.0ml/m2 LA Vol Index A2C:  53.6 ml/m2 LA Vol Index BP:   53.0 ml/m2 LA Area A4C:       24.1 cm2 LA Area A2C:       24.4 cm2 LA Major Axis A4C: 5.9 cm LA Major Axis A2C: 5.9 cm LA Volume Index:   52.9 ml/m2 AORTA MEASUREMENTS:                    Normal Ranges: Asc Ao, d: 3.40 cm (2.1-3.4cm) LV SYSTOLIC FUNCTION BY 2D PLANIMETRY (MOD):                     Normal Ranges: EF-A4C View: 77.5 % (>=55%) EF-A2C View: 80.8 % EF-Biplane:  79.1 % LV DIASTOLIC FUNCTION:                           Normal Ranges: MV Peak E:    0.87 m/s    (0.7-1.2 m/s) MV Peak A:    1.47 m/s    (0.42-0.7 m/s) E/A Ratio:    0.59        (1.0-2.2) MV e'         0.05 m/s    (>8.0)  MV lateral e' 0.05 m/s MV medial e'  0.05 m/s MV A Dur:     145.00 msec E/e' Ratio:   17.11       (<8.0) a'            0.09 m/s MV DT:        321 msec    (150-240 msec) MITRAL VALVE:                 Normal Ranges: MV DT: 321 msec (150-240msec) AORTIC VALVE:                         Normal Ranges: AoV Vmax:      1.41 m/s (<=1.7m/s) AoV Peak P.0 mmHg (<20mmHg) LVOT Max Juan:  1.29 m/s (<=1.1m/s) LVOT VTI:      27.10 cm LVOT Diameter: 2.00 cm  (1.8-2.4cm) AoV Area,Vmax: 2.87 cm2 (2.5-4.5cm2)  RIGHT VENTRICLE: TAPSE: 18.7 mm RV s'  0.13 m/s TRICUSPID VALVE/RVSP:                             Normal Ranges: Peak TR Velocity: 2.40 m/s RV Syst Pressure: 26.0 mmHg (< 30mmHg) IVC Diam:         1.80 cm PULMONIC VALVE:                         Normal Ranges: PV Accel Time: 153 msec (>120ms) PV Max Juan:    0.7 m/s  (0.6-0.9m/s) PV Max P.2 mmHg  [id number] Arash Amezquita MD Electronically signed on 2024 at 3:29:32 PM  ** Final **     EEG    IMPRESSION This 62 minute awake and sleep EEG is indicative of a structural lesion in the left hemisphere. No epileptiform abnormalities or seizures noted. A full report will be scanned into the patient's chart at a later time. This report has been interpreted and electronically signed by          Assessment/Plan      Principal Problem:    Aphasia  Active Problems:    Acute ischemic stroke (CMS/HCC)    Stroke due to embolism of left middle cerebral artery (CMS/HCC)    Mr. Roslyn Cabrera is a 79 YO F with PMH of HTN, HLD, tobacco abuse, glaucoma complicated by visual impairment presenting with acute onset confusion and speech difficulty. On examination, patient found to have persistent wernicke's type aphasia (NIHSS 1). Later appeared to be mixed aphasia (impaired fluency, comprehension, and repetition). Per Hx - pt had Left sided weakness with drift which had resolved when examined by Neurology at [facility]. CT head w/o contrast shows global atrophy and advanced white matter disease.  CTA shows no significant evidence of intracranial or extracranial disease. MR brain from 03/22 shows left occipital encephalomalacia suggestive of old infarct. Repeat MR with contrast shows new diffusion restriction in left parietal and left occipital regions in distal MCA distribution. Patient's aphasia can be attributed to affected infarcted region. 1h EEG negative for epileptiform discharges. Echo revealed severely dilated left atrium, no afib on telemetry. Pt had implanted loop recorder 1/16.    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  ::LDL 12/7/23: 97  ::Echo 1/17/2024: 70-75% EF, increased LV mass, severe dilation of left atrium  - CTH with L parieto-occipital hypodensity  - MRI with wedge shaped territory in the L parieto-occipital region with diffusion restriction, no contrast enhancement or bleeding  - HbA1c 5.7%  - Follow up lipid panel  - Start Aspirin 81mg daily, continue home Atorvastatin 40mg at bedtime   - PT, OT, speech therapy  - Implanted loop recorder 1/16, see EP note     #Hx ofHTN  #Hypertensive Emergency  - SBP goal < 220 for 24h (until 6pm 1/16), Current SBP goal < 180   - Resume carvedilol 12.5mg BID 1/17, Resume hydralazine PO 25mg BID 1/18  - PRN Hydralazine and Labetolol   - Home: hydralazine PO 25mg twice a day,  losartan PO 100mg every day, carvedilol PO 12.5mg twice a day      #Hx of Glaucoma  - Continue home latanoprost .005% 1 drop in each eye once a day  - Continue home timolol 0.5% 1 drop into each eye once a day     #Vitamin D, B12 deficiency  - Continue  home cholecalciferol PO 125MCG once a day  - Last B12 shot in 05/23     F: Bolus PRN  E: K>4 Mg>2 Phos>3  N: Normal Diet  A: PIV  P: SCDs     NOK: daughterJeimy ([phone])  CODE STATUS: DNR, DNI (confirmed on admission with pt and family)    Gwyn Mendoza, MS3    Reviewed by Thiago Monsalve, PGY2 Neurology  Stroke team pager [phone]

## 2024-01-18 NOTE — PROGRESS NOTES
Occupational Therapy    Occupational Therapy Treatment    Name: Roslyn Cabrera  MRN: [id number]  : 1945  Date: 24  Time Calculation  Start Time: 1128  Stop Time: 1208  Time Calculation (min): 40 min    Assessment:  OT Assessment: difficulty I/ADLs, safety, fxnl mob  Prognosis: Good  Evaluation/Treatment Tolerance: Patient tolerated treatment well  Medical Staff Made Aware: Yes  End of Session Communication: Bedside nurse  End of Session Patient Position: Bed, 3 rail up, Alarm on (family present)  Plan:  Treatment Interventions: ADL retraining, Visual perceptual retraining, Functional transfer training, Endurance training, Cognitive reorientation, Patient/family training, Equipment evaluation/education, Neuromuscular reeducation, Compensatory technique education  OT Frequency: 3 times per week  OT Discharge Recommendations: High intensity level of continued care  OT Recommended Transfer Status: Minimal assist, Assist of 1  OT - OK to Discharge: Yes    Subjective   Previous Visit Info:  OT Last Visit  OT Received On: 24  General:  General  Reason for Referral: 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 Found to have persistent wernicke's type aphasia (NIHSS 1). Per Hx - pt had Left sided weakness with drift which had resolved when examined by Neurology at [facility].  Family/Caregiver Present: Yes  Caregiver Feedback:  (dtr in law present)  Prior to Session Communication: Bedside nurse  Patient Position Received: Bed, 3 rail up, Alarm off, caregiver present  General Comment: pt appears increased sentences in session, appears expressive aphasia >receptive, relaxation techniques and cues required in session to A with speech  Precautions:  Medical Precautions: Fall precautions, Cardiac precautions  Vitals:  Vital Signs  Heart Rate:  "54  Patient Position:  (aid in session for vitals)  Pain Assessment:  Pain Assessment  Pain Assessment: 0-10  Pain Score: 0 - No pain     Objective   Activities of Daily Living:      Grooming  Grooming Level of Assistance:  (pt performed oral care standing sink side mod A setup open toothpaste, pt started brushing without toothpaste mod VC's to initate task in correct order and with sequencing. Pt washed hands 2x sink side mod VC's and tactile cues to turn head to right side)  Grooming Where Assessed:  (cues and tactile cues 3x find soap R side of sink and paper towel dispenser R side. 2x att and delayed cues to grab paper towel and wipe face.)  Grooming Comments:  (pt verbalized 'need more over here\" pointing to L side of head wanting more shampoo and water on that side of her head, able to communicate when needing more rinsing to get shampoo out)    UE Bathing  UE Bathing Level of Assistance:  (pt performed hair washing standing sink side mod A VC's monitor head position with sink faucet extended time to complete)         UE Dressing  UE Dressing Level of Assistance:  (mod A doff 2 gowns and eladio 2 gowns standing sink side, pt able to verbalize 'something not right' when needing tele cord pulled through front of gown)              Functional Standing Tolerance:     Bed Mobility/Transfers: Bed Mobility 1  Level of Assistance 1:  (sup/sit CGA 2x in session)    Transfer 1  Transfer Level of Assistance 1:  (sit to stand 3x in session CGA HHA)      Ambulation/Gait Training: pt performed fxnl mob to/from bed/bathroom 2x CGA-MIN A HHA          Cognitive Skill Development:  Cognitive Skill Development  Cognitive Skill Development Activity 1: pt watching classic movies on tv reported \"I like old movies\" \"that dwayne looks like the dwayne in [facility]\" then intermittent word finding issues, mod VC's take a deep breath and relaxation technqiues in session  Cognitive Skill Development Activity 2: OT educated pt on deficits, good " motivation and determination with activity, still needing to slow down movements and scan environment to R side with all activity and I/ADLs.     Outcome Measures:  [facility] Daily Activity  Putting on and taking off regular lower body clothing: A little  Bathing (including washing, rinsing, drying): A little  Putting on and taking off regular upper body clothing: A little  Toileting, which includes using toilet, bedpan or urinal: A little  Taking care of personal grooming such as brushing teeth: A little  Eating Meals: A little  Daily Activity - Total Score: 18        Education Documentation  Body Mechanics, taught by Tran Cerrato OT at 1/18/2024 12:18 PM.  Learner: Family, Patient  Readiness: Acceptance  Method: Explanation  Response: Needs Reinforcement    Precautions, taught by Tran Cerrato OT at 1/18/2024 12:18 PM.  Learner: Family, Patient  Readiness: Acceptance  Method: Explanation  Response: Needs Reinforcement    ADL Training, taught by Tran Cerrato OT at 1/18/2024 12:18 PM.  Learner: Family, Patient  Readiness: Acceptance  Method: Explanation  Response: Needs Reinforcement    Education Comments  No comments found.      Goals:  Encounter Problems       Encounter Problems (Active)       ADLs       Patient will perform UB and LB bathing  with independent level  (Progressing)       Start:  01/16/24    Expected End:  02/06/24            Patient with complete upper body dressing with independent  (Progressing)       Start:  01/16/24    Expected End:  02/06/24            Patient with complete lower body dressing with independent  (Progressing)       Start:  01/16/24    Expected End:  02/06/24            Patient will complete daily grooming tasks  with independent  (Progressing)       Start:  01/16/24    Expected End:  02/06/24            Patient will complete toileting including hygiene clothing management/hygiene with independent level (Progressing)       Start:  01/16/24    Expected End:  02/06/24                COGNITION/SAFETY       Patient will score WFL on standardized cognitive assessment with min cues and within reasonable time frame (Progressing)       Start:  01/16/24    Expected End:  02/06/24               MOBILITY       Patient will perform Functional mobility max Household distances/Community Distances with independent level of assistance (Progressing)       Start:  01/16/24    Expected End:  02/06/24                 TRANSFERS       Patient will perform bed mobility independent (Progressing)       Start:  01/16/24    Expected End:  02/06/24            Patient will complete functional transfersindependent level of assistance. (Progressing)       Start:  01/16/24    Expected End:  02/06/24                   VISION       Patient will complete  Visual Scanning Task task regarding all areas of activity with independent level of assistance. (Progressing)       Start:  01/16/24    Expected End:  02/06/24

## 2024-01-18 NOTE — PROGRESS NOTES
Physical Therapy    Physical Therapy Treatment    Patient Name: Roslyn Cabrera  MRN: [id number]  Today's Date: 1/18/2024  Time Calculation  Start Time: 1210  Stop Time: 1233  Time Calculation (min): 23 min       Assessment/Plan   PT Assessment  PT Assessment Results: Decreased strength, Decreased endurance, Impaired balance, Decreased mobility, Decreased cognition, Impaired judgement, Decreased safety awareness  Rehab Prognosis: Good  End of Session Communication: Bedside nurse  End of Session Patient Position: Bed, 3 rail up, Alarm on  PT Plan  Inpatient/Swing Bed or Outpatient: Inpatient  PT Plan  Treatment/Interventions: Bed mobility, Transfer training, Gait training, Balance training, Strengthening, Endurance training, Therapeutic exercise, Therapeutic activity  PT Plan: Skilled PT  PT Frequency: 5 times per week  PT Discharge Recommendations: High intensity level of continued care  PT Recommended Transfer Status: Assist x1  PT - OK to Discharge: Yes      General Visit Information:   PT  Visit  PT Received On: 01/18/24  General  Reason for Referral: 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 Found to have persistent wernicke's type aphasia (NIHSS 1). Per Hx - pt had Left sided weakness with drift which had resolved when examined by Neurology at [facility].  Family/Caregiver Present: No  Prior to Session Communication: Bedside nurse  Patient Position Received: Bed, 3 rail up, Alarm on  Preferred Learning Style: verbal, visual  General Comment: Pt supine in bed upon arrival and agreeable to participate in PT session. Demo'd increased speech and required cues for safety throughout.    Subjective   Precautions:  Precautions  Medical Precautions: Fall precautions, Cardiac precautions  Vital Signs:  Vital Signs  Heart Rate: 58  Resp: 16  BP:  132/68    Objective   Pain:  Pain Assessment  Pain Assessment: 0-10  Pain Score: 0 - No pain  Cognition:  Cognition  Overall Cognitive Status: Impaired  Orientation Level: Disoriented to time, Disoriented to place (Pt able to reply back with choices by repeating the choices)     Treatments:  Therapeutic Exercise  Therapeutic Exercise Performed: Yes  Therapeutic Exercise Activity 1: Seated BLE: AP, LAQ, Hip Abd and Hip F 2x10 reps. Verbal/tactile cues for techniques. Increased processing time required intially for Hip F.    Bed Mobility 2  Bed Mobility  2: Supine to sitting, Sitting to supine  Level of Assistance 2: Contact guard, Minimal verbal cues  Bed Mobility Comments 2: HOB elevated    Ambulation/Gait Training  Ambulation/Gait Training Performed: Yes  Ambulation/Gait Training 1  Device 1:  (HHA)  Assistance 1: Minimal verbal cues (CGA-Ciara)  Comments/Distance (ft) 1: 2x40 ft  Transfers  Transfer: Yes  Transfer 1  Transfer From 1: Sit to  Transfer to 1: Stand  Technique 1: Sit to stand  Transfer Level of Assistance 1: Contact guard, Minimal verbal cues, Minimal tactile cues  Transfers 2  Transfer From 2: Stand to  Transfer to 2: Sit  Technique 2: Stand to sit  Transfer Level of Assistance 2: Contact guard, Minimal verbal cues, Minimal tactile cues    Outcome Measures:  [facility] Basic Mobility  Turning from your back to your side while in a flat bed without using bedrails: A little  Moving from lying on your back to sitting on the side of a flat bed without using bedrails: A little  Moving to and from bed to chair (including a wheelchair): A little  Standing up from a chair using your arms (e.g. wheelchair or bedside chair): A little  To walk in hospital room: A little  Climbing 3-5 steps with railing: Total  Basic Mobility - Total Score: 16    Education Documentation  Home Exercise Program, taught by Kelsie Johnson PT at 1/18/2024  1:39 PM.  Learner: Patient  Readiness: Acceptance  Method: Explanation,  Demonstration  Response: Verbalizes Understanding, Needs Reinforcement    Precautions, taught by Kelsie Johnson PT at 1/18/2024  1:39 PM.  Learner: Patient  Readiness: Acceptance  Method: Explanation, Demonstration  Response: Verbalizes Understanding, Needs Reinforcement    Mobility Training, taught by Kelsie Johnson PT at 1/18/2024  1:39 PM.  Learner: Patient  Readiness: Acceptance  Method: Explanation, Demonstration  Response: Verbalizes Understanding, Needs Reinforcement    Education Comments  No comments found.        OP EDUCATION:       Encounter Problems       Encounter Problems (Active)       Mobility       Pt will be SBA for ambulation 75 ft with LRAD (Progressing)       Start:  01/16/24    Expected End:  01/30/24               Pain - Adult          Transfers       Pt will be SBA for sit to stand and bed to chair transfers with LRAD (Progressing)       Start:  01/16/24    Expected End:  01/30/24               Transfers       Pt will be Mario with bed mobility (Progressing)       Start:  01/16/24    Expected End:  01/30/24

## 2024-01-19 ENCOUNTER — TELEPHONE (OUTPATIENT)
Dept: PRIMARY CARE | Facility: CLINIC | Age: 79
End: 2024-01-19
Payer: MEDICARE

## 2024-01-19 PROCEDURE — 97110 THERAPEUTIC EXERCISES: CPT | Mod: GP | Performed by: PHYSICAL THERAPIST

## 2024-01-19 PROCEDURE — 97116 GAIT TRAINING THERAPY: CPT | Mod: GP | Performed by: PHYSICAL THERAPIST

## 2024-01-19 PROCEDURE — 2500000001 HC RX 250 WO HCPCS SELF ADMINISTERED DRUGS (ALT 637 FOR MEDICARE OP)

## 2024-01-19 PROCEDURE — 94760 N-INVAS EAR/PLS OXIMETRY 1: CPT

## 2024-01-19 PROCEDURE — C9113 INJ PANTOPRAZOLE SODIUM, VIA: HCPCS

## 2024-01-19 PROCEDURE — 97530 THERAPEUTIC ACTIVITIES: CPT | Mod: GO

## 2024-01-19 PROCEDURE — 92507 TX SP LANG VOICE COMM INDIV: CPT | Mod: GN

## 2024-01-19 PROCEDURE — 99233 SBSQ HOSP IP/OBS HIGH 50: CPT

## 2024-01-19 PROCEDURE — 1200000002 HC GENERAL ROOM WITH TELEMETRY DAILY

## 2024-01-19 PROCEDURE — 97535 SELF CARE MNGMENT TRAINING: CPT | Mod: GO

## 2024-01-19 PROCEDURE — 2500000004 HC RX 250 GENERAL PHARMACY W/ HCPCS (ALT 636 FOR OP/ED)

## 2024-01-19 RX ORDER — HYDROXYZINE HYDROCHLORIDE 25 MG/1
25 TABLET, FILM COATED ORAL ONCE
Status: COMPLETED | OUTPATIENT
Start: 2024-01-19 | End: 2024-01-19

## 2024-01-19 RX ORDER — BISACODYL 5 MG
5 TABLET, DELAYED RELEASE (ENTERIC COATED) ORAL DAILY PRN
Status: DISCONTINUED | OUTPATIENT
Start: 2024-01-19 | End: 2024-01-20 | Stop reason: HOSPADM

## 2024-01-19 RX ORDER — LOSARTAN POTASSIUM 50 MG/1
50 TABLET ORAL DAILY
Status: DISCONTINUED | OUTPATIENT
Start: 2024-01-19 | End: 2024-01-20 | Stop reason: HOSPADM

## 2024-01-19 RX ADMIN — SENNOSIDES AND DOCUSATE SODIUM 1 TABLET: 8.6; 5 TABLET ORAL at 09:14

## 2024-01-19 RX ADMIN — SENNOSIDES AND DOCUSATE SODIUM 1 TABLET: 8.6; 5 TABLET ORAL at 20:40

## 2024-01-19 RX ADMIN — HYDRALAZINE HYDROCHLORIDE 25 MG: 25 TABLET ORAL at 09:13

## 2024-01-19 RX ADMIN — HYDROXYZINE HYDROCHLORIDE 25 MG: 25 TABLET, FILM COATED ORAL at 23:19

## 2024-01-19 RX ADMIN — LOSARTAN POTASSIUM 50 MG: 50 TABLET, FILM COATED ORAL at 09:14

## 2024-01-19 RX ADMIN — ASPIRIN 81 MG: 81 TABLET, COATED ORAL at 09:14

## 2024-01-19 RX ADMIN — CARVEDILOL 12.5 MG: 12.5 TABLET, FILM COATED ORAL at 20:40

## 2024-01-19 RX ADMIN — LATANOPROST 1 DROP: 50 SOLUTION OPHTHALMIC at 20:41

## 2024-01-19 RX ADMIN — ATORVASTATIN CALCIUM 40 MG: 40 TABLET, FILM COATED ORAL at 20:40

## 2024-01-19 RX ADMIN — TIMOLOL MALEATE 1 DROP: 5 SOLUTION/ DROPS OPHTHALMIC at 09:13

## 2024-01-19 RX ADMIN — POLYETHYLENE GLYCOL 3350 17 G: 17 POWDER, FOR SOLUTION ORAL at 09:14

## 2024-01-19 RX ADMIN — Medication 2000 UNITS: at 09:14

## 2024-01-19 RX ADMIN — HYDRALAZINE HYDROCHLORIDE 25 MG: 25 TABLET ORAL at 20:40

## 2024-01-19 RX ADMIN — CARVEDILOL 12.5 MG: 12.5 TABLET, FILM COATED ORAL at 09:13

## 2024-01-19 RX ADMIN — PANTOPRAZOLE SODIUM 40 MG: 40 INJECTION, POWDER, FOR SOLUTION INTRAVENOUS at 07:02

## 2024-01-19 ASSESSMENT — COGNITIVE AND FUNCTIONAL STATUS - GENERAL
MOVING FROM LYING ON BACK TO SITTING ON SIDE OF FLAT BED WITH BEDRAILS: A LITTLE
PERSONAL GROOMING: A LITTLE
WALKING IN HOSPITAL ROOM: A LITTLE
CLIMB 3 TO 5 STEPS WITH RAILING: A LOT
MOBILITY SCORE: 18
MOBILITY SCORE: 17
HELP NEEDED FOR BATHING: A LITTLE
CLIMB 3 TO 5 STEPS WITH RAILING: A LITTLE
STANDING UP FROM CHAIR USING ARMS: A LITTLE
DRESSING REGULAR UPPER BODY CLOTHING: A LITTLE
MOBILITY SCORE: 23
DAILY ACTIVITIY SCORE: 24
STANDING UP FROM CHAIR USING ARMS: A LITTLE
HELP NEEDED FOR BATHING: A LITTLE
TOILETING: A LITTLE
WALKING IN HOSPITAL ROOM: A LITTLE
DRESSING REGULAR LOWER BODY CLOTHING: A LITTLE
MOVING FROM LYING ON BACK TO SITTING ON SIDE OF FLAT BED WITH BEDRAILS: A LITTLE
TOILETING: A LITTLE
TURNING FROM BACK TO SIDE WHILE IN FLAT BAD: A LITTLE
MOVING TO AND FROM BED TO CHAIR: A LITTLE
TURNING FROM BACK TO SIDE WHILE IN FLAT BAD: A LITTLE
MOVING TO AND FROM BED TO CHAIR: A LITTLE
PERSONAL GROOMING: A LITTLE
DRESSING REGULAR UPPER BODY CLOTHING: A LITTLE
DAILY ACTIVITIY SCORE: 19
DRESSING REGULAR LOWER BODY CLOTHING: A LITTLE
DAILY ACTIVITIY SCORE: 19
CLIMB 3 TO 5 STEPS WITH RAILING: A LITTLE

## 2024-01-19 ASSESSMENT — ACTIVITIES OF DAILY LIVING (ADL): HOME_MANAGEMENT_TIME_ENTRY: 10

## 2024-01-19 ASSESSMENT — PAIN SCALES - GENERAL
PAINLEVEL_OUTOF10: 0 - NO PAIN

## 2024-01-19 ASSESSMENT — PAIN - FUNCTIONAL ASSESSMENT
PAIN_FUNCTIONAL_ASSESSMENT: 0-10

## 2024-01-19 NOTE — TELEPHONE ENCOUNTER
Pt daughter called states she missed your call while talking to hospital. Can you please contact Boy

## 2024-01-19 NOTE — PROGRESS NOTES
"Speech-Language Pathology  Adult Inpatient Speech/Language/Cognitive Treatment    Patient Name: Roslyn Cabrera  MRN: [id number]  Today's Date: 2024   Start Time: 940  Stop Time: 1010  Time Calculation (min): 30    Impression:   Language treatment completed. Pt awake/alert and upright in bed for session. Per eval completed on , pt with fluent aphasia and ? ideomotor apraxia. Per RN, pt with persistent expressive language deficits. Continues to present with fluent expressive aphasia characterized by intermittent anomia, perseveration, semantic paraphasias, and pseudo foreign dialect/language.     No receptive language deficits noted. Expressively/at the conversation level, pt ~60-70% intelligible per SLP. Throughout conversation, pt benefited from repetition of message by SLP to confirm thoughts/feelings; pt nodded or responded \"yes\" when message was successfully relayed + shook head/responded \"no\" and attempted to provide additional clarification as necessary. Pt independently utilized circumlocution to compensate for word-finding difficulties with inconsistent success. Pt reports that her communication partners understand ~25% of her expressive speech.      With structured tasks, pt demonstrated pseudo foreign dialect/language. Results are as follows:    Automatics   Countin% accuracy in Hebrew; unable to respond in English   Days of the Week: 0% accuracy; did not benefit from initiation of sequence by SLP    Y/N Questions: 90% accuracy; responses in both Hebrew and English     Mod improvement in expressive language skills since previous session e/b performance on structured tasks. Continue to recommend SLP services for residual deficits.     Of note, pt aware of deficits and verbalized/demonstrated significant frustration with current language skills e/b sighing, grunting, hitting table, and becoming intermittently teary eyed during conversation. Pt reported she feels as though she does not fully " understand her POC 2/2 not being understood by MDs when she attempts to ask questions re: her current medical status and plan for discharge. SLP provided education re: additional compensatory strategies to utilize during conversation to aid in intelligibility including reducing rate of speech and nonverbal communication. Additionally educated pt on possible therapy she will receive at rehab facility upon discharge. Pt expressed gratitude for education provided.     Recommend Speech Therapy Services:   Yes; Targeting anomia, perseveration, semantic paraphasias, and pseudo foreign dialect/language    Goal(s):   Pt will demonstrate reduced rate of speech at the conversation level to increase intelligibility to 80%.   Pt will demonstrate adequate usage of circumlocution to aid in word-finding at the sentence level with 80% accuracy.   Pt will complete automatic sequences in English with 80% accuracy given max cues.      Plan:  SLP Services Indicated: Yes  Frequency: 2x week  Discussed POC with patient  SLP - OK to Discharge    Pain:   0-10  0 = No pain.     Inpatient Education:  Extensive education provided to patient regarding current speech/language/cognitive function, recommendations/results, and POC.      Consultations/Referrals/Coordination of Services:   N/A

## 2024-01-19 NOTE — PROGRESS NOTES
Occupational Therapy    Occupational Therapy Treatment    Name: Roslyn Cabrera  MRN: [id number]  : 1945  Date: 24  Time Calculation  Start Time: 1400  Stop Time: 1444  Time Calculation (min): 44 min    Assessment:  OT Assessment: difficulty with I/ADLs, safety, fxnl mob  Prognosis: Good  Evaluation/Treatment Tolerance: Patient tolerated treatment well  Medical Staff Made Aware: Yes  End of Session Communication: Bedside nurse  End of Session Patient Position: Bed, 3 rail up, Alarm on  Plan:  Treatment Interventions: ADL retraining, Functional transfer training, Endurance training, Cognitive reorientation, Patient/family training, Neuromuscular reeducation, Compensatory technique education  OT Frequency: 3 times per week  OT Discharge Recommendations: High intensity level of continued care  OT Recommended Transfer Status: Minimal assist, Assist of 1  OT - OK to Discharge: Yes    Subjective   Previous Visit Info:  OT Last Visit  OT Received On: 24  General:  General  Reason for Referral: 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 Found to have persistent wernicke's type aphasia (NIHSS 1). Per Hx - pt had Left sided weakness with drift which had resolved when examined by Neurology at [facility].  Prior to Session Communication: Bedside nurse  Patient Position Received: Bed, 3 rail up, Alarm on  Precautions:  Medical Precautions: Fall precautions, Cardiac precautions  Vitals:  Vital Signs  Heart Rate: 55  Pain Assessment:  Pain Assessment  Pain Assessment: 0-10  Pain Score: 0 - No pain     Objective   Activities of Daily Living:        UE Dressing  UE Dressing Level of Assistance:  (min A eladio posterior gown standing, 3x att LUE into sleeve)          Bed Mobility/Transfers: Bed Mobility 1  Level of Assistance 1:  (sup/sit  "SBA)    Transfer 1  Transfer Level of Assistance 1:  (sit/stand CGA-MIN A HHA)      Ambulation/Gait Training:  Ambulation/Gait Training  Ambulation/Gait Training Performed:  (pt performed fxnl mob to/from room x2 laps around floor and hallways min A HHA, VC's to attend to R side objects)  IADL's:  OT performed fxnl mobility into nutrition room, had pt scan to R to find a cup, and scan to R to find ice/water machine, fill cup with water and take it back to her room. End of session pt had to scan her tray to find her new  cup of ice. Pt also had to look at room numbers to find her room.   Communication:     Therapy/Activity: Therapeutic Activity  Therapeutic Activity Performed:  (Pt performed fxnl mobility around unit with OT HHA, reading each sign and attempting to pronounce the words she sees. Pt said \"Picc\", saw a quote about Vickie Paris on WB and said \"she's not that great\")       Cognitive Skill Development:  Cognitive Skill Development  Cognitive Skill Development Activity 1: pt communicated which foods she liked and doesn't like in vending machines. OT performed fxnl mob to 3 different windows on the floor so pt could see snow and she said \"no wonder my dtr couldn't come today\" pt reported and OT talked about improved word finding this date, practiced relaxation techniques with frustration and attempted to use other words to communicate with gestures as needed.  Cognitive Skill Development Activity 2: OT assited pt with operating phone, clicking on messages and pictures received from family with a picture of a dog pt reported \"I am a cat person\" with multiple attempts. Extended time ambluating around unit finding different signs to read and increase speech.       Outcome Measures:  [facility] Daily Activity  Putting on and taking off regular lower body clothing: A little  Bathing (including washing, rinsing, drying): A little  Putting on and taking off regular upper body clothing: A little  Toileting, which " includes using toilet, bedpan or urinal: A little  Taking care of personal grooming such as brushing teeth: A little  Eating Meals: None  Daily Activity - Total Score: 19        Education Documentation  Body Mechanics, taught by Tran Cerrato OT at 1/19/2024  2:53 PM.  Learner: Patient  Readiness: Acceptance  Method: Explanation  Response: Verbalizes Understanding, Needs Reinforcement    Precautions, taught by Tran Cerrato OT at 1/19/2024  2:53 PM.  Learner: Patient  Readiness: Acceptance  Method: Explanation  Response: Verbalizes Understanding, Needs Reinforcement    ADL Training, taught by Tran Cerrato OT at 1/19/2024  2:53 PM.  Learner: Patient  Readiness: Acceptance  Method: Explanation  Response: Verbalizes Understanding, Needs Reinforcement    Education Comments  No comments found.      Goals:  Encounter Problems       Encounter Problems (Active)       ADLs       Patient will perform UB and LB bathing  with independent level  (Progressing)       Start:  01/16/24    Expected End:  02/06/24            Patient with complete upper body dressing with independent  (Progressing)       Start:  01/16/24    Expected End:  02/06/24            Patient with complete lower body dressing with independent  (Progressing)       Start:  01/16/24    Expected End:  02/06/24            Patient will complete daily grooming tasks  with independent  (Progressing)       Start:  01/16/24    Expected End:  02/06/24            Patient will complete toileting including hygiene clothing management/hygiene with independent level (Progressing)       Start:  01/16/24    Expected End:  02/06/24               COGNITION/SAFETY       Patient will score WFL on standardized cognitive assessment with min cues and within reasonable time frame (Progressing)       Start:  01/16/24    Expected End:  02/06/24               MOBILITY       Patient will perform Functional mobility max Household distances/Community Distances with independent level of  assistance (Progressing)       Start:  01/16/24    Expected End:  02/06/24                 TRANSFERS       Patient will perform bed mobility independent (Progressing)       Start:  01/16/24    Expected End:  02/06/24            Patient will complete functional transfersindependent level of assistance. (Progressing)       Start:  01/16/24    Expected End:  02/06/24                   VISION       Patient will complete  Visual Scanning Task task regarding all areas of activity with independent level of assistance. (Progressing)       Start:  01/16/24    Expected End:  02/06/24

## 2024-01-19 NOTE — CARE PLAN
Problem: Fall/Injury  Goal: Not fall by end of shift  Outcome: Progressing   The patient's goals for the shift include free from falls    The clinical goals for the shift include Pt will will improve communictaion    Over the shift, the patient did make progress toward the following goals.

## 2024-01-19 NOTE — PROGRESS NOTES
Physical Therapy    Physical Therapy Treatment    Patient Name: Roslyn Cabrera  MRN: [id number]  Today's Date: 1/19/2024  Time Calculation  Start Time: 1312  Stop Time: 1338  Time Calculation (min): 26 min       Assessment/Plan   PT Assessment  PT Assessment Results: Decreased strength, Decreased endurance, Impaired balance, Decreased mobility, Decreased safety awareness, Impaired judgement, Decreased cognition  Rehab Prognosis: Good  End of Session Communication: Bedside nurse  End of Session Patient Position: Bed, 3 rail up, Alarm on  PT Plan  Inpatient/Swing Bed or Outpatient: Inpatient  PT Plan  Treatment/Interventions: Bed mobility, Transfer training, Gait training, Balance training, Neuromuscular re-education, Strengthening, Endurance training, Therapeutic exercise, Therapeutic activity, Home exercise program  PT Plan: Skilled PT  PT Frequency: 5 times per week  PT Discharge Recommendations: High intensity level of continued care  PT Recommended Transfer Status: Assist x1  PT - OK to Discharge: Yes      General Visit Information:   PT  Visit  PT Received On: 01/19/24  General  Reason for Referral: 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 Found to have persistent wernicke's type aphasia (NIHSS 1). Per Hx - pt had Left sided weakness with drift which had resolved when examined by Neurology at [facility].  Prior to Session Communication: Bedside nurse  Patient Position Received: Bed, 3 rail up, Alarm on  Preferred Learning Style: visual, verbal  General Comment: Pt supine in bed upon arrival and agreeable to participate in PT session. Pt required cues for safety throughout mobility.    Subjective   Precautions:Fall Precautions, Cardiac Precautions     Vital Signs:  Vital Signs  Heart Rate: 62  Resp: 16  BP: 125/70    Objective   Pain:  Pain  Assessment  Pain Assessment: 0-10  Pain Score: 0 - No pain  Cognition:  Cognition  Orientation Level: Disoriented to time, Disoriented to situation (answer with choices by repetition)     Treatments:  Therapeutic Exercise  Therapeutic Exercise Performed: Yes  Therapeutic Exercise Activity 1: Seated BLE: AP, LAQ, Hip Abd and Hip F 2x10 reps. Verbal/tactile cues for techniques. Increased processing time required intially for Hip F.    Bed Mobility  Bed Mobility: Yes  Bed Mobility 1  Bed Mobility 1: Supine to sitting, Sitting to supine  Level of Assistance 1: Contact guard, Minimal verbal cues    Ambulation/Gait Training  Ambulation/Gait Training Performed: Yes  Ambulation/Gait Training 1  Assistance 1: Hand held assistance, Minimal verbal cues (CGA-Fahad)  Comments/Distance (ft) 1: 2x80 ft  Transfers  Transfer: Yes  Transfer 1  Transfer From 1: Sit to  Transfer to 1: Stand  Technique 1: Sit to stand  Transfer Level of Assistance 1: Contact guard, Minimal verbal cues  Transfers 2  Transfer From 2: Stand to  Transfer to 2: Sit  Technique 2: Stand to sit  Transfer Level of Assistance 2: Contact guard, Minimal verbal cues    Outcome Measures:  [facility] Basic Mobility  Turning from your back to your side while in a flat bed without using bedrails: A little  Moving from lying on your back to sitting on the side of a flat bed without using bedrails: A little  Moving to and from bed to chair (including a wheelchair): A little  Standing up from a chair using your arms (e.g. wheelchair or bedside chair): A little  To walk in hospital room: A little  Climbing 3-5 steps with railing: A lot  Basic Mobility - Total Score: 17    Education Documentation  Home Exercise Program, taught by Kelsie Johnson PT at 1/19/2024  2:49 PM.  Learner: Patient  Readiness: Acceptance  Method: Explanation  Response: Verbalizes Understanding, Needs Reinforcement    Precautions, taught by Kelsie Johnson PT at 1/19/2024  2:49 PM.  Learner: Patient  Readiness:  Acceptance  Method: Explanation  Response: Verbalizes Understanding, Needs Reinforcement    Mobility Training, taught by Kelsie Johnson PT at 1/19/2024  2:49 PM.  Learner: Patient  Readiness: Acceptance  Method: Explanation  Response: Verbalizes Understanding, Needs Reinforcement    Education Comments  No comments found.        OP EDUCATION:       Encounter Problems       Encounter Problems (Active)       Mobility       Pt will be SBA for ambulation 75 ft with LRAD (Progressing)       Start:  01/16/24    Expected End:  01/30/24               Pain - Adult          Transfers       Pt will be SBA for sit to stand and bed to chair transfers with LRAD (Progressing)       Start:  01/16/24    Expected End:  01/30/24               Transfers       Pt will be Mario with bed mobility (Progressing)       Start:  01/16/24    Expected End:  01/30/24

## 2024-01-19 NOTE — PROGRESS NOTES
"Roslyn Cabrera is a 78 y.o. female on day 4 of admission presenting with Aphasia.    Subjective     NAEON, no complaints         Objective     Last Recorded Vitals  Blood pressure 149/67, pulse 59, temperature 36.9 °C (98.4 °F), temperature source Temporal, resp. rate 18, height 1.575 m (5' 2\"), weight 59.4 kg (131 lb 1 oz), SpO2 94 %.    Physical Exam  Eyes:      Extraocular Movements: Extraocular movements intact.      Pupils: Pupils are equal, round, and reactive to light.   Cardiovascular:      Rate and Rhythm: Normal rate and regular rhythm.   Pulmonary:      Effort: Pulmonary effort is normal.      Breath sounds: Normal breath sounds. No wheezing or rales.   Abdominal:      General: Abdomen is flat.      Palpations: Abdomen is soft.   Musculoskeletal:         General: No swelling.   Neurological:      Motor: Motor strength is normal.      Neurological Exam  Mental Status  Awake, alert and oriented to person, place and time. Level of consciousness: Oriented to self and place, limited by mixed aphasia. Mixed aphasia present. Follows complex commands. Obeys / mimics some commands.    Cranial Nerves  CN II: Visual fields full to confrontation.  CN III, IV, VI: Extraocular movements intact bilaterally. Pupils equal round and reactive to light bilaterally.  CN V: Facial sensation is normal.  CN VII: Full and symmetric facial movement.  CN VIII: Hearing is normal.    Motor   Strength is 5/5 throughout all four extremities.    Sensory  Light touch is normal in upper and lower extremities.     Coordination  Right: Finger-to-nose normal.Left: Finger-to-nose normal.    Speech  Improvements in automatic speech      NIH Stroke Scale  1A. Level of Consciousness: Alert, Keenly Responsive  1B. Ask Month and Age: 1 Question Right  1C. Blink Eyes & Squeeze Hands: Performs Both Tasks  2. Best Gaze: Normal  3. Visual: No Visual Loss  4. Facial Palsy: Normal Symmetrical Movements  5A. Motor - Left Arm: No Drift  5B. Motor - " Right Arm: No Drift  6A. Motor - Left Leg: No Drift  6B. Motor - Right Leg: No Drift  7. Limb Ataxia: Absent  8. Sensory Loss: Normal  9. Best Language: Mild-to-Moderate Aphasia  10. Dysarthria: Normal  11. Extinction and Inattention: No Abnormality  NIH Stroke Scale: 2           Trinity Coma Scale  Best Eye Response: Spontaneous  Best Verbal Response: Confused  Best Motor Response: Follows commands  Trinity Coma Scale Score: 14            Scheduled medications  aspirin, 81 mg, oral, Daily  atorvastatin, 40 mg, oral, Nightly  carvedilol, 12.5 mg, oral, BID  cholecalciferol, 2,000 Units, oral, Daily  hydrALAZINE, 25 mg, oral, BID  latanoprost, 1 drop, Both Eyes, Nightly  losartan, 50 mg, oral, Daily  pantoprazole, 40 mg, oral, Daily before breakfast   Or  pantoprazole, 40 mg, intravenous, Daily before breakfast  polyethylene glycol, 17 g, oral, Daily  sennosides-docusate sodium, 1 tablet, oral, BID  timolol, 1 drop, Both Eyes, Daily      Continuous medications     PRN medications  PRN medications: bisacodyl, oxygen    Relevant Results  Results for orders placed or performed during the hospital encounter of 01/15/24 (from the past 24 hour(s))   POCT GLUCOSE   Result Value Ref Range    POCT Glucose 123 (H) 74 - 99 mg/dL   POCT GLUCOSE   Result Value Ref Range    POCT Glucose 89 74 - 99 mg/dL   POCT GLUCOSE   Result Value Ref Range    POCT Glucose 118 (H) 74 - 99 mg/dL     Electrophysiology procedure    Result Date: 1/16/2024  Loop recorder implantation Loop recorder implanted: Medtronic LINQ II LNQ22 Serial No: [id number] The indications, risks, benefits, alternatives, and details of the procedure were explained to the patient who expressed understanding of the risks including but are not limited to: pain, bleeding, and infection for which the risk is less than 1%. More serious risks, including cardiac perforation and tamponade, vascular trauma, and/or death, for which the overall risk ranges 0.1-1%. After all  questions were answered, the patient provided informed and written consent. The patient was prepped and draped in the usual sterile fashion. Local anesthesia with 1% lidocaine was achieved, and a small incision was made at the left fourth intercostal space. A dissection plane was created subcutaneously in the direction of the apex of the heart, and the implantable loop recorder was introduced under the skin. The edges of the incision were approximated and (suture/Dermabond) with an island dressing was applied. Hemostasis was achieved. Conclusion: Successful implantation of an implantable loop recorder. The patient tolerated the procedure well with no immediate complications.     Transthoracic Echo (TTE) Complete    Result Date: 1/16/2024   [facility], [address] [place] [address]                Tel [phone] and Fax [phone] TRANSTHORACIC ECHOCARDIOGRAM REPORT  Patient Name:      RENEE ALCAZAR TADEO Villarreal Physician:    [id number] Arash Amezquita MD Study Date:        1/16/2024           Ordering Provider:    [id number] SHAHNAZ SAUNDERS MRN/PID:           [id number]            Fellow: Accession#:        [id number]        Nurse:                Jeimy Jain RN Date of Birth/Age: 1945 / 78 years Sonographer:          Errol Ureña RDCS Gender:            F                   Additional Staff: Height:            157.48 cm           Admit Date:           1/15/2024 Weight:            59.42 kg            Admission Status:     Inpatient -                                                              Priority discharge BSA:               1.60 m2             Encounter#:           [id number]                                        Department Location:  [facility] Non                                                              Invasive Blood Pressure: 101 /59 mmHg Study Type:     TRANSTHORACIC ECHO (TTE) COMPLETE Diagnosis/ICD: Cerebral Infarction, unspecified-I63.9; Cerebral infarction due                to embolism of left middle cerebral artery-I63.412 Indication:    acute ischemic stroke, stroke due to embolism of left middle                cerebral artery CPT Code:      Echo Complete w Full [id number] Patient History: Pertinent         Smoker, HTN, HLD, RA, wernicke's aphasia, hypertensive History:          emergency. Study Detail: The following Echo studies were performed: 2D, M-Mode, Doppler and               color flow. Technically challenging study due to patient lying in               supine position. Definity used as a contrast agent for endocardial               border definition and agitated saline used as a contrast agent for               intraseptal flow evaluation. Total contrast used for this               procedure was 1.5 mL via IV push. Unable to obtain suprasternal               notch view.  PHYSICIAN INTERPRETATION: Left Ventricle: The left ventricular systolic function is hyperdynamic, with an estimated ejection fraction of 70-75%. There are no regional wall motion abnormalities. The left ventricular cavity size is normal. There is mild to moderate concentric left ventricular hypertrophy. Spectral Doppler shows an impaired relaxation pattern of left ventricular diastolic filling. There are elevated left atrial and left ventricular end diastolic pressures. Left Atrium: The left atrium is severely dilated. Right Ventricle: The right ventricle is normal in size. There is normal right ventricular global systolic function. Right Atrium: The right atrium is normal in size. Aortic Valve: The aortic valve appears structurally normal. There is no evidence of aortic valve regurgitation. The peak instantaneous gradient of the aortic valve is 8.0 mmHg. Mitral Valve: The mitral valve is normal in structure. There is moderate mitral annular calcification. There is trace  mitral valve regurgitation. Tricuspid Valve: The tricuspid valve is structurally normal. No evidence of tricuspid regurgitation. The Doppler estimated RVSP is within normal limits at 26.0 mmHg. Pulmonic Valve: The pulmonic valve is structurally normal. There is trace pulmonic valve regurgitation. Pericardium: There is no pericardial effusion noted. Aorta: The aortic root is normal. Systemic Veins: The inferior vena cava appears to be of normal size. There is IVC inspiratory collapse greater than 50%. In comparison to the previous echocardiogram(s): Compared with study from 3/27/2019, no significant change.  CONCLUSIONS:  1. Left ventricular systolic function is hyperdynamic with a 70-75% estimated ejection fraction.  2. There is mild to moderate concentric left ventricular hypertrophy.  3. Spectral Doppler shows an impaired relaxation pattern of left ventricular diastolic filling.  4. There are elevated left atrial and left ventricular end diastolic pressures.  5. The left atrium is severely dilated.  6. RVSP within normal limits.  7. There is moderate mitral annular calcification. QUANTITATIVE DATA SUMMARY: 2D MEASUREMENTS:                           Normal Ranges: Ao Root d:     3.50 cm    (2.0-3.7cm) LAs:           4.00 cm    (2.7-4.0cm) IVSd:          1.30 cm    (0.6-1.1cm) LVPWd:         1.30 cm    (0.6-1.1cm) LVIDd:         4.30 cm    (3.9-5.9cm) LVIDs:         2.70 cm LV Mass Index: 130.1 g/m2 LV % FS        37.2 % LA VOLUME:                               Normal Ranges: LA Vol A4C:        83.1 ml    (22+/-6mL/m2) LA Vol A2C:        85.6 ml LA Vol BP:         84.6 ml LA Vol Index A4C:  52.0ml/m2 LA Vol Index A2C:  53.6 ml/m2 LA Vol Index BP:   53.0 ml/m2 LA Area A4C:       24.1 cm2 LA Area A2C:       24.4 cm2 LA Major Axis A4C: 5.9 cm LA Major Axis A2C: 5.9 cm LA Volume Index:   52.9 ml/m2 AORTA MEASUREMENTS:                    Normal Ranges: Asc Ao, d: 3.40 cm (2.1-3.4cm) LV SYSTOLIC FUNCTION BY 2D PLANIMETRY  (MOD):                     Normal Ranges: EF-A4C View: 77.5 % (>=55%) EF-A2C View: 80.8 % EF-Biplane:  79.1 % LV DIASTOLIC FUNCTION:                           Normal Ranges: MV Peak E:    0.87 m/s    (0.7-1.2 m/s) MV Peak A:    1.47 m/s    (0.42-0.7 m/s) E/A Ratio:    0.59        (1.0-2.2) MV e'         0.05 m/s    (>8.0) MV lateral e' 0.05 m/s MV medial e'  0.05 m/s MV A Dur:     145.00 msec E/e' Ratio:   17.11       (<8.0) a'            0.09 m/s MV DT:        321 msec    (150-240 msec) MITRAL VALVE:                 Normal Ranges: MV DT: 321 msec (150-240msec) AORTIC VALVE:                         Normal Ranges: AoV Vmax:      1.41 m/s (<=1.7m/s) AoV Peak P.0 mmHg (<20mmHg) LVOT Max Juan:  1.29 m/s (<=1.1m/s) LVOT VTI:      27.10 cm LVOT Diameter: 2.00 cm  (1.8-2.4cm) AoV Area,Vmax: 2.87 cm2 (2.5-4.5cm2)  RIGHT VENTRICLE: TAPSE: 18.7 mm RV s'  0.13 m/s TRICUSPID VALVE/RVSP:                             Normal Ranges: Peak TR Velocity: 2.40 m/s RV Syst Pressure: 26.0 mmHg (< 30mmHg) IVC Diam:         1.80 cm PULMONIC VALVE:                         Normal Ranges: PV Accel Time: 153 msec (>120ms) PV Max Juan:    0.7 m/s  (0.6-0.9m/s) PV Max P.2 mmHg  [id number] Arash Amezquita MD Electronically signed on 2024 at 3:29:32 PM  ** Final **     EEG    IMPRESSION This 62 minute awake and sleep EEG is indicative of a structural lesion in the left hemisphere. No epileptiform abnormalities or seizures noted. A full report will be scanned into the patient's chart at a later time. This report has been interpreted and electronically signed by          Assessment/Plan      Principal Problem:    Aphasia  Active Problems:    Acute ischemic stroke (CMS/HCC)    Stroke due to embolism of left middle cerebral artery (CMS/HCC)    Mr. Roslyn Cabrera is a 79 YO F with PMH of HTN, HLD, tobacco abuse, glaucoma complicated by visual impairment presenting with acute onset confusion and speech difficulty. On examination, patient  found to have persistent wernicke's type aphasia (NIHSS 1). Later appeared to be mixed aphasia (impaired fluency, comprehension, and repetition). Per Hx - pt had Left sided weakness with drift which had resolved when examined by Neurology at [facility]. CT head w/o contrast shows global atrophy and advanced white matter disease. CTA shows no significant evidence of intracranial or extracranial disease. MR brain from 03/22 shows left occipital encephalomalacia suggestive of old infarct. Repeat MR with contrast shows new diffusion restriction in left parietal and left occipital regions in distal MCA distribution. Patient's aphasia can be attributed to affected infarcted region. 1h EEG negative for epileptiform discharges. Echo revealed severely dilated left atrium, no afib on telemetry. Pt had implanted loop recorder 1/16.    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  ::LDL 12/7/23: 97  ::Echo 1/17/2024: 70-75% EF, increased LV mass, severe dilation of left atrium  - CTH with L parieto-occipital hypodensity  - MRI with wedge shaped territory in the L parieto-occipital region with diffusion restriction, no contrast enhancement or bleeding  - HbA1c 5.7%  - Follow up lipid panel  - Start Aspirin 81mg daily, continue home Atorvastatin 40mg at bedtime   - PT, OT, speech therapy  - Implanted loop recorder 1/16, see EP note     #Hx ofHTN  #Hypertensive Emergency  - SBP goal < 220 for 24h (until 6pm 1/16), Current SBP goal < 180   - Resume carvedilol 12.5mg BID 1/17, Resume hydralazine PO 25mg BID 1/18, resume  losartan at half home dose (50mg) 1/19  - PRN Hydralazine and Labetolol   - Home: hydralazine PO 25mg twice a day,  losartan PO 100mg every day, carvedilol PO 12.5mg twice a day      #Hx of Glaucoma  - Continue home latanoprost .005% 1 drop in each eye once a day  - Continue home timolol 0.5% 1 drop into each eye once a day     #Vitamin D, B12 deficiency  - Continue home cholecalciferol PO 125MCG once a day  - Last B12 shot in 05/23     F: Bolus PRN  E: K>4 Mg>2 Phos>3  N: Normal Diet  A: PIV  P: SCDs     NOK: daughterJeimy ([phone])  CODE STATUS: DNR, DNI (confirmed on admission with pt and family)    Thiago Monsalve MD, MS3    Reviewed by Thiago Monsalve, PGY2 Neurology  Stroke team pager [phone]

## 2024-01-20 VITALS
DIASTOLIC BLOOD PRESSURE: 71 MMHG | SYSTOLIC BLOOD PRESSURE: 118 MMHG | TEMPERATURE: 97.5 F | OXYGEN SATURATION: 96 % | HEART RATE: 53 BPM | WEIGHT: 131.06 LBS | BODY MASS INDEX: 24.12 KG/M2 | RESPIRATION RATE: 19 BRPM | HEIGHT: 62 IN

## 2024-01-20 PROCEDURE — 99232 SBSQ HOSP IP/OBS MODERATE 35: CPT | Performed by: STUDENT IN AN ORGANIZED HEALTH CARE EDUCATION/TRAINING PROGRAM

## 2024-01-20 PROCEDURE — 2500000004 HC RX 250 GENERAL PHARMACY W/ HCPCS (ALT 636 FOR OP/ED)

## 2024-01-20 PROCEDURE — 2500000001 HC RX 250 WO HCPCS SELF ADMINISTERED DRUGS (ALT 637 FOR MEDICARE OP)

## 2024-01-20 PROCEDURE — 94760 N-INVAS EAR/PLS OXIMETRY 1: CPT

## 2024-01-20 RX ORDER — LOSARTAN POTASSIUM 50 MG/1
100 TABLET ORAL DAILY
Qty: 60 TABLET | Refills: 11
Start: 2024-01-21 | End: 2024-01-20 | Stop reason: HOSPADM

## 2024-01-20 RX ADMIN — Medication 2000 UNITS: at 09:12

## 2024-01-20 RX ADMIN — LOSARTAN POTASSIUM 50 MG: 50 TABLET, FILM COATED ORAL at 09:12

## 2024-01-20 RX ADMIN — HYDRALAZINE HYDROCHLORIDE 25 MG: 25 TABLET ORAL at 09:12

## 2024-01-20 RX ADMIN — POLYETHYLENE GLYCOL 3350 17 G: 17 POWDER, FOR SOLUTION ORAL at 09:11

## 2024-01-20 RX ADMIN — CARVEDILOL 12.5 MG: 12.5 TABLET, FILM COATED ORAL at 09:12

## 2024-01-20 RX ADMIN — TIMOLOL MALEATE 1 DROP: 5 SOLUTION/ DROPS OPHTHALMIC at 09:11

## 2024-01-20 RX ADMIN — PANTOPRAZOLE SODIUM 40 MG: 40 TABLET, DELAYED RELEASE ORAL at 07:00

## 2024-01-20 RX ADMIN — SENNOSIDES AND DOCUSATE SODIUM 1 TABLET: 8.6; 5 TABLET ORAL at 09:12

## 2024-01-20 RX ADMIN — ASPIRIN 81 MG: 81 TABLET, COATED ORAL at 09:12

## 2024-01-20 ASSESSMENT — COGNITIVE AND FUNCTIONAL STATUS - GENERAL
MOVING FROM LYING ON BACK TO SITTING ON SIDE OF FLAT BED WITH BEDRAILS: A LITTLE
MOBILITY SCORE: 18
TOILETING: A LITTLE
EATING MEALS: A LITTLE
HELP NEEDED FOR BATHING: A LITTLE
CLIMB 3 TO 5 STEPS WITH RAILING: A LITTLE
MOVING TO AND FROM BED TO CHAIR: A LITTLE
PERSONAL GROOMING: A LITTLE
DRESSING REGULAR UPPER BODY CLOTHING: A LITTLE
WALKING IN HOSPITAL ROOM: A LITTLE
DRESSING REGULAR LOWER BODY CLOTHING: A LITTLE
STANDING UP FROM CHAIR USING ARMS: A LITTLE
DAILY ACTIVITIY SCORE: 18
TURNING FROM BACK TO SIDE WHILE IN FLAT BAD: A LITTLE

## 2024-01-20 ASSESSMENT — PAIN SCALES - GENERAL: PAINLEVEL_OUTOF10: 0 - NO PAIN

## 2024-01-20 ASSESSMENT — PAIN - FUNCTIONAL ASSESSMENT: PAIN_FUNCTIONAL_ASSESSMENT: 0-10

## 2024-01-20 NOTE — PROGRESS NOTES
"Roslyn Cabrera is a 78 y.o. female on day 5 of admission presenting with Aphasia.    Subjective     NAEON, no complaints         Objective     Last Recorded Vitals  Blood pressure (!) 148/96, pulse 77, temperature 36.4 °C (97.5 °F), temperature source Temporal, resp. rate 18, height 1.575 m (5' 2\"), weight 59.4 kg (131 lb 1 oz), SpO2 95 %.      Neurological Exam  Mental Status  Awake, alert and oriented to person, place and time. Level of consciousness: Oriented to self and place, limited by mixed aphasia. Mixed aphasia present. Follows complex commands. Obeys / mimics some commands.    Cranial Nerves  CN II: Visual fields full to confrontation.  CN III, IV, VI: Extraocular movements intact bilaterally. Pupils equal round and reactive to light bilaterally.  CN V: Facial sensation is normal.  CN VII: Full and symmetric facial movement.  CN VIII: Hearing is normal.    Motor   Strength is 5/5 throughout all four extremities.    Sensory  Light touch is normal in upper and lower extremities.     Coordination  Right: Finger-to-nose normal.Left: Finger-to-nose normal.    Speech  Improvements in automatic speech      NIH Stroke Scale  1A. Level of Consciousness: Alert, Keenly Responsive  1B. Ask Month and Age: Both Questions Right  1C. Blink Eyes & Squeeze Hands: Performs Both Tasks  2. Best Gaze: Normal  3. Visual: No Visual Loss  4. Facial Palsy: Normal Symmetrical Movements  5A. Motor - Left Arm: No Drift  5B. Motor - Right Arm: No Drift  6A. Motor - Left Leg: No Drift  6B. Motor - Right Leg: No Drift  7. Limb Ataxia: Absent  8. Sensory Loss: Normal  9. Best Language: Mild-to-Moderate Aphasia  10. Dysarthria: Normal  11. Extinction and Inattention: No Abnormality  NIH Stroke Scale: 1           Pk Coma Scale  Best Eye Response: Spontaneous  Best Verbal Response: Oriented  Best Motor Response: Follows commands  [place] Coma Scale Score: 15            Scheduled medications  aspirin, 81 mg, oral, Daily  atorvastatin, " 40 mg, oral, Nightly  carvedilol, 12.5 mg, oral, BID  cholecalciferol, 2,000 Units, oral, Daily  hydrALAZINE, 25 mg, oral, BID  latanoprost, 1 drop, Both Eyes, Nightly  losartan, 50 mg, oral, Daily  pantoprazole, 40 mg, oral, Daily before breakfast   Or  pantoprazole, 40 mg, intravenous, Daily before breakfast  polyethylene glycol, 17 g, oral, Daily  sennosides-docusate sodium, 1 tablet, oral, BID  timolol, 1 drop, Both Eyes, Daily      Continuous medications     PRN medications  PRN medications: bisacodyl, oxygen    Relevant Results  No results found for this or any previous visit (from the past 24 hour(s)).    Electrophysiology procedure    Result Date: 1/16/2024  Loop recorder implantation Loop recorder implanted: Flutter LINQ II LNQ22 Serial No: [id number] The indications, risks, benefits, alternatives, and details of the procedure were explained to the patient who expressed understanding of the risks including but are not limited to: pain, bleeding, and infection for which the risk is less than 1%. More serious risks, including cardiac perforation and tamponade, vascular trauma, and/or death, for which the overall risk ranges 0.1-1%. After all questions were answered, the patient provided informed and written consent. The patient was prepped and draped in the usual sterile fashion. Local anesthesia with 1% lidocaine was achieved, and a small incision was made at the left fourth intercostal space. A dissection plane was created subcutaneously in the direction of the apex of the heart, and the implantable loop recorder was introduced under the skin. The edges of the incision were approximated and (suture/Dermabond) with an island dressing was applied. Hemostasis was achieved. Conclusion: Successful implantation of an implantable loop recorder. The patient tolerated the procedure well with no immediate complications.     Transthoracic Echo (TTE) Complete    Result Date: 1/16/2024   [facility],  [address] [place] [address]                Tel [phone] and Fax [phone] TRANSTHORACIC ECHOCARDIOGRAM REPORT  Patient Name:      RENEE Villarreal Physician:    [id number] Arash Amezquita MD Study Date:        1/16/2024           Ordering Provider:    [id number] SHAHNAZ SAUNDERS MRN/PID:           [id number]            Fellow: Accession#:        [id number]        Nurse:                Jeimy Jain RN Date of Birth/Age: 1945 / 78 years Sonographer:          Errol Ureña                                                              RD Gender:            F                   Additional Staff: Height:            157.48 cm           Admit Date:           1/15/2024 Weight:            59.42 kg            Admission Status:     Inpatient -                                                              Priority discharge BSA:               1.60 m2             Encounter#:           [id number]                                        Department Location:  [facility] Non                                                              Invasive Blood Pressure: 101 /59 mmHg Study Type:    TRANSTHORACIC ECHO (TTE) COMPLETE Diagnosis/ICD: Cerebral Infarction, unspecified-I63.9; Cerebral infarction due                to embolism of left middle cerebral artery-I63.412 Indication:    acute ischemic stroke, stroke due to embolism of left middle                cerebral artery CPT Code:      Echo Complete w Full Doppler-[id number] Patient History: Pertinent         Smoker, HTN, HLD, RA, wernicke's aphasia, hypertensive History:          emergency. Study Detail: The following Echo studies were performed: 2D, M-Mode, Doppler and               color flow. Technically challenging study due to patient lying in               supine position. Definity used as a contrast agent for endocardial               border definition and agitated saline used as a contrast  agent for               intraseptal flow evaluation. Total contrast used for this               procedure was 1.5 mL via IV push. Unable to obtain suprasternal               notch view.  PHYSICIAN INTERPRETATION: Left Ventricle: The left ventricular systolic function is hyperdynamic, with an estimated ejection fraction of 70-75%. There are no regional wall motion abnormalities. The left ventricular cavity size is normal. There is mild to moderate concentric left ventricular hypertrophy. Spectral Doppler shows an impaired relaxation pattern of left ventricular diastolic filling. There are elevated left atrial and left ventricular end diastolic pressures. Left Atrium: The left atrium is severely dilated. Right Ventricle: The right ventricle is normal in size. There is normal right ventricular global systolic function. Right Atrium: The right atrium is normal in size. Aortic Valve: The aortic valve appears structurally normal. There is no evidence of aortic valve regurgitation. The peak instantaneous gradient of the aortic valve is 8.0 mmHg. Mitral Valve: The mitral valve is normal in structure. There is moderate mitral annular calcification. There is trace mitral valve regurgitation. Tricuspid Valve: The tricuspid valve is structurally normal. No evidence of tricuspid regurgitation. The Doppler estimated RVSP is within normal limits at 26.0 mmHg. Pulmonic Valve: The pulmonic valve is structurally normal. There is trace pulmonic valve regurgitation. Pericardium: There is no pericardial effusion noted. Aorta: The aortic root is normal. Systemic Veins: The inferior vena cava appears to be of normal size. There is IVC inspiratory collapse greater than 50%. In comparison to the previous echocardiogram(s): Compared with study from 3/27/2019, no significant change.  CONCLUSIONS:  1. Left ventricular systolic function is hyperdynamic with a 70-75% estimated ejection fraction.  2. There is mild to moderate concentric left  ventricular hypertrophy.  3. Spectral Doppler shows an impaired relaxation pattern of left ventricular diastolic filling.  4. There are elevated left atrial and left ventricular end diastolic pressures.  5. The left atrium is severely dilated.  6. RVSP within normal limits.  7. There is moderate mitral annular calcification. QUANTITATIVE DATA SUMMARY: 2D MEASUREMENTS:                           Normal Ranges: Ao Root d:     3.50 cm    (2.0-3.7cm) LAs:           4.00 cm    (2.7-4.0cm) IVSd:          1.30 cm    (0.6-1.1cm) LVPWd:         1.30 cm    (0.6-1.1cm) LVIDd:         4.30 cm    (3.9-5.9cm) LVIDs:         2.70 cm LV Mass Index: 130.1 g/m2 LV % FS        37.2 % LA VOLUME:                               Normal Ranges: LA Vol A4C:        83.1 ml    (22+/-6mL/m2) LA Vol A2C:        85.6 ml LA Vol BP:         84.6 ml LA Vol Index A4C:  52.0ml/m2 LA Vol Index A2C:  53.6 ml/m2 LA Vol Index BP:   53.0 ml/m2 LA Area A4C:       24.1 cm2 LA Area A2C:       24.4 cm2 LA Major Axis A4C: 5.9 cm LA Major Axis A2C: 5.9 cm LA Volume Index:   52.9 ml/m2 AORTA MEASUREMENTS:                    Normal Ranges: Asc Ao, d: 3.40 cm (2.1-3.4cm) LV SYSTOLIC FUNCTION BY 2D PLANIMETRY (MOD):                     Normal Ranges: EF-A4C View: 77.5 % (>=55%) EF-A2C View: 80.8 % EF-Biplane:  79.1 % LV DIASTOLIC FUNCTION:                           Normal Ranges: MV Peak E:    0.87 m/s    (0.7-1.2 m/s) MV Peak A:    1.47 m/s    (0.42-0.7 m/s) E/A Ratio:    0.59        (1.0-2.2) MV e'         0.05 m/s    (>8.0) MV lateral e' 0.05 m/s MV medial e'  0.05 m/s MV A Dur:     145.00 msec E/e' Ratio:   17.11       (<8.0) a'            0.09 m/s MV DT:        321 msec    (150-240 msec) MITRAL VALVE:                 Normal Ranges: MV DT: 321 msec (150-240msec) AORTIC VALVE:                         Normal Ranges: AoV Vmax:      1.41 m/s (<=1.7m/s) AoV Peak P.0 mmHg (<20mmHg) LVOT Max Juan:  1.29 m/s (<=1.1m/s) LVOT VTI:      27.10 cm LVOT Diameter: 2.00 cm   (1.8-2.4cm) AoV Area,Vmax: 2.87 cm2 (2.5-4.5cm2)  RIGHT VENTRICLE: TAPSE: 18.7 mm RV s'  0.13 m/s TRICUSPID VALVE/RVSP:                             Normal Ranges: Peak TR Velocity: 2.40 m/s RV Syst Pressure: 26.0 mmHg (< 30mmHg) IVC Diam:         1.80 cm PULMONIC VALVE:                         Normal Ranges: PV Accel Time: 153 msec (>120ms) PV Max Juan:    0.7 m/s  (0.6-0.9m/s) PV Max P.2 mmHg  [id number] Arash Amezquita MD Electronically signed on 2024 at 3:29:32 PM  ** Final **     EEG    IMPRESSION This 62 minute awake and sleep EEG is indicative of a structural lesion in the left hemisphere. No epileptiform abnormalities or seizures noted. A full report will be scanned into the patient's chart at a later time. This report has been interpreted and electronically signed by          Assessment/Plan      Principal Problem:    Aphasia  Active Problems:    Acute ischemic stroke (CMS/HCC)    Stroke due to embolism of left middle cerebral artery (CMS/HCC)    Mr. Roslyn Cabrera is a 77 YO F with PMH of HTN, HLD, tobacco abuse, glaucoma complicated by visual impairment presenting with acute onset confusion and speech difficulty. On examination, patient found to have persistent wernicke's type aphasia (NIHSS 1). Later appeared to be mixed aphasia (impaired fluency, comprehension, and repetition). Per Hx - pt had Left sided weakness with drift which had resolved when examined by Neurology at [facility]. CT head w/o contrast shows global atrophy and advanced white matter disease. CTA shows no significant evidence of intracranial or extracranial disease. MR brain from  shows left occipital encephalomalacia suggestive of old infarct. Repeat MR with contrast shows new diffusion restriction in left parietal and left occipital regions in distal MCA distribution. Patient's aphasia can be attributed to affected infarcted region. 1h EEG negative for epileptiform discharges. Echo revealed severely dilated left atrium, no afib  on telemetry. Pt had implanted loop recorder 1/16.    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  ::LDL 12/7/23: 97  ::Echo 1/17/2024: 70-75% EF, increased LV mass, severe dilation of left atrium  - CTH with L parieto-occipital hypodensity  - MRI with wedge shaped territory in the L parieto-occipital region with diffusion restriction, no contrast enhancement or bleeding  - HbA1c 5.7%  - Follow up lipid panel  - Start Aspirin 81mg daily, continue home Atorvastatin 40mg at bedtime   - PT, OT, speech therapy  - Implanted loop recorder 1/16, see EP note     #Hx ofHTN  #Hypertensive Emergency  - SBP goal < 220 for 24h (until 6pm 1/16), Current SBP goal < 180   - Resume carvedilol 12.5mg BID 1/17, Resume hydralazine PO 25mg BID 1/18, resume losartan at half home dose (50mg) 1/19  - PRN Hydralazine and Labetolol   - Home: hydralazine PO 25mg twice a day,  losartan PO 100mg every day, carvedilol PO 12.5mg twice a day      #Hx of Glaucoma  - Continue home latanoprost .005% 1 drop in each eye once a day  - Continue home timolol 0.5% 1 drop into each eye once a day     #Vitamin D, B12 deficiency  - Continue home cholecalciferol PO 125MCG once a day  - Last B12 shot in 05/23     F: Bolus PRN  E: K>4 Mg>2 Phos>3  N: Normal Diet  A: PIV  P: SCDs     NOK: daughterJeimy ([phone])  CODE STATUS: DNR, DNI (confirmed on admission with pt and family)    Rubén Navarro MD,

## 2024-01-20 NOTE — NURSING NOTE
Pt discharged to [facility]. All IV's removed. Pt unable to find glasses, described as having square brown frames, but all other belongings accounted for per pt and family. Report called to R[facility] at facility. Pt left floor in stable condition escorted by transport.

## 2024-01-20 NOTE — HOSPITAL COURSE
Mr. Roslyn Cabrera is a 79 YO F with PMH of HTN, HLD, tobacco abuse, glaucoma complicated by visual impairment presenting with acute onset confusion and speech difficulty. On examination, patient found to have persistent wernicke's type aphasia (NIHSS 1). Later appeared to be mixed aphasia (impaired fluency, comprehension, and repetition). Per Hx - pt had Left sided weakness with drift which had resolved when examined by Neurology at [facility].     CT head w/o contrast shows global atrophy and advanced white matter disease.   CTA shows no significant evidence of intracranial or extracranial disease.   MRI brain shows new diffusion restriction in left parietal and left occipital regions in distal MCA distribution. Patient's aphasia can be attributed to affected infarcted region. 1h EEG negative for epileptiform discharges.   Echo revealed severely dilated left atrium, no afib on telemetry.   Pt had implanted loop recorder 1/16.   Mechanism of stroke likely cardioembolic    Discharged to acute rehab on Aspirin and Atorvastatin with loop recorder in place.    Should follow-up with PCP, Neurology for stroke, and Cardiology for Loop recorder

## 2024-01-20 NOTE — DISCHARGE SUMMARY
Discharge Diagnosis  Aphasia    Problem List  Principal Problem:    Aphasia  Active Problems:    Acute ischemic stroke (CMS/HCC)    Stroke due to embolism of left middle cerebral artery (CMS/HCC)      Roslyn Cabrera is a 78 y.o. female who presented to the hospital with an acute L distal MCA stroke. They were diagnosed with a stroke.  Etiology: Ischemic stroke, likely cardioembolic    Relevant hospital complications: None  Discharge antithrombotics: ASA  Pending evaluation:  Loop recorder   Issues Requiring Follow-Up  Loop recorder results    MR brain w and wo IV contrast    Result Date: 1/15/2024  Interpreted By:  Jayne De La Vega and Hanreck James STUDY: MR BRAIN W AND WO IV CONTRAST;  1/15/2024 10:20 pm   INDICATION: Signs/Symptoms:c/f stroke.   COMPARISON: Same day MR brain   ACCESSION NUMBER(S): [id number]   ORDERING CLINICIAN: CARLOS DUNBAR   TECHNIQUE: Axial T2, FLAIR, DWI, gradient echo T2 and T1 weighted images of brain were acquired. Post contrast T1 weighted images were acquired after administration of 12 mL Dotarem gadolinium based intravenous contrast.   FINDINGS: There is similar geographic diffusion restriction in the left parietal lobe with slight extent into the left occipital lobe. There is mild associated T2 FLAIR hyperintense edema and slight sulcal effacement. No new area of diffusion restriction.   No intracranial hemorrhage. No midline shift.   There is mild to moderate diffuse parenchymal volume loss. The ventricles are nondilated. The basal cisterns are patent. No extra-axial fluid collection.   Nonspecific scattered T2 FLAIR hyperintense foci in the periventricular and subcortical white matter of the bilateral cerebral hemispheres favored to represent sequela of microvascular disease. Focal T2 FLAIR hyperintensity in the left thalamus is favored to represent a chronic lacunar infarct.   No abnormal intracranial enhancement.   Right greater than left maxillary mucous secretions.        Redemonstration of left parieto-occipital infarct, essentially unchanged in appearance to prior same day MRI. No evidence of hemorrhagic transformation or pathologic intracranial enhancement.   I personally reviewed the images/study and I agree with the resident Gwyn Rodriguez's findings as stated. This study was interpreted at [facility], [place], [place].   MACRO: None   Signed by: Jayne De La Vega 1/15/2024 11:18 PM Dictation workstation:   [id number]    MR brain wo IV contrast    Result Date: 1/15/2024  Interpreted By:  Jayne De La Vega and Hanreck James STUDY: MR BRAIN WO IV CONTRAST;  1/15/2024 8:18 pm   INDICATION: Signs/Symptoms:c/f stroke.   COMPARISON: Same day CTA head; MRI of the brain dated 03/21/2022   ACCESSION NUMBER(S): [id number]   ORDERING CLINICIAN: CARLOS DUNBAR   TECHNIQUE: Axial T2, FLAIR, DWI, gradient echo T2 and sagittal and coronal T1 weighted images of brain were acquired.   FINDINGS: Geographic area of diffusion restriction is present in the left parietal lobe, with involvement of the parts of the left occipital lobe. Mild associated FLAIR signal changes are present, with slight effacement of the local sulci.   No additional areas of diffusion restriction are present.   There is no evidence of intracranial hemorrhage. No midline shift.   Mild-to-moderate diffuse parenchymal volume loss is noted without abnormal ventricular dilatation. Basal cisterns are patent. No extra-axial fluid collections are identified.   Scattered foci of hyperintense FLAIR and T2 signal are noted in the periventricular and subcortical white matter of bilateral cerebral hemispheres, nonspecific findings favored to represent sequela of microvascular disease.   Focus of hyperintense T2 signal are present in the left thalamus (series 2, image 23) likely represent sequela of chronic lacunar infarct.   Visualized large arterial flow voids are preserved.   Small  amount of mucus/secretions are present in the inferior maxillary sinuses bilaterally.       1. Acute or early subacute infarction is present in the left parietal lobe, with some involvement of the left occipital lobe. There is slight local mass effect with effacement of the adjacent sulci, without evidence of midline shift or hemorrhagic transformation. 2. Scattered areas of hyperintense FLAIR and T2 signal are present in the periventricular and subcortical white matter of bilateral cerebral hemispheres, nonspecific findings favored to represent sequela of microvascular disease.     I personally reviewed the images/study and I agree with the resident Gwyn Rodriguez's findings as stated. This study was interpreted at [facility], [place], [place].   MACRO: Gwyn Rodriguez discussed the significance and urgency of this critical finding by telephone with Gwyn DUNBAR on 1/15/2024 at 8:26 pm.  (**-RCF-**) Findings:  See findings.   Signed by: Jayne De La Vega 1/15/2024 9:05 PM Dictation workstation:   [id number]   No CT head results found for the past 14 days  Transthoracic Echo (TTE) Complete    Result Date: 1/16/2024   [facility], [address] [place] [address]                Tel [phone] and Fax [phone] TRANSTHORACIC ECHOCARDIOGRAM REPORT  Patient Name:      RENEE Villarreal Physician:    [id number] Arash Amezquita MD Study Date:        1/16/2024           Ordering Provider:    [id number] SHAHNAZ SAUNDERS MRN/PID:           [id number]            Fellow: Accession#:        [id number]        Nurse:                Jeiym Jain RN Date of Birth/Age: 1945 / 78 years Sonographer:          Errol Ureña RDCS Gender:            F                   Additional Staff: Height:            157.48 cm           Admit  Date:           1/15/2024 Weight:            59.42 kg            Admission Status:     Inpatient -                                                              Priority discharge BSA:               1.60 m2             Encounter#:           [id number]                                        Department Location:  [facility] Non                                                              Invasive Blood Pressure: 101 /59 mmHg Study Type:    TRANSTHORACIC ECHO (TTE) COMPLETE Diagnosis/ICD: Cerebral Infarction, unspecified-I63.9; Cerebral infarction due                to embolism of left middle cerebral artery-I63.412 Indication:    acute ischemic stroke, stroke due to embolism of left middle                cerebral artery CPT Code:      Echo Complete w Full Doppler-[id number] Patient History: Pertinent         Smoker, HTN, HLD, RA, wernicke's aphasia, hypertensive History:          emergency. Study Detail: The following Echo studies were performed: 2D, M-Mode, Doppler and               color flow. Technically challenging study due to patient lying in               supine position. Definity used as a contrast agent for endocardial               border definition and agitated saline used as a contrast agent for               intraseptal flow evaluation. Total contrast used for this               procedure was 1.5 mL via IV push. Unable to obtain suprasternal               notch view.  PHYSICIAN INTERPRETATION: Left Ventricle: The left ventricular systolic function is hyperdynamic, with an estimated ejection fraction of 70-75%. There are no regional wall motion abnormalities. The left ventricular cavity size is normal. There is mild to moderate concentric left ventricular hypertrophy. Spectral Doppler shows an impaired relaxation pattern of left ventricular diastolic filling. There are elevated left atrial and left ventricular end diastolic pressures. Left Atrium: The left atrium is severely dilated. Right Ventricle: The  right ventricle is normal in size. There is normal right ventricular global systolic function. Right Atrium: The right atrium is normal in size. Aortic Valve: The aortic valve appears structurally normal. There is no evidence of aortic valve regurgitation. The peak instantaneous gradient of the aortic valve is 8.0 mmHg. Mitral Valve: The mitral valve is normal in structure. There is moderate mitral annular calcification. There is trace mitral valve regurgitation. Tricuspid Valve: The tricuspid valve is structurally normal. No evidence of tricuspid regurgitation. The Doppler estimated RVSP is within normal limits at 26.0 mmHg. Pulmonic Valve: The pulmonic valve is structurally normal. There is trace pulmonic valve regurgitation. Pericardium: There is no pericardial effusion noted. Aorta: The aortic root is normal. Systemic Veins: The inferior vena cava appears to be of normal size. There is IVC inspiratory collapse greater than 50%. In comparison to the previous echocardiogram(s): Compared with study from 3/27/2019, no significant change.  CONCLUSIONS:  1. Left ventricular systolic function is hyperdynamic with a 70-75% estimated ejection fraction.  2. There is mild to moderate concentric left ventricular hypertrophy.  3. Spectral Doppler shows an impaired relaxation pattern of left ventricular diastolic filling.  4. There are elevated left atrial and left ventricular end diastolic pressures.  5. The left atrium is severely dilated.  6. RVSP within normal limits.  7. There is moderate mitral annular calcification. QUANTITATIVE DATA SUMMARY: 2D MEASUREMENTS:                           Normal Ranges: Ao Root d:     3.50 cm    (2.0-3.7cm) LAs:           4.00 cm    (2.7-4.0cm) IVSd:          1.30 cm    (0.6-1.1cm) LVPWd:         1.30 cm    (0.6-1.1cm) LVIDd:         4.30 cm    (3.9-5.9cm) LVIDs:         2.70 cm LV Mass Index: 130.1 g/m2 LV % FS        37.2 % LA VOLUME:                               Normal Ranges: LA Vol  A4C:        83.1 ml    (22+/-6mL/m2) LA Vol A2C:        85.6 ml LA Vol BP:         84.6 ml LA Vol Index A4C:  52.0ml/m2 LA Vol Index A2C:  53.6 ml/m2 LA Vol Index BP:   53.0 ml/m2 LA Area A4C:       24.1 cm2 LA Area A2C:       24.4 cm2 LA Major Axis A4C: 5.9 cm LA Major Axis A2C: 5.9 cm LA Volume Index:   52.9 ml/m2 AORTA MEASUREMENTS:                    Normal Ranges: Asc Ao, d: 3.40 cm (2.1-3.4cm) LV SYSTOLIC FUNCTION BY 2D PLANIMETRY (MOD):                     Normal Ranges: EF-A4C View: 77.5 % (>=55%) EF-A2C View: 80.8 % EF-Biplane:  79.1 % LV DIASTOLIC FUNCTION:                           Normal Ranges: MV Peak E:    0.87 m/s    (0.7-1.2 m/s) MV Peak A:    1.47 m/s    (0.42-0.7 m/s) E/A Ratio:    0.59        (1.0-2.2) MV e'         0.05 m/s    (>8.0) MV lateral e' 0.05 m/s MV medial e'  0.05 m/s MV A Dur:     145.00 msec E/e' Ratio:   17.11       (<8.0) a'            0.09 m/s MV DT:        321 msec    (150-240 msec) MITRAL VALVE:                 Normal Ranges: MV DT: 321 msec (150-240msec) AORTIC VALVE:                         Normal Ranges: AoV Vmax:      1.41 m/s (<=1.7m/s) AoV Peak P.0 mmHg (<20mmHg) LVOT Max Juan:  1.29 m/s (<=1.1m/s) LVOT VTI:      27.10 cm LVOT Diameter: 2.00 cm  (1.8-2.4cm) AoV Area,Vmax: 2.87 cm2 (2.5-4.5cm2)  RIGHT VENTRICLE: TAPSE: 18.7 mm RV s'  0.13 m/s TRICUSPID VALVE/RVSP:                             Normal Ranges: Peak TR Velocity: 2.40 m/s RV Syst Pressure: 26.0 mmHg (< 30mmHg) IVC Diam:         1.80 cm PULMONIC VALVE:                         Normal Ranges: PV Accel Time: 153 msec (>120ms) PV Max Juan:    0.7 m/s  (0.6-0.9m/s) PV Max P.2 mmHg  [id number] Arash Amezquita MD Electronically signed on 2024 at 3:29:32 PM  ** Final **        Results from last 7 days   Lab Units 01/15/24  1835   HEMOGLOBIN A1C % 5.7*     BNP   Date/Time Value Ref Range Status   01/15/2024 06:35  (H) 0 - 99 pg/mL Final       Test Results Pending At Discharge  Pending Labs       No  current pending labs.            Hospital Course  Mr. Roslyn Cabrera is a 77 YO F with PMH of HTN, HLD, tobacco abuse, glaucoma complicated by visual impairment presenting with acute onset confusion and speech difficulty. On examination, patient found to have persistent wernicke's type aphasia (NIHSS 1). Later appeared to be mixed aphasia (impaired fluency, comprehension, and repetition). Per Hx - pt had Left sided weakness with drift which had resolved when examined by Neurology at [facility].     CT head w/o contrast shows global atrophy and advanced white matter disease.   CTA shows no significant evidence of intracranial or extracranial disease.   MRI brain shows new diffusion restriction in left parietal and left occipital regions in distal MCA distribution. Patient's aphasia can be attributed to affected infarcted region. 1h EEG negative for epileptiform discharges.   Echo revealed severely dilated left atrium, no afib on telemetry.   Pt had implanted loop recorder 1/16.   Mechanism of stroke likely cardioembolic    Discharged to acute rehab on Aspirin and Atorvastatin with loop recorder in place.    Should follow-up with PCP, Neurology for stroke, and Cardiology for Loop recorder    Pertinent Physical Exam At Time of Discharge  Physical Exam  Neurological Exam  Mental Status  Awake, alert and oriented to person, place and time. Level of consciousness: Oriented to self and place, limited by mixed aphasia. Mixed aphasia present. Follows complex commands. Obeys / mimics some commands.     Cranial Nerves  CN II: Visual fields full to confrontation.  CN III, IV, VI: Extraocular movements intact bilaterally. Pupils equal round and reactive to light bilaterally.  CN V: Facial sensation is normal.  CN VII: Full and symmetric facial movement.  CN VIII: Hearing is normal.     Motor   Strength is 5/5 throughout all four extremities.     Sensory  Light touch is normal in upper and lower extremities.      Coordination  Right:  Finger-to-nose normal.Left: Finger-to-nose normal.    NIH Stroke Scale  1A. Level of Consciousness: Alert, Keenly Responsive  1B. Ask Month and Age: Both Questions Right  1C. Blink Eyes & Squeeze Hands: Performs Both Tasks  2. Best Gaze: Normal  3. Visual: No Visual Loss  4. Facial Palsy: Normal Symmetrical Movements  5A. Motor - Left Arm: No Drift  5B. Motor - Right Arm: No Drift  6A. Motor - Left Leg: No Drift  6B. Motor - Right Leg: No Drift  7. Limb Ataxia: Absent  8. Sensory Loss: Normal  9. Best Language: Mild-to-Moderate Aphasia  10. Dysarthria: Normal  11. Extinction and Inattention: No Abnormality  NIH Stroke Scale: 1     Outpatient Follow-Up  Future Appointments   Date Time Provider Department Center   2/27/2024  1:00 PM Joan Wells MD [id number] [place]   4/15/2024 10:15 AM Miller Pickens MD [id number] [place]   10/29/2024 10:40 AM Bhumi Carl, APRN-CNP [id number] [place]       Rubén Navarro MD

## 2024-01-20 NOTE — CARE PLAN
Problem: Fall/Injury  Goal: Not fall by end of shift  Outcome: Progressing   The patient's goals for the shift include free from falls    The clinical goals for the shift include patient will remain safe and free from falls/injury this shift    Over the shift, the patient did make progress toward the following goals.

## 2024-01-29 PROCEDURE — G0180 MD CERTIFICATION HHA PATIENT: HCPCS | Performed by: INTERNAL MEDICINE

## 2024-01-30 ENCOUNTER — TELEPHONE (OUTPATIENT)
Dept: PRIMARY CARE | Facility: CLINIC | Age: 79
End: 2024-01-30
Payer: MEDICARE

## 2024-01-31 NOTE — TELEPHONE ENCOUNTER
Called Anuja to advise on message from Dr. Milner, not available, left voice message to call office.

## 2024-02-15 ENCOUNTER — TELEPHONE (OUTPATIENT)
Dept: PRIMARY CARE | Facility: CLINIC | Age: 79
End: 2024-02-15
Payer: MEDICARE

## 2024-02-15 DIAGNOSIS — I63.9 CEREBROVASCULAR ACCIDENT (CVA), UNSPECIFIED MECHANISM (MULTI): Primary | ICD-10-CM

## 2024-02-15 DIAGNOSIS — R47.01 EXPRESSIVE APHASIA: ICD-10-CM

## 2024-02-15 NOTE — TELEPHONE ENCOUNTER
Theresa from [facility] called pt will be discharged from the hospital and they will discontinue her speech and physical therapy. She will need an order for outpatient speech therapy starting February 26,2024. It has to have the diagnosis on it and has to say she had a CVA    Fax to [facility]

## 2024-02-26 ENCOUNTER — EVALUATION (OUTPATIENT)
Dept: SPEECH THERAPY | Facility: CLINIC | Age: 79
End: 2024-02-26
Payer: MEDICARE

## 2024-02-26 DIAGNOSIS — I69.390 APRAXIA AS LATE EFFECT OF CEREBROVASCULAR ACCIDENT (CVA): ICD-10-CM

## 2024-02-26 DIAGNOSIS — I69.320 APHASIA AS LATE EFFECT OF CEREBROVASCULAR ACCIDENT: ICD-10-CM

## 2024-02-26 DIAGNOSIS — R47.01 EXPRESSIVE APHASIA: ICD-10-CM

## 2024-02-26 DIAGNOSIS — I63.9 CEREBROVASCULAR ACCIDENT (CVA), UNSPECIFIED MECHANISM (MULTI): Primary | ICD-10-CM

## 2024-02-26 PROCEDURE — 92523 SPEECH SOUND LANG COMPREHEN: CPT | Mod: GN | Performed by: STUDENT IN AN ORGANIZED HEALTH CARE EDUCATION/TRAINING PROGRAM

## 2024-02-26 PROCEDURE — 92507 TX SP LANG VOICE COMM INDIV: CPT | Mod: GN | Performed by: STUDENT IN AN ORGANIZED HEALTH CARE EDUCATION/TRAINING PROGRAM

## 2024-02-26 NOTE — LETTER
February 27, 2024    Oscar Milner MD  [address] [place] [id number]    Patient: Roslyn Cabrera   YOB: 1945   Date of Visit: 2/26/2024       Dear Oscar Milner MD  [address] [place] [address],  [place] [address]    The attached plan of care is being sent to you because your patient’s medical reimbursement requires that you certify the plan of care. Your signature is required to allow uninterrupted insurance coverage.      You may indicate your approval by signing below and faxing this form back to us at Dept Fax: [phone].    Please call Dept: [phone] with any questions or concerns.    Thank you for this referral,        Noemi Barber CCC-SLP  [address] [place] [address]    Payer: Payor: [facility] MEDICARE / Plan: [facility] MEDICARE / Product Type: *No Product type* /                                                                         Date:     Dear Noemi Barber CCC-SLP,     Re: Ms. Roslyn Cabrera, MRN:[id number]    I certify that I have reviewed the attached plan of care and it is medically necessary for Ms. Roslyn Cabrera (1945) who is under my care.          ______________________________________                    _________________  Provider name and credentials                                           Date and time                                                                                      The following plan is in draft form.  Please refer to the current version for the most up-to-date information.                Plan of Care 2/26/24   Effective from: 2/26/2024  Effective to: 5/26/2024    Draft  Plan ID: [id number]               Participants as of 2/27/2024      Name Type Comments Contact Info    Oscar Milner MD PCP - General  [phone]    Noemi Barber CCC-SLP Speech Language Pathologist  [phone]          Last Plan Note       Author:  Noemi Barber, CCC-SLP Status: Incomplete Last edited: 2/26/2024 10:30 AM         Speech-Language Pathology    SLP ADULT Outpatient Speech-Language Cognition    Patient Name: Roslyn Cabrera  MRN: [id number]  Today's Date: 2/27/2024   Time Calculation  Start Time: 1040  Stop Time: 1138  Time Calculation (min): 58 min         Current Problem:   1. Cerebrovascular accident (CVA), unspecified mechanism (CMS/HCC)  Referral to Speech Therapy      2. Expressive aphasia  Referral to Speech Therapy      3. 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 Aphasia as late effect of cerebrovascular accident             SLP Assessment:  Patient presents with moderate expressive aphasia, moderate apraxia of speech and mild receptive language impairment. Speech production is characterized by phonemic errors which increased as the length of utterance increased, rapid rate of speech and anomia. The patient was able to write her full name and only the number 4 when writing to dictation. Receptively the patient had difficulty following complex verbal commands and completing a functional reading task.    SLP Plan:  Skilled speech therapy services are warranted in order for the patient to be able to express wants, needs and opinions and to engage in meaningful communication and conversation with family in friends.  General Information  Chart Reviewed: Yes  Arrival: Accompanied by: __ (daughter)  Reason for Referral: Patient is s/p L CVA (1/15/24)  Past Medical History Relevant to Rehab: 77 YO F with PMH of HTN, HLD, tobacco abuse, glaucoma  Patient Seen During This Visit: Yes  Certification Period Start Date: 02/26/24  Certification Period End Date: 05/26/24  Frequency: SLP Frequency: 2x per week  Duration: Duration: 12 weeks    Goals: Established 2/26/24  Short-term goals:  Patient will complete naming activities (including object naming, generative naming, verb naming, synonyms/antonyms, etc.) with 80% accuracy  given minimal-moderate cues    Patient will complete targeted expressive language tasks (including stating personal information, phrase/sentence completion, open-ended/WH questions) with 80% accuracy given minimal-moderate cues.    Patient will repeat bisyllabic words x3 repetitions with 80% accuracy.    Patient will complete targeted receptive language tasks (including complex yes/no questions, following verbal/written commands, sentence comprehension) with 80% accuracy given minimal-moderate cues.    Long-term goals:  Patient will improve functional global communication skills in order to promote safety and independence with ADLs and communicate wants/needs/preferences/opinions.       General Visit Information:  Reason for referral: Speech and language assessment  Past Medical History: Past Medical History Relevant to Rehab: 79 YO F with PMH of HTN, HLD, tobacco abuse, glaucoma    Subjective:  Patient lives in a condo in [place]. She has a lot of friends. She is friendly and talkative with some awareness of errors. She is a fast talker and is highly motivated.    Pain:  Scale: 0-10   Pain Score: 0 - No pain    Motor Speech Production:  Mild-mod apraxia; Increased phonemic errors as utterances increase    Results for the [place] Evidence-Based Language Test (Standard Level) as follows:    Auditory Comprehension:  15/18     Y/N questions 11/12     Following verbal commands 4/6       Verbal Expression:   28/     Automatic speech  - Required cue to initiate     Sentence completion 2/2     Personal/orientation questions 5/6     Repetition 1/4     Picture Naming 0/4     Naming objects in the room 3/4     Naming gestures 1/2     Picture description      Verbal fluency - Animals      Verbal fluency - F words        Perceptual      Semantic links     Readin/14     Written word to picture matching /     Following written commands 4/     Reading aloud 0/        Functional reading - Medicine  label 2/3       Writin/13     Functional writing - Name 2/2     Functional writing - Address 1/3     Writing to dictation      Written object 0/1     Written gesture 0    TOTAL: 64/169    The [place] EBLT is not based on the premise that a perfect test score is indicative of normal (non-impaired) language. Instead, the test was specifically designed to avoid floor and ceiling effects (where nil or perfect scores are achieved) and be able to reflect variation in language functioning across the entire ability spectrum.  Judgement as to whether a test score constitutes a 'mild', 'moderate' or 'severe' condition is at the discretion of the  assessing clinician.    Education:  Adult Outpatient Education  Individual(s) Educated: Patient, Child  Written Education : Yes  Verbal Education : Yes  Community Resources: Yes  Risk and Benefits Discussed with Patient/Caregiver/Other: yes  Patient/Caregiver Demonstrated Understanding: yes  Patient Response to Education: Patient/Caregiver Verbalized Understanding of Information     Treatment:   SLP provided verbal and written education regarding compensatory speaking strategies with emphasis on slowing down rate of speech. The patient was encouraged to pause and think about the word before trying to speak it. Education was also provided regarding Speak, a community resource to support language skills.

## 2024-02-27 ENCOUNTER — OFFICE VISIT (OUTPATIENT)
Dept: OPHTHALMOLOGY | Facility: CLINIC | Age: 79
End: 2024-02-27
Payer: MEDICARE

## 2024-02-27 DIAGNOSIS — I10 BENIGN ESSENTIAL HYPERTENSION: Primary | ICD-10-CM

## 2024-02-27 DIAGNOSIS — H40.1133 PRIMARY OPEN ANGLE GLAUCOMA (POAG) OF BOTH EYES, SEVERE STAGE: ICD-10-CM

## 2024-02-27 DIAGNOSIS — F32.A ANXIETY AND DEPRESSION: ICD-10-CM

## 2024-02-27 DIAGNOSIS — I63.9 ACUTE ISCHEMIC STROKE (MULTI): ICD-10-CM

## 2024-02-27 DIAGNOSIS — F41.9 ANXIETY AND DEPRESSION: ICD-10-CM

## 2024-02-27 PROBLEM — I69.320 APHASIA AS LATE EFFECT OF CEREBROVASCULAR ACCIDENT: Status: ACTIVE | Noted: 2024-02-27

## 2024-02-27 PROBLEM — I69.390 APRAXIA AS LATE EFFECT OF CEREBROVASCULAR ACCIDENT (CVA): Status: ACTIVE | Noted: 2024-02-27

## 2024-02-27 PROCEDURE — 99214 OFFICE O/P EST MOD 30 MIN: CPT | Performed by: OPHTHALMOLOGY

## 2024-02-27 RX ORDER — CARVEDILOL 12.5 MG/1
12.5 TABLET ORAL 2 TIMES DAILY
Qty: 90 TABLET | Refills: 1 | Status: SHIPPED | OUTPATIENT
Start: 2024-02-27 | End: 2024-05-31 | Stop reason: SDUPTHER

## 2024-02-27 RX ORDER — LOSARTAN POTASSIUM 100 MG/1
100 TABLET ORAL DAILY
Qty: 90 TABLET | Refills: 3 | Status: SHIPPED | OUTPATIENT
Start: 2024-02-27 | End: 2024-04-01 | Stop reason: DRUGHIGH

## 2024-02-27 RX ORDER — VENLAFAXINE HYDROCHLORIDE 75 MG/1
75 CAPSULE, EXTENDED RELEASE ORAL
Qty: 90 CAPSULE | Refills: 1 | Status: SHIPPED | OUTPATIENT
Start: 2024-02-27

## 2024-02-27 RX ORDER — VENLAFAXINE HYDROCHLORIDE 75 MG/1
75 CAPSULE, EXTENDED RELEASE ORAL
COMMUNITY
Start: 2024-01-26 | End: 2024-02-27 | Stop reason: SDUPTHER

## 2024-02-27 RX ORDER — ATORVASTATIN CALCIUM 40 MG/1
40 TABLET, FILM COATED ORAL NIGHTLY
Qty: 90 TABLET | Refills: 3 | Status: SHIPPED | OUTPATIENT
Start: 2024-02-27

## 2024-02-27 RX ORDER — TIMOLOL MALEATE 5 MG/ML
1 SOLUTION/ DROPS OPHTHALMIC
Qty: 30 ML | Refills: 3 | Status: SHIPPED | OUTPATIENT
Start: 2024-02-27 | End: 2024-05-01 | Stop reason: SDUPTHER

## 2024-02-27 RX ORDER — HYDRALAZINE HYDROCHLORIDE 25 MG/1
25 TABLET, FILM COATED ORAL 2 TIMES DAILY
Qty: 90 TABLET | Refills: 1 | Status: SHIPPED | OUTPATIENT
Start: 2024-02-27

## 2024-02-27 RX ORDER — LATANOPROST 50 UG/ML
SOLUTION/ DROPS OPHTHALMIC
Qty: 7.5 ML | Refills: 3 | Status: SHIPPED | OUTPATIENT
Start: 2024-02-27 | End: 2024-05-01 | Stop reason: SDUPTHER

## 2024-02-27 ASSESSMENT — CONF VISUAL FIELD
OD_SUPERIOR_TEMPORAL_RESTRICTION: 0
OS_SUPERIOR_NASAL_RESTRICTION: 0
OD_INFERIOR_TEMPORAL_RESTRICTION: 0
OS_NORMAL: 1
OD_INFERIOR_NASAL_RESTRICTION: 0
OS_INFERIOR_NASAL_RESTRICTION: 0
OD_SUPERIOR_NASAL_RESTRICTION: 0
METHOD: COUNTING FINGERS
OS_INFERIOR_TEMPORAL_RESTRICTION: 0
OD_NORMAL: 1
OS_SUPERIOR_TEMPORAL_RESTRICTION: 0

## 2024-02-27 ASSESSMENT — PACHYMETRY
OS_CT(UM): 564
OD_CT(UM): 556

## 2024-02-27 ASSESSMENT — ENCOUNTER SYMPTOMS
GASTROINTESTINAL NEGATIVE: 0
CONSTITUTIONAL NEGATIVE: 0
PSYCHIATRIC NEGATIVE: 0
RESPIRATORY NEGATIVE: 0
ALLERGIC/IMMUNOLOGIC NEGATIVE: 0
CARDIOVASCULAR NEGATIVE: 0
MUSCULOSKELETAL NEGATIVE: 0
ENDOCRINE NEGATIVE: 0
NEUROLOGICAL NEGATIVE: 0
HEMATOLOGIC/LYMPHATIC NEGATIVE: 0
EYES NEGATIVE: 1

## 2024-02-27 ASSESSMENT — EXTERNAL EXAM - LEFT EYE: OS_EXAM: NORMAL

## 2024-02-27 ASSESSMENT — REFRACTION_WEARINGRX
OS_CYLINDER: -1.50
OD_CYLINDER: -2.00
OD_AXIS: 085
OS_SPHERE: PLANO
OS_ADD: +2.50
OD_SPHERE: +0.50
OS_AXIS: 115
OD_ADD: +2.50

## 2024-02-27 ASSESSMENT — CUP TO DISC RATIO
OD_RATIO: .9
OS_RATIO: .95

## 2024-02-27 ASSESSMENT — EXTERNAL EXAM - RIGHT EYE: OD_EXAM: NORMAL

## 2024-02-27 ASSESSMENT — PAIN - FUNCTIONAL ASSESSMENT: PAIN_FUNCTIONAL_ASSESSMENT: 0-10

## 2024-02-27 ASSESSMENT — VISUAL ACUITY
CORRECTION_TYPE: GLASSES
OD_CC: 20/25-2
METHOD: LEA SYMBOLS - LINEAR
OS_CC: 20/30-2

## 2024-02-27 ASSESSMENT — TONOMETRY
OS_IOP_MMHG: 23
IOP_METHOD: GOLDMANN APPLANATION
OD_IOP_MMHG: 20

## 2024-02-27 ASSESSMENT — PAIN SCALES - GENERAL: PAINLEVEL_OUTOF10: 0 - NO PAIN

## 2024-02-27 ASSESSMENT — SLIT LAMP EXAM - LIDS
COMMENTS: GOOD POSITION, 2+ MGD
COMMENTS: GOOD POSITION, 2+ MGD

## 2024-02-27 NOTE — PROGRESS NOTES
Speech-Language Pathology    SLP ADULT Outpatient Speech-Language Cognition    Patient Name: Roslyn Cabrera  MRN: [id number]  Today's Date: 2/27/2024   Time Calculation  Start Time: 1040  Stop Time: 1138  Time Calculation (min): 58 min         Current Problem:   1. Cerebrovascular accident (CVA), unspecified mechanism (CMS/HCC)  Referral to Speech Therapy      2. Expressive aphasia  Referral to Speech Therapy      3. 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 Aphasia as late effect of cerebrovascular accident             SLP Assessment:  Patient presents with moderate expressive aphasia, moderate apraxia of speech and mild receptive language impairment. Speech production is characterized by phonemic errors which increased as the length of utterance increased, rapid rate of speech and anomia. The patient was able to write her full name and only the number 4 when writing to dictation. Receptively the patient had difficulty following complex verbal commands and completing a functional reading task.    SLP Plan:  Skilled speech therapy services are warranted in order for the patient to be able to express wants, needs and opinions and to engage in meaningful communication and conversation with family in friends.  General Information  Chart Reviewed: Yes  Arrival: Accompanied by: __ (daughter)  Reason for Referral: Patient is s/p L CVA (1/15/24)  Past Medical History Relevant to Rehab: 79 YO F with PMH of HTN, HLD, tobacco abuse, glaucoma  Patient Seen During This Visit: Yes  Certification Period Start Date: 02/26/24  Certification Period End Date: 05/26/24  Frequency: SLP Frequency: 2x per week  Duration: Duration: 12 weeks    Goals: Established 2/26/24  Short-term goals:  Patient will complete naming activities (including object naming, generative naming, verb naming, synonyms/antonyms, etc.) with 80% accuracy given minimal-moderate cues    Patient will complete targeted expressive language tasks  (including stating personal information, phrase/sentence completion, open-ended/WH questions) with 80% accuracy given minimal-moderate cues.    Patient will repeat bisyllabic words x3 repetitions with 80% accuracy.    Patient will complete targeted receptive language tasks (including complex yes/no questions, following verbal/written commands, sentence comprehension) with 80% accuracy given minimal-moderate cues.    Long-term goals:  Patient will improve functional global communication skills in order to promote safety and independence with ADLs and communicate wants/needs/preferences/opinions.       General Visit Information:  Reason for referral: Speech and language assessment  Past Medical History: Past Medical History Relevant to Rehab: 79 YO F with PMH of HTN, HLD, tobacco abuse, glaucoma    Subjective:  Patient lives in a condo in [place]. She has a lot of friends. She is friendly and talkative with some awareness of errors. She is a fast talker and is highly motivated.    Pain:  Scale: 0-10   Pain Score: 0 - No pain    Motor Speech Production:  Mild-mod apraxia; Increased phonemic errors as utterances increase    Results for the [place] Evidence-Based Language Test (Standard Level) as follows:    Auditory Comprehension:  15/18     Y/N questions /12     Following verbal commands 4/6       Verbal Expression:   28/119     Automatic speech / - Required cue to initiate     Sentence completion 2/2     Personal/orientation questions 5/6     Repetition 1/4     Picture Naming 0/4     Naming objects in the room 3/4     Naming gestures 1/2     Picture description      Verbal fluency - Animals      Verbal fluency - F words        Perceptual 5     Semantic links     Readin/14     Written word to picture matching      Following written commands /     Reading aloud 0/1        Functional reading - Medicine label 2/3       Writin/13     Functional writing - Name 2/     Functional writing  - Address 1/3     Writing to dictation 1/6     Written object 0/1     Written gesture 0/1    TOTAL: 64/169    The [place] EBLT is not based on the premise that a perfect test score is indicative of normal (non-impaired) language. Instead, the test was specifically designed to avoid floor and ceiling effects (where nil or perfect scores are achieved) and be able to reflect variation in language functioning across the entire ability spectrum.  Judgement as to whether a test score constitutes a 'mild', 'moderate' or 'severe' condition is at the discretion of the  assessing clinician.    Education:  Adult Outpatient Education  Individual(s) Educated: Patient, Child  Written Education : Yes  Verbal Education : Yes  Community Resources: Yes  Risk and Benefits Discussed with Patient/Caregiver/Other: yes  Patient/Caregiver Demonstrated Understanding: yes  Patient Response to Education: Patient/Caregiver Verbalized Understanding of Information     Treatment:   SLP provided verbal and written education regarding compensatory speaking strategies with emphasis on slowing down rate of speech. The patient was encouraged to pause and think about the word before trying to speak it. Education was also provided regarding Speak, a community resource to support language skills.

## 2024-02-27 NOTE — PROGRESS NOTES
1. Advanced POAG OU   - Prev cared for by Dr. Dave (private practice ophthalmologist in [place]) but never diagnosed with glaucoma   - Tmax 21/24 per our records, /564, FH neg   - No trauma, prolonged steroid use, cbd oil   - Gonio open 360   - HVF 24-2 (8/22/23): OD inf>sup arcuate; OS dense sup>inf arcuate . both stable from previous   - OCT RNFL (8/22/23): sup/inf thinning OU, stable from previous   - s/p SLT OU in August 2022, excellent response  IOP is back up OU   -goal IOP <15   -continue latan qhs OU and qam timolol ou -   - recommend repeat SLT, PATIENT is poor surgical candidate right now given recent stroke      2. Gilbert Bonnet phenomenon    - Pt describing visual hallucinations   - As per Dr. Dumont      3. Pseudophakia OU   - Monitor      4. AMD OU   - Mac OCT (3/17/22): flat OU   - As per Dr. Pickens

## 2024-03-01 ENCOUNTER — APPOINTMENT (OUTPATIENT)
Dept: SPEECH THERAPY | Facility: CLINIC | Age: 79
End: 2024-03-01
Payer: MEDICARE

## 2024-03-01 ENCOUNTER — TREATMENT (OUTPATIENT)
Dept: SPEECH THERAPY | Facility: CLINIC | Age: 79
End: 2024-03-01
Payer: MEDICARE

## 2024-03-01 DIAGNOSIS — I69.320 APHASIA AS LATE EFFECT OF CEREBROVASCULAR ACCIDENT: ICD-10-CM

## 2024-03-01 DIAGNOSIS — I69.390 APRAXIA AS LATE EFFECT OF CEREBROVASCULAR ACCIDENT (CVA): Primary | ICD-10-CM

## 2024-03-01 PROCEDURE — 92507 TX SP LANG VOICE COMM INDIV: CPT | Mod: GN | Performed by: STUDENT IN AN ORGANIZED HEALTH CARE EDUCATION/TRAINING PROGRAM

## 2024-03-01 ASSESSMENT — PAIN - FUNCTIONAL ASSESSMENT: PAIN_FUNCTIONAL_ASSESSMENT: 0-10

## 2024-03-01 ASSESSMENT — PAIN SCALES - GENERAL: PAINLEVEL_OUTOF10: 0 - NO PAIN

## 2024-03-01 NOTE — PROGRESS NOTES
Speech-Language Pathology    Outpatient Speech-Language Pathology Treatment     Patient Name: Roslyn Cabrera  MRN: [id number]  Today's Date: 3/5/2024       Current Problem:   1. 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 Aphasia as late effect of cerebrovascular accident          SLP Assessment:  SLP TX Intervention Outcome: Making Progress Towards Goals  Prognosis: Good.    The patient was actively engaged in therapeutic tasks targeting expressive and receptive language skills. She demonstrated improvement with word repetition and self correction of phonemic errors. She demonstrated difficulty with accuracy of produced word during a naming task. The patient may continue to benefit from skilled speech therapy services in order to improve her ability to express wants, needs and opinions and engage in meaningful conversation in the home, social and clinical settings.    General Visit Information:  Certification Period Start Date: 02/26/24  Certification Period End Date: 05/26/24     Total Number of Visits : 1    Plan:  Plan  Inpatient/Swing Bed or Outpatient: Outpatient  SLP Frequency: 2x per week  Duration: 12 weeks  Discussed POC: Patient  Discussed Risks/Benefits: Yes  Patient/Caregiver Agreeable: Yes    Goals: Established 2/26/24  Short-term goals:  Patient will complete naming activities (including object naming, generative naming, verb naming, synonyms/antonyms, etc.) with 80% accuracy given minimal-moderate cues   Status: Goalstatus: Ongoing  Progress: See objective data below   -Confrontational naming - 68% for initial attempt, increased to 80% when allowed time for self corrections    Patient will complete targeted expressive language tasks (including stating personal information, phrase/sentence completion, open-ended/WH questions) with 80% accuracy given minimal-moderate cues.  Status: Goalstatus: Ongoing  Progress: Goal not addressed this date     Patient will repeat bisyllabic  words x3 repetitions with 80% accuracy.  Status: Goalstatus: Ongoing  Progress: See objective data below   -Bisyllabic word rep x3 - 100%    Patient will complete targeted receptive language tasks (including complex yes/no questions, following verbal/written commands, sentence comprehension) with 80% accuracy given minimal-moderate cues.  Status: Goalstatus: Ongoing  Progress: See objective data below   -Phrase level comprehension: choosing correct phrase from FO4 to match picture - 92%    Long-term goals:  Patient will improve functional global communication skills in order to promote safety and independence with ADLs and communicate wants/needs/preferences/opinions.     Subjective:  Patient is using the Apraxia reese by [employer], [employer] and and has a printed copy of [employer] available.     Pain:  Pain Assessment  Pain Assessment: 0-10  Pain Score: 0 - No pain     Outpatient Education:  Adult Outpatient Education  Individual(s) Educated: Patient, Child  Written Education : Yes  Verbal Education : Yes  Risk and Benefits Discussed with Patient/Caregiver/Other: yes  Patient/Caregiver Demonstrated Understanding: yes  Patient Response to Education: Patient/Caregiver Verbalized Understanding of Information

## 2024-03-05 ENCOUNTER — TREATMENT (OUTPATIENT)
Dept: SPEECH THERAPY | Facility: CLINIC | Age: 79
End: 2024-03-05
Payer: MEDICARE

## 2024-03-05 DIAGNOSIS — I69.390 APRAXIA AS LATE EFFECT OF CEREBROVASCULAR ACCIDENT (CVA): Primary | ICD-10-CM

## 2024-03-05 DIAGNOSIS — I69.320 APHASIA AS LATE EFFECT OF CEREBROVASCULAR ACCIDENT: ICD-10-CM

## 2024-03-05 PROCEDURE — 92507 TX SP LANG VOICE COMM INDIV: CPT | Mod: GN | Performed by: STUDENT IN AN ORGANIZED HEALTH CARE EDUCATION/TRAINING PROGRAM

## 2024-03-05 ASSESSMENT — PAIN - FUNCTIONAL ASSESSMENT: PAIN_FUNCTIONAL_ASSESSMENT: 0-10

## 2024-03-05 ASSESSMENT — PAIN SCALES - GENERAL: PAINLEVEL_OUTOF10: 0 - NO PAIN

## 2024-03-05 NOTE — PROGRESS NOTES
Speech-Language Pathology    Outpatient Speech-Language Pathology Treatment     Patient Name: Roslyn Cabrera  MRN: [id number]  Today's Date: 3/5/2024  Time Calculation  Start Time: 1430  Stop Time: 1515  Time Calculation (min): 45 min    Current Problem:   1. 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 Aphasia as late effect of cerebrovascular accident          SLP Assessment:  SLP TX Intervention Outcome: Making Progress Towards Goals  Prognosis: Good.    The patient was actively engaged in therapeutic tasks targeting expressive language skills. She demonstrated improvement with word finding during a generative naming task. She demonstrated difficulty producing the words on initial attempt but was able to self correct independently or with cues or extended time. The patient is unable to copy words at this point and has been encouraged to complete tracing tasks, primarily her address and the alphabet. The patient may continue to benefit from skilled speech therapy services in order to improve spoken language skills and writing skills to support communication in the home, social and clinical settings.    General Visit Information:  Certification Period Start Date: 02/26/24  Certification Period End Date: 05/26/24     Total Number of Visits : 2    Plan:  Plan  Inpatient/Swing Bed or Outpatient: Outpatient  Treatment/Interventions: Communication functioning, Expressive Language  SLP Plan: Skilled SLP  SLP Frequency: 2x per week  Duration: 12 weeks  Discussed POC: Patient  Discussed Risks/Benefits: Yes  Patient/Caregiver Agreeable: Yes    Goals: Established 2/26/24  Short-term goals:  Patient will complete naming activities (including object naming, generative naming, verb naming, synonyms/antonyms, etc.) with 80% accuracy given minimal-moderate cues   Status: Goalstatus: Ongoing  Progress: See objective data below   -Generative naming: 3 concrete category items; patient was able to generate 3  "items in 100% of opportunities however accuracy of the spoken response required min cues, extended time and/or restarts. SLP educated patient on the value of \"seeing\" the word produced and how this imagery may be helpful when trying to produce the target word    Previous:  -Confrontational naming - 68% for initial attempt, increased to 80% when allowed time for self corrections    Patient will complete targeted expressive language tasks (including stating personal information, phrase/sentence completion, open-ended/WH questions) with 80% accuracy given minimal-moderate cues.  Status: Goalstatus: Ongoing  Progress: See objective data below    -The patient brought in a page from her [employer] book in which she circled the correct answer to complete a sentence. The answers were correct however when the patient tried to copy the word into the blank the responses bared no resemblance to the intentional word. As a result the patient was given a paper with her address in traceable format to use with a sheet protector for repeated practice at home. The patient confirms she also has a trace the [employer] book at home. She is encouraged to use this book, or pages from the book to use with the sheet protector as well.    Patient will repeat bisyllabic words x3 repetitions with 80% accuracy.  Status: Goalstatus: Ongoing  Progress: Goal not addressed this date     Previous:  -Bisyllabic word rep x3 - 100%    Patient will complete targeted receptive language tasks (including complex yes/no questions, following verbal/written commands, sentence comprehension) with 80% accuracy given minimal-moderate cues.  Status: Goalstatus: Ongoing  Progress: Goal not addressed this date     Previous:  -Phrase level comprehension: choosing correct phrase from FO4 to match picture - 92%    Long-term goals:  Patient will improve functional global communication skills in order to promote safety and independence with ADLs and communicate " wants/needs/preferences/opinions.     Subjective:  Patient reports no new or worsening symptoms.     Pain:  Pain Assessment  Pain Assessment: 0-10  Pain Score: 0 - No pain     Outpatient Education:  Adult Outpatient Education  Individual(s) Educated: Patient, Child  Written Education : Yes  Verbal Education : Yes  Risk and Benefits Discussed with Patient/Caregiver/Other: yes  Patient/Caregiver Demonstrated Understanding: yes  Patient Response to Education: Patient/Caregiver Verbalized Understanding of Information

## 2024-03-07 ENCOUNTER — TREATMENT (OUTPATIENT)
Dept: SPEECH THERAPY | Facility: CLINIC | Age: 79
End: 2024-03-07
Payer: MEDICARE

## 2024-03-07 ENCOUNTER — APPOINTMENT (OUTPATIENT)
Dept: SPEECH THERAPY | Facility: CLINIC | Age: 79
End: 2024-03-07
Payer: MEDICARE

## 2024-03-07 DIAGNOSIS — I69.320 APHASIA AS LATE EFFECT OF CEREBROVASCULAR ACCIDENT: ICD-10-CM

## 2024-03-07 DIAGNOSIS — R00.1 BRADYCARDIA ON ECG: ICD-10-CM

## 2024-03-07 DIAGNOSIS — Z95.0 PACEMAKER: Primary | ICD-10-CM

## 2024-03-07 DIAGNOSIS — I69.390 APRAXIA AS LATE EFFECT OF CEREBROVASCULAR ACCIDENT (CVA): Primary | ICD-10-CM

## 2024-03-07 PROCEDURE — 92507 TX SP LANG VOICE COMM INDIV: CPT | Mod: GN | Performed by: STUDENT IN AN ORGANIZED HEALTH CARE EDUCATION/TRAINING PROGRAM

## 2024-03-07 ASSESSMENT — PAIN SCALES - GENERAL: PAINLEVEL_OUTOF10: 0 - NO PAIN

## 2024-03-07 ASSESSMENT — PAIN - FUNCTIONAL ASSESSMENT: PAIN_FUNCTIONAL_ASSESSMENT: 0-10

## 2024-03-07 NOTE — PROGRESS NOTES
Speech-Language Pathology    Outpatient Speech-Language Pathology Treatment     Patient Name: Roslyn Cabrera  MRN: [id number]  Today's Date: 3/7/2024  Time Calculation  Start Time: 1345  Stop Time: 1430  Time Calculation (min): 45 min    Current Problem:   1. 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 Aphasia as late effect of cerebrovascular accident          SLP Assessment:  SLP TX Intervention Outcome: Making Progress Towards Goals  Prognosis: Good.    The patient was actively engaged in therapeutic tasks targeting motor speech and apraxia reduction skills. She demonstrated improvement independently generating CVC/CV/VC words with improved self corrections. She demonstrated difficulty producing 3 reps of trisyllabic words. The patient may continue to benefit from skilled speech therapy services in order to improve speech production and writing skills to support communication in the home, social and clinical settings.    General Visit Information:  Certification Period Start Date: 02/26/24  Certification Period End Date: 05/26/24     Total Number of Visits : 3    Plan:  Plan  Inpatient/Swing Bed or Outpatient: Outpatient  Treatment/Interventions: Communication functioning  SLP Plan: Skilled SLP  SLP Frequency: 2x per week  Duration: 12 weeks  Discussed POC: Patient  Discussed Risks/Benefits: Yes  Patient/Caregiver Agreeable: Yes    Goals: Established 2/26/24  Short-term goals:  Patient will complete naming activities (including object naming, generative naming, verb naming, synonyms/antonyms, etc.) with 80% accuracy given minimal-moderate cues   Status: Goalstatus: Ongoing  Progress: See objective data below   -naming CVC/CV/VC words: >90% accuracy as observed during the motor speech task below    Previous:  -Generative naming: 3 items; 100% of opportunities   -Confrontational naming - 68% for initial attempt, increased to 80% when allowed time for self corrections    Patient will complete  targeted expressive language tasks (including stating personal information, phrase/sentence completion, open-ended/WH questions) with 80% accuracy given minimal-moderate cues.  Status: Goalstatus: Ongoing  Progress: See objective data below    -The patient brought in a page from her [employer] book in which she circled the correct answer to complete a sentence. The answers were correct however when the patient tried to copy the word into the blank the responses bared no resemblance to the intentional word. As a result the patient was given a paper with her address in traceable format to use with a sheet protector for repeated practice at home. The patient confirms she also has a trace the alphabet book at home. She is encouraged to use this book, or pages from the book to use with the sheet protector as well.    Patient will repeat bisyllabic words x3 repetitions with 80% accuracy.  Status: Goalstatus: Ongoing, Progressing toward goal  Progress: See objective data below   -CVC/CV/VC word production - 95% indep with allowance for self corrections   -Trisyllabic words x3 - 80% indep    Previous:  -Bisyllabic word rep x3 - 100%    Patient will complete targeted receptive language tasks (including complex yes/no questions, following verbal/written commands, sentence comprehension) with 80% accuracy given minimal-moderate cues.  Status: Goalstatus: Ongoing  Progress: Goal not addressed this date     Previous:  -Phrase level comprehension: choosing correct phrase from FO4 to match picture - 92%    Long-term goals:  Patient will improve functional global communication skills in order to promote safety and independence with ADLs and communicate wants/needs/preferences/opinions.     Subjective:  Patient reports no new or worsening symptoms.     Pain:  Pain Assessment  Pain Assessment: 0-10  Pain Score: 0 - No pain     Outpatient Education:  Adult Outpatient Education  Individual(s) Educated: Patient, Child  Written Education :  Yes  Verbal Education : Yes  Risk and Benefits Discussed with Patient/Caregiver/Other: yes  Patient/Caregiver Demonstrated Understanding: yes  Patient Response to Education: Patient/Caregiver Verbalized Understanding of Information

## 2024-03-08 ENCOUNTER — APPOINTMENT (OUTPATIENT)
Dept: SPEECH THERAPY | Facility: CLINIC | Age: 79
End: 2024-03-08
Payer: MEDICARE

## 2024-03-08 ENCOUNTER — HOSPITAL ENCOUNTER (OUTPATIENT)
Dept: CARDIOLOGY | Facility: CLINIC | Age: 79
Discharge: HOME | End: 2024-03-08
Payer: MEDICARE

## 2024-03-08 DIAGNOSIS — I63.9 CEREBRAL INFARCTION, UNSPECIFIED (MULTI): ICD-10-CM

## 2024-03-08 PROCEDURE — 93291 INTERROG DEV EVAL SCRMS IP: CPT

## 2024-03-08 PROCEDURE — 93291 INTERROG DEV EVAL SCRMS IP: CPT | Performed by: NURSE PRACTITIONER

## 2024-03-11 ENCOUNTER — OFFICE VISIT (OUTPATIENT)
Dept: OPHTHALMOLOGY | Facility: CLINIC | Age: 79
End: 2024-03-11
Payer: MEDICARE

## 2024-03-11 ENCOUNTER — HOSPITAL ENCOUNTER (OUTPATIENT)
Dept: CARDIOLOGY | Facility: CLINIC | Age: 79
Discharge: HOME | End: 2024-03-11
Payer: MEDICARE

## 2024-03-11 DIAGNOSIS — Z95.0 PACEMAKER: ICD-10-CM

## 2024-03-11 DIAGNOSIS — H40.1133 PRIMARY OPEN ANGLE GLAUCOMA (POAG) OF BOTH EYES, SEVERE STAGE: Primary | ICD-10-CM

## 2024-03-11 DIAGNOSIS — R00.1 BRADYCARDIA ON ECG: ICD-10-CM

## 2024-03-11 PROCEDURE — 65855 TRABECULOPLASTY LASER SURG: CPT | Mod: LEFT SIDE | Performed by: OPHTHALMOLOGY

## 2024-03-11 ASSESSMENT — TONOMETRY
IOP_METHOD: GOLDMANN APPLANATION
OS_IOP_MMHG: 15

## 2024-03-11 ASSESSMENT — ENCOUNTER SYMPTOMS
RESPIRATORY NEGATIVE: 0
CONSTITUTIONAL NEGATIVE: 0
CARDIOVASCULAR NEGATIVE: 0
GASTROINTESTINAL NEGATIVE: 0
EYES NEGATIVE: 0
ENDOCRINE NEGATIVE: 0
PSYCHIATRIC NEGATIVE: 0
ALLERGIC/IMMUNOLOGIC NEGATIVE: 0
MUSCULOSKELETAL NEGATIVE: 0
NEUROLOGICAL NEGATIVE: 0
HEMATOLOGIC/LYMPHATIC NEGATIVE: 0

## 2024-03-11 ASSESSMENT — CONF VISUAL FIELD
OD_SUPERIOR_TEMPORAL_RESTRICTION: 0
OD_SUPERIOR_NASAL_RESTRICTION: 0
OS_INFERIOR_NASAL_RESTRICTION: 0
OD_INFERIOR_NASAL_RESTRICTION: 0
OD_INFERIOR_TEMPORAL_RESTRICTION: 0
OD_NORMAL: 1
OS_NORMAL: 1
OS_SUPERIOR_TEMPORAL_RESTRICTION: 0
OS_INFERIOR_TEMPORAL_RESTRICTION: 0
OS_SUPERIOR_NASAL_RESTRICTION: 0

## 2024-03-11 ASSESSMENT — EXTERNAL EXAM - LEFT EYE: OS_EXAM: NORMAL

## 2024-03-11 ASSESSMENT — CUP TO DISC RATIO
OD_RATIO: .9
OS_RATIO: .95

## 2024-03-11 ASSESSMENT — VISUAL ACUITY
METHOD: SNELLEN - LINEAR
OS_CC: 20/40
OS_PH_CC: 20/30
CORRECTION_TYPE: GLASSES

## 2024-03-11 ASSESSMENT — SLIT LAMP EXAM - LIDS
COMMENTS: GOOD POSITION, 2+ MGD
COMMENTS: GOOD POSITION, 2+ MGD

## 2024-03-11 ASSESSMENT — EXTERNAL EXAM - RIGHT EYE: OD_EXAM: NORMAL

## 2024-03-11 ASSESSMENT — PACHYMETRY
OD_CT(UM): 556
OS_CT(UM): 564

## 2024-03-12 ENCOUNTER — TREATMENT (OUTPATIENT)
Dept: SPEECH THERAPY | Facility: CLINIC | Age: 79
End: 2024-03-12
Payer: MEDICARE

## 2024-03-12 DIAGNOSIS — I69.320 APHASIA AS LATE EFFECT OF CEREBROVASCULAR ACCIDENT: ICD-10-CM

## 2024-03-12 DIAGNOSIS — I69.390 APRAXIA AS LATE EFFECT OF CEREBROVASCULAR ACCIDENT (CVA): Primary | ICD-10-CM

## 2024-03-12 PROCEDURE — 92507 TX SP LANG VOICE COMM INDIV: CPT | Mod: GN | Performed by: STUDENT IN AN ORGANIZED HEALTH CARE EDUCATION/TRAINING PROGRAM

## 2024-03-12 ASSESSMENT — PAIN SCALES - GENERAL: PAINLEVEL_OUTOF10: 0 - NO PAIN

## 2024-03-12 ASSESSMENT — PAIN - FUNCTIONAL ASSESSMENT: PAIN_FUNCTIONAL_ASSESSMENT: 0-10

## 2024-03-12 NOTE — PROGRESS NOTES
Speech-Language Pathology    Outpatient Speech-Language Pathology Treatment     Patient Name: Roslyn Cabrera  MRN: [id number]  Today's Date: 3/12/2024       Current Problem:   1. 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 Aphasia as late effect of cerebrovascular accident          SLP Assessment:  SLP TX Intervention Outcome: Making Progress Towards Goals  Prognosis: Good.    The patient was actively engaged in therapeutic tasks targeting receptive and expressive language skills. She demonstrated improvement with double digit number recognition. She demonstrated difficulty reading bisyllabic words aloud. The patient may continue to benefit from skilled speech therapy services in order to improve speech production, reading skills and writing skills to support communication in the home, social and clinical settings.    General Visit Information:  Certification Period Start Date: 02/26/24  Certification Period End Date: 05/26/24     Total Number of Visits : 4    Plan:  Plan  Inpatient/Swing Bed or Outpatient: Outpatient  Treatment/Interventions: Communication functioning  SLP Plan: Skilled SLP  SLP Frequency: 2x per week  Duration: 12 weeks  Discussed POC: Patient  Discussed Risks/Benefits: Yes  Patient/Caregiver Agreeable: Yes    Goals: Established 2/26/24  Short-term goals:  Patient will complete naming activities (including object naming, generative naming, verb naming, synonyms/antonyms, etc.) with 80% accuracy given minimal-moderate cues   Status: Goalstatus: Ongoing  Progress: See objective data below     Previous:  -naming CVC/CV/VC words: >90% accuracy as observed during the motor speech task below  -Generative naming: 3 items; 100% of opportunities   -Confrontational naming - 68% for initial attempt, increased to 80% when allowed time for self corrections    Patient will complete targeted expressive language tasks (including stating personal information, phrase/sentence completion,  open-ended/WH questions) with 80% accuracy given minimal-moderate cues.  Status: Goalstatus: Ongoing  Progress: See objective data below    -Reading comp (word level): 96%   -Single digit recognition (FO6): 90% (9/10)   -Dbl digit recognition (FO6): 100% (25/25)    Previous:  -The patient brought in a page from her [employer] book in which she circled the correct answer to complete a sentence. The answers were correct however when the patient tried to copy the word into the blank the responses bared no resemblance to the intentional word. As a result the patient was given a paper with her address in traceable format to use with a sheet protector for repeated practice at home. The patient confirms she also has a trace the alphabet book at home. She is encouraged to use this book, or pages from the book to use with the sheet protector as well.    Patient will repeat bisyllabic words x3 repetitions with 80% accuracy.  Status: Goalstatus: Ongoing, Progressing toward goal  Progress: Goal not addressed this date     Previous:  -CVC/CV/VC word production - 95% indep with allowance for self corrections   -Trisyllabic words x3 - 80% indep  -Bisyllabic word rep x3 - 100%    Patient will complete targeted receptive language tasks (including complex yes/no questions, following verbal/written commands, sentence comprehension) with 80% accuracy given minimal-moderate cues.  Status: Goalstatus: Ongoing  Progress: Goal not addressed this date   -Reading words aloud (bisyllabic): 20% increased to 60% when given phonemic cues    Previous:  -Phrase level comprehension: choosing correct phrase from FO4 to match picture - 92%    Long-term goals:  Patient will improve functional global communication skills in order to promote safety and independence with ADLs and communicate wants/needs/preferences/opinions.     Subjective:  Patient reports no new or worsening symptoms. The patient had difficulty finding letters on the QWERTY  keyboard.    Pain:  Pain Assessment  Pain Assessment: 0-10  Pain Score: 0 - No pain     Outpatient Education:  Adult Outpatient Education  Individual(s) Educated: Patient, Child  Written Education : Yes  Verbal Education : Yes  Risk and Benefits Discussed with Patient/Caregiver/Other: yes  Patient/Caregiver Demonstrated Understanding: yes  Patient Response to Education: Patient/Caregiver Verbalized Understanding of Information

## 2024-03-15 ENCOUNTER — TREATMENT (OUTPATIENT)
Dept: SPEECH THERAPY | Facility: CLINIC | Age: 79
End: 2024-03-15
Payer: MEDICARE

## 2024-03-15 DIAGNOSIS — I69.320 APHASIA AS LATE EFFECT OF CEREBROVASCULAR ACCIDENT: ICD-10-CM

## 2024-03-15 DIAGNOSIS — I69.390 APRAXIA AS LATE EFFECT OF CEREBROVASCULAR ACCIDENT (CVA): Primary | ICD-10-CM

## 2024-03-15 PROCEDURE — 92507 TX SP LANG VOICE COMM INDIV: CPT | Mod: GN | Performed by: STUDENT IN AN ORGANIZED HEALTH CARE EDUCATION/TRAINING PROGRAM

## 2024-03-15 ASSESSMENT — PAIN SCALES - GENERAL: PAINLEVEL_OUTOF10: 0 - NO PAIN

## 2024-03-15 ASSESSMENT — PAIN - FUNCTIONAL ASSESSMENT: PAIN_FUNCTIONAL_ASSESSMENT: 0-10

## 2024-03-15 NOTE — PROGRESS NOTES
Speech-Language Pathology    SLP Adult Outpatient Speech-Language Pathology Treatment     Patient Name: Roslyn Cabrera  MRN: [id number]  Today's Date: 3/15/2024  Time Calculation  Start Time: 1520  Stop Time: 1600  Time Calculation (min): 40 min    Current Problem:   1. 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 Aphasia as late effect of cerebrovascular accident          SLP Assessment:  SLP TX Intervention Outcome: Making Progress Towards Goals  Prognosis: Good.    The patient was actively engaged in therapeutic tasks targeting receptive and expressive language skills. She demonstrated improvement with double digit number recognition. She demonstrated difficulty reading bisyllabic words aloud. The patient may continue to benefit from skilled speech therapy services in order to improve speech production, reading skills and writing skills to support communication in the home, social and clinical settings.    General Visit Information:  Certification Period Start Date: 02/26/24  Certification Period End Date: 05/26/24     Total Number of Visits : 5    Plan:  Plan  Inpatient/Swing Bed or Outpatient: Outpatient  Treatment/Interventions: Communication functioning  SLP Plan: Skilled SLP  SLP Frequency: 2x per week  Duration: 12 weeks  Discussed POC: Patient  Discussed Risks/Benefits: Yes  Patient/Caregiver Agreeable: Yes    Goals: Established 2/26/24  Short-term goals:  Patient will complete naming activities (including object naming, generative naming, verb naming, synonyms/antonyms, etc.) with 80% accuracy given minimal-moderate cues   Status: Goalstatus: Ongoing  Progress: Goal not addressed this date     Previous:  -naming CVC/CV/VC words: >90% accuracy as observed during the motor speech task below  -Generative naming: 3 items; 100% of opportunities   -Confrontational naming - 68% for initial attempt, increased to 80% when allowed time for self corrections    Patient will complete targeted  expressive language tasks (including stating personal information, phrase/sentence completion, open-ended/WH questions) with 80% accuracy given minimal-moderate cues.  Status: Goalstatus: Ongoing  Progress: See objective data below    -Complete the sentence (verbally presented), single word responses: 83% increased to 97% with indep self corrections    Previous:  -The patient brought in a page from her [employer] book in which she circled the correct answer to complete a sentence. The answers were correct however when the patient tried to copy the word into the blank the responses bared no resemblance to the intentional word. As a result the patient was given a paper with her address in traceable format to use with a sheet protector for repeated practice at home. The patient confirms she also has a trace the alphabet book at home. She is encouraged to use this book, or pages from the book to use with the sheet protector as well.    Patient will repeat bisyllabic words x3 repetitions with 80% accuracy.  Status: Goalstatus: Ongoing, Progressing toward goal  Progress: Goal not addressed this date     Previous:  -CVC/CV/VC word production - 95% indep with allowance for self corrections   -Trisyllabic words x3 - 80% indep  -Bisyllabic word rep x3 - 100%    Patient will complete targeted receptive language tasks (including complex yes/no questions, following verbal/written commands, sentence comprehension) with 80% accuracy given minimal-moderate cues.  Status: Goalstatus: Ongoing  Progress: See objective data below   -Sentence level comprehension, FO4 choices: 90% (18/20)    Previous:  -Reading comp (word level): 96%   -Single digit recognition (FO6): 90% (9/10)   -Dbl digit recognition (FO6): 100% (25/25)  -Reading words aloud (bisyllabic): 20% increased to 60% when given phonemic cues  -Phrase level comprehension: choosing correct phrase from FO4 to match picture - 92%    Long-term goals:  Patient will improve functional  global communication skills in order to promote safety and independence with ADLs and communicate wants/needs/preferences/opinions.     Subjective:  Patient reports no new or worsening symptoms. Suggested family look for keyboard/typing games for patient to be able to complete independently at home.    Pain:  Pain Assessment  Pain Assessment: 0-10  Pain Score: 0 - No pain     Outpatient Education:  Adult Outpatient Education  Individual(s) Educated: Patient, Child  Written Education : Yes  Verbal Education : Yes  Risk and Benefits Discussed with Patient/Caregiver/Other: yes  Patient/Caregiver Demonstrated Understanding: yes  Patient Response to Education: Patient/Caregiver Verbalized Understanding of Information

## 2024-03-19 ENCOUNTER — TREATMENT (OUTPATIENT)
Dept: SPEECH THERAPY | Facility: CLINIC | Age: 79
End: 2024-03-19
Payer: MEDICARE

## 2024-03-19 DIAGNOSIS — I69.390 APRAXIA AS LATE EFFECT OF CEREBROVASCULAR ACCIDENT (CVA): Primary | ICD-10-CM

## 2024-03-19 DIAGNOSIS — I69.320 APHASIA AS LATE EFFECT OF CEREBROVASCULAR ACCIDENT: ICD-10-CM

## 2024-03-19 PROCEDURE — 92507 TX SP LANG VOICE COMM INDIV: CPT | Mod: GN | Performed by: STUDENT IN AN ORGANIZED HEALTH CARE EDUCATION/TRAINING PROGRAM

## 2024-03-19 ASSESSMENT — PAIN - FUNCTIONAL ASSESSMENT: PAIN_FUNCTIONAL_ASSESSMENT: 0-10

## 2024-03-19 ASSESSMENT — PAIN SCALES - GENERAL: PAINLEVEL_OUTOF10: 0 - NO PAIN

## 2024-03-19 NOTE — PROGRESS NOTES
"Speech-Language Pathology    SLP Adult Outpatient Speech-Language Pathology Treatment     Patient Name: Roslyn Cabrera \"Ivette"  MRN: [id number]  Today's Date: 3/19/2024  Time Calculation  Start Time: 1515  Stop Time: 1600  Time Calculation (min): 45 min    Current Problem:   1. 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 Aphasia as late effect of cerebrovascular accident          SLP Assessment:  SLP TX Intervention Outcome: Making Progress Towards Goals  Prognosis: Good.    The patient was actively engaged in therapeutic tasks targeting expressive language skills. She demonstrated improvement with word finding and motor speech to complete simple sentences. She demonstrated difficulty with repetition this date and became tearful because it was frustrating and she was upset she is by herself so much. The patient may continue to benefit from skilled speech therapy services in order to improve speech production, reading skills and writing skills to support communication in the home, social and clinical settings.    General Visit Information:  Certification Period Start Date: 02/26/24  Certification Period End Date: 05/26/24     Total Number of Visits : 6    Plan:  Plan  Inpatient/Swing Bed or Outpatient: Outpatient  Treatment/Interventions: Communication functioning, Expressive Language  SLP Plan: Skilled SLP  SLP Frequency: 2x per week  Duration: 12 weeks  Discussed POC: Patient  Discussed Risks/Benefits: Yes  Patient/Caregiver Agreeable: Yes    Goals: Established 2/26/24  Short-term goals:  Patient will complete naming activities (including object naming, generative naming, verb naming, synonyms/antonyms, etc.) with 80% accuracy given minimal-moderate cues   Status: Goalstatus: Ongoing  Progress: Goal not addressed this date     Previous:  -naming CVC/CV/VC words: >90% accuracy as observed during the motor speech task below  -Generative naming: 3 items; 100% of opportunities   -Confrontational " naming - 68% for initial attempt, increased to 80% when allowed time for self corrections    Patient will complete targeted expressive language tasks (including stating personal information, phrase/sentence completion, open-ended/WH questions) with 80% accuracy given minimal-moderate cues.  Status: Goalstatus: Ongoing  Progress: See objective data below  -Sentence completion (simple): 76% increased to 88%     Previous:  -Complete the sentence (verbally presented), single word responses: 83% increased to 97% with indep self corrections    Patient will repeat bisyllabic words x3 repetitions with 80% accuracy.  Status: Goalstatus: Ongoing, Progressing toward goal  Progress: Goal not addressed this date   -Trisyllabic words x3 - 20% indep (1/5)  -Bisyllabic word rep x3 - 80% (4/5)    Previous:  -CVC/CV/VC word production - 95% indep with allowance for self corrections   -Trisyllabic words x3 - 80% indep  -Bisyllabic word rep x3 - 100%    Patient will complete targeted receptive language tasks (including complex yes/no questions, following verbal/written commands, sentence comprehension) with 80% accuracy given minimal-moderate cues.  Status: Goalstatus: Ongoing  Progress: Goal not addressed this date     Previous:  -Sentence level comp, FO4 choices: 90% (18/20)  -Reading comp (word level): 96%   -Single digit recognition (FO6): 90% (9/10)   -Dbl digit recognition (FO6): 100% (25/25)  -Reading words aloud (bisyllabic): 20% increased to 60% when given phonemic cues  -Phrase level comprehension: choosing correct phrase from FO4 to match picture - 92%    Long-term goals:  Patient will improve functional global communication skills in order to promote safety and independence with ADLs and communicate wants/needs/preferences/opinions.     Subjective:  Patient reports no new or worsening symptoms. The patient's daughter reported there are family/friends that have offered to come over but the patient says no.     Pain:  Pain  Assessment  Pain Assessment: 0-10  Pain Score: 0 - No pain     Outpatient Education:  Adult Outpatient Education  Individual(s) Educated: Patient, Child  Written Education : Yes  Verbal Education : Yes  Risk and Benefits Discussed with Patient/Caregiver/Other: yes  Patient/Caregiver Demonstrated Understanding: yes  Patient Response to Education: Patient/Caregiver Verbalized Understanding of Information

## 2024-03-21 ENCOUNTER — OFFICE VISIT (OUTPATIENT)
Dept: NEUROLOGY | Facility: CLINIC | Age: 79
End: 2024-03-21
Payer: MEDICARE

## 2024-03-21 VITALS
RESPIRATION RATE: 18 BRPM | BODY MASS INDEX: 24.48 KG/M2 | HEIGHT: 62 IN | DIASTOLIC BLOOD PRESSURE: 71 MMHG | WEIGHT: 133 LBS | HEART RATE: 61 BPM | SYSTOLIC BLOOD PRESSURE: 119 MMHG | TEMPERATURE: 97.3 F

## 2024-03-21 DIAGNOSIS — G47.30 HYPERSOMNIA WITH SLEEP APNEA: ICD-10-CM

## 2024-03-21 DIAGNOSIS — G47.10 HYPERSOMNIA WITH SLEEP APNEA: ICD-10-CM

## 2024-03-21 DIAGNOSIS — I69.390 APRAXIA AS LATE EFFECT OF CEREBROVASCULAR ACCIDENT (CVA): ICD-10-CM

## 2024-03-21 DIAGNOSIS — I69.320 APHASIA AS LATE EFFECT OF CEREBROVASCULAR ACCIDENT: ICD-10-CM

## 2024-03-21 DIAGNOSIS — G47.30 SLEEP APNEA, UNSPECIFIED TYPE: ICD-10-CM

## 2024-03-21 DIAGNOSIS — I63.412 STROKE DUE TO EMBOLISM OF LEFT MIDDLE CEREBRAL ARTERY (MULTI): Primary | ICD-10-CM

## 2024-03-21 PROCEDURE — 1159F MED LIST DOCD IN RCRD: CPT | Performed by: STUDENT IN AN ORGANIZED HEALTH CARE EDUCATION/TRAINING PROGRAM

## 2024-03-21 PROCEDURE — 1126F AMNT PAIN NOTED NONE PRSNT: CPT | Performed by: STUDENT IN AN ORGANIZED HEALTH CARE EDUCATION/TRAINING PROGRAM

## 2024-03-21 PROCEDURE — 99214 OFFICE O/P EST MOD 30 MIN: CPT | Performed by: STUDENT IN AN ORGANIZED HEALTH CARE EDUCATION/TRAINING PROGRAM

## 2024-03-21 PROCEDURE — 3074F SYST BP LT 130 MM HG: CPT | Performed by: STUDENT IN AN ORGANIZED HEALTH CARE EDUCATION/TRAINING PROGRAM

## 2024-03-21 PROCEDURE — 3078F DIAST BP <80 MM HG: CPT | Performed by: STUDENT IN AN ORGANIZED HEALTH CARE EDUCATION/TRAINING PROGRAM

## 2024-03-21 ASSESSMENT — ENCOUNTER SYMPTOMS
OCCASIONAL FEELINGS OF UNSTEADINESS: 0
DEPRESSION: 0
LOSS OF SENSATION IN FEET: 0

## 2024-03-21 ASSESSMENT — PAIN SCALES - GENERAL: PAINLEVEL: 0-NO PAIN

## 2024-03-21 NOTE — PROGRESS NOTES
"Subjective     Roslyn Cabrera is a 78 y.o. year old female who presented for follow-up of stroke.    She was hospitalized 1/15-20/2024 for left MCA stroke. She presented with sudden onset difficulty speaking, wernicke's aphasia. NIHSS 1.   From discharge summary \"CT head w/o contrast shows global atrophy and advanced white matter disease.   CTA shows no significant evidence of intracranial or extracranial disease.   MRI brain shows new diffusion restriction in left parietal and left occipital regions in distal MCA distribution. Patient's aphasia can be attributed to affected infarcted region. 1h EEG negative for epileptiform discharges.   Echo revealed severely dilated left atrium, no afib on telemetry.   Pt had implanted loop recorder 1/16.   Mechanism of stroke likely cardioembolic     Discharged to acute rehab on Aspirin and Atorvastatin with loop recorder in place. She was discharged to acute rehab at [facility], was there for 1 week and was discharged home with home therapy\"     Vascular RF: HTN, HLD, tobacco use     3/21/2024 - she is doing well but her speech is very impaired. She is having trouble with computer work, she wants to send emails but recognizes that she would have trouble with this. She has trouble conceptualizing time. She has started outpatient speech therapy twice weekly. She is living by herself at the [facility] for the past 2 years and is doing well on her own but has family close by. She is able to take care of herself, but has moments where she has difficulty such as remembering how to feed the cats. She is unable to make a list and cannot communicate over the phone fluently. She has followed up with the device clinic and there has been no atrial fibrillation so far. She has had no weight loss, night sweats or other history of cancer. She has not had colonoscopies. She has had mammograms. She stopped smoking when she had a stroke. She has not had recent lung cancer screening. "     Patient Active Problem List   Diagnosis    Depression    Benign essential HTN    Bilateral optic nerve atrophy    Blurry vision    Dyslipidemia    Vitamin D deficiency    Tobacco abuse    Ankle edema    Cellulitis of leg, left    Cellulitis of right lower extremity    Cobalamin deficiency    Hallucinatory illusions    Hemangioma of skin and subcutaneous tissue    Hyperlipidemia    Intermediate stage nonexudative age-related macular degeneration of both eyes    Traumatic injury of right lower extremity    Nail abnormality    OP (osteoporosis)    Osteoarthritis of both knees    Osteopenia    Other melanin hyperpigmentation    Inflamed seborrheic keratosis    Primary open angle glaucoma (POAG) of both eyes    Rheumatoid arthritis (CMS/HCC)    Right inguinal hernia    Sensorineural hearing loss, bilateral    Tuberculosis    Uncontrolled hypertension    Vision impairment    Visual distortions    Wound cellulitis    Wound dehiscence    Anxiety and depression    Aphasia    Acute ischemic stroke (CMS/HCC)    Stroke due to embolism of left middle cerebral artery (CMS/HCC)    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  Fluent nonsensical speech with paraphasic errors   Some difficulty naming objects with paraphasic errors   Impaired repetition   Follows one step appendicular commands .    Cranial Nerves  CN II: Visual fields full to confrontation.  CN III, IV, VI: Extraocular movements intact bilaterally.  CN V: Facial sensation is normal.  CN VII: Full and symmetric facial movement.  CN VIII: Hearing is normal.  CN IX, X: Palate elevates symmetrically  CN XI: Shoulder shrug strength is normal.  CN XII: Tongue midline without atrophy or fasciculations.    Motor   Strength is 5/5 throughout all four extremities.    Sensory  Light touch is normal in  upper and lower extremities.     Coordination  Right: Finger-to-nose normal.Left: Finger-to-nose normal.    Physical Exam  Eyes:      Extraocular Movements: Extraocular movements intact.   Neurological:      Motor: Motor strength is normal.        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 Has implantable loop recorder in place. Will continue aspirin 81mg daily for secondary stroke prevention. Continue SLP and reviewed resources for aphasia to assist at home. Consider ID tag/bracelet to indicate that she has had a stroke with aphasia. Encouraged her to complete routine cancer screening. Mild snoring, does not have any witnessed apnea. Will refer to sleep clinic.     Follow-up in 6 months

## 2024-03-22 ENCOUNTER — APPOINTMENT (OUTPATIENT)
Dept: SPEECH THERAPY | Facility: CLINIC | Age: 79
End: 2024-03-22
Payer: MEDICARE

## 2024-03-26 ENCOUNTER — TREATMENT (OUTPATIENT)
Dept: SPEECH THERAPY | Facility: CLINIC | Age: 79
End: 2024-03-26
Payer: MEDICARE

## 2024-03-26 DIAGNOSIS — I69.390 APRAXIA AS LATE EFFECT OF CEREBROVASCULAR ACCIDENT (CVA): Primary | ICD-10-CM

## 2024-03-26 DIAGNOSIS — I69.320 APHASIA AS LATE EFFECT OF CEREBROVASCULAR ACCIDENT: ICD-10-CM

## 2024-03-26 PROCEDURE — 92507 TX SP LANG VOICE COMM INDIV: CPT | Mod: GN | Performed by: STUDENT IN AN ORGANIZED HEALTH CARE EDUCATION/TRAINING PROGRAM

## 2024-03-26 ASSESSMENT — PAIN SCALES - GENERAL: PAINLEVEL_OUTOF10: 0 - NO PAIN

## 2024-03-26 ASSESSMENT — PAIN - FUNCTIONAL ASSESSMENT: PAIN_FUNCTIONAL_ASSESSMENT: 0-10

## 2024-03-26 NOTE — PROGRESS NOTES
"Speech-Language Pathology    SLP Adult Outpatient Speech-Language Pathology Treatment     Patient Name: Roslyn Cabrera \"Ivette"  MRN: [id number]  Today's Date: 3/26/2024  Time Calculation  Start Time: 1515  Stop Time: 1600  Time Calculation (min): 45 min    Current Problem:   1. 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 Aphasia as late effect of cerebrovascular accident            SLP Assessment:  SLP TX Intervention Outcome: Making Progress Towards Goals  Prognosis: Good.    The patient was actively engaged in therapeutic tasks targeting expressive language and apraxia reduction skills. She demonstrated improvement producing bisyllabic and trisyllabic words x3 reps and naming verbs. She demonstrated difficulty producing the correct phonemes on the initial attempt although this improved compared to object naming. She is often able to self correct when given time. The patient may continue to benefit from skilled speech therapy services in order to improve her ability to communicate with familiar and unfamiliar individuals in the home, social and clinical settings.    General Visit Information:  Certification Period Start Date: 24  Certification Period End Date: 24     Total Number of Visits : 7    Plan:  Plan  Inpatient/Swing Bed or Outpatient: Outpatient  Treatment/Interventions: Communication functioning, Expressive Language  SLP Plan: Skilled SLP  SLP Frequency: 2x per week  Duration: 12 weeks  Discussed POC: Patient  Discussed Risks/Benefits: Yes  Patient/Caregiver Agreeable: Yes    Goals: Established 24  Short-term goals:  Patient will complete naming activities (including object naming, generative naming, verb naming, synonyms/antonyms, etc.) with 80% accuracy given minimal-moderate cues   Status: Goalstatus: Ongoing  Progress: Goal not addressed this date   -Verb namin% improved to 92% with min cues or extended time    Previous:  -naming CVC/CV/VC words: >90% " accuracy as observed during the motor speech task below  -Generative naming: 3 items; 100% of opportunities   -Confrontational naming - 68% for initial attempt, increased to 80% when allowed time for self corrections    Patient will complete targeted expressive language tasks (including stating personal information, phrase/sentence completion, open-ended/WH questions) with 80% accuracy given minimal-moderate cues.  Status: Goalstatus: Ongoing  Progress: Goal not addressed this date    Previous:  -Sentence completion (simple): 76% increased to 88%   -Complete the sentence (verbally presented), single word responses: 83% increased to 97% with indep self corrections    Patient will repeat bisyllabic words x3 repetitions with 80% accuracy.  Status: Goalstatus: Ongoing, Progressing toward goal  Progress: See objective data below   -Bisyllabic word rep x3 - 100%   -Trisyllabic words x3 - 80%    Previous:  -CVC/CV/VC word production - 95% indep with allowance for self corrections   -Bisyllabic word rep x3   - 80%, 100%  -Trisyllabic words x3   - 20%, 80%     Patient will complete targeted receptive language tasks (including complex yes/no questions, following verbal/written commands, sentence comprehension) with 80% accuracy given minimal-moderate cues.  Status: Goalstatus: Ongoing  Progress: Goal not addressed this date     Previous:  -Sentence level comp, FO4 choices: 90% (18/20)  -Reading comp (word level): 96%   -Single digit recognition (FO6): 90% (9/10)   -Dbl digit recognition (FO6): 100% (25/25)  -Reading words aloud (bisyllabic): 20% increased to 60% when given phonemic cues  -Phrase level comprehension: choosing correct phrase from FO4 to match picture - 92%    Long-term goals:  Patient will improve functional global communication skills in order to promote safety and independence with ADLs and communicate wants/needs/preferences/opinions.     Subjective:  Patient reports no new or worsening symptoms. The  patient's daughter reported there are family/friends that have offered to come over but the patient says no.     Pain:  Pain Assessment  Pain Assessment: 0-10  Pain Score: 0 - No pain     Outpatient Education:  Adult Outpatient Education  Individual(s) Educated: Patient, Child  Written Education : Yes  Verbal Education : Yes  Risk and Benefits Discussed with Patient/Caregiver/Other: yes  Patient/Caregiver Demonstrated Understanding: yes  Patient Response to Education: Patient/Caregiver Verbalized Understanding of Information

## 2024-03-29 ENCOUNTER — TREATMENT (OUTPATIENT)
Dept: SPEECH THERAPY | Facility: CLINIC | Age: 79
End: 2024-03-29
Payer: MEDICARE

## 2024-03-29 DIAGNOSIS — I69.390 APRAXIA AS LATE EFFECT OF CEREBROVASCULAR ACCIDENT (CVA): Primary | ICD-10-CM

## 2024-03-29 DIAGNOSIS — I69.320 APHASIA AS LATE EFFECT OF CEREBROVASCULAR ACCIDENT: ICD-10-CM

## 2024-03-29 PROCEDURE — 92507 TX SP LANG VOICE COMM INDIV: CPT | Mod: GN | Performed by: STUDENT IN AN ORGANIZED HEALTH CARE EDUCATION/TRAINING PROGRAM

## 2024-03-29 ASSESSMENT — PAIN - FUNCTIONAL ASSESSMENT: PAIN_FUNCTIONAL_ASSESSMENT: 0-10

## 2024-03-29 ASSESSMENT — PAIN SCALES - GENERAL: PAINLEVEL_OUTOF10: 0 - NO PAIN

## 2024-03-29 NOTE — PROGRESS NOTES
"Speech-Language Pathology    SLP Adult Outpatient Speech-Language Pathology Treatment     Patient Name: Roslyn Cabrera \"Ivette"  MRN: [id number]  Today's Date: 3/29/2024  Time Calculation  Start Time: 1515  Stop Time: 1600  Time Calculation (min): 45 min    Current Problem:   1. 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 Aphasia as late effect of cerebrovascular accident            SLP Assessment:  SLP TX Intervention Outcome: Making Progress Towards Goals  Prognosis: Good.    The patient was actively engaged in therapeutic tasks targeting expressive language and apraxia reduction skills. She demonstrated improvement providing accurate responses to open ended questions. She demonstrated difficulty repeating conversational phrases. The patient may continue to benefit from skilled speech therapy services in order to improve her ability to communicate with familiar and unfamiliar individuals in the home, social and clinical settings.    General Visit Information:  Certification Period Start Date: 24  Certification Period End Date: 24     Total Number of Visits : 8    Plan:  Plan  Inpatient/Swing Bed or Outpatient: Outpatient  Treatment/Interventions: Communication functioning  SLP Plan: Skilled SLP  SLP Frequency: 2x per week  Duration: 12 weeks  Discussed POC: Patient, Caregiver/family  Discussed Risks/Benefits: Yes  Patient/Caregiver Agreeable: Yes    Goals: Established 24  Short-term goals:  Patient will complete naming activities (including object naming, generative naming, verb naming, synonyms/antonyms, etc.) with 80% accuracy given minimal-moderate cues   Status: Goalstatus: Ongoing  Progress: Goal not addressed this date     Previous:  -Verb namin% improved to 92% with min cues or extended time  -naming CVC/CV/VC words: >90% accuracy as observed during the motor speech task below  -Generative naming: 3 items; 100% of opportunities   -Confrontational naming - 68% for " "initial attempt, increased to 80% when allowed time for self corrections    Patient will complete targeted expressive language tasks (including stating personal information, phrase/sentence completion, open-ended/WH questions) with 80% accuracy given minimal-moderate cues.  Status: Goalstatus: Ongoing  Progress: Progressing as follows   -Open ended/WH questions (\"why\"): 85% increased to 95% with self corrections or cues to slow    Previous:  -Sentence completion (simple): 76% increased to 88%   -Complete the sentence (verbally presented), single word responses: 83% increased to 97% with indep self corrections    Patient will repeat bisyllabic words x3 repetitions with 80% accuracy.  Status: Goalstatus: Ongoing, Progressing toward goal  Progress: Goal not addressed this date   -Repeating simple conversational phrases: 70% improved to 80% with self corrections/cues.    Previous:  -CVC/CV/VC word production - 95% indep with allowance for self corrections   -Bisyllabic word rep x3 (scores oldest to newest)  - 80%, 100%, 80%  -Trisyllabic words x3 (scores oldest to newest)  - 20%, 80%, 80%     Patient will complete targeted receptive language tasks (including complex yes/no questions, following verbal/written commands, sentence comprehension) with 80% accuracy given minimal-moderate cues.  Status: Goalstatus: Ongoing  Progress: Goal not addressed this date     Previous:  -Sentence level comp, FO4 choices: 90% (18/20)  -Reading comp (word level): 96%   -Single digit recognition (FO6): 90% (9/10)   -Dbl digit recognition (FO6): 100% (25/25)  -Reading words aloud (bisyllabic): 20% increased to 60% when given phonemic cues  -Phrase level comprehension: choosing correct phrase from FO4 to match picture - 92%    Long-term goals:  Patient will improve functional global communication skills in order to promote safety and independence with ADLs and communicate wants/needs/preferences/opinions.     Subjective:  Patient reports no " new or worsening symptoms. The daughter reports the patient sent a link to an article and took a picture of an empty cheese container as a way to communicate she was out.    Pain:  Pain Assessment  Pain Assessment: 0-10  Pain Score: 0 - No pain     Outpatient Education:  Adult Outpatient Education  Individual(s) Educated: Patient, Child  Written Education : Yes  Verbal Education : Yes  Risk and Benefits Discussed with Patient/Caregiver/Other: yes  Patient/Caregiver Demonstrated Understanding: yes  Patient Response to Education: Patient/Caregiver Verbalized Understanding of Information

## 2024-04-01 ENCOUNTER — TREATMENT (OUTPATIENT)
Dept: SPEECH THERAPY | Facility: CLINIC | Age: 79
End: 2024-04-01
Payer: MEDICARE

## 2024-04-01 ENCOUNTER — OFFICE VISIT (OUTPATIENT)
Dept: CARDIOLOGY | Facility: CLINIC | Age: 79
End: 2024-04-01
Payer: MEDICARE

## 2024-04-01 VITALS
BODY MASS INDEX: 28.76 KG/M2 | WEIGHT: 137 LBS | DIASTOLIC BLOOD PRESSURE: 73 MMHG | HEIGHT: 58 IN | SYSTOLIC BLOOD PRESSURE: 119 MMHG | HEART RATE: 66 BPM | OXYGEN SATURATION: 95 %

## 2024-04-01 DIAGNOSIS — I67.89 ACUTE, BUT ILL-DEFINED, CEREBROVASCULAR DISEASE: Primary | ICD-10-CM

## 2024-04-01 DIAGNOSIS — I10 BENIGN ESSENTIAL HTN: ICD-10-CM

## 2024-04-01 DIAGNOSIS — R73.9 HYPERGLYCEMIA: ICD-10-CM

## 2024-04-01 DIAGNOSIS — I69.320 APHASIA AS LATE EFFECT OF CEREBROVASCULAR ACCIDENT: ICD-10-CM

## 2024-04-01 DIAGNOSIS — I69.390 APRAXIA AS LATE EFFECT OF CEREBROVASCULAR ACCIDENT (CVA): Primary | ICD-10-CM

## 2024-04-01 DIAGNOSIS — I63.9 CEREBROVASCULAR ACCIDENT (CVA), UNSPECIFIED MECHANISM (MULTI): ICD-10-CM

## 2024-04-01 DIAGNOSIS — I10 BENIGN ESSENTIAL HYPERTENSION: ICD-10-CM

## 2024-04-01 PROCEDURE — 93005 ELECTROCARDIOGRAM TRACING: CPT | Performed by: INTERNAL MEDICINE

## 2024-04-01 PROCEDURE — 99214 OFFICE O/P EST MOD 30 MIN: CPT | Performed by: INTERNAL MEDICINE

## 2024-04-01 PROCEDURE — 1160F RVW MEDS BY RX/DR IN RCRD: CPT | Performed by: INTERNAL MEDICINE

## 2024-04-01 PROCEDURE — 3074F SYST BP LT 130 MM HG: CPT | Performed by: INTERNAL MEDICINE

## 2024-04-01 PROCEDURE — 1159F MED LIST DOCD IN RCRD: CPT | Performed by: INTERNAL MEDICINE

## 2024-04-01 PROCEDURE — 92507 TX SP LANG VOICE COMM INDIV: CPT | Mod: GN | Performed by: STUDENT IN AN ORGANIZED HEALTH CARE EDUCATION/TRAINING PROGRAM

## 2024-04-01 PROCEDURE — 3078F DIAST BP <80 MM HG: CPT | Performed by: INTERNAL MEDICINE

## 2024-04-01 PROCEDURE — 93010 ELECTROCARDIOGRAM REPORT: CPT | Performed by: INTERNAL MEDICINE

## 2024-04-01 PROCEDURE — 1126F AMNT PAIN NOTED NONE PRSNT: CPT | Performed by: INTERNAL MEDICINE

## 2024-04-01 RX ORDER — LOSARTAN POTASSIUM 100 MG/1
50 TABLET ORAL DAILY
Qty: 90 TABLET | Refills: 3 | Status: SHIPPED | OUTPATIENT
Start: 2024-04-01

## 2024-04-01 ASSESSMENT — PAIN SCALES - GENERAL
PAINLEVEL_OUTOF10: 0 - NO PAIN
PAINLEVEL: 0-NO PAIN

## 2024-04-01 ASSESSMENT — ENCOUNTER SYMPTOMS
LOSS OF SENSATION IN FEET: 0
OCCASIONAL FEELINGS OF UNSTEADINESS: 0
DEPRESSION: 0

## 2024-04-01 ASSESSMENT — PAIN - FUNCTIONAL ASSESSMENT: PAIN_FUNCTIONAL_ASSESSMENT: 0-10

## 2024-04-01 NOTE — PROGRESS NOTES
CHIEF COMPLAINT: establish care after CVA    HISTORY OF PRESENT ILLNESS:    PCP: Dr. Oscar Milner    Ms. Cabrera is a 77 yo F with hx as listed below who is new patient to me in Cardiology Clinic to establish care after recent hospitalization for CVA in 1/2024 at [facility]. Accompanied by daughter Jeimy Marie (also my patient) who helps provide history as patient has difficulty with speech aphasia and apraxia. She is participating in speech therapy. Prior to CVA, patient was smoking and not compliant with BP meds. BP was high in the ER. While hospitalized for CVA, no arrhythmias noted on telemetry but she was discharged with a loop recorder. Found to have L MCA stroke. Transmissions thus far are without afib. ECG today shows NSR with anterior twi. She feels well and denies CP, SOB, dizziness, LH, LE edema, or palpitations. Lives independently at the [place].      PAST MEDICAL/SURGICAL HISTORY:  -HTN  -DLD  -IFG  -former smoker  -L MCA CVA (1/2024)  -glaucoma and macular degeneration  -hysterectomy  -knee replacement  -R inguinal hernia surgery (2022)    PRIOR CARDIAC TESTING:  -TTE (1/2024): EF 70-75%, increased LV mass, moderately increased LV septal thickness, impaired relaxation, moderate MAC, severe dilation of left atrium, elevated LA and LV EDP  -Nuclear stress test (2019): negative  -TTE (2019): EF 70-75%, LV septal wall thickness is moderately increased, impaired relaxation, mild LAE, mild MR    ALLERGIES: NKDA     Current Outpatient Medications:     aspirin 81 mg EC tablet, Take 1 tablet (81 mg) by mouth once daily. Do not start before January 19, 2024., Disp: 90 tablet, Rfl: 3    atorvastatin (Lipitor) 40 mg tablet, Take 1 tablet (40 mg) by mouth once daily at bedtime., Disp: 90 tablet, Rfl: 3    carvedilol (Coreg) 12.5 mg tablet, Take 1 tablet (12.5 mg) by mouth 2 times a day., Disp: 90 tablet, Rfl: 1    coenzyme Q-10 100 mg capsule, Take by mouth., Disp: , Rfl:     hydrALAZINE (Apresoline) 25 mg tablet,  "Take 1 tablet (25 mg) by mouth 2 times a day., Disp: 90 tablet, Rfl: 1    latanoprost (Xalatan) 0.005 % ophthalmic solution, INSTILL 1 DROP INTO EACH AFFECTED EYE EVERYDAY, Disp: 7.5 mL, Rfl: 3    timolol (Timoptic) 0.5 % ophthalmic solution, Administer 1 drop into both eyes once daily in the morning. Take before meals., Disp: 30 mL, Rfl: 3    venlafaxine XR (Effexor-XR) 75 mg 24 hr capsule, Take 1 capsule (75 mg) by mouth once daily., Disp: 90 capsule, Rfl: 1    losartan (Cozaar) 100 mg tablet, Take 0.5 tablets (50 mg) by mouth once daily., Disp: 90 tablet, Rfl: 3    /73 (BP Location: Left arm, Patient Position: Sitting)   Pulse 66   Ht 1.473 m (4' 10\")   Wt 62.1 kg (137 lb)   LMP  (LMP Unknown)   SpO2 95%   BMI 28.63 kg/m²     PHYSICAL EXAM:  GENERAL: NAD  HEENT: no JVD  CV: RRR without m/r/g  PULM: CTAB  EXT: non-edematous bilateral lower extremities     LABS  WBC (x10*3/uL)   Date Value   01/17/2024 8.5     Hemoglobin (g/dL)   Date Value   01/17/2024 13.3     Platelets (x10*3/uL)   Date Value   01/17/2024 195     Sodium (mmol/L)   Date Value   01/17/2024 141     Potassium (mmol/L)   Date Value   01/17/2024 4.0     Chloride (mmol/L)   Date Value   01/17/2024 109 (H)     Bicarbonate (mmol/L)   Date Value   01/17/2024 22     Urea Nitrogen (mg/dL)   Date Value   01/17/2024 15     Creatinine (mg/dL)   Date Value   01/17/2024 0.99     Calcium (mg/dL)   Date Value   01/17/2024 9.1     Total Protein (g/dL)   Date Value   01/15/2024 5.9 (L)     Bilirubin, Total (mg/dL)   Date Value   01/15/2024 0.4     Alkaline Phosphatase (U/L)   Date Value   01/15/2024 84     ALT (U/L)   Date Value   01/15/2024 10     AST (U/L)   Date Value   01/15/2024 15     Glucose (mg/dL)   Date Value   01/17/2024 107 (H)     Cholesterol (mg/dL)   Date Value   12/07/2023 186   05/12/2023 182   12/06/2022 190   11/13/2021 166     LDL Calculated (mg/dL)   Date Value   12/07/2023 97     HDL-Cholesterol (mg/dL)   Date Value   12/07/2023 " 66.5     HDL (mg/dL)   Date Value   05/12/2023 57.1   12/06/2022 64.3   11/13/2021 50.0     Triglycerides (mg/dL)   Date Value   12/07/2023 111   05/12/2023 103   12/06/2022 106   11/13/2021 122     Hemoglobin A1C (%)   Date Value   01/15/2024 5.7 (H)     Lab Results   Component Value Date    LDLF 104 (H) 05/12/2023     ASSESSMENT/PLAN: 77 yo F with hx of HTN, DLD, IFG, and recent L MCA CVA with residual speech aphasia/apraxia here to establish care. Etiology of the CVA thought to be cardioembolic given severe LAE but no afib found yet on ILR. Follows with [facility]. BP controlled on coreg 12.5 mg BID, hydralazine 25 mg BID, and losartan 50 mg daily. On ASA 81 mg daily and atorvastatin 40 mg daily. Repeat FLP and HgbA1c in May to ensure at goal for secondary prevention. RTC 6 months.

## 2024-04-01 NOTE — PROGRESS NOTES
"Speech-Language Pathology    SLP Adult Outpatient Speech-Language Pathology Treatment     Patient Name: Roslyn Cabrera \"Ivette"  MRN: [id number]  Today's Date: 2024  Time Calculation  Start Time: 1300  Stop Time: 1345  Time Calculation (min): 45 min    Current Problem:   1. 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 Aphasia as late effect of cerebrovascular accident            SLP Assessment:  SLP TX Intervention Outcome: Making Progress Towards Goals  Prognosis: Good.    The patient was actively engaged in therapeutic tasks targeting expressive language and apraxia reduction skills. She demonstrated improvement providing accurate responses to open ended questions and repeating conversational phrases. She demonstrated difficulty reading single words aloud. The patient may continue to benefit from skilled speech therapy services in order to improve her ability to communicate with familiar and unfamiliar individuals in the home, social and clinical settings.    General Visit Information:  Certification Period Start Date: 24  Certification Period End Date: 24     Total Number of Visits : 9    Plan:  Plan  Inpatient/Swing Bed or Outpatient: Outpatient  SLP Frequency: 2x per week  Duration: 12 weeks  Discussed POC: Patient, Caregiver/family  Discussed Risks/Benefits: Yes  Patient/Caregiver Agreeable: Yes    Goals: Established 24  Short-term goals:  Patient will complete naming activities (including object naming, generative naming, verb naming, synonyms/antonyms, etc.) with 80% accuracy given minimal-moderate cues   Status: Goalstatus: Ongoing  Progress: Goal not addressed this date     Previous:  -Verb namin% improved to 92% with min cues or extended time  -naming CVC/CV/VC words: >90% accuracy as observed during the motor speech task below  -Generative naming: 3 items; 100% of opportunities   -Confrontational naming - 68% for initial attempt, increased to 80% when allowed " "time for self corrections    Patient will complete targeted expressive language tasks (including stating personal information, phrase/sentence completion, open-ended/WH questions) with 80% accuracy given minimal-moderate cues.  Status: Goalstatus: Ongoing  Progress: Progressing as follows   -Open ended situational questions: 92% indep; avg of 7 word responses   -Reading monosyllabic words aloud    Previous:  -Open ended/WH questions (\"why\"): 85% increased to 95% with self corrections or cues to slow  -Sentence completion (simple): 76% increased to 88%   -Complete the sentence (verbally presented), single word responses: 83% increased to 97% with indep self corrections    Patient will repeat bisyllabic words x3 repetitions with 80% accuracy.  Status: Goalstatus: Ongoing, Progressing toward goal  Progress: Goal not addressed this date   -Repeating simple conversational phrases: 80% improved to 90% with self corrections/cues.   -Repeating simple conversational phrases x2: 60% improved to 80% with multiple attempts    Previous:  -Repeating simple conversational phrases: 70% improved to 80% with self corrections/cues.  -CVC/CV/VC word production - 95% indep with allowance for self corrections   -Bisyllabic word rep x3 (scores oldest to newest)  - 80%, 100%, 80%  -Trisyllabic words x3 (scores oldest to newest)  - 20%, 80%, 80%     Patient will complete targeted receptive language tasks (including complex yes/no questions, following verbal/written commands, sentence comprehension) with 80% accuracy given minimal-moderate cues.  Status: Goalstatus: Ongoing  Progress: Goal not addressed this date     Previous:  -Sentence level comp, FO4 choices: 90% (18/20)  -Reading comp (word level): 96%   -Single digit recognition (FO6): 90% (9/10)   -Dbl digit recognition (FO6): 100% (25/25)  -Reading words aloud (bisyllabic): 20% increased to 60% when given phonemic cues  -Phrase level comprehension: choosing correct phrase from FO4 to " match picture - 92%    Long-term goals:  Patient will improve functional global communication skills in order to promote safety and independence with ADLs and communicate wants/needs/preferences/opinions.     Subjective:  Patient reports no new or worsening symptoms. The daughter reports the patient sent a link to an article and took a picture of an empty cheese container as a way to communicate she was out.    Pain:  Pain Assessment  Pain Assessment: 0-10  Pain Score: 0 - No pain     Outpatient Education:  Adult Outpatient Education  Individual(s) Educated: Patient  Written Education : Yes  Verbal Education : Yes  Risk and Benefits Discussed with Patient/Caregiver/Other: yes  Patient/Caregiver Demonstrated Understanding: yes  Patient Response to Education: Patient/Caregiver Verbalized Understanding of Information

## 2024-04-03 LAB
ATRIAL RATE: 66 BPM
P AXIS: 40 DEGREES
P OFFSET: 197 MS
P ONSET: 160 MS
PR INTERVAL: 136 MS
Q ONSET: 228 MS
QRS COUNT: 11 BEATS
QRS DURATION: 74 MS
QT INTERVAL: 402 MS
QTC CALCULATION(BAZETT): 421 MS
QTC FREDERICIA: 415 MS
R AXIS: 7 DEGREES
T AXIS: 30 DEGREES
T OFFSET: 429 MS
VENTRICULAR RATE: 66 BPM

## 2024-04-05 ENCOUNTER — TREATMENT (OUTPATIENT)
Dept: SPEECH THERAPY | Facility: CLINIC | Age: 79
End: 2024-04-05
Payer: MEDICARE

## 2024-04-05 DIAGNOSIS — I69.320 APHASIA AS LATE EFFECT OF CEREBROVASCULAR ACCIDENT: Primary | ICD-10-CM

## 2024-04-05 DIAGNOSIS — I69.390 APRAXIA AS LATE EFFECT OF CEREBROVASCULAR ACCIDENT (CVA): ICD-10-CM

## 2024-04-05 PROCEDURE — 92507 TX SP LANG VOICE COMM INDIV: CPT | Mod: GN | Performed by: STUDENT IN AN ORGANIZED HEALTH CARE EDUCATION/TRAINING PROGRAM

## 2024-04-05 ASSESSMENT — PAIN - FUNCTIONAL ASSESSMENT: PAIN_FUNCTIONAL_ASSESSMENT: 0-10

## 2024-04-05 ASSESSMENT — PAIN SCALES - GENERAL: PAINLEVEL_OUTOF10: 0 - NO PAIN

## 2024-04-05 NOTE — PROGRESS NOTES
"Speech-Language Pathology    SLP Adult Outpatient Speech-Language Pathology Treatment     Patient Name: Roslyn Cabrera \"Pam\"  MRN: [id number]  Today's Date: 2024  Time Calculation  Start Time: 1515  Stop Time: 1600  Time Calculation (min): 45 min    Current Problem:   1. Aphasia as late effect of cerebrovascular accident        2. 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    The patient was actively engaged in therapeutic tasks targeting receptive language and apraxia reduction skills. She demonstrated improvement reading monosyllabic words aloud but when presented with monosyllabic \"nonsense\" words using known phonemes in the initial position, she had quite a bit of difficulty producing the sounds. The patient may continue to benefit from skilled speech therapy services in order to improve her ability to communicate with familiar and unfamiliar individuals in the home, social and clinical settings.    General Visit Information:  Certification Period Start Date: 24  Certification Period End Date: 24     Total Number of Visits : 10    Plan:  Plan  Inpatient/Swing Bed or Outpatient: Outpatient  Treatment/Interventions: Communication functioning  SLP Plan: Skilled SLP  SLP Frequency: 2x per week  Duration: 12 weeks  Discussed POC: Patient, Caregiver/family  Discussed Risks/Benefits: Yes  Patient/Caregiver Agreeable: Yes    Goals: Established 24  Short-term goals:  Patient will complete naming activities (including object naming, generative naming, verb naming, synonyms/antonyms, etc.) with 80% accuracy given minimal-moderate cues   Status: Goalstatus: Ongoing  Progress: Goal not addressed this date     Previous:  -Verb namin% improved to 92% with min cues or extended time  -naming CVC/CV/VC words: >90% accuracy as observed during the motor speech task below  -Generative " "naming: 3 items; 100% of opportunities   -Confrontational naming - 68% for initial attempt, increased to 80% when allowed time for self corrections    Patient will complete targeted expressive language tasks (including stating personal information, phrase/sentence completion, open-ended/WH questions) with 80% accuracy given minimal-moderate cues.  Status: Goalstatus: Ongoing  Progress: Goal not addressed this date     Previous:  -Open ended situational questions: 92% indep; avg of 7 word responses  -Open ended/WH questions (\"why\"): 85% increased to 95% with self corrections or cues to slow  -Sentence completion (simple): 76% increased to 88%   -Complete the sentence (verbally presented), single word responses: 83% increased to 97% with indep self corrections    Patient will repeat bisyllabic words x3 repetitions with 80% accuracy.  Status: Goalstatus: Ongoing, Progressing toward goal  Progress: Goal not addressed this date     Previous:  -Repeating simple conversational phrases: 80% improved to 90% with self corrections/cues.   -Repeating simple conversational phrases x2: 60% improved to 80% with multiple attempts  -Repeating simple conversational phrases: 70% improved to 80% with self corrections/cues.  -CVC/CV/VC word production - 95% indep with allowance for self corrections   -Bisyllabic word rep x3 (scores oldest to newest)  - 80%, 100%, 80%  -Trisyllabic words x3 (scores oldest to newest)  - 20%, 80%, 80%     Patient will complete targeted receptive language tasks (including complex yes/no questions, following verbal/written commands, sentence comprehension) with 80% accuracy given minimal-moderate cues.  Status: Goalstatus: Ongoing  Progress: Progressing as follows   -Reading \"nonsense\" words with the same initial phonemes - task was too difficult therefore it was modified with real words  -Reading aloud monosyllabic words, with self corrections:   /p/ - 93%  /b/ - 94%  /t/ - 95%  /d/- " 100%    Previous:  -Sentence level comp, FO4 choices: 90% (18/20)  -Reading comp (word level): 96%   -Single digit recognition (FO6): 90% (9/10)   -Dbl digit recognition (FO6): 100% (25/25)  -Reading words aloud (bisyllabic): 20% increased to 60% when given phonemic cues  -Phrase level comprehension: choosing correct phrase from FO4 to match picture - 92%    Long-term goals:  Patient will improve functional global communication skills in order to promote safety and independence with ADLs and communicate wants/needs/preferences/opinions.     Subjective:  Patient reports no new or worsening symptoms. The daughter reports the patient sent a link to an article and took a picture of an empty cheese container as a way to communicate she was out.    Pain:  Pain Assessment  Pain Assessment: 0-10  Pain Score: 0 - No pain     Outpatient Education:  Adult Outpatient Education  Individual(s) Educated: Patient  Written Education : Yes  Verbal Education : Yes  Risk and Benefits Discussed with Patient/Caregiver/Other: yes  Patient/Caregiver Demonstrated Understanding: yes  Patient Response to Education: Patient/Caregiver Verbalized Understanding of Information

## 2024-04-09 ENCOUNTER — TREATMENT (OUTPATIENT)
Dept: SPEECH THERAPY | Facility: CLINIC | Age: 79
End: 2024-04-09
Payer: MEDICARE

## 2024-04-09 DIAGNOSIS — I69.320 APHASIA AS LATE EFFECT OF CEREBROVASCULAR ACCIDENT: ICD-10-CM

## 2024-04-09 DIAGNOSIS — I69.390 APRAXIA AS LATE EFFECT OF CEREBROVASCULAR ACCIDENT (CVA): Primary | ICD-10-CM

## 2024-04-09 PROCEDURE — 92507 TX SP LANG VOICE COMM INDIV: CPT | Mod: GN | Performed by: STUDENT IN AN ORGANIZED HEALTH CARE EDUCATION/TRAINING PROGRAM

## 2024-04-09 ASSESSMENT — PAIN SCALES - GENERAL: PAINLEVEL_OUTOF10: 0 - NO PAIN

## 2024-04-09 ASSESSMENT — PAIN - FUNCTIONAL ASSESSMENT: PAIN_FUNCTIONAL_ASSESSMENT: 0-10

## 2024-04-09 NOTE — PROGRESS NOTES
"Speech-Language Pathology    SLP Adult Outpatient Speech-Language Pathology Treatment     Patient Name: Roslyn Cabrera \"Ivette"  MRN: [id number]  Today's Date: 2024  Time Calculation  Start Time: 1430  Stop Time: 1515  Time Calculation (min): 45 min    Current Problem:   1. 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 Aphasia as late effect of cerebrovascular accident            SLP Assessment:  SLP TX Intervention Outcome: Making Progress Towards Goals  Prognosis: Good.    The patient was actively engaged in therapeutic tasks targeting word finding and expressive language. She demonstrated difficulty during a confrontational naming task and while she is capable of generating grammatically correct sentences, she had trouble doing so when asked to generate novel sentences using a given word. The patient reports she can read texts fine and she is trying to respond back but it takes a very long time and she is sure there are a lot of errors.     General Visit Information:  Certification Period Start Date: 24  Certification Period End Date: 24  Number of Authorized Treatments : 20  Total Number of Visits : 11    Plan:  Plan  Inpatient/Swing Bed or Outpatient: Outpatient  Treatment/Interventions: Communication functioning  SLP Plan: Skilled SLP  SLP Frequency: 2x per week  Duration: 12 weeks  Discussed POC: Patient, Caregiver/family  Discussed Risks/Benefits: Yes  Patient/Caregiver Agreeable: Yes    Goals: Established 24  Short-term goals:  Patient will complete naming activities (including object naming, generative naming, verb naming, synonyms/antonyms, etc.) with 80% accuracy given minimal-moderate cues   Status: Goalstatus: Ongoing  Progress: Progressing as follows   -Confrontational namin% acc, errors largely due to phonemic paraphasias vs anomia; improved to >90% with self corrections/cues    Previous:  -Verb namin% improved to 92% with min cues or extended " "time  -naming CVC/CV/VC words: >90% accuracy as observed during the motor speech task below  -Generative naming: 3 items; 100% of opportunities   -Confrontational naming - 68% for initial attempt, increased to 80% when allowed time for self corrections    Patient will complete targeted expressive language tasks (including stating personal information, phrase/sentence completion, open-ended/WH questions) with 80% accuracy given minimal-moderate cues.  Status: Goalstatus: Ongoing  Progress: Progressing as follows   -Sentence generation using a given word: 50% accuracy to generate sentences because of word finding impairment but when produced, the patient could verbalize grammatically and syntactically correct sentences.    Previous:  -Open ended situational questions: 92% indep; avg of 7 word responses  -Open ended/WH questions (\"why\"): 85% increased to 95% with self corrections or cues to slow  -Sentence completion (simple): 76% increased to 88%   -Complete the sentence (verbally presented), single word responses: 83% increased to 97% with indep self corrections    Patient will repeat bisyllabic words x3 repetitions with 80% accuracy.  Status: Goalstatus: Ongoing, Progressing toward goal  Progress: Goal not addressed this date     Previous:  -Repeating simple conversational phrases: 80% improved to 90% with self corrections/cues.   -Repeating simple conversational phrases x2: 60% improved to 80% with multiple attempts  -Repeating simple conversational phrases: 70% improved to 80% with self corrections/cues.  -CVC/CV/VC word production - 95% indep with allowance for self corrections   -Bisyllabic word rep x3 (scores oldest to newest)  - 80%, 100%, 80%  -Trisyllabic words x3 (scores oldest to newest)  - 20%, 80%, 80%     Patient will complete targeted receptive language tasks (including complex yes/no questions, following verbal/written commands, sentence comprehension) with 80% accuracy given minimal-moderate " "cues.  Status: Goalstatus: Ongoing  Progress: Progressing as follows   -Reading \"nonsense\" words with the same initial phonemes - task was too difficult therefore it was modified with real words  -Reading aloud monosyllabic words, with self corrections:   /p/ - 93%  /b/ - 94%  /t/ - 95%  /d/- 100%    Previous:  -Sentence level comp, FO4 choices: 90% (18/20)  -Reading comp (word level): 96%   -Single digit recognition (FO6): 90% (9/10)   -Dbl digit recognition (FO6): 100% (25/25)  -Reading words aloud (bisyllabic): 20% increased to 60% when given phonemic cues  -Phrase level comprehension: choosing correct phrase from FO4 to match picture - 92%    Long-term goals:  Patient will improve functional global communication skills in order to promote safety and independence with ADLs and communicate wants/needs/preferences/opinions.     Subjective:  Patient reports no new or worsening symptoms. The daughter reports the patient sent a link to an article and took a picture of an empty cheese container as a way to communicate she was out.    Pain:  Pain Assessment  Pain Assessment: 0-10  Pain Score: 0 - No pain     Outpatient Education:  Adult Outpatient Education  Individual(s) Educated: Patient  Written Education : Yes  Verbal Education : Yes  Risk and Benefits Discussed with Patient/Caregiver/Other: yes  Patient/Caregiver Demonstrated Understanding: yes  Patient Response to Education: Patient/Caregiver Verbalized Understanding of Information       "

## 2024-04-11 ENCOUNTER — TREATMENT (OUTPATIENT)
Dept: SPEECH THERAPY | Facility: CLINIC | Age: 79
End: 2024-04-11
Payer: MEDICARE

## 2024-04-11 DIAGNOSIS — I69.320 APHASIA AS LATE EFFECT OF CEREBROVASCULAR ACCIDENT: ICD-10-CM

## 2024-04-11 DIAGNOSIS — I69.390 APRAXIA AS LATE EFFECT OF CEREBROVASCULAR ACCIDENT (CVA): Primary | ICD-10-CM

## 2024-04-11 PROCEDURE — 92507 TX SP LANG VOICE COMM INDIV: CPT | Mod: GN | Performed by: STUDENT IN AN ORGANIZED HEALTH CARE EDUCATION/TRAINING PROGRAM

## 2024-04-11 ASSESSMENT — PAIN - FUNCTIONAL ASSESSMENT: PAIN_FUNCTIONAL_ASSESSMENT: 0-10

## 2024-04-11 ASSESSMENT — PAIN SCALES - GENERAL: PAINLEVEL_OUTOF10: 0 - NO PAIN

## 2024-04-11 NOTE — PROGRESS NOTES
"Speech-Language Pathology    SLP Adult Outpatient Speech-Language Pathology Treatment     Patient Name: Roslyn Cabrera \"Pam\"  MRN: [id number]  Today's Date: 4/11/2024  Time Calculation  Start Time: 1118  Stop Time: 1205  Time Calculation (min): 47 min       Current Problem:   1. 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 Aphasia as late effect of cerebrovascular accident          SLP Assessment:  SLP TX Intervention Outcome: Making Progress Towards Goals  Prognosis: Good  The patient was actively engaged in therapeutic tasks targeting word finding and motor speech skills. She demonstrated improvement with word finding . She demonstrated difficulty during a repetition task. A large part of the session was dedicated to caregiver education including instruction for the [employer] reese \"Apraxia\", encouraging family and use Facetime vs regular phone calls when appropriate and cueing strategies. The patient may continue to benefit from skilled speech therapy services in order to improve the effectiveness and efficiency of her communication in the home, social and clinical settings.    Interventions:  Motor Speech  Motor Speech Intervention : Compensatory Speech Strategies, Word Repetitions      Language Expression  Language Expression Interventions: Confrontation Naming         Subjective:  Patient reports no new or worsening symptoms. Daughter reports the patient is conversing more and she notices use of compensatory strategies (shorter words). She added the patient is self advocating and in general initiating more with communication and tasks at home.    General Visit Information:  Certification Period Start Date: 02/26/24  Certification Period End Date: 05/26/24  Number of Authorized Treatments : 20  Total Number of Visits : 12    Plan:  Plan  Inpatient/Swing Bed or Outpatient: Outpatient  Treatment/Interventions: Patient/family education, Expressive Language  SLP Plan: Skilled SLP  SLP " "Frequency: 2x per week  Duration: 12 weeks  Discussed POC: Patient, Caregiver/family  Discussed Risks/Benefits: Yes  Patient/Caregiver Agreeable: Yes    Objective Data & Progress:  Goals: Established 24  Short-term goals:  Patient will complete naming activities (including object naming, generative naming, verb naming, synonyms/antonyms, etc.) with 80% accuracy given minimal-moderate cues   Status: Goalstatus: Ongoing  Progress: Progressing as follows   -Confrontational namin% improved to 100% with semantic cue  Previous:  -Confrontational namin% acc, errors largely due to phonemic paraphasias vs anomia; improved to >90% with self corrections/cues  -Verb namin% improved to 92% with min cues or extended time  -naming CVC/CV/VC words: >90% accuracy as observed during the motor speech task below  -Generative naming: 3 items; 100% of opportunities   -Confrontational naming - 68% for initial attempt, increased to 80% when allowed time for self corrections    Patient will complete targeted expressive language tasks (including stating personal information, phrase/sentence completion, open-ended/WH questions) with 80% accuracy given minimal-moderate cues.  Status: Goalstatus: Ongoing, Progressing toward goal  Progress: Goal not addressed this date     Previous:  -Sentence generation using a given word: 50% accuracy to generate sentences because of word finding impairment but when produced, the patient could verbalize grammatically and syntactically correct sentences.  -Open ended situational questions: 92% indep; avg of 7 word responses  -Open ended/WH questions (\"why\"): 85% increased to 95% with self corrections or cues to slow  -Sentence completion (simple): 76% increased to 88%   -Complete the sentence (verbally presented), single word responses: 83% increased to 97% with indep self corrections    Patient will repeat bisyllabic words x3 repetitions with 80% accuracy.  Status: Goalstatus: " "Ongoing  Progress: Progressing as follows   -Trisyllabic word repetition: 40% for initial attempt; increased to 73% with additional time and self corrections and visual cues (modeling).    Previous:  -Repeating simple conversational phrases: 80% improved to 90% with self corrections/cues.   -Repeating simple conversational phrases x2: 60% improved to 80% with multiple attempts  -Repeating simple conversational phrases: 70% improved to 80% with self corrections/cues.  -CVC/CV/VC word production - 95% indep with allowance for self corrections   -Bisyllabic word rep x3 (scores oldest to newest)  - 80%, 100%, 80%  -Trisyllabic words x3 (scores oldest to newest)  - 20%, 80%, 80%     Patient will complete targeted receptive language tasks (including complex yes/no questions, following verbal/written commands, sentence comprehension) with 80% accuracy given minimal-moderate cues.  Status: Goalstatus: Ongoing  Progress: Goal not addressed this date     Previous:  -Reading \"nonsense\" words with the same initial phonemes - task was too difficult therefore it was modified with real words  -Reading aloud monosyllabic words, with self corrections:   /p/ - 93%  /b/ - 94%  /t/ - 95%  /d/- 100%  Pain:  Pain Assessment  Pain Assessment: 0-10  Pain Score: 0 - No pain     Outpatient Education:  Adult Outpatient Education  Individual(s) Educated: Patient, Child  Written Education : Yes  Verbal Education : Yes  Risk and Benefits Discussed with Patient/Caregiver/Other: yes  Patient/Caregiver Demonstrated Understanding: yes  Patient Response to Education: Patient/Caregiver Verbalized Understanding of Information    "

## 2024-04-12 ENCOUNTER — HOSPITAL ENCOUNTER (OUTPATIENT)
Dept: CARDIOLOGY | Facility: CLINIC | Age: 79
Discharge: HOME | End: 2024-04-12
Payer: MEDICARE

## 2024-04-12 DIAGNOSIS — I63.9 CRYPTOGENIC STROKE (MULTI): ICD-10-CM

## 2024-04-12 PROCEDURE — 93298 REM INTERROG DEV EVAL SCRMS: CPT

## 2024-04-15 ENCOUNTER — OFFICE VISIT (OUTPATIENT)
Dept: OPHTHALMOLOGY | Facility: CLINIC | Age: 79
End: 2024-04-15
Payer: MEDICARE

## 2024-04-15 DIAGNOSIS — E08.37X3: ICD-10-CM

## 2024-04-15 DIAGNOSIS — H53.8 BLURRY VISION: ICD-10-CM

## 2024-04-15 DIAGNOSIS — H35.3191 EARLY DRY STAGE NONEXUDATIVE AGE-RELATED MACULAR DEGENERATION, UNSPECIFIED LATERALITY: Primary | ICD-10-CM

## 2024-04-15 DIAGNOSIS — H35.3132 INTERMEDIATE STAGE NONEXUDATIVE AGE-RELATED MACULAR DEGENERATION OF BOTH EYES: ICD-10-CM

## 2024-04-15 DIAGNOSIS — H47.20 BILATERAL OPTIC NERVE ATROPHY: ICD-10-CM

## 2024-04-15 PROCEDURE — 92134 CPTRZ OPH DX IMG PST SGM RTA: CPT | Mod: BILATERAL PROCEDURE | Performed by: OPHTHALMOLOGY

## 2024-04-15 PROCEDURE — 99214 OFFICE O/P EST MOD 30 MIN: CPT | Performed by: OPHTHALMOLOGY

## 2024-04-15 ASSESSMENT — CONF VISUAL FIELD
OD_SUPERIOR_NASAL_RESTRICTION: 0
OD_INFERIOR_TEMPORAL_RESTRICTION: 0
OD_NORMAL: 1
OS_SUPERIOR_TEMPORAL_RESTRICTION: 0
OS_SUPERIOR_NASAL_RESTRICTION: 0
OD_INFERIOR_NASAL_RESTRICTION: 0
OS_INFERIOR_NASAL_RESTRICTION: 0
OD_SUPERIOR_TEMPORAL_RESTRICTION: 0
OS_INFERIOR_TEMPORAL_RESTRICTION: 0
OS_NORMAL: 1

## 2024-04-15 ASSESSMENT — ENCOUNTER SYMPTOMS
CARDIOVASCULAR NEGATIVE: 1
CONSTITUTIONAL NEGATIVE: 0
GASTROINTESTINAL NEGATIVE: 0
HEMATOLOGIC/LYMPHATIC NEGATIVE: 0
MUSCULOSKELETAL NEGATIVE: 0
RESPIRATORY NEGATIVE: 0
NEUROLOGICAL NEGATIVE: 1
EYES NEGATIVE: 0
PSYCHIATRIC NEGATIVE: 0
ALLERGIC/IMMUNOLOGIC NEGATIVE: 0
ENDOCRINE NEGATIVE: 0

## 2024-04-15 ASSESSMENT — EXTERNAL EXAM - LEFT EYE: OS_EXAM: NORMAL

## 2024-04-15 ASSESSMENT — PACHYMETRY
OD_CT(UM): 556
OS_CT(UM): 564

## 2024-04-15 ASSESSMENT — VISUAL ACUITY
OD_CC: 20/25
METHOD: SNELLEN - LINEAR
OS_CC: 20/25

## 2024-04-15 ASSESSMENT — EXTERNAL EXAM - RIGHT EYE: OD_EXAM: NORMAL

## 2024-04-15 ASSESSMENT — TONOMETRY
OS_IOP_MMHG: 16
OD_IOP_MMHG: 16
IOP_METHOD: GOLDMANN APPLANATION

## 2024-04-15 ASSESSMENT — CUP TO DISC RATIO
OD_RATIO: .9
OS_RATIO: .95

## 2024-04-15 ASSESSMENT — SLIT LAMP EXAM - LIDS
COMMENTS: GOOD POSITION, 2+ MGD
COMMENTS: GOOD POSITION, 2+ MGD

## 2024-04-15 NOTE — PROGRESS NOTES
Assessment/Plan   Diagnoses and all orders for this visit:  Early dry stage nonexudative age-related macular degeneration, unspecified laterality  -     OCT, Retina - OU - Both Eyes  Diabetes mellitus due to underlying condition with diabetic macular edema of both eyes resolved after treatment, without long-term current use of insulin (Multi)  -     OCT, Retina - OU - Both Eyes  Bilateral optic nerve atrophy  Blurry vision  Intermediate stage nonexudative age-related macular degeneration of both eyes      Impression    1 H35.3132 Intermediate stage nonexudative age-related macular degeneration of both eyes-Stable  2 H31.112 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  3 H31.111 Age-related choroidal atrophy of right eye-Stable  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-Dslwkh  7 H40.1130 Primary open angle glaucoma (poag) of both eyes-Stable    DIAGNOSTIC PROCEDURE DONE    OCT DONE OD/OS            REASON FOR TEST: will help address and tailor  therapy by detecting subclinical CME SRF     Hi quality OCT  scans obtained  signal good    OCT OD - Normal Foveal Contour, No Edema, IS/OS Junction Normal  OCT OS - Normal Foveal Contour, No Edema, IS/OS Junction Normal    additional commnents:        Discussion    would reccommend exercise more  no other intervention    this has improved her cognition and hallucinations    She has myopic degeneration more than AMD    Hi quality OCT  scans obtained  signal good    OCT OD - Normal Foveal Contour, No Edema, IS/OS Junction Normal drusenoid lesion large  OCT OS - Normal Foveal Contour, No Edema, IS/OS Junction Normal    additional commnents: created by:Miller Pickens     6m

## 2024-04-16 ENCOUNTER — TREATMENT (OUTPATIENT)
Dept: SPEECH THERAPY | Facility: CLINIC | Age: 79
End: 2024-04-16
Payer: MEDICARE

## 2024-04-16 DIAGNOSIS — I69.390 APRAXIA AS LATE EFFECT OF CEREBROVASCULAR ACCIDENT (CVA): Primary | ICD-10-CM

## 2024-04-16 DIAGNOSIS — I69.320 APHASIA AS LATE EFFECT OF CEREBROVASCULAR ACCIDENT: ICD-10-CM

## 2024-04-16 PROCEDURE — 92507 TX SP LANG VOICE COMM INDIV: CPT | Mod: GN | Performed by: STUDENT IN AN ORGANIZED HEALTH CARE EDUCATION/TRAINING PROGRAM

## 2024-04-16 ASSESSMENT — PAIN SCALES - GENERAL: PAINLEVEL_OUTOF10: 0 - NO PAIN

## 2024-04-16 ASSESSMENT — PAIN - FUNCTIONAL ASSESSMENT: PAIN_FUNCTIONAL_ASSESSMENT: 0-10

## 2024-04-16 NOTE — PROGRESS NOTES
"Speech-Language Pathology    SLP Adult Outpatient Speech-Language Pathology Treatment     Patient Name: Roslyn Cabrera \"Pam\"  MRN: [id number]  Today's Date: 4/16/2024  Time Calculation  Start Time: 1450  Stop Time: 1535  Time Calculation (min): 45 min       Current Problem:   1. 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 Aphasia as late effect of cerebrovascular accident          SLP Assessment:  SLP TX Intervention Outcome: Making Progress Towards Goals  Prognosis: Good    Therapy this date included education on a novel spelling reese with which the patient can complete independent practice with writing skills. The patient is having difficulty managing a QWERTY keyboard due to her receptive aphasia which makes writing a challenge. The patient may benefit with this HEP in order to improve letter recognition, sound-letter relationships and identification of letters in alphabetical order. By doing so she may more easily utilize a QWERTY keyboard to foster expressive language skills such as emails and texts and support communication among known and unknown communication partners.    Interventions:         Language Expression  Language Expression Interventions: Writing         Subjective:  Patient reports no new or worsening symptoms. Daughter reports the patient is conversing more and she notices use of compensatory strategies (shorter words). She added the patient is self advocating and in general initiating more with communication and tasks at home.    General Visit Information:  Certification Period Start Date: 02/26/24  Certification Period End Date: 05/26/24  Number of Authorized Treatments : 20  Total Number of Visits : 13    Plan:  Plan  Inpatient/Swing Bed or Outpatient: Outpatient  Treatment/Interventions: Patient/family education  SLP Plan: Skilled SLP  SLP Frequency: 2x per week  Duration: 12 weeks  Discussed POC: Patient, Caregiver/family  Discussed Risks/Benefits: " Yes  Patient/Caregiver Agreeable: Yes    Objective Data & Progress:  Goals: Established 24  Short-term goals:  Patient will complete naming activities (including object naming, generative naming, verb naming, synonyms/antonyms, etc.) with 80% accuracy given minimal-moderate cues   Status: Goalstatus: Ongoing  Progress: Not addressed this date     Previous:  -Confrontational namin% improved to 100% with semantic cue  -Confrontational namin% acc, errors largely due to phonemic paraphasias vs anomia; improved to >90% with self corrections/cues  -Verb namin% improved to 92% with min cues or extended time  -naming CVC/CV/VC words: >90% accuracy as observed during the motor speech task below  -Generative naming: 3 items; 100% of opportunities   -Confrontational naming - 68% for initial attempt, increased to 80% when allowed time for self corrections    Patient will complete targeted expressive language tasks (including stating personal information, phrase/sentence completion, open-ended/WH questions) with 80% accuracy given minimal-moderate cues.  Status: Goalstatus: Ongoing, Progressing toward goal  Progress: Progressing as follows   -The patient does not have a specific writing goal at this time due to difficulty with letter recognition. On this date the patient's daughter purchased a writing reese by [employer] that can offer the patient opportunities for home practice with this skill. It was requested instruction be given on this reese to foster more independent expressive language skills. Therapy this date included extensive education on the reese including instruction for all 4 writing tasks (filling in the letter, copying, spelling what you see, spelling what you hear), setting difficulty levels, strategies to maximize the exercises using the reese's auditory feedback and how to read scores and recommendation for next session. Patient was able to independently navigate the reese but required frequent  "cues to slow her rate and look closely at the screens to stay on track of what the task required her to do. The patient's daughter did not participate in the education.    Previous:  -Sentence generation using a given word: 50% accuracy to generate sentences because of word finding impairment but when produced, the patient could verbalize grammatically and syntactically correct sentences.  -Open ended situational questions: 92% indep; avg of 7 word responses  -Open ended/WH questions (\"why\"): 85% increased to 95% with self corrections or cues to slow  -Sentence completion (simple): 76% increased to 88%   -Complete the sentence (verbally presented), single word responses: 83% increased to 97% with indep self corrections    Patient will repeat bisyllabic words x3 repetitions with 80% accuracy.  Status: Goalstatus: Ongoing  Progress: Goal not addressed this date     Previous:  -Trisyllabic word repetition: 40% for initial attempt; increased to 73% with additional time and self corrections and visual cues (modeling).  -Repeating simple conversational phrases: 80% improved to 90% with self corrections/cues.   -Repeating simple conversational phrases x2: 60% improved to 80% with multiple attempts  -Repeating simple conversational phrases: 70% improved to 80% with self corrections/cues.  -CVC/CV/VC word production - 95% indep with allowance for self corrections   -Bisyllabic word rep x3 (scores oldest to newest)  - 80%, 100%, 80%  -Trisyllabic words x3 (scores oldest to newest)  - 20%, 80%, 80%     Patient will complete targeted receptive language tasks (including complex yes/no questions, following verbal/written commands, sentence comprehension) with 80% accuracy given minimal-moderate cues.  Status: Goalstatus: Ongoing  Progress: Goal not addressed this date     Previous:  -Reading \"nonsense\" words with the same initial phonemes - task was too difficult therefore it was modified with real words  -Reading aloud " monosyllabic words, with self corrections:   /p/ - 93%  /b/ - 94%  /t/ - 95%  /d/- 100%  Pain:  Pain Assessment  Pain Assessment: 0-10  Pain Score: 0 - No pain     Outpatient Education:  Adult Outpatient Education  Individual(s) Educated: Patient, Child  Written Education : Yes  Verbal Education : Yes  Risk and Benefits Discussed with Patient/Caregiver/Other: yes  Patient/Caregiver Demonstrated Understanding: yes  Patient Response to Education: Patient/Caregiver Verbalized Understanding of Information

## 2024-04-19 ENCOUNTER — TREATMENT (OUTPATIENT)
Dept: SPEECH THERAPY | Facility: CLINIC | Age: 79
End: 2024-04-19
Payer: MEDICARE

## 2024-04-19 DIAGNOSIS — I69.320 APHASIA AS LATE EFFECT OF CEREBROVASCULAR ACCIDENT: ICD-10-CM

## 2024-04-19 DIAGNOSIS — I69.390 APRAXIA AS LATE EFFECT OF CEREBROVASCULAR ACCIDENT (CVA): Primary | ICD-10-CM

## 2024-04-19 PROCEDURE — 92507 TX SP LANG VOICE COMM INDIV: CPT | Mod: GN | Performed by: STUDENT IN AN ORGANIZED HEALTH CARE EDUCATION/TRAINING PROGRAM

## 2024-04-19 ASSESSMENT — PAIN SCALES - GENERAL: PAINLEVEL_OUTOF10: 0 - NO PAIN

## 2024-04-19 ASSESSMENT — PAIN - FUNCTIONAL ASSESSMENT: PAIN_FUNCTIONAL_ASSESSMENT: 0-10

## 2024-04-19 NOTE — PROGRESS NOTES
"Speech-Language Pathology    SLP Adult Outpatient Speech-Language Pathology Treatment     Patient Name: Roslyn Cabrera \"Pam\"  MRN: [id number]  Today's Date: 4/19/2024  Time Calculation  Start Time: 1519  Stop Time: 1604  Time Calculation (min): 45 min       Current Problem:   1. 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 Aphasia as late effect of cerebrovascular accident          SLP Assessment:  SLP TX Intervention Outcome: Making Progress Towards Goals  Prognosis: Good    The patient was actively engaged in therapeutic tasks targeting expressive language and motor speech skills. She demonstrated improvement with repetition of trisyllabic words. She demonstrated difficulty generating opposites.     Continue skilled speech therapy services to target functional communication skills. Currently targeting expressive language and motor speech goals with ongoing assessment for possible writing goals.     Interventions:  Motor Speech  Motor Speech Intervention : Word Repetitions      Language Expression  Language Expression Interventions:  (Antonyms)         Subjective:  Patient reports no new or worsening symptoms. Daughter reports the patient is conversing more and she notices use of compensatory strategies (shorter words). She added the patient is self advocating and in general initiating more with communication and tasks at home.    General Visit Information:  Certification Period Start Date: 02/26/24  Certification Period End Date: 05/26/24  Number of Authorized Treatments : 20  Total Number of Visits : 14    Plan:  Plan  Inpatient/Swing Bed or Outpatient: Outpatient  Treatment/Interventions: Patient/family education  SLP Plan: Skilled SLP  SLP Frequency: 2x per week  Duration: 12 weeks  Discussed POC: Patient, Caregiver/family    Objective Data & Progress:  Goals: Established 2/26/24  Short-term goals:  Patient will complete naming activities (including object naming, generative naming, verb " "naming, synonyms/antonyms, etc.) with 80% accuracy given minimal-moderate cues  Status: Goalstatus: Ongoing, Progressing toward goal  Progress: Progressing as follows   Providing opposites: 80%     Previous:  -Confrontational namin% improved to 100% with semantic cue  -Confrontational namin% acc, errors largely due to phonemic paraphasias vs anomia; improved to >90% with self corrections/cues  -Verb namin% improved to 92% with min cues or extended time  -naming CVC/CV/VC words: >90% accuracy as observed during the motor speech task below  -Generative naming: 3 items; 100% of opportunities   -Confrontational naming - 68% for initial attempt, increased to 80% when allowed time for self corrections    Patient will complete targeted expressive language tasks (including stating personal information, phrase/sentence completion, open-ended/WH questions) with 80% accuracy given minimal-moderate cues.  Status: Goalstatus: Ongoing, Progressing toward goal  Progress: Goal not addressed this date     Previous:  -Sentence generation using a given word: 50% accuracy to generate sentences because of word finding impairment but when produced, the patient could verbalize grammatically and syntactically correct sentences.  -Open ended situational questions: 92% indep; avg of 7 word responses  -Open ended/WH questions (\"why\"): 85% increased to 95% with self corrections or cues to slow  -Sentence completion (simple): 76% increased to 88%   -Complete the sentence (verbally presented), single word responses: 83% increased to 97% with indep self corrections    Patient will repeat bisyllabic words x3 repetitions with 80% accuracy.  Status: Goalstatus: Ongoing  Progress: Progressing as follows   -Trisyllabic word repetition: 95%    Previous:  -Trisyllabic word repetition: 40% for initial attempt; increased to 73% with additional time and self corrections and visual cues (modeling).  -Repeating simple conversational phrases: " "80% improved to 90% with self corrections/cues.   -Repeating simple conversational phrases x2: 60% improved to 80% with multiple attempts  -CVC/CV/VC word production - 95% indep with allowance for self corrections   -Bisyllabic word rep x3 (scores oldest to newest)  - 80%, 100%, 80%  -Trisyllabic words x3 (scores oldest to newest)  - 20%, 80%, 80%     Patient will complete targeted receptive language tasks (including complex yes/no questions, following verbal/written commands, sentence comprehension) with 80% accuracy given minimal-moderate cues.  Status: Goalstatus: Ongoing  Progress: Goal not addressed this date     Previous:  -Reading \"nonsense\" words with the same initial phonemes - task was too difficult therefore it was modified with real words  -Reading aloud monosyllabic words, with self corrections:   /p/ - 93%  /b/ - 94%  /t/ - 95%  /d/- 100%  Pain:  Pain Assessment  Pain Assessment: 0-10  Pain Score: 0 - No pain     Outpatient Education:  Adult Outpatient Education  Individual(s) Educated: Patient, Child  Written Education : Yes  Verbal Education : Yes  Risk and Benefits Discussed with Patient/Caregiver/Other: yes  Patient/Caregiver Demonstrated Understanding: yes  Patient Response to Education: Patient/Caregiver Verbalized Understanding of Information    "

## 2024-04-22 ENCOUNTER — TREATMENT (OUTPATIENT)
Dept: SPEECH THERAPY | Facility: CLINIC | Age: 79
End: 2024-04-22
Payer: MEDICARE

## 2024-04-22 DIAGNOSIS — I69.320 APHASIA AS LATE EFFECT OF CEREBROVASCULAR ACCIDENT: ICD-10-CM

## 2024-04-22 DIAGNOSIS — I69.390 APRAXIA AS LATE EFFECT OF CEREBROVASCULAR ACCIDENT (CVA): Primary | ICD-10-CM

## 2024-04-22 PROCEDURE — 92507 TX SP LANG VOICE COMM INDIV: CPT | Mod: GN | Performed by: STUDENT IN AN ORGANIZED HEALTH CARE EDUCATION/TRAINING PROGRAM

## 2024-04-22 ASSESSMENT — PAIN SCALES - GENERAL: PAINLEVEL_OUTOF10: 0 - NO PAIN

## 2024-04-22 ASSESSMENT — PAIN - FUNCTIONAL ASSESSMENT: PAIN_FUNCTIONAL_ASSESSMENT: 0-10

## 2024-04-23 NOTE — PROGRESS NOTES
"Speech-Language Pathology    SLP Adult Outpatient Speech-Language Pathology Treatment     Patient Name: Roslyn Cabrera \"Pam\"  MRN: [id number]  Today's Date: 4/23/2024  Time Calculation  Start Time: 1115  Stop Time: 1200  Time Calculation (min): 45 min       Current Problem:   1. 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 Aphasia as late effect of cerebrovascular accident          SLP Assessment:  SLP [place] Intervention Outcome: Making Progress Towards Goals  Prognosis: Good    The patient was actively engaged in therapeutic tasks targeting motor speech and reading skills. She demonstrated improvement reading at the sentence level and repeating rhyming sets. She demonstrated difficulty generating synonyms and antonyms and repetition of trisyllabic words whereas she did this same tasks in previous sessions with much greater accuracy. The patient may continue to benefit from skilled speech therapy services in order to improve her ability to communicate in the home, social and clinical settings.    Interventions:  Motor Speech  Motor Speech Intervention : Word Repetitions      Language Expression  Language Expression Comments: Synonym/Antonyms         Subjective:  Patient reports no new or worsening symptoms. She was able to purchase something on [employer] but admitted her card was on file and she didn't have to type much.    General Visit Information:  Certification Period Start Date: 02/26/24  Certification Period End Date: 05/26/24  Number of Authorized Treatments : 20  Total Number of Visits : 15    Plan:  Plan  Inpatient/Swing Bed or Outpatient: Outpatient  Treatment/Interventions: Patient/family education, Communication functioning  SLP Plan: Skilled SLP  SLP Frequency: 2x per week  Duration: 12 weeks  Discussed POC: Patient, Caregiver/family  Discussed Risks/Benefits: Yes  Patient/Caregiver Agreeable: Yes    Objective Data & Progress:  Goals: Established 2/26/24  Short-term goals:  Patient will " "complete naming activities (including object naming, generative naming, verb naming, synonyms/antonyms, etc.) with 80% accuracy given minimal-moderate cues  Status: Goalstatus: Ongoing, Progressing toward goal  Progress: Progressing as follows   -Providing synonyms/antonyms: 33%; patient had a lot of difficulty with this task this date     Previous:  -Providing antonyms: 80%  -Confrontational namin% improved to 100% with semantic cue  -Confrontational namin% acc, errors largely due to phonemic paraphasias vs anomia; improved to >90% with self corrections/cues  -Verb namin% improved to 92% with min cues or extended time  -naming CVC/CV/VC words: >90% accuracy as observed during the motor speech task below  -Generative naming: 3 items; 100% of opportunities   -Confrontational naming - 68% for initial attempt, increased to 80% when allowed time for self corrections    Patient will complete targeted expressive language tasks (including stating personal information, phrase/sentence completion, open-ended/WH questions) with 80% accuracy given minimal-moderate cues.  Status: Goalstatus: Ongoing, Progressing toward goal  Progress: Goal not addressed this date     Previous:  -Sentence generation using a given word: 50% accuracy to generate sentences because of word finding impairment but when produced, the patient could verbalize grammatically and syntactically correct sentences.  -Open ended situational questions: 92% indep; avg of 7 word responses  -Open ended/WH questions (\"why\"): 85% increased to 95% with self corrections or cues to slow  -Sentence completion (simple): 76% increased to 88%   -Complete the sentence (verbally presented), single word responses: 83% increased to 97% with indep self corrections    Patient will repeat bisyllabic words x3 repetitions with 80% accuracy.  Status: Goalstatus: Ongoing  Progress: Progressing as follows   -Trisyllabic word repetition: 85%   -Rhyming word sets: " "100%    Previous:  -Trisyllabic word repetition: 95%  -Trisyllabic word repetition: 73% with additional time and self corrections and visual cues (modeling).  -Repeating simple conversational phrases: 80% improved to 90% with self corrections/cues.   -Repeating simple conversational phrases x2: 60% improved to 80% with multiple attempts  -CVC/CV/VC word production - 95% indep with allowance for self corrections   -Bisyllabic word rep x3 (scores oldest to newest)  - 80%, 100%, 80%  -Trisyllabic words x3 (scores oldest to newest)  - 20%, 80%, 80%     Patient will complete targeted receptive language tasks (including complex yes/no questions, following verbal/written commands, sentence comprehension) with 80% accuracy given minimal-moderate cues.  Status: Goalstatus: Ongoing  Progress: Goal not addressed this date   -Sentence comprehension: 80%     Previous:  -Reading \"nonsense\" words with the same initial phonemes - task was too difficult therefore it was modified with real words    Pain:  Pain Assessment  Pain Assessment: 0-10  Pain Score: 0 - No pain     Outpatient Education:  Adult Outpatient Education  Individual(s) Educated: Patient, Child  Written Education : Yes  Verbal Education : Yes  Risk and Benefits Discussed with Patient/Caregiver/Other: yes  Patient/Caregiver Demonstrated Understanding: yes  Patient Response to Education: Patient/Caregiver Verbalized Understanding of Information    "

## 2024-04-25 ENCOUNTER — TREATMENT (OUTPATIENT)
Dept: SPEECH THERAPY | Facility: CLINIC | Age: 79
End: 2024-04-25
Payer: MEDICARE

## 2024-04-25 DIAGNOSIS — I69.390 APRAXIA AS LATE EFFECT OF CEREBROVASCULAR ACCIDENT (CVA): Primary | ICD-10-CM

## 2024-04-25 DIAGNOSIS — I69.320 APHASIA AS LATE EFFECT OF CEREBROVASCULAR ACCIDENT: ICD-10-CM

## 2024-04-25 PROCEDURE — 92507 TX SP LANG VOICE COMM INDIV: CPT | Mod: GN | Performed by: STUDENT IN AN ORGANIZED HEALTH CARE EDUCATION/TRAINING PROGRAM

## 2024-04-25 ASSESSMENT — PAIN - FUNCTIONAL ASSESSMENT: PAIN_FUNCTIONAL_ASSESSMENT: 0-10

## 2024-04-25 ASSESSMENT — PAIN SCALES - GENERAL: PAINLEVEL_OUTOF10: 0 - NO PAIN

## 2024-04-25 NOTE — PROGRESS NOTES
"Speech-Language Pathology    SLP Adult Outpatient Speech-Language Pathology Treatment     Patient Name: Roslyn Cabrera \"Ivette"  MRN: [id number]  Today's Date: 4/25/2024  Time Calculation  Start Time: 1120  Stop Time: 1200  Time Calculation (min): 40 min       Current Problem:   1. 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 Aphasia as late effect of cerebrovascular accident          SLP Assessment:  SLP [place] Intervention Outcome: Making Progress Towards Goals  Prognosis: Good    The patient was actively engaged in therapeutic tasks targeting language expression and reading skills. She demonstrated improvement reading at the sentence level and confrontational naming. She demonstrated difficulty generating novel sentences using a target word. Errors included morphology, syntax and many restarts. The patient may continue to benefit from skilled speech therapy services in order to improve her ability to communicate in the home, social and clinical settings.    Interventions:         Language Expression  Language Expression Interventions: Confrontation Naming (Sentence generation)  Language Comprehension   Language Comprehension Interventions: Understanding Written Language      Subjective:  Patient reports no new or worsening symptoms.     General Visit Information:  Certification Period Start Date: 02/26/24  Certification Period End Date: 05/26/24  Number of Authorized Treatments : 20  Total Number of Visits : 16    Plan:  Plan  Inpatient/Swing Bed or Outpatient: Outpatient  SLP Frequency: 2x per week  Duration: 12 weeks  Discussed POC: Patient, Caregiver/family  Discussed Risks/Benefits: Yes  Patient/Caregiver Agreeable: Yes    Objective Data & Progress:  Goals: Established 2/26/24  Short-term goals:  Patient will complete naming activities (including object naming, generative naming, verb naming, synonyms/antonyms, etc.) with 80% accuracy given minimal-moderate cues  Status: Goalstatus: Ongoing, " "Progressing toward goal  Progress: Progressing as follows   -Providing synonyms/antonyms: 33%; patient had a lot of difficulty with this task this date   -Confrontational namin%    Previous:  -Providing antonyms: 80%  -Confrontational namin% improved to 100% with semantic cue  -Confrontational namin% acc, errors largely due to phonemic paraphasias vs anomia; improved to >90% with self corrections/cues  -Verb namin% improved to 92% with min cues or extended time  -naming CVC/CV/VC words: >90% accuracy as observed during the motor speech task below  -Generative naming: 3 items; 100% of opportunities   -Confrontational naming - 68% for initial attempt, increased to 80% when allowed time for self corrections    Patient will complete targeted expressive language tasks (including stating personal information, phrase/sentence completion, open-ended/WH questions) with 80% accuracy given minimal-moderate cues.  Status: Goalstatus: Ongoing, Progressing toward goal  Progress: Progressing as follows   -Using a given word in a sentence: 60%, errors with morphology and syntax; many restarts and general word finding difficulty    Previous:  -Sentence generation using a given word: 50% accuracy to generate sentences because of word finding impairment but when produced, the patient could verbalize grammatically and syntactically correct sentences.  -Open ended situational questions: 92% indep; avg of 7 word responses  -Open ended/WH questions (\"why\"): 85% increased to 95% with self corrections or cues to slow  -Sentence completion (simple): 76% increased to 88%   -Complete the sentence (verbally presented), single word responses: 83% increased to 97% with indep self corrections    Patient will repeat bisyllabic words x3 repetitions with 80% accuracy.  Status: Goalstatus: Ongoing  Progress: Goal not addressed this date      Previous:  -Trisyllabic word repetition: 73%, 95%, 85%  -Rhyming word sets: " "100%  -Repeating simple conversational phrases: 80% improved to 90% with self corrections/cues.   -Repeating simple conversational phrases x2: 60% improved to 80% with multiple attempts  -CVC/CV/VC word production - 95% indep with allowance for self corrections   -Bisyllabic word rep x3 (scores oldest to newest)  - 80%, 100%, 80%  -Trisyllabic words x3 (scores oldest to newest)  - 20%, 80%, 80%     Patient will complete targeted receptive language tasks (including complex yes/no questions, following verbal/written commands, sentence comprehension) with 80% accuracy given minimal-moderate cues.  Status: Goalstatus: Ongoing  Progress: Goal not addressed this date   -Sentence comprehension (complete the sentence): 96%     Previous:  -Sentence comprehension: 80%   -Reading \"nonsense\" words with the same initial phonemes - task was too difficult therefore it was modified with real words    Pain:  Pain Assessment  Pain Assessment: 0-10  Pain Score: 0 - No pain     Outpatient Education:  Adult Outpatient Education  Individual(s) Educated: Patient, Child  Written Education : Yes  Verbal Education : Yes  Risk and Benefits Discussed with Patient/Caregiver/Other: yes  Patient/Caregiver Demonstrated Understanding: yes  Patient Response to Education: Patient/Caregiver Verbalized Understanding of Information    "

## 2024-04-29 ENCOUNTER — TREATMENT (OUTPATIENT)
Dept: SPEECH THERAPY | Facility: CLINIC | Age: 79
End: 2024-04-29
Payer: MEDICARE

## 2024-04-29 DIAGNOSIS — I69.320 APHASIA AS LATE EFFECT OF CEREBROVASCULAR ACCIDENT: Primary | ICD-10-CM

## 2024-04-29 DIAGNOSIS — I69.390 APRAXIA AS LATE EFFECT OF CEREBROVASCULAR ACCIDENT (CVA): ICD-10-CM

## 2024-04-29 PROCEDURE — 92507 TX SP LANG VOICE COMM INDIV: CPT | Mod: GN | Performed by: STUDENT IN AN ORGANIZED HEALTH CARE EDUCATION/TRAINING PROGRAM

## 2024-04-29 ASSESSMENT — PAIN - FUNCTIONAL ASSESSMENT: PAIN_FUNCTIONAL_ASSESSMENT: 0-10

## 2024-04-29 ASSESSMENT — PAIN SCALES - GENERAL: PAINLEVEL_OUTOF10: 0 - NO PAIN

## 2024-04-29 NOTE — LETTER
April 29, 2024    Oscar Milner MD  [address] [place] [id number]    Patient: Pam Cabrera   YOB: 1945   Date of Visit: 4/29/2024       Dear Oscar Milner MD  [address] [place] [address],  [place] [address]    The attached plan of care is being sent to you because your patient’s medical reimbursement requires that you certify the plan of care. Your signature is required to allow uninterrupted insurance coverage.      You may indicate your approval by signing below and faxing this form back to us at Dept Fax: [phone].    Please call Dept: [phone] with any questions or concerns.    Thank you for this referral,        Noemi Barber CCC-SLP  [address] [place] [address]    Payer: Payor: [facility] MEDICARE / Plan: [facility] MEDICARE / Product Type: *No Product type* /                                                                         Date:     Dear Noemi Barber CCC-SLP,     Re: Ms. Roslyn Cabrera, MRN:[id number]    I certify that I have reviewed the attached plan of care and it is medically necessary for Ms. Roslyn Cabrera (1945) who is under my care.          ______________________________________                    _________________  Provider name and credentials                                           Date and time                                                                                      The following plan is in draft form.  Please refer to the current version for the most up-to-date information.                Plan of Care 2/26/24   Effective from: 2/26/2024  Effective to: 5/26/2024    Draft  Plan ID: [id number]               Participants as of 4/29/2024      Name Type Comments Contact Info    Oscar Milner MD PCP - General  [phone]    Noemi Barber CCC-SLP Speech Language Pathologist  [phone]          Last Plan Note       Author: Noemi VELOZ  "Sunil CCC-SLP Status: Signed Last edited: 4/25/2024 11:15 AM         Speech-Language Pathology    SLP Adult Outpatient Speech-Language Pathology Treatment     Patient Name: Roslyn Cabrera \"Ivette"  MRN: [id number]  Today's Date: 4/25/2024  Time Calculation  Start Time: 1120  Stop Time: 1200  Time Calculation (min): 40 min       Current Problem:   1. 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 Aphasia as late effect of cerebrovascular accident          SLP Assessment:  SLP [place] Intervention Outcome: Making Progress Towards Goals  Prognosis: Good    The patient was actively engaged in therapeutic tasks targeting language expression and reading skills. She demonstrated improvement reading at the sentence level and confrontational naming. She demonstrated difficulty generating novel sentences using a target word. Errors included morphology, syntax and many restarts. The patient may continue to benefit from skilled speech therapy services in order to improve her ability to communicate in the home, social and clinical settings.    Interventions:         Language Expression  Language Expression Interventions: Confrontation Naming (Sentence generation)  Language Comprehension   Language Comprehension Interventions: Understanding Written Language      Subjective:  Patient reports no new or worsening symptoms.     General Visit Information:  Certification Period Start Date: 02/26/24  Certification Period End Date: 05/26/24  Number of Authorized Treatments : 20  Total Number of Visits : 16    Plan:  Plan  Inpatient/Swing Bed or Outpatient: Outpatient  SLP Frequency: 2x per week  Duration: 12 weeks  Discussed POC: Patient, Caregiver/family  Discussed Risks/Benefits: Yes  Patient/Caregiver Agreeable: Yes    Objective Data & Progress:  Goals: Established 2/26/24  Short-term goals:  Patient will complete naming activities (including object naming, generative naming, verb naming, synonyms/antonyms, etc.) with " "80% accuracy given minimal-moderate cues  Status: Goalstatus: Ongoing, Progressing toward goal  Progress: Progressing as follows   -Providing synonyms/antonyms: 33%; patient had a lot of difficulty with this task this date   -Confrontational namin%    Previous:  -Providing antonyms: 80%  -Confrontational namin% improved to 100% with semantic cue  -Confrontational namin% acc, errors largely due to phonemic paraphasias vs anomia; improved to >90% with self corrections/cues  -Verb namin% improved to 92% with min cues or extended time  -naming CVC/CV/VC words: >90% accuracy as observed during the motor speech task below  -Generative naming: 3 items; 100% of opportunities   -Confrontational naming - 68% for initial attempt, increased to 80% when allowed time for self corrections    Patient will complete targeted expressive language tasks (including stating personal information, phrase/sentence completion, open-ended/WH questions) with 80% accuracy given minimal-moderate cues.  Status: Goalstatus: Ongoing, Progressing toward goal  Progress: Progressing as follows   -Using a given word in a sentence: 60%, errors with morphology and syntax; many restarts and general word finding difficulty    Previous:  -Sentence generation using a given word: 50% accuracy to generate sentences because of word finding impairment but when produced, the patient could verbalize grammatically and syntactically correct sentences.  -Open ended situational questions: 92% indep; avg of 7 word responses  -Open ended/WH questions (\"why\"): 85% increased to 95% with self corrections or cues to slow  -Sentence completion (simple): 76% increased to 88%   -Complete the sentence (verbally presented), single word responses: 83% increased to 97% with indep self corrections    Patient will repeat bisyllabic words x3 repetitions with 80% accuracy.  Status: Goalstatus: Ongoing  Progress: Goal not addressed this " "date      Previous:  -Trisyllabic word repetition: 73%, 95%, 85%  -Rhyming word sets: 100%  -Repeating simple conversational phrases: 80% improved to 90% with self corrections/cues.   -Repeating simple conversational phrases x2: 60% improved to 80% with multiple attempts  -CVC/CV/VC word production - 95% indep with allowance for self corrections   -Bisyllabic word rep x3 (scores oldest to newest)  - 80%, 100%, 80%  -Trisyllabic words x3 (scores oldest to newest)  - 20%, 80%, 80%     Patient will complete targeted receptive language tasks (including complex yes/no questions, following verbal/written commands, sentence comprehension) with 80% accuracy given minimal-moderate cues.  Status: Goalstatus: Ongoing  Progress: Goal not addressed this date   -Sentence comprehension (complete the sentence): 96%     Previous:  -Sentence comprehension: 80%   -Reading \"nonsense\" words with the same initial phonemes - task was too difficult therefore it was modified with real words    Pain:  Pain Assessment  Pain Assessment: 0-10  Pain Score: 0 - No pain     Outpatient Education:  Adult Outpatient Education  Individual(s) Educated: Patient, Child  Written Education : Yes  Verbal Education : Yes  Risk and Benefits Discussed with Patient/Caregiver/Other: yes  Patient/Caregiver Demonstrated Understanding: yes  Patient Response to Education: Patient/Caregiver Verbalized Understanding of Information         "

## 2024-04-29 NOTE — PROGRESS NOTES
"Speech-Language Pathology    SLP Adult Outpatient Speech-Language Pathology Treatment     Patient Name: Roslyn Cabrera \"Pam\"  MRN: [id number]  Today's Date: 4/29/2024  Time Calculation  Start Time: 1115  Stop Time: 1200  Time Calculation (min): 45 min       Current Problem:   1. Aphasia as late effect of cerebrovascular accident        2. 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 She demonstrated improvement with word finding when naming to descriptions and using a target word in a novel sentence. She also demonstrated improvement repeating sentence level utterances and has had more success self correcting then in the most recent visits. The patient may continue to benefit from skilled speech therapy services in order to improve her ability to communicate in the home, social and clinical settings.    Interventions:  Motor Speech  Motor Speech Intervention : Word Repetitions, Sentence Repetitions      Language Expression  Language Expression Interventions:  (Responsive naming)         Subjective:  Patient reports no new or worsening symptoms.     General Visit Information:  Certification Period Start Date: 02/26/24  Certification Period End Date: 05/26/24  Number of Authorized Treatments : 20  Total Number of Visits : 17    Plan:  Plan  Inpatient/Swing Bed or Outpatient: Outpatient  Treatment/Interventions: Expressive Language  SLP Plan: Skilled SLP  SLP Frequency: 2x per week  Duration: 12 weeks  Discussed POC: Patient, Caregiver/family  Discussed Risks/Benefits: Yes  Patient/Caregiver Agreeable: Yes    Objective Data & Progress:  Goals: Established 2/26/24  Short-term goals:  Patient will complete naming activities (including object naming, generative naming, verb naming, " "synonyms/antonyms, etc.) with 80% accuracy given minimal-moderate cues  Status: Goalstatus: Ongoing, Progressing toward goal  Progress: Progressing as follows   -Naming to description: 100%     Previous:  -Providing antonyms: 33%, 80%  -Confrontational namin%, 100% with semantic cue, 80% with extended time and self correction  -Confrontational namin% acc, errors largely due to phonemic paraphasias vs anomia; improved to >90% with self corrections/cues  -Verb namin% improved to 92% with min cues or extended time  -naming CVC/CV/VC words: >90% accuracy as observed during the motor speech task below  -Generative naming: 3 items; 100% of opportunities     Patient will complete targeted expressive language tasks (including stating personal information, phrase/sentence completion, open-ended/WH questions) with 80% accuracy given minimal-moderate cues.  Status: Goalstatus: Ongoing, Progressing toward goal  Progress: Progressing as follows   -Using a given word in a sentence: 80%    Previous:  -Using a given word in a sentence: 60%, errors with morphology and syntax; many restarts and general word finding difficulty   -Sentence generation using a given word: 50% accuracy to generate sentences because of word finding impairment but when produced, the patient could verbalize grammatically and syntactically correct sentences.  -Open ended situational questions: 92% indep; avg of 7 word responses  -Open ended/WH questions (\"why\"): 85% increased to 95% with self corrections or cues to slow  -Sentence completion (simple): 76% increased to 88%   -Complete the sentence (verbally presented), single word responses: 83% increased to 97% with indep self corrections    Patient will repeat bisyllabic words x3 repetitions with 80% accuracy.  Status: Goalstatus: Ongoing  Progress: Goal not addressed this date   -Trisyllabic word repetition x3: 95%    -Repeat 5-7 word sentences: 80% with self corrections and min " "cues   -Repeating simple conversational phrases: 100%    Previous:  -Trisyllabic word repetition: 73%, 95%, 85%  -Rhyming word sets: 100%  -Repeating simple conversational phrases: 80% improved to 90% with self corrections/cues.   -Repeating simple conversational phrases x2: 60% improved to 80% with multiple attempts  -CVC/CV/VC word production - 95% indep with allowance for self corrections   -Bisyllabic word rep x3 (scores oldest to newest)  - 80%, 100%, 80%  -Trisyllabic words x3 (scores oldest to newest)  - 20%, 80%, 80%     Patient will complete targeted receptive language tasks (including complex yes/no questions, following verbal/written commands, sentence comprehension) with 80% accuracy given minimal-moderate cues.  Status: Goalstatus: Ongoing  Progress: Goal not addressed this date   -Sentence comprehension (complete the sentence): 96%     Previous:  -Sentence comprehension: 80%   -Reading \"nonsense\" words with the same initial phonemes - task was too difficult therefore it was modified with real words    Pain:  Pain Assessment  Pain Assessment: 0-10  Pain Score: 0 - No pain     Outpatient Education:  Adult Outpatient Education  Individual(s) Educated: Patient  Written Education : Yes  Verbal Education : Yes  Risk and Benefits Discussed with Patient/Caregiver/Other: yes  Patient/Caregiver Demonstrated Understanding: yes  Patient Response to Education: Patient/Caregiver Verbalized Understanding of Information  "

## 2024-05-01 ENCOUNTER — OFFICE VISIT (OUTPATIENT)
Dept: OPHTHALMOLOGY | Facility: CLINIC | Age: 79
End: 2024-05-01
Payer: MEDICARE

## 2024-05-01 DIAGNOSIS — H40.1133 PRIMARY OPEN ANGLE GLAUCOMA (POAG) OF BOTH EYES, SEVERE STAGE: ICD-10-CM

## 2024-05-01 PROCEDURE — 65855 TRABECULOPLASTY LASER SURG: CPT | Mod: RIGHT SIDE | Performed by: OPHTHALMOLOGY

## 2024-05-01 RX ORDER — LATANOPROST 50 UG/ML
SOLUTION/ DROPS OPHTHALMIC
Qty: 7.5 ML | Refills: 3 | Status: SHIPPED | OUTPATIENT
Start: 2024-05-01

## 2024-05-01 RX ORDER — TIMOLOL MALEATE 5 MG/ML
1 SOLUTION/ DROPS OPHTHALMIC
Qty: 30 ML | Refills: 3 | Status: SHIPPED | OUTPATIENT
Start: 2024-05-01 | End: 2025-05-01

## 2024-05-01 ASSESSMENT — CUP TO DISC RATIO
OD_RATIO: .9
OS_RATIO: .95

## 2024-05-01 ASSESSMENT — ENCOUNTER SYMPTOMS
HEMATOLOGIC/LYMPHATIC NEGATIVE: 0
PSYCHIATRIC NEGATIVE: 0
ALLERGIC/IMMUNOLOGIC NEGATIVE: 0
ENDOCRINE NEGATIVE: 0
EYES NEGATIVE: 0
CARDIOVASCULAR NEGATIVE: 0
MUSCULOSKELETAL NEGATIVE: 0
GASTROINTESTINAL NEGATIVE: 0
NEUROLOGICAL NEGATIVE: 0
RESPIRATORY NEGATIVE: 0
CONSTITUTIONAL NEGATIVE: 0

## 2024-05-01 ASSESSMENT — EXTERNAL EXAM - RIGHT EYE: OD_EXAM: NORMAL

## 2024-05-01 ASSESSMENT — PACHYMETRY
OD_CT(UM): 556
OS_CT(UM): 564

## 2024-05-01 ASSESSMENT — TONOMETRY
IOP_METHOD: GOLDMANN APPLANATION
OD_IOP_MMHG: 14
IOP_METHOD: GOLDMANN APPLANATION

## 2024-05-01 ASSESSMENT — EXTERNAL EXAM - LEFT EYE: OS_EXAM: NORMAL

## 2024-05-01 ASSESSMENT — SLIT LAMP EXAM - LIDS
COMMENTS: GOOD POSITION, 2+ MGD
COMMENTS: GOOD POSITION, 2+ MGD

## 2024-05-01 ASSESSMENT — VISUAL ACUITY
CORRECTION_TYPE: GLASSES
OD_CC: 20/25
METHOD: SNELLEN - LINEAR

## 2024-05-02 ENCOUNTER — TREATMENT (OUTPATIENT)
Dept: SPEECH THERAPY | Facility: CLINIC | Age: 79
End: 2024-05-02
Payer: MEDICARE

## 2024-05-02 DIAGNOSIS — I69.320 APHASIA AS LATE EFFECT OF CEREBROVASCULAR ACCIDENT: ICD-10-CM

## 2024-05-02 DIAGNOSIS — I69.390 APRAXIA AS LATE EFFECT OF CEREBROVASCULAR ACCIDENT (CVA): Primary | ICD-10-CM

## 2024-05-02 PROCEDURE — 92507 TX SP LANG VOICE COMM INDIV: CPT | Mod: GN | Performed by: STUDENT IN AN ORGANIZED HEALTH CARE EDUCATION/TRAINING PROGRAM

## 2024-05-02 ASSESSMENT — PAIN - FUNCTIONAL ASSESSMENT: PAIN_FUNCTIONAL_ASSESSMENT: 0-10

## 2024-05-02 ASSESSMENT — PAIN SCALES - GENERAL: PAINLEVEL_OUTOF10: 0 - NO PAIN

## 2024-05-02 NOTE — PROGRESS NOTES
"Speech-Language Pathology    SLP Adult Outpatient Speech-Language Pathology Progress Note     Patient Name: Roslyn Cabrera \"Ivette"  MRN: [id number]  Today's Date: 5/2/2024  Time Calculation  Start Time: 1118  Stop Time: 1202  Time Calculation (min): 44 min       Current Problem:   1. 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 Aphasia as late effect of cerebrovascular accident          SLP Assessment:  SLP [place] Intervention Outcome: Making Progress Towards Goals  Prognosis: Good    The patient was actively engaged in therapeutic tasks targeting language expression and motor speech skills. She has been able to repeat trisyllabic words x3 repetitions at or above 90% accuracy although she had more difficulty producing the same trisyllabic words when they were part of increased word length series. She demonstrated improvement generating novel endings to a common sentence but responses were basically 1-2 words. Goals for simple word finding and word repetition (1-2 syllables) have been met. The plan of care has been updated to reflect advanced motor speech goals and the addition of a reading goal. The patient may continue to benefit from skilled speech therapy services in order to improve her ability to communicate in the home, social and clinical settings.    Interventions:  Motor Speech  Motor Speech Intervention : Word Repetitions, Phrase Repetitions, Sentence Repetitions    Subjective:  Patient reports no new or worsening symptoms.     General Visit Information:  Certification Period Start Date: 05/02/24  Certification Period End Date: 07/31/24  Number of Authorized Treatments : 20  Total Number of Visits : 1    Plan:  Plan  Inpatient/Swing Bed or Outpatient: Outpatient  Treatment/Interventions: Expressive Language, Receptive Language (apraxia reduction training)  SLP Plan: Skilled SLP  SLP Frequency: 2x per week  Duration: 12 weeks  Discussed POC: Patient  Discussed Risks/Benefits: " "Yes  Patient/Caregiver Agreeable: Yes    Objective Data & Progress:  Goals: Established 24  Short-term goals:  Patient will complete naming activities (including object naming, generative naming, verb naming, synonyms/antonyms, etc.) with 80% accuracy given minimal-moderate cues  Status: Goalstatus: Ongoing, Progressing toward goal  Progress: Goal met      Previous:  -Naming to description: 100%  -Providing antonyms: 33%, 80%  -Confrontational namin%, 100% with semantic cue, 80% with extended time and self correction  -Confrontational namin% acc, errors largely due to phonemic paraphasias vs anomia; improved to >90% with self corrections/cues  -Verb namin% improved to 92% with min cues or extended time  -naming CVC/CV/VC words: >90% accuracy as observed during the motor speech task below  -Generative naming: 3 items; 100% of opportunities     Patient will complete targeted expressive language tasks (including stating personal information, phrase/sentence completion, open-ended/WH questions) with 80% accuracy given minimal-moderate cues.  Status: Goalstatus: Ongoing, Progressing toward goal  Progress: Progressing as follows   -Complete the sentence: 90% with restarts   -Stating the conclusion of a story: 80% with restarts and min cues    Previous:  -Using a given word in a sentence: 80%  -Using a given word in a sentence: 60%, errors with morphology and syntax; many restarts and general word finding difficulty   -Sentence generation using a given word: 50% accuracy to generate sentences because of word finding impairment but when produced, the patient could verbalize grammatically and syntactically correct sentences.  -Open ended situational questions: 92% indep; avg of 7 word responses  -Open ended/WH questions (\"why\"): 85% increased to 95% with self corrections or cues to slow  -Sentence completion (simple): 76% increased to 88%   -Complete the sentence (verbally presented), single word " "responses: 83% increased to 97% with indep self corrections    Patient will repeat bisyllabic words x3 repetitions with 80% accuracy.  Status: Goalstatus: Ongoing  Progress: Goal met     Previous:   -CVC/CV/VC word production - 95% indep with allowance for self corrections  -Bisyllabic word rep x3 (scores oldest to newest)  - 80%, 100%, 80%  -Trisyllabic words x3 (scores oldest to newest)  - 20%, 80%, 80%     Patient will complete targeted receptive language tasks (including complex yes/no questions, following verbal/written commands, sentence comprehension) with 80% accuracy given minimal-moderate cues.  Status: Goalstatus: Ongoing  Progress: Goal not addressed this date     Previous:  -Sentence comprehension (complete the sentence): 96%   -Sentence comprehension: 80%   -Reading \"nonsense\" words with the same initial phonemes - task was too difficult therefore it was modified with real words    5. Patient will complete repetition tasks up 8-9 words in length with 80% accuracy with min cues/restarts/self-corrections. NEW GOAL Est 5/2/24  -Words of increasing length (1-2-3 syllables):     94% - 1 syllable base word    81% - 2 syllable expanded word    56% - 3 syllable expanded word   -Trisyllabic word repetition x3 (same words from above task): 94%     Previous:  -Trisyllabic word repetition x3: 95%, 73%, 95%, 85%, 90% -Repeat 5-7 word sentences: 80% with self corrections and min cues   -Repeating simple conversational phrases: 100%, 90% with self corrections/cues, 80% with multiple attempts  -Rhyming word sets: 100%    6. Patient will read words/phrases aloud with 80% accuracy and min cues. NEW GOAL Est 5/2/24     Pain:  Pain Assessment  Pain Assessment: 0-10  Pain Score: 0 - No pain     Outpatient Education:  Adult Outpatient Education  Individual(s) Educated: Patient  Written Education : Yes  Verbal Education : Yes  Risk and Benefits Discussed with Patient/Caregiver/Other: yes  Patient/Caregiver Demonstrated " Understanding: yes  Patient Response to Education: Patient/Caregiver Verbalized Understanding of Information

## 2024-05-02 NOTE — LETTER
May 2, 2024    Oscar Milner MD  [address] [place] [id number]    Patient: Pam Cabrera   YOB: 1945   Date of Visit: 5/2/2024       Dear Oscar Milner MD  [address] [place] [address],  [place] [address]    The attached plan of care is being sent to you because your patient’s medical reimbursement requires that you certify the plan of care. Your signature is required to allow uninterrupted insurance coverage.      You may indicate your approval by signing below and faxing this form back to us at Dept Fax: [phone].    Please call Dept: [phone] with any questions or concerns.    Thank you for this referral,        Noemi Barber CCC-SLP  [address] [place] [address]    Payer: Payor: [facility] MEDICARE / Plan: [facility] MEDICARE / Product Type: *No Product type* /                                                                         Date:     Dear Noemi Barber CCC-SLP,     Re: Ms. Roslyn Cabrera, MRN:[id number]    I certify that I have reviewed the attached plan of care and it is medically necessary for Ms. Roslyn Cabrera (1945) who is under my care.          ______________________________________                    _________________  Provider name and credentials                                           Date and time                                                                                           Plan of Care 5/2/24   Effective from: 5/2/2024  Effective to: 7/31/2024    Plan ID: [id number]                Participants as of Finalize on 5/2/2024      Name Type Comments Contact Info    Oscar Milner MD PCP - General  [phone]    Noemi Barber CCC-SLP Speech Language Pathologist  [phone]           Last Plan Note       Author: KEVIN Ovalles Status: Sign when Signing Visit Last edited: 5/2/2024 11:15 AM         Speech-Language Pathology    SLP  "Adult Outpatient Speech-Language Pathology Progress Note     Patient Name: Roslyn Cabrera \"Pam\"  MRN: [id number]  Today's Date: 5/2/2024  Time Calculation  Start Time: 1118  Stop Time: 1202  Time Calculation (min): 44 min       Current Problem:   1. 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 Aphasia as late effect of cerebrovascular accident          SLP Assessment:  SLP [place] Intervention Outcome: Making Progress Towards Goals  Prognosis: Good    The patient was actively engaged in therapeutic tasks targeting language expression and motor speech skills. She has been able to repeat trisyllabic words x3 repetitions at or above 90% accuracy although she had more difficulty producing the same trisyllabic words when they were part of increased word length series. She demonstrated improvement generating novel endings to a common sentence but responses were basically 1-2 words. Goals for simple word finding and word repetition (1-2 syllables) have been met. The plan of care has been updated to reflect advanced motor speech goals and the addition of a reading goal. The patient may continue to benefit from skilled speech therapy services in order to improve her ability to communicate in the home, social and clinical settings.    Interventions:  Motor Speech  Motor Speech Intervention : Word Repetitions, Phrase Repetitions, Sentence Repetitions    Subjective:  Patient reports no new or worsening symptoms.     General Visit Information:  Certification Period Start Date: 05/02/24  Certification Period End Date: 07/31/24  Number of Authorized Treatments : 20  Total Number of Visits : 1    Plan:  Plan  Inpatient/Swing Bed or Outpatient: Outpatient  Treatment/Interventions: Expressive Language, Receptive Language (apraxia reduction training)  SLP Plan: Skilled SLP  SLP Frequency: 2x per week  Duration: 12 weeks  Discussed POC: Patient  Discussed Risks/Benefits: Yes  Patient/Caregiver Agreeable: " "Yes    Objective Data & Progress:  Goals: Established 24  Short-term goals:  Patient will complete naming activities (including object naming, generative naming, verb naming, synonyms/antonyms, etc.) with 80% accuracy given minimal-moderate cues  Status: Goalstatus: Ongoing, Progressing toward goal  Progress: Goal met      Previous:  -Naming to description: 100%  -Providing antonyms: 33%, 80%  -Confrontational namin%, 100% with semantic cue, 80% with extended time and self correction  -Confrontational namin% acc, errors largely due to phonemic paraphasias vs anomia; improved to >90% with self corrections/cues  -Verb namin% improved to 92% with min cues or extended time  -naming CVC/CV/VC words: >90% accuracy as observed during the motor speech task below  -Generative naming: 3 items; 100% of opportunities     Patient will complete targeted expressive language tasks (including stating personal information, phrase/sentence completion, open-ended/WH questions) with 80% accuracy given minimal-moderate cues.  Status: Goalstatus: Ongoing, Progressing toward goal  Progress: Progressing as follows   -Complete the sentence: 90% with restarts   -Stating the conclusion of a story: 80% with restarts and min cues    Previous:  -Using a given word in a sentence: 80%  -Using a given word in a sentence: 60%, errors with morphology and syntax; many restarts and general word finding difficulty   -Sentence generation using a given word: 50% accuracy to generate sentences because of word finding impairment but when produced, the patient could verbalize grammatically and syntactically correct sentences.  -Open ended situational questions: 92% indep; avg of 7 word responses  -Open ended/WH questions (\"why\"): 85% increased to 95% with self corrections or cues to slow  -Sentence completion (simple): 76% increased to 88%   -Complete the sentence (verbally presented), single word responses: 83% increased to 97% with indep " "self corrections    Patient will repeat bisyllabic words x3 repetitions with 80% accuracy.  Status: Goalstatus: Ongoing  Progress: Goal met     Previous:   -CVC/CV/VC word production - 95% indep with allowance for self corrections  -Bisyllabic word rep x3 (scores oldest to newest)  - 80%, 100%, 80%  -Trisyllabic words x3 (scores oldest to newest)  - 20%, 80%, 80%     Patient will complete targeted receptive language tasks (including complex yes/no questions, following verbal/written commands, sentence comprehension) with 80% accuracy given minimal-moderate cues.  Status: Goalstatus: Ongoing  Progress: Goal not addressed this date     Previous:  -Sentence comprehension (complete the sentence): 96%   -Sentence comprehension: 80%   -Reading \"nonsense\" words with the same initial phonemes - task was too difficult therefore it was modified with real words    5. Patient will complete repetition tasks up 8-9 words in length with 80% accuracy with min cues/restarts/self-corrections. NEW GOAL Est 5/2/24  -Words of increasing length (1-2-3 syllables):     94% - 1 syllable base word    81% - 2 syllable expanded word    56% - 3 syllable expanded word   -Trisyllabic word repetition x3 (same words from above task): 94%     Previous:  -Trisyllabic word repetition x3: 95%, 73%, 95%, 85%, 90% -Repeat 5-7 word sentences: 80% with self corrections and min cues   -Repeating simple conversational phrases: 100%, 90% with self corrections/cues, 80% with multiple attempts  -Rhyming word sets: 100%    6. Patient will read words/phrases aloud with 80% accuracy and min cues. NEW GOAL Est 5/2/24     Pain:  Pain Assessment  Pain Assessment: 0-10  Pain Score: 0 - No pain     Outpatient Education:  Adult Outpatient Education  Individual(s) Educated: Patient  Written Education : Yes  Verbal Education : Yes  Risk and Benefits Discussed with Patient/Caregiver/Other: yes  Patient/Caregiver Demonstrated Understanding: yes  Patient Response to " Education: Patient/Caregiver Verbalized Understanding of Information         Current Participants as of 5/2/2024      Name Type Comments Contact Info    Oscar Milner MD PCP - General  [phone]    Signature pending    Noemi Barber CCC-SLP Speech Language Pathologist  [phone]    Signature pending

## 2024-05-06 ENCOUNTER — TREATMENT (OUTPATIENT)
Dept: SPEECH THERAPY | Facility: CLINIC | Age: 79
End: 2024-05-06
Payer: MEDICARE

## 2024-05-06 DIAGNOSIS — I69.320 APHASIA AS LATE EFFECT OF CEREBROVASCULAR ACCIDENT: ICD-10-CM

## 2024-05-06 DIAGNOSIS — I69.390 APRAXIA AS LATE EFFECT OF CEREBROVASCULAR ACCIDENT (CVA): Primary | ICD-10-CM

## 2024-05-06 PROCEDURE — 92507 TX SP LANG VOICE COMM INDIV: CPT | Mod: GN | Performed by: STUDENT IN AN ORGANIZED HEALTH CARE EDUCATION/TRAINING PROGRAM

## 2024-05-06 ASSESSMENT — PAIN - FUNCTIONAL ASSESSMENT: PAIN_FUNCTIONAL_ASSESSMENT: 0-10

## 2024-05-06 ASSESSMENT — PAIN SCALES - GENERAL: PAINLEVEL_OUTOF10: 0 - NO PAIN

## 2024-05-06 NOTE — PROGRESS NOTES
"Speech-Language Pathology    SLP Adult Outpatient Speech-Language Pathology Progress Note     Patient Name: Roslyn Cabrera \"Ivette"  MRN: [id number]  Today's Date: 5/6/2024  Time Calculation  Start Time: 1401  Stop Time: 1446  Time Calculation (min): 45 min       Current Problem:   1. 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 Aphasia as late effect of cerebrovascular accident          SLP Assessment:  SLP [place] Intervention Outcome: Making Progress Towards Goals  Prognosis: Good    The patient was actively engaged in therapeutic tasks targeting motor speech and receptive language skills. She demonstrated improvement with reading comprehension and repeating similar word pairs. She demonstrated difficulty generating novel sentences when given only a few starting words. The patient may continue to benefit from skilled speech therapy services in order to improve her ability to effectively communicate in the home, social and clinical settings.    Interventions:  Motor Speech  Motor Speech Intervention : Word Repetitions, Sentence Repetitions    Subjective:  Patient reports no new or worsening symptoms.     General Visit Information:  Certification Period Start Date: 05/02/24  Certification Period End Date: 07/31/24  Number of Authorized Treatments : 20  Total Number of Visits : 2    Plan:  Plan  Inpatient/Swing Bed or Outpatient: Outpatient  Treatment/Interventions: Expressive Language, Receptive Language  SLP Plan: Skilled SLP  SLP Frequency: 2x per week  Duration: 12 weeks  Discussed POC: Patient  Discussed Risks/Benefits: Yes  Patient/Caregiver Agreeable: Yes    Objective Data & Progress:  Goals: Established 2/26/24  Short-term goals:  Patient will complete naming activities (including object naming, generative naming, verb naming, synonyms/antonyms, etc.) with 80% accuracy given minimal-moderate cues  Status: Goalstatus: Ongoing, Progressing toward goal  Progress: Goal met      Patient will " "complete targeted expressive language tasks (including stating personal information, phrase/sentence completion, open-ended/WH questions) with 80% accuracy given minimal-moderate cues.  Status: Goalstatus: Ongoing, Progressing toward goal  Progress: Progressing as follows   -Complete the sentence: 90% acc, avg 4.6 words    Previous:  -Complete the sentence: 90% with restarts   -Stating the conclusion of a story: 80% with restarts and min cues  -Using a given word in a sentence: 80%  -Using a given word in a sentence: 60%, errors with morphology and syntax; many restarts and general word finding difficulty   -Sentence generation using a given word: 50% accuracy to generate sentences because of word finding impairment but when produced, the patient could verbalize grammatically and syntactically correct sentences.  -Open ended situational questions: 92% indep; avg of 7 word responses  -Open ended/WH questions (\"why\"): 85% increased to 95% with self corrections or cues to slow  -Sentence completion (simple): 76% increased to 88%   -Complete the sentence (verbally presented), single word responses: 83% increased to 97% with indep self corrections    Patient will repeat bisyllabic words x3 repetitions with 80% accuracy.  Status: Goalstatus: Ongoing  Progress: Goal met     Patient will complete targeted receptive language tasks (including complex yes/no questions, following verbal/written commands, sentence comprehension) with 80% accuracy given minimal-moderate cues.  Status: Goalstatus: Ongoing  Progress: Goal not addressed this date   -Sentence level comprehension (match from FO4 options): 96%    Previous:  -Sentence comprehension (complete the sentence): 96%   -Sentence comprehension: 80%   -Reading \"nonsense\" words with the same initial phonemes - task was too difficult therefore it was modified with real words    5. Patient will complete repetition tasks up 8-9 words in length with 80% accuracy with min " cues/restarts/self-corrections. NEW GOAL Est 5/2/24  Status: Goalstatus: Ongoing  Progress: Progressing as follows  -Similar word pairs: 85%    Previous:  -Words of increasing length (1-2-3 syllables):     94% - 1 syllable base word    81% - 2 syllable expanded word    56% - 3 syllable expanded word   -Trisyllabic word repetition x3 (same words from above task): 94%     6. Patient will read words/phrases aloud with 80% accuracy and min cues. (Est 5/2/24)  Status: Goalstatus: Goal not addressed this date  Progress: Goal not addressed this date    Pain:  Pain Assessment  Pain Assessment: 0-10  Pain Score: 0 - No pain     Outpatient Education:  Adult Outpatient Education  Individual(s) Educated: Patient  Written Education : Yes  Verbal Education : Yes  Risk and Benefits Discussed with Patient/Caregiver/Other: yes  Patient/Caregiver Demonstrated Understanding: yes  Patient Response to Education: Patient/Caregiver Verbalized Understanding of Information

## 2024-05-09 ENCOUNTER — TREATMENT (OUTPATIENT)
Dept: SPEECH THERAPY | Facility: CLINIC | Age: 79
End: 2024-05-09
Payer: MEDICARE

## 2024-05-09 DIAGNOSIS — I69.390 APRAXIA AS LATE EFFECT OF CEREBROVASCULAR ACCIDENT (CVA): Primary | ICD-10-CM

## 2024-05-09 DIAGNOSIS — I69.320 APHASIA AS LATE EFFECT OF CEREBROVASCULAR ACCIDENT: ICD-10-CM

## 2024-05-09 PROCEDURE — 92507 TX SP LANG VOICE COMM INDIV: CPT | Mod: GN | Performed by: STUDENT IN AN ORGANIZED HEALTH CARE EDUCATION/TRAINING PROGRAM

## 2024-05-09 ASSESSMENT — PAIN - FUNCTIONAL ASSESSMENT: PAIN_FUNCTIONAL_ASSESSMENT: 0-10

## 2024-05-09 ASSESSMENT — PAIN SCALES - GENERAL: PAINLEVEL_OUTOF10: 0 - NO PAIN

## 2024-05-09 NOTE — PROGRESS NOTES
"Speech-Language Pathology    SLP Adult Outpatient Speech-Language Pathology Progress Note     Patient Name: Roslyn Cabrera \"Ivette"  MRN: [id number]  Today's Date: 5/9/2024  Time Calculation  Start Time: 1115  Stop Time: 1203  Time Calculation (min): 48 min       Current Problem:   1. 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 Aphasia as late effect of cerebrovascular accident          SLP Assessment:  SLP [place] Intervention Outcome: Making Progress Towards Goals  Prognosis: Good    The patient was actively engaged in therapeutic tasks targeting motor speech and receptive language skills. She was able to read bisyllabic words aloud but afterward was very emotional at how difficult it was. The patient may continue to benefit from skilled speech therapy services in order to improve her ability to effectively communicate in the home, social and clinical settings using both spoken and written language.    Interventions:  Word level reading, aloud    Subjective:  Patient reports no new or worsening symptoms.     General Visit Information:  Certification Period Start Date: 05/02/24  Certification Period End Date: 07/31/24  Number of Authorized Treatments : 20  Total Number of Visits : 3    Plan:  Plan  Inpatient/Swing Bed or Outpatient: Outpatient  Treatment/Interventions: Expressive Language, Receptive Language  SLP Plan: Skilled SLP  SLP Frequency: 2x per week  Duration: 12 weeks  Discussed POC: Patient, Caregiver/family  Discussed Risks/Benefits: Yes  Patient/Caregiver Agreeable: Yes    Objective Data & Progress:  Goals: Established 2/26/24  Short-term goals:  Patient will complete naming activities (including object naming, generative naming, verb naming, synonyms/antonyms, etc.) with 80% accuracy given minimal-moderate cues  Status: Goalstatus: Ongoing, Progressing toward goal  Progress: Goal met      Patient will complete targeted expressive language tasks (including stating personal information, " "phrase/sentence completion, open-ended/WH questions) with 80% accuracy given minimal-moderate cues.  Status: Goalstatus: Ongoing, Progressing toward goal  Progress: Progressing as follows   -Complete the sentence: 90% acc, avg 4.6 words    Previous:  -Complete the sentence: 90% with restarts   -Stating the conclusion of a story: 80% with restarts and min cues  -Using a given word in a sentence: 80%  -Using a given word in a sentence: 60%, errors with morphology and syntax; many restarts and general word finding difficulty   -Sentence generation using a given word: 50% accuracy to generate sentences because of word finding impairment but when produced, the patient could verbalize grammatically and syntactically correct sentences.  -Open ended situational questions: 92% indep; avg of 7 word responses  -Open ended/WH questions (\"why\"): 85% increased to 95% with self corrections or cues to slow  -Sentence completion (simple): 76% increased to 88%   -Complete the sentence (verbally presented), single word responses: 83% increased to 97% with indep self corrections    Patient will repeat bisyllabic words x3 repetitions with 80% accuracy.  Status: Goalstatus: Ongoing  Progress: Goal met     Patient will complete targeted receptive language tasks (including complex yes/no questions, following verbal/written commands, sentence comprehension) with 80% accuracy given minimal-moderate cues.  Status: Goalstatus: Ongoing  Progress: Goal not addressed this date     Previous:  -Sentence level comprehension (match from FO4 options): 96%  -Sentence comprehension (complete the sentence): 96%   -Sentence comprehension: 80%   -Reading \"nonsense\" words with the same initial phonemes - task was too difficult therefore it was modified with real words    5. Patient will complete repetition tasks up 8-9 words in length with 80% accuracy with min cues/restarts/self-corrections. NEW GOAL Est 5/2/24  Status: Goalstatus: Ongoing  Progress: " Progressing as follows  -Similar word pairs: 85%    Previous:  -Words of increasing length (1-2-3 syllables):     94% - 1 syllable base word    81% - 2 syllable expanded word    56% - 3 syllable expanded word   -Trisyllabic word repetition x3 (same words from above task): 94%     6. Patient will read words/phrases aloud with 80% accuracy and min cues. (Est 5/2/24)  Status: Goalstatus: Goal not addressed this date  Progress: Progressing as follows   -Bisyllabic words: 85% (17/20), improved to 100% with min cues or additional time    Pain:  Pain Assessment  Pain Assessment: 0-10  Pain Score: 0 - No pain     Outpatient Education:  Adult Outpatient Education  Individual(s) Educated: Patient  Written Education : Yes  Verbal Education : Yes  Risk and Benefits Discussed with Patient/Caregiver/Other: yes  Patient/Caregiver Demonstrated Understanding: yes  Patient Response to Education: Patient/Caregiver Verbalized Understanding of Information

## 2024-05-13 ENCOUNTER — APPOINTMENT (OUTPATIENT)
Dept: OPHTHALMOLOGY | Facility: CLINIC | Age: 79
End: 2024-05-13
Payer: MEDICARE

## 2024-05-13 ENCOUNTER — TREATMENT (OUTPATIENT)
Dept: SPEECH THERAPY | Facility: CLINIC | Age: 79
End: 2024-05-13
Payer: MEDICARE

## 2024-05-13 DIAGNOSIS — I69.390 APRAXIA AS LATE EFFECT OF CEREBROVASCULAR ACCIDENT (CVA): Primary | ICD-10-CM

## 2024-05-13 DIAGNOSIS — R47.01 APHASIA: ICD-10-CM

## 2024-05-13 PROCEDURE — 92507 TX SP LANG VOICE COMM INDIV: CPT | Mod: GN | Performed by: STUDENT IN AN ORGANIZED HEALTH CARE EDUCATION/TRAINING PROGRAM

## 2024-05-13 ASSESSMENT — PAIN - FUNCTIONAL ASSESSMENT: PAIN_FUNCTIONAL_ASSESSMENT: 0-10

## 2024-05-13 ASSESSMENT — PAIN SCALES - GENERAL: PAINLEVEL_OUTOF10: 0 - NO PAIN

## 2024-05-13 NOTE — PROGRESS NOTES
"Speech-Language Pathology    SLP Adult Outpatient Speech-Language Pathology Progress Note     Patient Name: Roslyn Cabrera \"Ivette"  MRN: [id number]  Today's Date: 5/13/2024  Time Calculation  Start Time: 1041  Stop Time: 1125  Time Calculation (min): 44 min       Current Problem:   1. 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 Aphasia          SLP Assessment:  SLP TX Intervention Outcome: Making Progress Towards Goals  Prognosis: Good    The patient was actively engaged in therapeutic tasks targeting motor speech and receptive language skills. The patient attempted to read rhyming word sentences aloud and later repeat them but the task was too difficult. She demonstrated improvement repeating words of increasing length.  The patient may continue to benefit from skilled speech therapy services in order to improve her ability to effectively communicate in the home, social and clinical settings using both spoken and written language.    Interventions:  Word level reading, aloud    Subjective:  Patient reports no new or worsening symptoms.     General Visit Information:  Certification Period Start Date: 05/02/24  Certification Period End Date: 07/31/24  Number of Authorized Treatments : 20  Total Number of Visits : 4    Plan:  Plan  Inpatient/Swing Bed or Outpatient: Outpatient  Treatment/Interventions: Expressive Language  SLP Plan: Skilled SLP  SLP Frequency: 2x per week  Duration: 12 weeks  Discussed POC: Patient  Discussed Risks/Benefits: Yes  Patient/Caregiver Agreeable: Yes    Objective Data & Progress:  Goals: Established 2/26/24  Short-term goals:  Patient will complete naming activities (including object naming, generative naming, verb naming, synonyms/antonyms, etc.) with 80% accuracy given minimal-moderate cues  Status: Goalstatus: Ongoing, Progressing toward goal  Progress: Goal met      Patient will complete targeted expressive language tasks (including stating personal information, " "phrase/sentence completion, open-ended/WH questions) with 80% accuracy given minimal-moderate cues.  Status: Goalstatus: Ongoing, Progressing toward goal  Progress: Progressing as follows   -Complete the sentence: 90% acc, avg 4.6 words    Previous:  -Complete the sentence: 90% with restarts   -Stating the conclusion of a story: 80% with restarts and min cues  -Using a given word in a sentence: 80%  -Using a given word in a sentence: 60%, errors with morphology and syntax; many restarts and general word finding difficulty   -Sentence generation using a given word: 50% accuracy to generate sentences because of word finding impairment but when produced, the patient could verbalize grammatically and syntactically correct sentences.  -Open ended situational questions: 92% indep; avg of 7 word responses  -Open ended/WH questions (\"why\"): 85% increased to 95% with self corrections or cues to slow  -Sentence completion (simple): 76% increased to 88%   -Complete the sentence (verbally presented), single word responses: 83% increased to 97% with indep self corrections    Patient will repeat bisyllabic words x3 repetitions with 80% accuracy.  Status: Goalstatus: Ongoing  Progress: Goal met     Patient will complete targeted receptive language tasks (including complex yes/no questions, following verbal/written commands, sentence comprehension) with 80% accuracy given minimal-moderate cues.  Status: Goalstatus: Ongoing  Progress: Progressing as follows   -Sentence/paragraph level comprehension (text messages), FO4 multiple choice: Avg of 67% across 3 passages with accuracy decreasing with increased length of the passage (100%, 67%, 33%)    Previous:  -Sentence level comprehension (match from FO4 options): 96%  -Sentence comprehension (complete the sentence): 96%   -Sentence comprehension: 80%   -Reading \"nonsense\" words with the same initial phonemes - task was too difficult therefore it was modified with real words    5. " Patient will complete repetition tasks up 8-9 words in length with 80% accuracy with min cues/restarts/self-corrections. NEW GOAL Est 5/2/24  Status: Goalstatus: Ongoing  Progress: Progressing as follows  -Words of increasing length (1, 2, multi syllables), with allowance for restarts/self-corrections     94% - monosyllable base word    88% - bisyllabic expanded word    81% - polysyllablic expanded word    Previous:  -Similar word pairs: 85%  -Words of increasing length (1-2-3 syllables):     94% - 1 syllable base word    81% - 2 syllable expanded word    56% - 3 syllable expanded word   -Trisyllabic word repetition x3 (same words from above task): 94%     6. Patient will read words/phrases aloud with 80% accuracy and min cues. (Est 5/2/24)  Status: Goalstatus: Goal not addressed this date  Progress: Goal not addressed this date   -Attempted to have patient read rhyming word sentences; task abandoned as it was too difficult and frustrating for the patient, even with repetition     Previous:  -Bisyllabic words: 85% (17/20), improved to 100% with min cues or additional time    Pain:  Pain Assessment  Pain Assessment: 0-10  Pain Score: 0 - No pain     Outpatient Education:  Adult Outpatient Education  Individual(s) Educated: Patient  Written Education : Yes  Verbal Education : Yes  Risk and Benefits Discussed with Patient/Caregiver/Other: yes  Patient/Caregiver Demonstrated Understanding: yes  Patient Response to Education: Patient/Caregiver Verbalized Understanding of Information

## 2024-05-17 ENCOUNTER — TREATMENT (OUTPATIENT)
Dept: SPEECH THERAPY | Facility: CLINIC | Age: 79
End: 2024-05-17
Payer: MEDICARE

## 2024-05-17 DIAGNOSIS — I69.390 APRAXIA AS LATE EFFECT OF CEREBROVASCULAR ACCIDENT (CVA): Primary | ICD-10-CM

## 2024-05-17 DIAGNOSIS — I69.320 APHASIA AS LATE EFFECT OF CEREBROVASCULAR ACCIDENT: ICD-10-CM

## 2024-05-17 PROCEDURE — 92507 TX SP LANG VOICE COMM INDIV: CPT | Mod: GN | Performed by: STUDENT IN AN ORGANIZED HEALTH CARE EDUCATION/TRAINING PROGRAM

## 2024-05-17 ASSESSMENT — PAIN - FUNCTIONAL ASSESSMENT: PAIN_FUNCTIONAL_ASSESSMENT: 0-10

## 2024-05-17 ASSESSMENT — PAIN SCALES - GENERAL: PAINLEVEL_OUTOF10: 0 - NO PAIN

## 2024-05-17 NOTE — PROGRESS NOTES
"Speech-Language Pathology    SLP Adult Outpatient Speech-Language Pathology Progress Note     Patient Name: Roslyn Cabrera \"Ivette"  MRN: [id number]  Today's Date: 5/17/2024  Time Calculation  Start Time: 1115  Stop Time: 1200  Time Calculation (min): 45 min       Current Problem:   1. 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 Aphasia as late effect of cerebrovascular accident            SLP Assessment:  SLP TX Intervention Outcome: Making Progress Towards Goals  Prognosis: Good    The patient was actively engaged in therapeutic tasks targeting motor speech and expressive language skills. The patient produced fluent language with occasional restarts while in the lobby. She admitted that when she goes into the therapy room speaking becomes more difficult. She demonstrated difficulty with production of initial phonemes during rhyming word drill. The patient may continue to benefit from skilled speech therapy services in order to improve her ability to effectively communicate in the home, social and clinical settings using both spoken and written language.    Interventions:  Word level reading, aloud    Subjective:  Patient reports no new or worsening symptoms.     General Visit Information:  Certification Period Start Date: 05/02/24  Certification Period End Date: 07/31/24  Number of Authorized Treatments : 20  Total Number of Visits : 5    Plan:  Plan  Inpatient/Swing Bed or Outpatient: Outpatient  SLP Frequency: 2x per week  Duration: 12 weeks  Discussed POC: Patient  Discussed Risks/Benefits: Yes  Patient/Caregiver Agreeable: Yes    Objective Data & Progress:  Goals: Established 2/26/24  Short-term goals:     Patient will complete targeted expressive language tasks (including stating personal information, phrase/sentence completion, open-ended/WH questions) with 80% accuracy given minimal-moderate cues.  Status: Goalstatus: Ongoing, Progressing toward goal  Progress: Goal not addressed this " "date    Previous:  -Complete the sentence: 90% acc, avg 4.6 words  -Complete the sentence: 90% with restarts   -Stating the conclusion of a story: 80% with restarts and min cues  -Using a given word in a sentence: 80%  -Using a given word in a sentence: 60%, errors with morphology and syntax; many restarts and general word finding difficulty   -Sentence generation using a given word: 50% accuracy to generate sentences because of word finding impairment but when produced, the patient could verbalize grammatically and syntactically correct sentences.  -Open ended situational questions: 92% indep; avg of 7 word responses  -Open ended/WH questions (\"why\"): 85% increased to 95% with self corrections or cues to slow  -Sentence completion (simple): 76% increased to 88%   -Complete the sentence (verbally presented), single word responses: 83% increased to 97% with indep self corrections      Patient will complete targeted receptive language tasks (including complex yes/no questions, following verbal/written commands, sentence comprehension) with 80% accuracy given minimal-moderate cues.  Status: Goalstatus: Ongoing  Progress: Goal not addressed this date     Previous:  -Sentence/paragraph level comprehension (text messages), FO4 multiple choice: Avg of 67% across 3 passages with accuracy decreasing with increased length of the passage (100%, 67%, 33%)  -Sentence level comprehension (match from FO4 options): 96%  -Sentence comprehension (complete the sentence): 96%   -Sentence comprehension: 80%   -Reading \"nonsense\" words with the same initial phonemes - task was too difficult therefore it was modified with real words    5. Patient will complete repetition tasks up 8-9 words in length with 80% accuracy with min cues/restarts/self-corrections. NEW GOAL Est 5/2/24  Status: Goalstatus: Ongoing  Progress: Progressing as follows   -Rhyming word sets: 86% with min cues and demo    Previous:  -Similar word pairs: 85%  -Words of " increasing length (1-2-3 syllables) with allowance for restarts/self-corrections:     94% - 1 syllable base word    88%, 81% - 2 syllable expanded word    81%, 56% - 3 syllable expanded word   -Trisyllabic word repetition x3 (same words from above task): 94%     6. Patient will read words/phrases aloud with 80% accuracy and min cues. (Est 5/2/24)  Status: Goalstatus: Goal not addressed this date  Progress: Goal not addressed this date   -Attempted to have patient read rhyming word sentences; task abandoned as it was too difficult and frustrating for the patient, even with repetition     Previous:  -Bisyllabic words: 85% (17/20), improved to 100% with min cues or additional time    Pain:  Pain Assessment  Pain Assessment: 0-10  Pain Score: 0 - No pain     Outpatient Education:  Adult Outpatient Education  Individual(s) Educated: Patient  Written Education : Yes  Verbal Education : Yes  Risk and Benefits Discussed with Patient/Caregiver/Other: yes  Patient/Caregiver Demonstrated Understanding: yes  Patient Response to Education: Patient/Caregiver Verbalized Understanding of Information

## 2024-05-20 ENCOUNTER — APPOINTMENT (OUTPATIENT)
Dept: SPEECH THERAPY | Facility: CLINIC | Age: 79
End: 2024-05-20
Payer: MEDICARE

## 2024-05-24 ENCOUNTER — TREATMENT (OUTPATIENT)
Dept: SPEECH THERAPY | Facility: CLINIC | Age: 79
End: 2024-05-24
Payer: MEDICARE

## 2024-05-24 DIAGNOSIS — I69.390 APRAXIA AS LATE EFFECT OF CEREBROVASCULAR ACCIDENT (CVA): Primary | ICD-10-CM

## 2024-05-24 DIAGNOSIS — I69.320 APHASIA AS LATE EFFECT OF CEREBROVASCULAR ACCIDENT: ICD-10-CM

## 2024-05-24 PROCEDURE — 92507 TX SP LANG VOICE COMM INDIV: CPT | Mod: GN | Performed by: STUDENT IN AN ORGANIZED HEALTH CARE EDUCATION/TRAINING PROGRAM

## 2024-05-24 ASSESSMENT — PAIN - FUNCTIONAL ASSESSMENT: PAIN_FUNCTIONAL_ASSESSMENT: 0-10

## 2024-05-24 ASSESSMENT — PAIN SCALES - GENERAL: PAINLEVEL_OUTOF10: 0 - NO PAIN

## 2024-05-24 NOTE — PROGRESS NOTES
"Speech-Language Pathology    SLP Adult Outpatient Speech-Language Pathology Progress Note     Patient Name: Roslyn Cabrera \"Ivette"  MRN: [id number]  Today's Date: 5/24/2024  Time Calculation  Start Time: 1600  Stop Time: 1647  Time Calculation (min): 47 min       Current Problem:   1. 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 Aphasia as late effect of cerebrovascular accident [I69.320]            SLP Assessment:  SLP TX Intervention Outcome: Making Progress Towards Goals  Prognosis: Good    The patient demonstrated difficulty during conversation therapy to accurately convey a complete story/concept due to the frequency of phonemic errors and abandonments/restarts. The patient benefited from cues to focus on the initial phoneme before attempting a word and to attempt the base word of morphed words when applicable. The patient is initiating phone calls but does not text written messages. The patient was educated on the talk to text feature of her phone that may allow her to expand communication opportunities with friends and family. Instruction and demonstration for a grocery list reese ([employer]) was provided and will be addressed in future session. The patient may continue to benefit from skilled speech therapy services in order to improve her ability to express wants, needs and opinions and engage in meaningful conversation in the home, social and clinical settings.    Interventions:  Word level reading, aloud    Subjective:  Patient reports no new or worsening symptoms.     General Visit Information:  Certification Period Start Date: 05/02/24  Certification Period End Date: 07/31/24  Number of Authorized Treatments : 20  Total Number of Visits : 6    Plan:  Plan  Inpatient/Swing Bed or Outpatient: Outpatient  Treatment/Interventions: Expressive Language, Patient/family education  SLP TX Plan: Continue Plan of Care  SLP Plan: Skilled SLP  SLP Frequency: 2x per week  Duration: 12 " "weeks  Discussed POC: Patient  Discussed Risks/Benefits: Yes  Patient/Caregiver Agreeable: Yes    Objective Data & Progress:  Goals: Established 2/26/24  Short-term goals:     Patient will complete targeted expressive language tasks (including stating personal information, phrase/sentence completion, open-ended/WH questions) with 80% accuracy given minimal-moderate cues.  Status: Goalstatus: Ongoing, Progressing toward goal  Progress: Progressing as follows   -Patient was able to say her SS# but not her phone number   -Patient attempted to describe the recent theater production she attended; frequent phonemic distortions and abandoned sentences noted. The patient required mod cues to slow rate of speech. Conversation required significant concentration by the listener to follow along with the story but even then portions of the story weren't clear; clinical observation was 70% accurate    Previous:  -Complete the sentence: 90% acc, avg 4.6 words  -Complete the sentence: 90% with restarts   -Stating the conclusion of a story: 80% with restarts and min cues  -Using a given word in a sentence: 80%  -Using a given word in a sentence: 60%, errors with morphology and syntax; many restarts and general word finding difficulty   -Sentence generation using a given word: 50% accuracy to generate sentences because of word finding impairment but when produced, the patient could verbalize grammatically and syntactically correct sentences.  -Open ended situational questions: 92% indep; avg of 7 word responses  -Open ended/WH questions (\"why\"): 85% increased to 95% with self corrections or cues to slow  -Sentence completion (simple): 76% increased to 88%   -Complete the sentence (verbally presented), single word responses: 83% increased to 97% with indep self corrections    Patient will complete targeted receptive language tasks (including complex yes/no questions, following verbal/written commands, sentence comprehension) with 80% " "accuracy given minimal-moderate cues.  Status: Goalstatus: Ongoing  Progress: Goal not addressed this date     Previous:  -Sentence/paragraph level comprehension (text messages), FO4 multiple choice: Avg of 67% across 3 passages with accuracy decreasing with increased length of the passage (100%, 67%, 33%)  -Sentence level comprehension (match from FO4 options): 96%  -Sentence comprehension (complete the sentence): 96%   -Sentence comprehension: 80%   -Reading \"nonsense\" words with the same initial phonemes - task was too difficult therefore it was modified with real words    5. Patient will complete repetition tasks up 8-9 words in length with 80% accuracy with min cues/restarts/self-corrections. NEW GOAL Est 5/2/24  Status: Goalstatus: Ongoing  Progress: Progressing as follows   -Rhyming word sets: 86% with min cues and demo    Previous:  -Similar word pairs: 85%  -Words of increasing length (1-2-3 syllables) with allowance for restarts/self-corrections:     94% - 1 syllable base word    88%, 81% - 2 syllable expanded word    81%, 56% - 3 syllable expanded word   -Trisyllabic word repetition x3 (same words from above task): 94%     6. Patient will read words/phrases aloud with 80% accuracy and min cues. (Est 5/2/24)  Status: Goalstatus: Goal not addressed this date  Progress: Progressing as follows   -Trisyllabic words: 90% with min cues to slow down and focus on the initial letter/phoneme and the base word when applicable before attempting to verbalize the word    Previous:  -Bisyllabic words: 85% (17/20), improved to 100% with min cues or additional time    Pain:  Pain Assessment  Pain Assessment: 0-10  Pain Score: 0 - No pain     Outpatient Education:  Adult Outpatient Education  Individual(s) Educated: Patient  Written Education : Yes  Verbal Education : Yes  Risk and Benefits Discussed with Patient/Caregiver/Other: yes  Patient/Caregiver Demonstrated Understanding: yes  Patient Response to Education: " Patient/Caregiver Verbalized Understanding of Information

## 2024-05-28 ENCOUNTER — APPOINTMENT (OUTPATIENT)
Dept: SPEECH THERAPY | Facility: CLINIC | Age: 79
End: 2024-05-28
Payer: MEDICARE

## 2024-05-30 ENCOUNTER — TREATMENT (OUTPATIENT)
Dept: SPEECH THERAPY | Facility: CLINIC | Age: 79
End: 2024-05-30
Payer: MEDICARE

## 2024-05-30 DIAGNOSIS — I69.320 APHASIA AS LATE EFFECT OF CEREBROVASCULAR ACCIDENT: ICD-10-CM

## 2024-05-30 DIAGNOSIS — I69.390 APRAXIA AS LATE EFFECT OF CEREBROVASCULAR ACCIDENT (CVA): Primary | ICD-10-CM

## 2024-05-30 PROCEDURE — 92507 TX SP LANG VOICE COMM INDIV: CPT | Mod: GN | Performed by: STUDENT IN AN ORGANIZED HEALTH CARE EDUCATION/TRAINING PROGRAM

## 2024-05-30 ASSESSMENT — PAIN SCALES - GENERAL: PAINLEVEL_OUTOF10: 0 - NO PAIN

## 2024-05-30 ASSESSMENT — PAIN - FUNCTIONAL ASSESSMENT: PAIN_FUNCTIONAL_ASSESSMENT: 0-10

## 2024-05-30 NOTE — PROGRESS NOTES
"Speech-Language Pathology    SLP Adult Outpatient Speech-Language Pathology Progress Note     Patient Name: Roslyn Cabrera \"Pam\"  MRN: [id number]  Today's Date: 5/30/2024  Time Calculation  Start Time: 1445  Stop Time: 1530  Time Calculation (min): 45 min       Current Problem:   1. 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 Aphasia as late effect of cerebrovascular accident            SLP Assessment:  SLP TX Intervention Outcome: Making Progress Towards Goals  Prognosis: Good    The patient demonstrated difficulty correctly identifying the initial letter when trying to read aloud rhyming word sets. Once the correct letter was identified she was better able to produce the word however restarts and inconsistency was prevalent.  The patient may continue to benefit from skilled speech therapy services in order to improve her ability to express wants, needs and opinions and engage in meaningful conversation in the home, social and clinical settings.    Interventions:  Word level reading, aloud    Subjective:  Patient reports no new or worsening symptoms.     General Visit Information:  Certification Period Start Date: 05/02/24  Certification Period End Date: 07/31/24  Number of Authorized Treatments : 20  Total Number of Visits : 7    Plan:  Plan  Inpatient/Swing Bed or Outpatient: Outpatient  Treatment/Interventions: Expressive Language  SLP TX Plan: Continue Plan of Care  SLP Plan: Skilled SLP  SLP Frequency: 2x per week  Duration: 12 weeks  Discussed POC: Patient  Discussed Risks/Benefits: Yes  Patient/Caregiver Agreeable: Yes    Objective Data & Progress:  Goals: Established 2/26/24  Short-term goals:     Patient will complete targeted expressive language tasks (including stating personal information, phrase/sentence completion, open-ended/WH questions) with 80% accuracy given minimal-moderate cues.  Status: Goalstatus: Ongoing, Progressing toward goal  Progress: Progressing as " "follows     Previous:  -Patient was able to say her SS# but not her phone number   -Patient attempted to describe the recent theater production she attended; frequent phonemic distortions and abandoned sentences noted. The patient required mod cues to slow rate of speech. Conversation required significant concentration by the listener to follow along with the story but even then portions of the story weren't clear; clinical observation was 70% accurate  -Complete the sentence: 90% acc, avg 4.6 words  -Complete the sentence: 90% with restarts   -Stating the conclusion of a story: 80% with restarts and min cues  -Using a given word in a sentence: 80%  -Using a given word in a sentence: 60%, errors with morphology and syntax; many restarts and general word finding difficulty   -Sentence generation using a given word: 50% accuracy to generate sentences because of word finding impairment but when produced, the patient could verbalize grammatically and syntactically correct sentences.  -Open ended situational questions: 92% indep; avg of 7 word responses  -Open ended/WH questions (\"why\"): 85% increased to 95% with self corrections or cues to slow  -Sentence completion (simple): 76% increased to 88%   -Complete the sentence (verbally presented), single word responses: 83% increased to 97% with indep self corrections    Patient will complete targeted receptive language tasks (including complex yes/no questions, following verbal/written commands, sentence comprehension) with 80% accuracy given minimal-moderate cues.  Status: Goalstatus: Ongoing  Progress: Goal not addressed this date     Previous:  -Sentence/paragraph level comprehension (text messages), FO4 multiple choice: Avg of 67% across 3 passages with accuracy decreasing with increased length of the passage (100%, 67%, 33%)  -Sentence level comprehension (match from FO4 options): 96%  -Sentence comprehension (complete the sentence): 96%   -Sentence comprehension: 80% " "  -Reading \"nonsense\" words with the same initial phonemes - task was too difficult therefore it was modified with real words    5. Patient will complete repetition tasks up 8-9 words in length with 80% accuracy with min cues/restarts/self-corrections. NEW GOAL Est 5/2/24  Status: Goalstatus: Ongoing  Progress: Progressing as follows   -Rhyming word sets: 94% requiring mod cues and demo; difficulty correctly identifying the initial letter and corresponding phoneme    Previous:  -Rhyming word sets: 86% with min cues and demo  -Similar word pairs: 85%  -Words of increasing length (1-2-3 syllables) with allowance for restarts/self-corrections:     94% - 1 syllable base word    88%, 81% - 2 syllable expanded word    81%, 56% - 3 syllable expanded word   -Trisyllabic word repetition x3 (same words from above task): 94%     6. Patient will read words/phrases aloud with 80% accuracy and min cues. (Est 5/2/24)  Status: Goalstatus: Goal not addressed this date  Progress: Progressing as follows   -Trisyllabic words: 65% increased to 95% with min cues    Previous:  -Trisyllabic words: 90% with min cues to slow down and focus on the initial letter/phoneme and the base word when applicable before attempting to verbalize the word  -Bisyllabic words: 85% (17/20), improved to 100% with min cues or additional time    Pain:  Pain Assessment  Pain Assessment: 0-10  Pain Score: 0 - No pain     Outpatient Education:  Adult Outpatient Education  Individual(s) Educated: Patient  Written Education : Yes  Verbal Education : Yes  Risk and Benefits Discussed with Patient/Caregiver/Other: yes  Patient/Caregiver Demonstrated Understanding: yes  Patient Response to Education: Patient/Caregiver Verbalized Understanding of Information       "

## 2024-05-31 DIAGNOSIS — I10 BENIGN ESSENTIAL HYPERTENSION: ICD-10-CM

## 2024-05-31 RX ORDER — CARVEDILOL 12.5 MG/1
12.5 TABLET ORAL 2 TIMES DAILY
Qty: 180 TABLET | Refills: 1 | Status: SHIPPED | OUTPATIENT
Start: 2024-05-31 | End: 2024-06-05 | Stop reason: SDUPTHER

## 2024-06-05 DIAGNOSIS — I10 BENIGN ESSENTIAL HYPERTENSION: ICD-10-CM

## 2024-06-05 RX ORDER — CARVEDILOL 12.5 MG/1
12.5 TABLET ORAL 2 TIMES DAILY
Qty: 180 TABLET | Refills: 3 | Status: SHIPPED | OUTPATIENT
Start: 2024-06-05

## 2024-06-06 ENCOUNTER — TREATMENT (OUTPATIENT)
Dept: SPEECH THERAPY | Facility: CLINIC | Age: 79
End: 2024-06-06
Payer: MEDICARE

## 2024-06-06 DIAGNOSIS — I69.320 APHASIA AS LATE EFFECT OF CEREBROVASCULAR ACCIDENT: ICD-10-CM

## 2024-06-06 DIAGNOSIS — I69.390 APRAXIA AS LATE EFFECT OF CEREBROVASCULAR ACCIDENT (CVA): Primary | ICD-10-CM

## 2024-06-06 PROCEDURE — 92507 TX SP LANG VOICE COMM INDIV: CPT | Mod: GN | Performed by: STUDENT IN AN ORGANIZED HEALTH CARE EDUCATION/TRAINING PROGRAM

## 2024-06-06 ASSESSMENT — PAIN - FUNCTIONAL ASSESSMENT: PAIN_FUNCTIONAL_ASSESSMENT: 0-10

## 2024-06-06 ASSESSMENT — PAIN SCALES - GENERAL: PAINLEVEL_OUTOF10: 0 - NO PAIN

## 2024-06-06 NOTE — PROGRESS NOTES
"Speech-Language Pathology    SLP Adult Outpatient Speech-Language Pathology Progress Note     Patient Name: Roslyn Cabrera \"Pam\"  MRN: [id number]  Today's Date: 6/6/2024  Time Calculation  Start Time: 1310  Stop Time: 1350  Time Calculation (min): 40 min       Current Problem:   1. 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 Aphasia as late effect of cerebrovascular accident            SLP Assessment:  SLP TX Intervention Outcome: Making Progress Towards Goals  Prognosis: Good    The patient arrived for the therapy session in a good mood and very talkative. The patient is at baseline a very fast talker and socially active. She shared quite a bit of information about the longevity of her makeup mirror that needed a minor repair and some anecdotes about her friend who is currently staying with her for a short time. The patient's speech is still characterized by phonemic errors and restarts. Today her communication was more effective despite these characteristics. Most notable was the rapid rate of speech with which she was speaking making it difficulty to ask, respond or ask clarifying questions. Upon conclusion the patient was visibly out of breath and reported strain in her upper abdomen. The patient reports she often feels like she has to rush her speech so she doesn't forget what she wants to say. She also gets frustrated when people restate her message in a way that does not fully align with her original intent. As a result the session focused on counseling, support and education for communication strategies. Education and demonstration regarding diaphragmatic breathing and coordination of phonation and respiration was provided. The patient was encouraged to briefly think through and prepare her message to more effectively manage motor planning, word choice, and respiration in the setting of her \"new normal\" speech mannerisms. She was encouraged to inform communication partners when she " needs extra time to reduce any time pressure she might experience. Skilled speech therapy services are warranted in order to improve the patient's ability to express wants, needs and opinions and engage in meaningful conversation in the home, social and clinical settings with limited distress.    Interventions:  Communication Strategies, see assessment    Subjective:  Patient was exceptionally talkative this date. No new or worsening symptoms.     General Visit Information:  Certification Period Start Date: 05/02/24  Certification Period End Date: 07/31/24  Number of Authorized Treatments : 20  Total Number of Visits : 8    Plan:  Plan  Inpatient/Swing Bed or Outpatient: Outpatient  Treatment/Interventions: Communication functioning  SLP TX Plan: Continue Plan of Care  SLP Plan: Skilled SLP  SLP Frequency: 2x per week  Duration: 12 weeks  Discussed POC: Patient  Discussed Risks/Benefits: Yes  Patient/Caregiver Agreeable: Yes    Objective Data & Progress:  Goals: Established 2/26/24  Short-term goals:     Patient will complete targeted expressive language tasks (including stating personal information, phrase/sentence completion, open-ended/WH questions) with 80% accuracy given minimal-moderate cues.  Status: Goalstatus: Ongoing, Progressing toward goal  Progress: Progressing as follows     Previous:  -Patient was able to say her SS# but not her phone number   -Patient attempted to describe the recent theater production she attended; frequent phonemic distortions and abandoned sentences noted. The patient required mod cues to slow rate of speech. Conversation required significant concentration by the listener to follow along with the story but even then portions of the story weren't clear; clinical observation was 70% accurate  -Complete the sentence: 90% acc, avg 4.6 words  -Complete the sentence: 90% with restarts   -Stating the conclusion of a story: 80% with restarts and min cues  -Using a given word in a  "sentence: 80%  -Using a given word in a sentence: 60%, errors with morphology and syntax; many restarts and general word finding difficulty   -Sentence generation using a given word: 50% accuracy to generate sentences because of word finding impairment but when produced, the patient could verbalize grammatically and syntactically correct sentences.  -Open ended situational questions: 92% indep; avg of 7 word responses  -Open ended/WH questions (\"why\"): 85% increased to 95% with self corrections or cues to slow  -Sentence completion (simple): 76% increased to 88%   -Complete the sentence (verbally presented), single word responses: 83% increased to 97% with indep self corrections    Patient will complete targeted receptive language tasks (including complex yes/no questions, following verbal/written commands, sentence comprehension) with 80% accuracy given minimal-moderate cues.  Status: Goalstatus: Ongoing  Progress: Goal not addressed this date     Previous:  -Sentence/paragraph level comprehension (text messages), FO4 multiple choice: Avg of 67% across 3 passages with accuracy decreasing with increased length of the passage (100%, 67%, 33%)  -Sentence level comprehension (match from FO4 options): 96%  -Sentence comprehension (complete the sentence): 96%   -Sentence comprehension: 80%   -Reading \"nonsense\" words with the same initial phonemes - task was too difficult therefore it was modified with real words    5. Patient will complete repetition tasks up 8-9 words in length with 80% accuracy with min cues/restarts/self-corrections. NEW GOAL Est 5/2/24  Status: Goalstatus: Ongoing  Progress: Progressing as follows   -Rhyming word sets: 94% requiring mod cues and demo; difficulty correctly identifying the initial letter and corresponding phoneme    Previous:  -Rhyming word sets: 86% with min cues and demo  -Similar word pairs: 85%  -Words of increasing length (1-2-3 syllables) with allowance for " restarts/self-corrections:     94% - 1 syllable base word    88%, 81% - 2 syllable expanded word    81%, 56% - 3 syllable expanded word   -Trisyllabic word repetition x3 (same words from above task): 94%     6. Patient will read words/phrases aloud with 80% accuracy and min cues. (Est 5/2/24)  Status: Goalstatus: Goal not addressed this date  Progress: Goal not addressed this date     Previous:  -Trisyllabic words: 65% increased to 95% with min cues  -Trisyllabic words: 90% with min cues to slow down and focus on the initial letter/phoneme and the base word when applicable before attempting to verbalize the word  -Bisyllabic words: 85% (17/20), improved to 100% with min cues or additional time    Pain:  Pain Assessment  Pain Assessment: 0-10  Pain Score: 0 - No pain     Outpatient Education:  Adult Outpatient Education  Individual(s) Educated: Patient  Written Education : Yes  Verbal Education : Yes  Risk and Benefits Discussed with Patient/Caregiver/Other: yes  Patient/Caregiver Demonstrated Understanding: yes  Patient Response to Education: Patient/Caregiver Verbalized Understanding of Information

## 2024-06-07 ENCOUNTER — OFFICE VISIT (OUTPATIENT)
Dept: OPHTHALMOLOGY | Facility: CLINIC | Age: 79
End: 2024-06-07
Payer: MEDICARE

## 2024-06-07 DIAGNOSIS — H40.1133 PRIMARY OPEN ANGLE GLAUCOMA (POAG) OF BOTH EYES, SEVERE STAGE: Primary | ICD-10-CM

## 2024-06-07 PROCEDURE — 99214 OFFICE O/P EST MOD 30 MIN: CPT | Performed by: OPHTHALMOLOGY

## 2024-06-07 ASSESSMENT — PACHYMETRY
OS_CT(UM): 564
OD_CT(UM): 556

## 2024-06-07 ASSESSMENT — ENCOUNTER SYMPTOMS
ALLERGIC/IMMUNOLOGIC NEGATIVE: 0
ENDOCRINE NEGATIVE: 0
NEUROLOGICAL NEGATIVE: 0
RESPIRATORY NEGATIVE: 0
EYES NEGATIVE: 1
GASTROINTESTINAL NEGATIVE: 0
CONSTITUTIONAL NEGATIVE: 0
CARDIOVASCULAR NEGATIVE: 0
MUSCULOSKELETAL NEGATIVE: 0
PSYCHIATRIC NEGATIVE: 0
HEMATOLOGIC/LYMPHATIC NEGATIVE: 0

## 2024-06-07 ASSESSMENT — VISUAL ACUITY
OS_CC: 20/25
METHOD: SNELLEN - SINGLE
CORRECTION_TYPE: GLASSES
OD_CC: 20/25

## 2024-06-07 ASSESSMENT — TONOMETRY
IOP_METHOD: GOLDMANN APPLANATION
OD_IOP_MMHG: 19
OS_IOP_MMHG: 18

## 2024-06-07 ASSESSMENT — EXTERNAL EXAM - LEFT EYE: OS_EXAM: NORMAL

## 2024-06-07 ASSESSMENT — SLIT LAMP EXAM - LIDS
COMMENTS: GOOD POSITION, 2+ MGD
COMMENTS: GOOD POSITION, 2+ MGD

## 2024-06-07 ASSESSMENT — EXTERNAL EXAM - RIGHT EYE: OD_EXAM: NORMAL

## 2024-06-07 NOTE — PROGRESS NOTES
1. Advanced POAG OU   - Prev cared for by Dr. Dave (private practice ophthalmologist in [place]) but never diagnosed with glaucoma   - Tmax 21/24 per our records, /564, FH neg   - No trauma, prolonged steroid use, cbd oil   - Gonio open 360   - HVF 24-2 (8/22/23): OD inf>sup arcuate; OS dense sup>inf arcuate . both stable from previous   - OCT RNFL (8/22/23): sup/inf thinning OU, stable from previous   - s/p SLT OU in August 2022, excellent response  IOP remains above goal but some improvement from pre-laser, will give it a little longer   -goal IOP <15   -continue latan qhs OU and qam timolol ou -   -PATIENT is poor surgical candidate right now given recent stroke  Rtc 6-8 weeks for repeat IOP check  Long discussion with patient re-not meeting criteria for driving given degree of field loss. She was very tearful. I have [facility] referral for both vision related occupational therapy and support groups that can help her cope with new normal      2. Gilbert Bonnet phenomenon    - Pt describing visual hallucinations   - As per Dr. Dumont      3. Pseudophakia OU   - Monitor      4. AMD OU   - Mac OCT (3/17/22): flat OU   - As per Dr. Pickens

## 2024-06-10 ENCOUNTER — TREATMENT (OUTPATIENT)
Dept: SPEECH THERAPY | Facility: CLINIC | Age: 79
End: 2024-06-10
Payer: MEDICARE

## 2024-06-10 DIAGNOSIS — I69.320 APHASIA AS LATE EFFECT OF CEREBROVASCULAR ACCIDENT: ICD-10-CM

## 2024-06-10 DIAGNOSIS — I69.390 APRAXIA AS LATE EFFECT OF CEREBROVASCULAR ACCIDENT (CVA): Primary | ICD-10-CM

## 2024-06-10 PROCEDURE — 92507 TX SP LANG VOICE COMM INDIV: CPT | Mod: GN | Performed by: STUDENT IN AN ORGANIZED HEALTH CARE EDUCATION/TRAINING PROGRAM

## 2024-06-10 ASSESSMENT — PAIN SCALES - GENERAL: PAINLEVEL_OUTOF10: 0 - NO PAIN

## 2024-06-10 ASSESSMENT — PAIN - FUNCTIONAL ASSESSMENT: PAIN_FUNCTIONAL_ASSESSMENT: 0-10

## 2024-06-10 NOTE — PROGRESS NOTES
"Speech-Language Pathology    SLP Adult Outpatient Speech-Language Pathology Progress Note     Patient Name: Roslyn Cabrera \"Pam\"  MRN: [id number]  Today's Date: 6/10/2024  Time Calculation  Start Time: 1030  Stop Time: 1115  Time Calculation (min): 45 min       Current Problem:   1. 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 Aphasia as late effect of cerebrovascular accident            SLP Assessment:  SLP TX Intervention Outcome: Making Progress Towards Goals  Prognosis: Good    The patient was actively engaged in therapeutic tasks targeting motor planning for speech using consonants and blends plus a vowel to create words and non-words. The patient demonstrated inconsistent, mild-mod difficulty recognizing graphemes and the associated phoneme. Skilled speech therapy services are warranted in order to improve the patient's ability to express wants, needs and opinions and engage in meaningful conversation in the home, social and clinical settings with limited distress.    Interventions:  Communication Strategies, see assessment    Subjective:  Patient reports no new or worsening symptoms. Her doctor confirmed she can no longer drive because of her vision.    General Visit Information:  Certification Period Start Date: 05/02/24  Certification Period End Date: 07/31/24  Number of Authorized Treatments : 20  Total Number of Visits : 9    Plan:  Plan  Inpatient/Swing Bed or Outpatient: Outpatient  Treatment/Interventions: Communication functioning  SLP TX Plan: Continue Plan of Care  SLP Plan: Skilled SLP  SLP Frequency: 2x per week  Duration: 12 weeks  Discussed POC: Patient  Discussed Risks/Benefits: Yes  Patient/Caregiver Agreeable: Yes    Objective Data & Progress:  Goals: Established 2/26/24  Short-term goals:     Patient will complete targeted expressive language tasks (including stating personal information, phrase/sentence completion, open-ended/WH questions) with 80% accuracy given " "minimal-moderate cues.  Status: Goalstatus: Ongoing, Progressing toward goal  Progress: Progressing as follows     Previous:  -Patient was able to say her SS# but not her phone number   -Patient attempted to describe the recent theater production she attended; frequent phonemic distortions and abandoned sentences noted. The patient required mod cues to slow rate of speech. Conversation required significant concentration by the listener to follow along with the story but even then portions of the story weren't clear; clinical observation was 70% accurate  -Complete the sentence: 90% acc, avg 4.6 words  -Complete the sentence: 90% with restarts   -Stating the conclusion of a story: 80% with restarts and min cues  -Using a given word in a sentence: 80%  -Using a given word in a sentence: 60%, errors with morphology and syntax; many restarts and general word finding difficulty   -Sentence generation using a given word: 50% accuracy to generate sentences because of word finding impairment but when produced, the patient could verbalize grammatically and syntactically correct sentences.  -Open ended situational questions: 92% indep; avg of 7 word responses  -Open ended/WH questions (\"why\"): 85% increased to 95% with self corrections or cues to slow  -Sentence completion (simple): 76% increased to 88%   -Complete the sentence (verbally presented), single word responses: 83% increased to 97% with indep self corrections    Patient will complete targeted receptive language tasks (including complex yes/no questions, following verbal/written commands, sentence comprehension) with 80% accuracy given minimal-moderate cues.  Status: Goalstatus: Ongoing  Progress: Goal not addressed this date     Previous:  -Sentence/paragraph level comprehension (text messages), FO4 multiple choice: Avg of 67% across 3 passages with accuracy decreasing with increased length of the passage (100%, 67%, 33%)  -Sentence level comprehension (match from " "FO4 options): 96%  -Sentence comprehension (complete the sentence): 96%   -Sentence comprehension: 80%   -Reading \"nonsense\" words with the same initial phonemes - task was too difficult therefore it was modified with real words    5. Patient will complete repetition tasks up 8-9 words in length with 80% accuracy with min cues/restarts/self-corrections. NEW GOAL Est 5/2/24  Status: Goalstatus: Ongoing  Progress: Progressing as follows   -Consonant/consonant blends + vowel production: /ah/ - 71% increased to 83% with min cues; /ee/ - 77% increased to 83% with min cues    Previous:  -Rhyming word sets: 94% requiring mod cues and demo; difficulty correctly identifying the initial letter and corresponding phoneme  -Rhyming word sets: 86% with min cues and demo  -Similar word pairs: 85%  -Words of increasing length (1-2-3 syllables) with allowance for restarts/self-corrections:     94% - 1 syllable base word    88%, 81% - 2 syllable expanded word    81%, 56% - 3 syllable expanded word   -Trisyllabic word repetition x3 (same words from above task): 94%     6. Patient will read words/phrases aloud with 80% accuracy and min cues. (Est 5/2/24)  Status: Goalstatus: Goal not addressed this date  Progress: Goal not addressed this date   -Consonant/consonant blends + vowel production (words, non-words): /ah/ - 71% increased to 83% with min cues; /ee/ - 77% increased to 83% with min cues    Previous:  -Trisyllabic words: 65% increased to 95% with min cues  -Trisyllabic words: 90% with min cues to slow down and focus on the initial letter/phoneme and the base word when applicable before attempting to verbalize the word  -Bisyllabic words: 85% (17/20), improved to 100% with min cues or additional time    Pain:  Pain Assessment  Pain Assessment: 0-10  Pain Score: 0 - No pain     Outpatient Education:  Adult Outpatient Education  Individual(s) Educated: Patient  Written Education : Yes  Verbal Education : Yes  Risk and Benefits " Discussed with Patient/Caregiver/Other: yes  Patient/Caregiver Demonstrated Understanding: yes  Patient Response to Education: Patient/Caregiver Verbalized Understanding of Information

## 2024-06-13 ENCOUNTER — APPOINTMENT (OUTPATIENT)
Dept: SPEECH THERAPY | Facility: CLINIC | Age: 79
End: 2024-06-13
Payer: MEDICARE

## 2024-06-17 ENCOUNTER — APPOINTMENT (OUTPATIENT)
Dept: SPEECH THERAPY | Facility: CLINIC | Age: 79
End: 2024-06-17
Payer: MEDICARE

## 2024-06-17 DIAGNOSIS — I69.390 APRAXIA AS LATE EFFECT OF CEREBROVASCULAR ACCIDENT (CVA): Primary | ICD-10-CM

## 2024-06-17 DIAGNOSIS — I69.320 APHASIA AS LATE EFFECT OF CEREBROVASCULAR ACCIDENT: ICD-10-CM

## 2024-06-17 PROCEDURE — 92507 TX SP LANG VOICE COMM INDIV: CPT | Mod: GN | Performed by: STUDENT IN AN ORGANIZED HEALTH CARE EDUCATION/TRAINING PROGRAM

## 2024-06-17 ASSESSMENT — PAIN SCALES - GENERAL: PAINLEVEL_OUTOF10: 0 - NO PAIN

## 2024-06-17 ASSESSMENT — PAIN - FUNCTIONAL ASSESSMENT: PAIN_FUNCTIONAL_ASSESSMENT: 0-10

## 2024-06-17 NOTE — PROGRESS NOTES
"Speech-Language Pathology    SLP Adult Outpatient Speech-Language Pathology Progress Note     Patient Name: Roslyn Cabrera \"Ivette"  MRN: [id number]  Today's Date: 6/17/2024  Time Calculation  Start Time: 1035  Stop Time: 1115  Time Calculation (min): 40 min       Current Problem:   1. 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 Aphasia as late effect of cerebrovascular accident [I69.320]            SLP Assessment:  SLP TX Intervention Outcome: Making Progress Towards Goals  Prognosis: Good    The patient was actively engaged in therapeutic tasks targeting motor planning while repeating 8-9 word sentences and receptive language skills. The patient demonstrated improvement with a reading comprehension task. The patient demonstrated inconsistent, mild-mod difficulty recognizing initial graphemes and the associated phoneme. Skilled speech therapy services are warranted in order to improve the patient's ability to express wants, needs and opinions and engage in meaningful conversation in the home, social and clinical settings with limited distress.    Interventions:  Communication Strategies, see assessment    Subjective:  Patient reports no new or worsening symptoms. Her doctor confirmed she can no longer drive because of her vision.    General Visit Information:  Certification Period Start Date: 05/02/24  Certification Period End Date: 07/31/24  Number of Authorized Treatments : 20  Total Number of Visits : 10    Plan:  Plan  Inpatient/Swing Bed or Outpatient: Outpatient  Treatment/Interventions: Communication functioning  SLP TX Plan: Continue Plan of Care  SLP Plan: Skilled SLP  SLP Frequency: 2x per week  Duration: 12 weeks  Discussed POC: Patient  Discussed Risks/Benefits: Yes  Patient/Caregiver Agreeable: Yes    Objective Data & Progress:  Goals: Established 2/26/24  Short-term goals:     Patient will complete targeted expressive language tasks (including stating personal " "information, phrase/sentence completion, open-ended/WH questions) with 80% accuracy given minimal-moderate cues.  Status: Goalstatus: Ongoing, Progressing toward goal  Progress: Progressing as follows     Previous:  -Patient was able to say her SS# but not her phone number   -Patient attempted to describe the recent theater production she attended; frequent phonemic distortions and abandoned sentences noted. The patient required mod cues to slow rate of speech. Conversation required significant concentration by the listener to follow along with the story but even then portions of the story weren't clear; clinical observation was 70% accurate  -Complete the sentence: 90% acc, avg 4.6 words  -Complete the sentence: 90% with restarts   -Stating the conclusion of a story: 80% with restarts and min cues  -Using a given word in a sentence: 80%  -Using a given word in a sentence: 60%, errors with morphology and syntax; many restarts and general word finding difficulty   -Sentence generation using a given word: 50% accuracy to generate sentences because of word finding impairment but when produced, the patient could verbalize grammatically and syntactically correct sentences.  -Open ended situational questions: 92% indep; avg of 7 word responses  -Open ended/WH questions (\"why\"): 85% increased to 95% with self corrections or cues to slow  -Sentence completion (simple): 76% increased to 88%   -Complete the sentence (verbally presented), single word responses: 83% increased to 97% with indep self corrections    Patient will complete targeted receptive language tasks (including complex yes/no questions, following verbal/written commands, sentence comprehension) with 80% accuracy given minimal-moderate cues.  Status: Goalstatus: Ongoing  Progress: Goal not addressed this date   - -Sentence/paragraph level comprehension (text messages), FO4 multiple choice: 83% (5/6)    Previous:  -Sentence/paragraph level comprehension (text " "messages), FO4 multiple choice: Avg of 67% across 3 passages with accuracy decreasing with increased length of the passage (100%, 67%, 33%)  -Sentence level comprehension (match from FO4 options): 96%  -Sentence comprehension (complete the sentence): 96%   -Sentence comprehension: 80%   -Reading \"nonsense\" words with the same initial phonemes - task was too difficult therefore it was modified with real words    5. Patient will complete repetition tasks up 8-9 words in length with 80% accuracy with min cues/restarts/self-corrections. NEW GOAL Est 5/2/24  Status: Goalstatus: Ongoing  Progress: Progressing as follows   -Repeating 8-9 word sentences while looking at the written prompt: 88% with allowance for restarts and min-mod cueing; accuracy did not change with the second attempt. Initial phonemes continue to pose the greatest challenge    Previous:  -Rhyming word sets: 94% requiring mod cues and demo; difficulty correctly identifying the initial letter and corresponding phoneme  -Rhyming word sets: 86% with min cues and demo  -Similar word pairs: 85%  -Words of increasing length (1-2-3 syllables) with allowance for restarts/self-corrections:     94% - 1 syllable base word    88%, 81% - 2 syllable expanded word    81%, 56% - 3 syllable expanded word   -Trisyllabic word repetition x3 (same words from above task): 94%     6. Patient will read words/phrases aloud with 80% accuracy and min cues. (Est 5/2/24)  Status: Goalstatus: Goal not addressed this date  Progress: Goal not addressed this date     Previous:  -Consonant/consonant blends + vowel production (words, non-words): /ah/ - 71% increased to 83% with min cues; /ee/ - 77% increased to 83% with min cues  -Trisyllabic words: 65% increased to 95% with min cues  -Trisyllabic words: 90% with min cues to slow down and focus on the initial letter/phoneme and the base word when applicable before attempting to verbalize the word  -Bisyllabic words: 85% (17/20), improved " to 100% with min cues or additional time    Pain:  Pain Assessment  Pain Assessment: 0-10  Pain Score: 0 - No pain     Outpatient Education:  Adult Outpatient Education  Individual(s) Educated: Patient  Written Education : Yes  Verbal Education : Yes  Risk and Benefits Discussed with Patient/Caregiver/Other: yes  Patient/Caregiver Demonstrated Understanding: yes  Patient Response to Education: Patient/Caregiver Verbalized Understanding of Information

## 2024-06-21 ENCOUNTER — APPOINTMENT (OUTPATIENT)
Dept: SPEECH THERAPY | Facility: CLINIC | Age: 79
End: 2024-06-21
Payer: MEDICARE

## 2024-06-24 ENCOUNTER — HOSPITAL ENCOUNTER (OUTPATIENT)
Dept: CARDIOLOGY | Facility: CLINIC | Age: 79
Discharge: HOME | End: 2024-06-24
Payer: MEDICARE

## 2024-06-24 ENCOUNTER — APPOINTMENT (OUTPATIENT)
Dept: SPEECH THERAPY | Facility: CLINIC | Age: 79
End: 2024-06-24
Payer: MEDICARE

## 2024-06-24 DIAGNOSIS — I63.412 STROKE DUE TO EMBOLISM OF LEFT MIDDLE CEREBRAL ARTERY (MULTI): Primary | ICD-10-CM

## 2024-06-24 DIAGNOSIS — D64.9 ANEMIA, UNSPECIFIED TYPE: ICD-10-CM

## 2024-06-24 DIAGNOSIS — I69.390 APRAXIA AS LATE EFFECT OF CEREBROVASCULAR ACCIDENT (CVA): Primary | ICD-10-CM

## 2024-06-24 DIAGNOSIS — E78.5 DYSLIPIDEMIA: ICD-10-CM

## 2024-06-24 DIAGNOSIS — E55.9 VITAMIN D DEFICIENCY: ICD-10-CM

## 2024-06-24 DIAGNOSIS — R00.1 BRADYCARDIA ON ECG: ICD-10-CM

## 2024-06-24 DIAGNOSIS — I69.390 APRAXIA AS LATE EFFECT OF CEREBROVASCULAR ACCIDENT (CVA): ICD-10-CM

## 2024-06-24 DIAGNOSIS — I10 BENIGN ESSENTIAL HTN: ICD-10-CM

## 2024-06-24 DIAGNOSIS — Z95.0 PACEMAKER: ICD-10-CM

## 2024-06-24 DIAGNOSIS — I69.320 APHASIA AS LATE EFFECT OF CEREBROVASCULAR ACCIDENT: ICD-10-CM

## 2024-06-24 PROCEDURE — 93298 REM INTERROG DEV EVAL SCRMS: CPT | Performed by: INTERNAL MEDICINE

## 2024-06-24 PROCEDURE — 92507 TX SP LANG VOICE COMM INDIV: CPT | Mod: GN | Performed by: STUDENT IN AN ORGANIZED HEALTH CARE EDUCATION/TRAINING PROGRAM

## 2024-06-24 PROCEDURE — 93298 REM INTERROG DEV EVAL SCRMS: CPT

## 2024-06-24 ASSESSMENT — PAIN SCALES - GENERAL: PAINLEVEL_OUTOF10: 0 - NO PAIN

## 2024-06-24 ASSESSMENT — PAIN - FUNCTIONAL ASSESSMENT: PAIN_FUNCTIONAL_ASSESSMENT: 0-10

## 2024-06-24 NOTE — PROGRESS NOTES
"Speech-Language Pathology    SLP Adult Outpatient Speech-Language Pathology Progress Note     Patient Name: Roslyn Cabrera \"Ivette"  MRN: [id number]  Today's Date: 6/24/2024  Time Calculation  Start Time: 1115  Stop Time: 1158  Time Calculation (min): 43 min       Current Problem:   1. 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 Aphasia as late effect of cerebrovascular accident [I69.320]            SLP Assessment:  SLP TX Intervention Outcome: Making Progress Towards Goals  Prognosis: Good    The patient was actively engaged in therapeutic tasks targeting motor planning to read words aloud. The patient demonstrated improvement producing rhyming word sets. She demonstrated difficulty with /l/ and /d/ phonemes this date. Skilled speech therapy services are warranted in order to improve the patient's ability to express wants, needs and opinions and engage in meaningful conversation in the home, social and clinical settings with limited distress.    Interventions:  Communication Strategies, see assessment    Subjective:  Patient reports no new or worsening symptoms. Her doctor confirmed she can no longer drive because of her vision.    General Visit Information:  Certification Period Start Date: 05/02/24  Certification Period End Date: 07/31/24  Number of Authorized Treatments : 20  Total Number of Visits : 11    Plan:  Plan  Inpatient/Swing Bed or Outpatient: Outpatient  Treatment/Interventions: Communication functioning  SLP TX Plan: Continue Plan of Care  SLP Plan: Skilled SLP  SLP Frequency: 2x per week  Duration: 12 weeks  Discussed POC: Patient  Discussed Risks/Benefits: Yes  Patient/Caregiver Agreeable: Yes    Objective Data & Progress:  Goals: Established 2/26/24  Short-term goals:     Patient will complete targeted expressive language tasks (including stating personal information, phrase/sentence completion, open-ended/WH questions) with 80% accuracy given minimal-moderate " "cues.  Status: Goalstatus: Ongoing, Progressing toward goal  Progress: Progressing as follows     Previous:  -Patient was able to say her SS# but not her phone number   -Patient attempted to describe the recent theater production she attended; frequent phonemic distortions and abandoned sentences noted. The patient required mod cues to slow rate of speech. Conversation required significant concentration by the listener to follow along with the story but even then portions of the story weren't clear; clinical observation was 70% accurate  -Complete the sentence: 90% acc, avg 4.6 words  -Complete the sentence: 90% with restarts   -Stating the conclusion of a story: 80% with restarts and min cues  -Using a given word in a sentence: 80%  -Using a given word in a sentence: 60%, errors with morphology and syntax; many restarts and general word finding difficulty   -Sentence generation using a given word: 50% accuracy to generate sentences because of word finding impairment but when produced, the patient could verbalize grammatically and syntactically correct sentences.  -Open ended situational questions: 92% indep; avg of 7 word responses  -Open ended/WH questions (\"why\"): 85% increased to 95% with self corrections or cues to slow  -Sentence completion (simple): 76% increased to 88%   -Complete the sentence (verbally presented), single word responses: 83% increased to 97% with indep self corrections    Patient will complete targeted receptive language tasks (including complex yes/no questions, following verbal/written commands, sentence comprehension) with 80% accuracy given minimal-moderate cues.  Status: Goalstatus: Ongoing  Progress: Goal not addressed this date   - -Sentence/paragraph level comprehension (text messages), FO4 multiple choice: 83% (5/6)    Previous:  -Sentence/paragraph level comprehension (text messages), FO4 multiple choice: Avg of 67% across 3 passages with accuracy decreasing with increased length " "of the passage (100%, 67%, 33%)  -Sentence level comprehension (match from FO4 options): 96%  -Sentence comprehension (complete the sentence): 96%   -Sentence comprehension: 80%   -Reading \"nonsense\" words with the same initial phonemes - task was too difficult therefore it was modified with real words    5. Patient will complete repetition tasks up 8-9 words in length with 80% accuracy with min cues/restarts/self-corrections. Est 5/2/24  Status: Goalstatus: Ongoing  Progress: Progressing as follows     Previous:  -Repeating 8-9 word sentences while looking at the written prompt: 88% with allowance for restarts and min-mod cueing; accuracy did not change with the second attempt. Initial phonemes continue to pose the greatest challenge  -Similar word pairs: 85%  -Words of increasing length (1-2-3 syllables) with allowance for restarts/self-corrections:     94% - 1 syllable base word    88%, 81% - 2 syllable expanded word    81%, 56% - 3 syllable expanded word   -Trisyllabic word repetition x3 (same words from above task): 94%     6. Patient will read words/phrases aloud with 80% accuracy and min cues. (Est 5/2/24)  Status: Goalstatus: Goal not addressed this date  Progress: Progressing as follows  -Rhyming word sets: 89% increased to 100% with self corrections or restarts  -Additional drills targeting /l/ and /d/ in initial position    Previous:  -Rhyming word sets: 86% with min cues and demo  -Consonant/consonant blends + vowel production (words, non-words): /ah/ - 71% increased to 83% with min cues; /ee/ - 77% increased to 83% with min cues  -Trisyllabic words: 65% increased to 95% with min cues  -Trisyllabic words: 90% with min cues to slow down and focus on the initial letter/phoneme and the base word when applicable before attempting to verbalize the word  -Bisyllabic words: 85% (17/20), improved to 100% with min cues or additional time    Pain:  Pain Assessment  Pain Assessment: 0-10  0-10 (Numeric) Pain Score: " 0 - No pain     Outpatient Education:  Adult Outpatient Education  Individual(s) Educated: Patient  Written Education : Yes  Verbal Education : Yes  Risk and Benefits Discussed with Patient/Caregiver/Other: yes  Patient/Caregiver Demonstrated Understanding: yes  Patient Response to Education: Patient/Caregiver Verbalized Understanding of Information

## 2024-06-27 ENCOUNTER — LAB (OUTPATIENT)
Dept: LAB | Facility: LAB | Age: 79
End: 2024-06-27
Payer: MEDICARE

## 2024-06-27 ENCOUNTER — HOSPITAL ENCOUNTER (OUTPATIENT)
Dept: RADIOLOGY | Facility: CLINIC | Age: 79
Discharge: HOME | End: 2024-06-27
Payer: MEDICARE

## 2024-06-27 ENCOUNTER — APPOINTMENT (OUTPATIENT)
Dept: SPEECH THERAPY | Facility: CLINIC | Age: 79
End: 2024-06-27
Payer: MEDICARE

## 2024-06-27 VITALS — WEIGHT: 137 LBS | BODY MASS INDEX: 27.62 KG/M2 | HEIGHT: 59 IN

## 2024-06-27 DIAGNOSIS — I63.9 CEREBROVASCULAR ACCIDENT (CVA), UNSPECIFIED MECHANISM (MULTI): ICD-10-CM

## 2024-06-27 DIAGNOSIS — Z12.31 OTHER SCREENING MAMMOGRAM: ICD-10-CM

## 2024-06-27 DIAGNOSIS — I63.412 STROKE DUE TO EMBOLISM OF LEFT MIDDLE CEREBRAL ARTERY (MULTI): ICD-10-CM

## 2024-06-27 DIAGNOSIS — I10 BENIGN ESSENTIAL HTN: ICD-10-CM

## 2024-06-27 DIAGNOSIS — I69.390 APRAXIA AS LATE EFFECT OF CEREBROVASCULAR ACCIDENT (CVA): ICD-10-CM

## 2024-06-27 DIAGNOSIS — E78.5 DYSLIPIDEMIA: ICD-10-CM

## 2024-06-27 DIAGNOSIS — I69.320 APHASIA AS LATE EFFECT OF CEREBROVASCULAR ACCIDENT: Primary | ICD-10-CM

## 2024-06-27 DIAGNOSIS — R73.9 HYPERGLYCEMIA: ICD-10-CM

## 2024-06-27 LAB
ALT SERPL W P-5'-P-CCNC: 13 U/L (ref 7–45)
ANION GAP SERPL CALC-SCNC: 13 MMOL/L (ref 10–20)
AST SERPL W P-5'-P-CCNC: 13 U/L (ref 9–39)
BUN SERPL-MCNC: 20 MG/DL (ref 6–23)
CALCIUM SERPL-MCNC: 9.3 MG/DL (ref 8.6–10.6)
CHLORIDE SERPL-SCNC: 105 MMOL/L (ref 98–107)
CHOLEST SERPL-MCNC: 160 MG/DL (ref 0–199)
CHOLESTEROL/HDL RATIO: 2.7
CO2 SERPL-SCNC: 28 MMOL/L (ref 21–32)
CREAT SERPL-MCNC: 1.01 MG/DL (ref 0.5–1.05)
EGFRCR SERPLBLD CKD-EPI 2021: 57 ML/MIN/1.73M*2
ERYTHROCYTE [DISTWIDTH] IN BLOOD BY AUTOMATED COUNT: 13 % (ref 11.5–14.5)
EST. AVERAGE GLUCOSE BLD GHB EST-MCNC: 123 MG/DL
GLUCOSE SERPL-MCNC: 93 MG/DL (ref 74–99)
HBA1C MFR BLD: 5.9 %
HCT VFR BLD AUTO: 37.6 % (ref 36–46)
HDLC SERPL-MCNC: 60.3 MG/DL
HGB BLD-MCNC: 11.7 G/DL (ref 12–16)
LDLC SERPL CALC-MCNC: 78 MG/DL
MCH RBC QN AUTO: 29 PG (ref 26–34)
MCHC RBC AUTO-ENTMCNC: 31.1 G/DL (ref 32–36)
MCV RBC AUTO: 93 FL (ref 80–100)
NON HDL CHOLESTEROL: 100 MG/DL (ref 0–149)
NRBC BLD-RTO: 0 /100 WBCS (ref 0–0)
PLATELET # BLD AUTO: 288 X10*3/UL (ref 150–450)
POTASSIUM SERPL-SCNC: 4.8 MMOL/L (ref 3.5–5.3)
RBC # BLD AUTO: 4.03 X10*6/UL (ref 4–5.2)
SODIUM SERPL-SCNC: 141 MMOL/L (ref 136–145)
TRIGL SERPL-MCNC: 107 MG/DL (ref 0–149)
VLDL: 21 MG/DL (ref 0–40)
WBC # BLD AUTO: 9 X10*3/UL (ref 4.4–11.3)

## 2024-06-27 PROCEDURE — 83036 HEMOGLOBIN GLYCOSYLATED A1C: CPT

## 2024-06-27 PROCEDURE — 80061 LIPID PANEL: CPT

## 2024-06-27 PROCEDURE — 92507 TX SP LANG VOICE COMM INDIV: CPT | Mod: GN | Performed by: STUDENT IN AN ORGANIZED HEALTH CARE EDUCATION/TRAINING PROGRAM

## 2024-06-27 PROCEDURE — 77067 SCR MAMMO BI INCL CAD: CPT

## 2024-06-27 PROCEDURE — 84460 ALANINE AMINO (ALT) (SGPT): CPT

## 2024-06-27 PROCEDURE — 80048 BASIC METABOLIC PNL TOTAL CA: CPT

## 2024-06-27 PROCEDURE — 84450 TRANSFERASE (AST) (SGOT): CPT

## 2024-06-27 PROCEDURE — 85027 COMPLETE CBC AUTOMATED: CPT

## 2024-06-27 PROCEDURE — 36415 COLL VENOUS BLD VENIPUNCTURE: CPT

## 2024-06-27 ASSESSMENT — PAIN - FUNCTIONAL ASSESSMENT: PAIN_FUNCTIONAL_ASSESSMENT: 0-10

## 2024-06-27 ASSESSMENT — PAIN SCALES - GENERAL: PAINLEVEL_OUTOF10: 0 - NO PAIN

## 2024-06-27 NOTE — PROGRESS NOTES
"Speech-Language Pathology    SLP Adult Outpatient Speech-Language Pathology Progress Note     Patient Name: Roslyn Cabrera \"Ivette"  MRN: [id number]  Today's Date: 6/27/2024  Time Calculation  Start Time: 1515  Stop Time: 1600  Time Calculation (min): 45 min       Current Problem:   1. Aphasia as late effect of cerebrovascular accident [I69.320]        2. 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) [I69.390]            SLP Assessment:  SLP TX Intervention Outcome: Making Progress Towards Goals  Prognosis: Good    The patient was actively engaged in therapeutic tasks targeting motor planning to read words aloud and repeat lengthy sentences. The patient demonstrated improvement monosyllabic words with /l/ in the initial position. She had difficulty repeating 8-9 word sentence.s Skilled speech therapy services are warranted in order to improve the patient's ability to express wants, needs and opinions and engage in meaningful conversation in the home, social and clinical settings with limited distress.    Interventions:  Communication Strategies, see assessment    Subjective:  Patient reports no new or worsening symptoms. She reports she still has trouble with numbers and still uses her Number reese.     General Visit Information:  Certification Period Start Date: 05/02/24  Certification Period End Date: 07/31/24  Number of Authorized Treatments : 20  Total Number of Visits : 12    Plan:  Plan  Inpatient/Swing Bed or Outpatient: Outpatient  Treatment/Interventions: Communication functioning  SLP TX Plan: Continue Plan of Care  SLP Plan: Skilled SLP  SLP Frequency: 2x per week  Duration: 12 weeks  Discussed POC: Patient  Discussed Risks/Benefits: Yes  Patient/Caregiver Agreeable: Yes    Objective Data & Progress:  Goals: Established 2/26/24  Short-term goals:     Patient will complete targeted expressive language tasks (including stating personal information, phrase/sentence completion, open-ended/WH questions) " "with 80% accuracy given minimal-moderate cues.  Status: Goalstatus: Ongoing, Progressing toward goal  Progress: Progressing as follows   -Pt able to state full name, unable to state street address or apartment number; able to state 3 children's names   -Pt mentioned the subject of the presidential debate and shared her thoughts on the 2016 and 2020 election. Phonemic errors and restarts were evident but the gist of her message was understood. Rate of speech remains at baseline requiring more restarts.    Previous:  -Patient was able to say her SS# but not her phone number   -Patient attempted to describe the recent theater production she attended; frequent phonemic distortions and abandoned sentences noted. The patient required mod cues to slow rate of speech. Conversation required significant concentration by the listener to follow along with the story but even then portions of the story weren't clear; clinical observation was 70% accurate  -Complete the sentence: 90% acc, avg 4.6 words  -Complete the sentence: 90% with restarts   -Stating the conclusion of a story: 80% with restarts and min cues  -Using a given word in a sentence: 80%  -Using a given word in a sentence: 60%, errors with morphology and syntax; many restarts and general word finding difficulty   -Sentence generation using a given word: 50% accuracy to generate sentences because of word finding impairment but when produced, the patient could verbalize grammatically and syntactically correct sentences.  -Open ended situational questions: 92% indep; avg of 7 word responses  -Open ended/WH questions (\"why\"): 85% increased to 95% with self corrections or cues to slow  -Sentence completion (simple): 76% increased to 88%   -Complete the sentence (verbally presented), single word responses: 83% increased to 97% with indep self corrections    Patient will complete targeted receptive language tasks (including complex yes/no questions, following verbal/written " "commands, sentence comprehension) with 80% accuracy given minimal-moderate cues.  Status: Goalstatus: Ongoing  Progress: Goal not addressed this date     Previous:  -Sentence/paragraph level comprehension (text messages), FO4 multiple choice: 83% (5/6)  -Sentence/paragraph level comprehension (text messages), FO4 multiple choice: Avg of 67% across 3 passages with accuracy decreasing with increased length of the passage (100%, 67%, 33%)  -Sentence level comprehension (match from FO4 options): 96%  -Sentence comprehension (complete the sentence): 96%   -Sentence comprehension: 80%   -Reading \"nonsense\" words with the same initial phonemes - task was too difficult therefore it was modified with real words    5. Patient will complete repetition tasks up 8-9 words in length with 80% accuracy with min cues/restarts/self-corrections. Est 5/2/24  Status: Goalstatus: Ongoing  Progress: Progressing as follows   -Repeating 8-9 word sentences: 20% (1/5)    Previous:  -Repeating 8-9 word sentences while looking at the written prompt: 88% with allowance for restarts and min-mod cueing; accuracy did not change with the second attempt. Initial phonemes continue to pose the greatest challenge  -Similar word pairs: 85%  -Words of increasing length (1-2-3 syllables) with allowance for restarts/self-corrections:     94% - 1 syllable base word    88%, 81% - 2 syllable expanded word    81%, 56% - 3 syllable expanded word   -Trisyllabic word repetition x3 (same words from above task): 94%     6. Patient will read words/phrases aloud with 80% accuracy and min cues. (Est 5/2/24)  Status: Goalstatus: Goal not addressed this date  Progress: Progressing as follows   -/l/ in initial position - 91%, improved to 99% with repeat attempts or occasional cue for oral placement    Previous:  -Rhyming word sets: 89% increased to 100% with self corrections or restarts  -Additional drills targeting /l/ and /d/ in initial position  -Rhyming word sets: 86% " with min cues and demo  -Consonant/consonant blends + vowel production (words, non-words): /ah/ - 71% increased to 83% with min cues; /ee/ - 77% increased to 83% with min cues  -Trisyllabic words: 65% increased to 95% with min cues  -Trisyllabic words: 90% with min cues to slow down and focus on the initial letter/phoneme and the base word when applicable before attempting to verbalize the word  -Bisyllabic words: 85% (17/20), improved to 100% with min cues or additional time    Pain:  Pain Assessment  Pain Assessment: 0-10  0-10 (Numeric) Pain Score: 0 - No pain     Outpatient Education:  Adult Outpatient Education  Individual(s) Educated: Patient  Written Education : Yes  Verbal Education : Yes  Risk and Benefits Discussed with Patient/Caregiver/Other: yes  Patient/Caregiver Demonstrated Understanding: yes  Patient Response to Education: Patient/Caregiver Verbalized Understanding of Information

## 2024-07-01 ENCOUNTER — APPOINTMENT (OUTPATIENT)
Dept: SPEECH THERAPY | Facility: CLINIC | Age: 79
End: 2024-07-01
Payer: MEDICARE

## 2024-07-01 DIAGNOSIS — I69.390 APRAXIA AS LATE EFFECT OF CEREBROVASCULAR ACCIDENT (CVA): Primary | ICD-10-CM

## 2024-07-01 DIAGNOSIS — I69.320 APHASIA AS LATE EFFECT OF CEREBROVASCULAR ACCIDENT: ICD-10-CM

## 2024-07-01 PROCEDURE — 92507 TX SP LANG VOICE COMM INDIV: CPT | Mod: GN | Performed by: STUDENT IN AN ORGANIZED HEALTH CARE EDUCATION/TRAINING PROGRAM

## 2024-07-01 ASSESSMENT — PAIN - FUNCTIONAL ASSESSMENT: PAIN_FUNCTIONAL_ASSESSMENT: 0-10

## 2024-07-01 ASSESSMENT — PAIN SCALES - GENERAL: PAINLEVEL_OUTOF10: 0 - NO PAIN

## 2024-07-01 NOTE — PROGRESS NOTES
"Speech-Language Pathology    SLP Adult Outpatient Speech-Language Pathology Progress Note     Patient Name: Roslyn Cabrera \"Ivette"  MRN: [id number]  Today's Date: 7/1/2024  Time Calculation  Start Time: 1345  Stop Time: 1435  Time Calculation (min): 50 min       Current Problem:   1. 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 Aphasia as late effect of cerebrovascular accident          SLP Assessment:  SLP TX Intervention Outcome: Making Progress Towards Goals  Prognosis: Good    The patient was actively engaged in a conversation about a current event that she was particularly passionate about. As a result of heightened emotions she had difficulty slowing her rate of speech and demonstrated more phonemic errors than typical for spontaneous speech. Errors were especially prominent with names and longer words as these were utterances not typically part of her lexicon. The patient was able to approximate most words so that the communication was effective. SLP asked for clarification x3 during a 25 minute conversation with 2/3 items clarified accurately. The patient is skilled with use of alternative communication strategies to compensate for word finding or intentional substitutions. Skilled speech therapy services are warranted in order to improve the patient's ability to express wants, needs and opinions and engage in meaningful conversation in the home, social and clinical settings without overt stress.    Interventions:  Communication Strategies, see assessment    Subjective:  Patient reports no new or worsening symptoms. She reports she still has trouble with numbers and still uses her Number erese.     General Visit Information:  Certification Period Start Date: 05/02/24  Certification Period End Date: 07/31/24  Number of Authorized Treatments : 20  Total Number of Visits : 13    Plan:  Plan  Inpatient/Swing Bed or Outpatient: Outpatient  Treatment/Interventions: Communication functioning  SLP " TX Plan: Continue Plan of Care  SLP Plan: Skilled SLP  SLP Frequency: 2x per week  Duration: 12 weeks  Discussed POC: Patient  Discussed Risks/Benefits: Yes  Patient/Caregiver Agreeable: Yes    Objective Data & Progress:  Goals: Established 2/26/24  Short-term goals:     Patient will complete targeted expressive language tasks (including stating personal information, phrase/sentence completion, open-ended/WH questions) with 80% accuracy given minimal-moderate cues.  Status: Goalstatus: Ongoing, Progressing toward goal  Progress: Progressing as follows   -See assessment     Previous:  -Pt able to state full name, unable to state street address or apartment number; able to state 3 children's names   -Pt mentioned the subject of the presidential debate and shared her thoughts on the 2016 and 2020 election. Phonemic errors and restarts were evident but the gist of her message was understood. Rate of speech remains at baseline requiring more restarts.  -Patient was able to say her SS# but not her phone number   -Patient attempted to describe the recent theater production she attended; frequent phonemic distortions and abandoned sentences noted. The patient required mod cues to slow rate of speech. Conversation required significant concentration by the listener to follow along with the story but even then portions of the story weren't clear; clinical observation was 70% accurate  -Complete the sentence: 90% acc, avg 4.6 words  -Complete the sentence: 90% with restarts   -Stating the conclusion of a story: 80% with restarts and min cues  -Using a given word in a sentence: 80%  -Using a given word in a sentence: 60%, errors with morphology and syntax; many restarts and general word finding difficulty   -Sentence generation using a given word: 50% accuracy to generate sentences because of word finding impairment but when produced, the patient could verbalize grammatically and syntactically correct sentences.  -Open ended  "situational questions: 92% indep; avg of 7 word responses  -Open ended/WH questions (\"why\"): 85% increased to 95% with self corrections or cues to slow  -Sentence completion (simple): 76% increased to 88%   -Complete the sentence (verbally presented), single word responses: 83% increased to 97% with indep self corrections    Patient will complete targeted receptive language tasks (including complex yes/no questions, following verbal/written commands, sentence comprehension) with 80% accuracy given minimal-moderate cues.  Status: Goalstatus: Ongoing  Progress: Progressing as follows   -Sentence/paragraph level comprehension (increased difficluty), FO4 multiple choice: 75% (3/4)    Previous:  -Sentence/paragraph level comprehension (text messages), FO4 multiple choice: 83% (5/6)  -Sentence/paragraph level comprehension (text messages), FO4 multiple choice: Avg of 67% across 3 passages with accuracy decreasing with increased length of the passage (100%, 67%, 33%)  -Sentence level comprehension (match from FO4 options): 96%  -Sentence comprehension (complete the sentence): 96%   -Sentence comprehension: 80%   -Reading \"nonsense\" words with the same initial phonemes - task was too difficult therefore it was modified with real words    5. Patient will complete repetition tasks up 8-9 words in length with 80% accuracy with min cues/restarts/self-corrections. Est 5/2/24  Status: Goalstatus: Ongoing  Progress: Progressing as follows   -Repeating 8-9 word sentences: 20% (1/5)    Previous:  -Repeating 8-9 word sentences while looking at the written prompt: 88% with allowance for restarts and min-mod cueing; accuracy did not change with the second attempt. Initial phonemes continue to pose the greatest challenge  -Similar word pairs: 85%  -Words of increasing length (1-2-3 syllables) with allowance for restarts/self-corrections:     94% - 1 syllable base word    88%, 81% - 2 syllable expanded word    81%, 56% - 3 syllable " expanded word   -Trisyllabic word repetition x3 (same words from above task): 94%     6. Patient will read words/phrases aloud with 80% accuracy and min cues. (Est 5/2/24)  Status: Goalstatus: Goal not addressed this date  Progress: Progressing as follows   -/l/ in initial position - 91%, improved to 99% with repeat attempts or occasional cue for oral placement    Previous:  -Rhyming word sets: 89% increased to 100% with self corrections or restarts  -Additional drills targeting /l/ and /d/ in initial position  -Rhyming word sets: 86% with min cues and demo  -Consonant/consonant blends + vowel production (words, non-words): /ah/ - 71% increased to 83% with min cues; /ee/ - 77% increased to 83% with min cues  -Trisyllabic words: 65% increased to 95% with min cues  -Trisyllabic words: 90% with min cues to slow down and focus on the initial letter/phoneme and the base word when applicable before attempting to verbalize the word  -Bisyllabic words: 85% (17/20), improved to 100% with min cues or additional time    Pain:  Pain Assessment  Pain Assessment: 0-10  0-10 (Numeric) Pain Score: 0 - No pain     Outpatient Education:  Adult Outpatient Education  Individual(s) Educated: Patient  Written Education : Yes  Verbal Education : Yes  Risk and Benefits Discussed with Patient/Caregiver/Other: yes  Patient/Caregiver Demonstrated Understanding: yes  Patient Response to Education: Patient/Caregiver Verbalized Understanding of Information

## 2024-07-08 ENCOUNTER — APPOINTMENT (OUTPATIENT)
Dept: SPEECH THERAPY | Facility: CLINIC | Age: 79
End: 2024-07-08
Payer: MEDICARE

## 2024-07-08 DIAGNOSIS — I69.390 APRAXIA AS LATE EFFECT OF CEREBROVASCULAR ACCIDENT (CVA): ICD-10-CM

## 2024-07-08 DIAGNOSIS — I69.320 APHASIA AS LATE EFFECT OF CEREBROVASCULAR ACCIDENT: Primary | ICD-10-CM

## 2024-07-08 PROCEDURE — 92507 TX SP LANG VOICE COMM INDIV: CPT | Mod: GN | Performed by: STUDENT IN AN ORGANIZED HEALTH CARE EDUCATION/TRAINING PROGRAM

## 2024-07-08 ASSESSMENT — PAIN - FUNCTIONAL ASSESSMENT: PAIN_FUNCTIONAL_ASSESSMENT: 0-10

## 2024-07-08 ASSESSMENT — PAIN SCALES - GENERAL: PAINLEVEL_OUTOF10: 0 - NO PAIN

## 2024-07-08 NOTE — PROGRESS NOTES
"Speech-Language Pathology    SLP Adult Outpatient Speech-Language Pathology Progress Note     Patient Name: Roslyn Cabrera \"Ivette"  MRN: [id number]  Today's Date: 7/8/2024  Time Calculation  Start Time: 1300  Stop Time: 1345  Time Calculation (min): 45 min       Current Problem:   1. Aphasia as late effect of cerebrovascular accident [I69.320]        2. 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 The patient is inconsistent with accuracy for motor speech but when given time or occasional cue for oral motor placement her accuracy improves. Skilled speech therapy services are warranted in order to improve the patient's ability to express wants, needs and opinions and engage in meaningful conversation in the home, social and clinical settings without overt stress.    Interventions:  Communication Strategies, see assessment    Subjective:  Patient reports no new or worsening symptoms. She reports she still has trouble with numbers and still uses her Number reese.     General Visit Information:  Certification Period Start Date: 05/02/24  Certification Period End Date: 07/31/24  Number of Authorized Treatments : 20  Total Number of Visits : 14    Plan:  Plan  Inpatient/Swing Bed or Outpatient: Outpatient  Treatment/Interventions: Communication functioning  SLP TX Plan: Continue Plan of Care  SLP Plan: Skilled SLP  SLP Frequency: 2x per week  Duration: 12 weeks  Discussed POC: Patient  Discussed Risks/Benefits: Yes  Patient/Caregiver Agreeable: Yes    Objective Data & Progress:  Goals: Established 2/26/24  Short-term goals:     Patient will complete targeted expressive language tasks (including stating personal information, phrase/sentence completion, open-ended/WH questions) " "with 80% accuracy given minimal-moderate cues.  Status: Goalstatus: Ongoing, Progressing toward goal  Progress: Progressing as follows   -Patient continues to present with dyscalculia and is unable to verbalize numbers correctly    Previous:  -Pt able to state full name, unable to state street address or apartment number; able to state 3 children's names   -Pt mentioned the subject of the presidential debate and shared her thoughts on the 2016 and 2020 election. Phonemic errors and restarts were evident but the gist of her message was understood. Rate of speech remains at baseline requiring more restarts.  -Patient was able to say her SS# but not her phone number   -Patient attempted to describe the recent theater production she attended; frequent phonemic distortions and abandoned sentences noted. The patient required mod cues to slow rate of speech. Conversation required significant concentration by the listener to follow along with the story but even then portions of the story weren't clear; clinical observation was 70% accurate  -Complete the sentence: 90% acc, avg 4.6 words  -Complete the sentence: 90% with restarts   -Stating the conclusion of a story: 80% with restarts and min cues  -Using a given word in a sentence: 80%  -Using a given word in a sentence: 60%, errors with morphology and syntax; many restarts and general word finding difficulty   -Sentence generation using a given word: 50% accuracy to generate sentences because of word finding impairment but when produced, the patient could verbalize grammatically and syntactically correct sentences.  -Open ended situational questions: 92% indep; avg of 7 word responses  -Open ended/WH questions (\"why\"): 85% increased to 95% with self corrections or cues to slow  -Sentence completion (simple): 76% increased to 88%   -Complete the sentence (verbally presented), single word responses: 83% increased to 97% with indep self corrections    Patient will complete " "targeted receptive language tasks (including complex yes/no questions, following verbal/written commands, sentence comprehension) with 80% accuracy given minimal-moderate cues.  Status: Goalstatus: Ongoing  Progress: Progressing as follows     Previous:  -Sentence/paragraph level comprehension (increased difficluty), FO4 multiple choice: 75% (3/4)  -Sentence/paragraph level comprehension (text messages), FO4 multiple choice: 83% (5/6)  -Sentence/paragraph level comprehension (text messages), FO4 multiple choice: Avg of 67% across 3 passages with accuracy decreasing with increased length of the passage (100%, 67%, 33%)  -Sentence level comprehension (match from FO4 options): 96%  -Sentence comprehension (complete the sentence): 96%   -Sentence comprehension: 80%   -Reading \"nonsense\" words with the same initial phonemes - task was too difficult therefore it was modified with real words    5. Patient will complete repetition tasks up 8-9 words in length with 80% accuracy with min cues/restarts/self-corrections. Est 5/2/24  Status: Goalstatus: Ongoing  Progress: Progressing as follows  -Repeating 5-7 word sentences: 90%  -Repeating 8-9 word sentences: 40% (2/5) with self correction     Previous:  -Repeating 8-9 word sentences: 20% (1/5)  -Repeating 8-9 word sentences while looking at the written prompt: 88% with allowance for restarts and min-mod cueing; accuracy did not change with the second attempt. Initial phonemes continue to pose the greatest challenge  -Similar word pairs: 85%  -Words of increasing length (1-2-3 syllables) with allowance for restarts/self-corrections:     94% - 1 syllable base word    88%, 81% - 2 syllable expanded word    81%, 56% - 3 syllable expanded word   -Trisyllabic word repetition x3 (same words from above task): 94%     6. Patient will read words/phrases aloud with 80% accuracy and min cues. (Est 5/2/24)  Status: Goalstatus: Goal not addressed this date  Progress: Progressing as " follows   -Grapheme-Phoneme production:  74%, improved to 84% with restarts and self corrections  -Rhyming word sets: 76%, improved to 93% with restarts and self corrections    Previous:  -/l/ in initial position - 91%, improved to 99% with repeat attempts or occasional cue for oral placement  -Rhyming word sets: 89% increased to 100% with self corrections or restarts  -Additional drills targeting /l/ and /d/ in initial position  -Rhyming word sets: 86% with min cues and demo  -Consonant/consonant blends + vowel production (words, non-words): /ah/ - 71% increased to 83% with min cues; /ee/ - 77% increased to 83% with min cues  -Trisyllabic words: 65% increased to 95% with min cues  -Trisyllabic words: 90% with min cues to slow down and focus on the initial letter/phoneme and the base word when applicable before attempting to verbalize the word  -Bisyllabic words: 85% (17/20), improved to 100% with min cues or additional time    Pain:  Pain Assessment  Pain Assessment: 0-10  0-10 (Numeric) Pain Score: 0 - No pain     Outpatient Education:  Adult Outpatient Education  Individual(s) Educated: Patient  Written Education : Yes  Verbal Education : Yes  Risk and Benefits Discussed with Patient/Caregiver/Other: yes  Patient/Caregiver Demonstrated Understanding: yes  Patient Response to Education: Patient/Caregiver Verbalized Understanding of Information

## 2024-07-09 NOTE — PROGRESS NOTES
Subjective   Pam Cabrera is a 78 y.o. female who presents for follow-up.  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  Takes medication prescribed.  Review of Systems  10 system review pertinent as above  Objective     There were no vitals taken for this visit.     Physical Exam  HEENT: Atraumatic normocephalic the pupils are equal and round and reactive to light the sclerae nonicteric extraocular motion are intact.  Neck: Is supple without JVD no carotid bruits the trachea is midline there are no masses pulses are equal and bilateral with normal upstroke.  Skin: Normal.  Skin good texture.  Moist.  Good turgor.  No lesions, no rashes.  Lymph: No lymphadenopathy appreciated, no masses, no lesions  Lungs: Are clear to auscultation and percussion, good breath sounds bilaterally, no rhonchi, no wheezing, good diaphragmatic excursion.  Heart: Normal rate and normal rhythm S1, S2, no S3, no gallop, murmur or rub.  Abdomen: Soft, nontender, no organomegaly, good bowel sounds.    Extremities: Full range of motion, good pulses bilateral.  No cyanosis, no clubbing or edema.  Neuro: Cranial nerves II-XII are grossly intact there is no sensory or motor deficits.  Able to move all extremities.      Assessment/Plan     Fasting blood works    CBC BMP lipids AST ALT vitamin 25-hydroxy    CVA, January 2024  MCA embolic aphasia, apraxia  Speech therapy physical therapy  Follows with cardiology no atrial fibrillation  Follows with device clinic loop recorder  Continue baby aspirin, Lipitor carvedilol  On Baby ASA    Cardiovascular disease  prevention  Diet exercise weight loss  Eat healthy  Remain on current medications  Would repeat your lipid panel    Hypertension  No added salt diet, do not and salt to your food  Try to exercise every other day for 30 minutes  Continue current medications  Continue carvedilol 12.5 mg twice a day hydralazine 25 mg 2 times a day  Losartan 100 mg daily    Open angle glaucoma  Follows with ophthalmology  Continue eyedrops as prescribed    History of tobacco abuse  Stopped smoking January 2024  Consult with patient regarding remaining smoke-free    Continue with the low-fat, low-cholesterol diet,  I recommended Mediterranean diet, which include fish, chicken, vegetables and olive oil  Exercise daily for 30 minutes at least 3 times a week  Continue home medications    Mammogram  Follows with GYN Dr. 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 Justen Lopez      Right inguinal hernia  Repair with Dr. Cristofer Cartagena  Circa September 2022    Vaginal hysterectomy May 30, 2013  Continues to follow with GYN         Problem List Items Addressed This Visit    None      Oscar Milner MD

## 2024-07-11 ENCOUNTER — APPOINTMENT (OUTPATIENT)
Dept: SPEECH THERAPY | Facility: CLINIC | Age: 79
End: 2024-07-11
Payer: MEDICARE

## 2024-07-11 ENCOUNTER — DOCUMENTATION (OUTPATIENT)
Dept: SPEECH THERAPY | Facility: CLINIC | Age: 79
End: 2024-07-11

## 2024-07-11 NOTE — PROGRESS NOTES
"Speech-Language Pathology                 Therapy Communication Note    Patient Name: Roslyn Cabrera \"Pam\"  MRN: [id number]  Today's Date: 7/11/2024     Discipline: Speech Language Pathology    Missed Time: Cancel    Comment:  The patient canceled her outpatient therapy session this date.           "

## 2024-07-12 ENCOUNTER — APPOINTMENT (OUTPATIENT)
Dept: PRIMARY CARE | Facility: CLINIC | Age: 79
End: 2024-07-12
Payer: MEDICARE

## 2024-07-12 VITALS
HEIGHT: 59 IN | HEART RATE: 76 BPM | WEIGHT: 136 LBS | TEMPERATURE: 98.1 F | SYSTOLIC BLOOD PRESSURE: 130 MMHG | DIASTOLIC BLOOD PRESSURE: 82 MMHG | RESPIRATION RATE: 16 BRPM | BODY MASS INDEX: 27.42 KG/M2

## 2024-07-12 DIAGNOSIS — E55.9 VITAMIN D DEFICIENCY: ICD-10-CM

## 2024-07-12 DIAGNOSIS — E78.5 DYSLIPIDEMIA: ICD-10-CM

## 2024-07-12 DIAGNOSIS — Z87.891 HISTORY OF TOBACCO ABUSE: ICD-10-CM

## 2024-07-12 DIAGNOSIS — Z86.73 HISTORY OF CVA (CEREBROVASCULAR ACCIDENT): ICD-10-CM

## 2024-07-12 DIAGNOSIS — H40.1131 PRIMARY OPEN ANGLE GLAUCOMA (POAG) OF BOTH EYES, MILD STAGE: ICD-10-CM

## 2024-07-12 DIAGNOSIS — J43.9 PULMONARY EMPHYSEMA, UNSPECIFIED EMPHYSEMA TYPE (MULTI): Primary | ICD-10-CM

## 2024-07-12 DIAGNOSIS — I10 BENIGN ESSENTIAL HTN: ICD-10-CM

## 2024-07-12 PROBLEM — C53.9 MALIGNANT NEOPLASM OF CERVIX, UNSPECIFIED SITE (MULTI): Status: ACTIVE | Noted: 2024-07-12

## 2024-07-12 ASSESSMENT — ACTIVITIES OF DAILY LIVING (ADL)
USING TELEPHONE: INDEPENDENT
WALKS IN HOME: INDEPENDENT
USING TRANSPORTATION: INDEPENDENT
PATIENT'S MEMORY ADEQUATE TO SAFELY COMPLETE DAILY ACTIVITIES?: YES
MANAGING FINANCES: INDEPENDENT
NEEDS ASSISTANCE WITH FOOD: INDEPENDENT
TOILETING: INDEPENDENT
GROCERY SHOPPING: INDEPENDENT
HEARING - RIGHT EAR: FUNCTIONAL
DRESSING YOURSELF: INDEPENDENT
PREPARING MEALS: INDEPENDENT
GROOMING: INDEPENDENT
BATHING: INDEPENDENT
HEARING - LEFT EAR: FUNCTIONAL
PILL BOX USED: NO
EATING: INDEPENDENT
STIL DRIVING: NO
TAKING MEDICATION: INDEPENDENT
JUDGMENT_ADEQUATE_SAFELY_COMPLETE_DAILY_ACTIVITIES: YES
ADEQUATE_TO_COMPLETE_ADL: YES
DOING HOUSEWORK: INDEPENDENT
FEEDING YOURSELF: INDEPENDENT

## 2024-07-12 ASSESSMENT — ANXIETY QUESTIONNAIRES
4. TROUBLE RELAXING: SEVERAL DAYS
GAD7 TOTAL SCORE: 7
1. FEELING NERVOUS, ANXIOUS, OR ON EDGE: SEVERAL DAYS
2. NOT BEING ABLE TO STOP OR CONTROL WORRYING: SEVERAL DAYS
5. BEING SO RESTLESS THAT IT IS HARD TO SIT STILL: SEVERAL DAYS
6. BECOMING EASILY ANNOYED OR IRRITABLE: SEVERAL DAYS
7. FEELING AFRAID AS IF SOMETHING AWFUL MIGHT HAPPEN: SEVERAL DAYS
IF YOU CHECKED OFF ANY PROBLEMS ON THIS QUESTIONNAIRE, HOW DIFFICULT HAVE THESE PROBLEMS MADE IT FOR YOU TO DO YOUR WORK, TAKE CARE OF THINGS AT HOME, OR GET ALONG WITH OTHER PEOPLE: NOT DIFFICULT AT ALL
3. WORRYING TOO MUCH ABOUT DIFFERENT THINGS: SEVERAL DAYS

## 2024-07-12 ASSESSMENT — GERIATRIC MINI NUTRITIONAL ASSESSMENT (MNA)
D HAS SUFFERED PSYCHOLOGICAL STRESS OR ACUTE DISEASE IN THE PAST 3 MONTHS?: NO
B WEIGHT LOSS DURING THE LAST 3 MONTHS: NO WEIGHT LOSS
C GENERAL MOBILITY: GOES OUT
E NEUROPSYCHOLOGICAL PROBLEMS: NO PSYCHOLOGICAL PROBLEMS

## 2024-07-12 ASSESSMENT — ENCOUNTER SYMPTOMS
DEPRESSION: 0
LOSS OF SENSATION IN FEET: 0
OCCASIONAL FEELINGS OF UNSTEADINESS: 0

## 2024-07-12 ASSESSMENT — PATIENT HEALTH QUESTIONNAIRE - PHQ9
SUM OF ALL RESPONSES TO PHQ9 QUESTIONS 1 AND 2: 0
1. LITTLE INTEREST OR PLEASURE IN DOING THINGS: NOT AT ALL
2. FEELING DOWN, DEPRESSED OR HOPELESS: NOT AT ALL

## 2024-07-12 ASSESSMENT — COLUMBIA-SUICIDE SEVERITY RATING SCALE - C-SSRS
1. IN THE PAST MONTH, HAVE YOU WISHED YOU WERE DEAD OR WISHED YOU COULD GO TO SLEEP AND NOT WAKE UP?: NO
6. HAVE YOU EVER DONE ANYTHING, STARTED TO DO ANYTHING, OR PREPARED TO DO ANYTHING TO END YOUR LIFE?: NO
2. HAVE YOU ACTUALLY HAD ANY THOUGHTS OF KILLING YOURSELF?: NO

## 2024-07-12 ASSESSMENT — PAIN SCALES - GENERAL: PAINLEVEL: 0-NO PAIN

## 2024-07-12 NOTE — PROGRESS NOTES
"Subjective   Reason for Visit: Roslyn Cabrera is an 78 y.o. female here for a Medicare Wellness visit.   HPI  Patient is here for Medicare wellness she is status post a stroke January 2024 which is mostly recovered except for expressive aphasia getting physical therapy and speech therapy she is accompanied with her daughter she lives alone she is self-sufficient takes her medication as prescribed.  Patient Care Team:  Oscar Milner MD as PCP - General (Internal Medicine)  Oscar Milner MD as PCP - O Medicare Advantage PCP     Review of Systems  10 system review pertinent as above  Objective   Vitals:  /82   Pulse 76   Temp 36.7 °C (98.1 °F)   Resp 16   Ht 1.499 m (4' 11\")   Wt 61.7 kg (136 lb)   LMP  (LMP Unknown)   BMI 27.47 kg/m²       Physical Exam  HEENT: Atraumatic normocephalic the pupils are equal and round and reactive to light the sclerae nonicteric extraocular motion are intact.  Neck: Is supple without JVD no carotid bruits the trachea is midline there are no masses pulses are equal and bilateral with normal upstroke.  Skin: Normal.  Skin good texture.  Moist.  Good turgor.  No lesions, no rashes.  Lymph: No lymphadenopathy appreciated, no masses, no lesions  Lungs: Are clear to auscultation and percussion, good breath sounds bilaterally, no rhonchi, no wheezing, good diaphragmatic excursion.  Heart: Normal rate and normal rhythm S1, S2, no S3, no gallop, murmur or rub.  Abdomen: Soft, nontender, no organomegaly, good bowel sounds.    Extremities: Full range of motion, good pulses bilateral.  No cyanosis, no clubbing or edema.  Neuro: Cranial nerves II-XII are grossly intact there is no sensory or motor deficits.  Able to move all extremities.  Assessment/Plan   Medicare wellness  Problem List Items Addressed This Visit       Benign essential HTN    Dyslipidemia    Vitamin D deficiency    Primary open angle glaucoma (POAG) of both eyes    Pulmonary emphysema, unspecified emphysema type " (Multi) - Primary    History of CVA (cerebrovascular accident)     Other Visit Diagnoses       History of tobacco abuse

## 2024-07-15 ENCOUNTER — APPOINTMENT (OUTPATIENT)
Dept: SPEECH THERAPY | Facility: CLINIC | Age: 79
End: 2024-07-15
Payer: MEDICARE

## 2024-07-15 ENCOUNTER — HOSPITAL ENCOUNTER (OUTPATIENT)
Dept: CARDIOLOGY | Facility: CLINIC | Age: 79
Discharge: HOME | End: 2024-07-15
Payer: MEDICARE

## 2024-07-15 DIAGNOSIS — Z95.0 PACEMAKER: ICD-10-CM

## 2024-07-15 DIAGNOSIS — R00.1 BRADYCARDIA ON ECG: ICD-10-CM

## 2024-07-18 ENCOUNTER — APPOINTMENT (OUTPATIENT)
Dept: SPEECH THERAPY | Facility: CLINIC | Age: 79
End: 2024-07-18
Payer: MEDICARE

## 2024-07-18 DIAGNOSIS — R48.8 DYSCALCULIA: Primary | ICD-10-CM

## 2024-07-18 DIAGNOSIS — I69.320 APHASIA AS LATE EFFECT OF CEREBROVASCULAR ACCIDENT: ICD-10-CM

## 2024-07-18 DIAGNOSIS — I69.390 APRAXIA AS LATE EFFECT OF CEREBROVASCULAR ACCIDENT (CVA): ICD-10-CM

## 2024-07-18 PROCEDURE — 92507 TX SP LANG VOICE COMM INDIV: CPT | Mod: GN | Performed by: STUDENT IN AN ORGANIZED HEALTH CARE EDUCATION/TRAINING PROGRAM

## 2024-07-18 ASSESSMENT — PAIN SCALES - GENERAL: PAINLEVEL_OUTOF10: 0 - NO PAIN

## 2024-07-18 ASSESSMENT — PAIN - FUNCTIONAL ASSESSMENT: PAIN_FUNCTIONAL_ASSESSMENT: 0-10

## 2024-07-18 NOTE — PROGRESS NOTES
"SLP Adult Outpatient Speech-Language Pathology RE-Evaluation     Patient Name: Roslyn Cabrera \"Ivette"  MRN: [id number]  Today's Date: 7/18/2024  Time Calculation  Start Time: 1120  Stop Time: 1205  Time Calculation (min): 45 min       Current Problem:   1. Dyscalculia [R48.8]        2. 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  The patient has made progress with her language expression has met reading comprehension goals.  She is interested in shifting the focus of therapy to target numerical and calculation skills for functional activities such as inputting time on a microwave, dialing phone numbers or time/money skills independently.  Skilled speech therapy services are warranted in order to improve the patient's ability to independently recognize, verbalize and calculate numbers in the context of everyday activities.    Interventions:  Communication Strategies, see assessment    Subjective:  Patient reports no new or worsening symptoms. She is fine going down to 1 day a week. She is interested in focusing on spelling/writing and numeral skills.     General Visit Information:  Certification Period Start Date: 07/18/24  Certification Period End Date: 10/16/24  Number of Authorized Treatments : 20  Total Number of Visits : 1    Plan:  Plan  Inpatient/Swing Bed or Outpatient: Outpatient  Treatment/Interventions:  (Re-evaluation)  SLP TX Plan: Continue Plan of Care, Goals Adjusted  SLP Plan: Skilled SLP  SLP Frequency: 1x per week  Duration: 12 weeks  Discussed POC: Patient, Caregiver/family  Discussed Risks/Benefits: Yes  Patient/Caregiver Agreeable: Yes    Objective Data & Progress:  Goals: Established 2/26/24  Short-term " goals:     Patient will complete targeted expressive language tasks (including stating personal information, phrase/sentence completion, open-ended/WH questions) with 80% accuracy given minimal-moderate cues.  Status: Discontinue goal , new goals will address portions of this goal  Progress: Patient has met a portion of the goal. The pt is able to state basic information but is highly inconsistent with number responses (, address, etc).    Patient will complete targeted receptive language tasks including complex yes/no questions, following verbal/written commands, sentence comprehension) with 80% accuracy given minimal-moderate cues.  Status: Goal met     5. Patient will complete repetition tasks up 8-9 words in length with 80% accuracy with min cues/restarts/self-corrections. Est 24  Status: Discontinue goal   Progress: Goal not met. Max utterance to 90% accuracy typically falls within 5-7 words. The patient is often successful when self correcting. Cues to slow rate of speech are still required    6. Patient will read words/phrases aloud with 80% accuracy and min cues. (Est 24)  Status: Goal met     7. Patient will accurately state numerical personal information (, address, zip, phone) with 80% accuracy given min cues. (New goal, Est 24)      8. Patient will verbalize and write integers and money to a maximum of value of thousands when given a verbal or written prompt with 80% accuracy given min cues. (New goal, Est 24)      9. Patient will complete calculation tasks (add, subtract, multiply, divide) with the use of a calculator to 80% accuracy given min cues. (New goal, Est 24)      10.  Patient will complete functional time and money tasks with 80% accuracy given min cues. (New goal, Est 24)       Objective Data collected this date via informal assessment of functional math skills:  - Counting by 2, 2-20: 100%  - Mental calculation, single digits and teens: 50%  add /50% sub   -Numerical sequences (fill in the blank), 50% improved to 100% when given FO3 choices  -Stating integers: single digit - 100%, double - 50%, triple - 0%  -Patient was unable to correctly input numbers correctly on a calculator in order to complete an additional task  -Clock drawing, numbers only: 75%, 9/12 correct, placement was accurate.  -Answering common questions with numbers: 90% improved to 100% after self correction  -Writing numbers: 100%, number and written version    Pain:  Pain Assessment  Pain Assessment: 0-10  0-10 (Numeric) Pain Score: 0 - No pain     Outpatient Education:  Adult Outpatient Education  Individual(s) Educated: Patient  Verbal Education : Yes  Risk and Benefits Discussed with Patient/Caregiver/Other: yes  Patient/Caregiver Demonstrated Understanding: yes  Patient Response to Education: Patient/Caregiver Verbalized Understanding of Information

## 2024-07-18 NOTE — LETTER
July 18, 2024    Oscar Milner MD  [address] [place] [id number]    Patient: Pam Cabrera   YOB: 1945   Date of Visit: 7/18/2024       Dear Oscar Milner MD  [address] [place] [address],  [place] [address]    The attached plan of care is being sent to you because your patient’s medical reimbursement requires that you certify the plan of care. Your signature is required to allow uninterrupted insurance coverage.      You may indicate your approval by signing below and faxing this form back to us at Dept Fax: [phone].    Please call Dept: [phone] with any questions or concerns.    Thank you for this referral,        Noemi J Porachan, CCC-SLP  [address] [place] [address]    Payer: Payor: [facility] MEDICARE / Plan: [facility] MEDICARE / Product Type: *No Product type* /                                                                         Date:     Dear KEVIN Ovalles,     Re: Ms. Roslyn Cabrera, MRN:[id number]    I certify that I have reviewed the attached plan of care and it is medically necessary for Ms. Roslyn Cabrera (1945) who is under my care.          ______________________________________                    _________________  Provider name and credentials                                           Date and time                                                                                      The following plan is in draft form.  Please refer to the current version for the most up-to-date information.                Plan of Care 7/18/24   Effective from: 7/18/2024  Effective to: 10/16/2024    Draft  Plan ID: [id number]               Participants as of 7/18/2024      Name Type Comments Contact Info    Oscar Milner MD PCP - General  [phone]          Last Plan Note       Author: KEVIN Ovalles Status: Sign when Signing Visit Last edited: 7/18/2024  "11:15 AM         SLP Adult Outpatient Speech-Language Pathology RE-Evaluation     Patient Name: Roslyn Cabrera \"Ivette"  MRN: [id number]  Today's Date: 7/18/2024  Time Calculation  Start Time: 1120  Stop Time: 1205  Time Calculation (min): 45 min       Current Problem:   1. Dyscalculia [R48.8]        2. 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  The patient has made progress with her language expression has met reading comprehension goals.  She is interested in shifting the focus of therapy to target numerical and calculation skills for functional activities such as inputting time on a microwave, dialing phone numbers or time/money skills independently.  Skilled speech therapy services are warranted in order to improve the patient's ability to independently recognize, verbalize and calculate numbers in the context of everyday activities.    Interventions:  Communication Strategies, see assessment    Subjective:  Patient reports no new or worsening symptoms. She is fine going down to 1 day a week. She is interested in focusing on spelling/writing and numeral skills.     General Visit Information:  Certification Period Start Date: 07/18/24  Certification Period End Date: 10/16/24  Number of Authorized Treatments : 20  Total Number of Visits : 1    Plan:  Plan  Inpatient/Swing Bed or Outpatient: Outpatient  Treatment/Interventions:  (Re-evaluation)  SLP TX Plan: Continue Plan of Care, Goals Adjusted  SLP Plan: Skilled SLP  SLP Frequency: 1x per week  Duration: 12 weeks  Discussed POC: Patient, Caregiver/family  Discussed Risks/Benefits: Yes  Patient/Caregiver Agreeable: Yes    Objective Data & Progress:  Goals: Established " 24  Short-term goals:     Patient will complete targeted expressive language tasks (including stating personal information, phrase/sentence completion, open-ended/WH questions) with 80% accuracy given minimal-moderate cues.  Status: Discontinue goal , new goals will address portions of this goal  Progress: Patient has met a portion of the goal. The pt is able to state basic information but is highly inconsistent with number responses (, address, etc).    Patient will complete targeted receptive language tasks including complex yes/no questions, following verbal/written commands, sentence comprehension) with 80% accuracy given minimal-moderate cues.  Status: Goal met     5. Patient will complete repetition tasks up 8-9 words in length with 80% accuracy with min cues/restarts/self-corrections. Est 24  Status: Discontinue goal   Progress: Goal not met. Max utterance to 90% accuracy typically falls within 5-7 words. The patient is often successful when self correcting. Cues to slow rate of speech are still required    6. Patient will read words/phrases aloud with 80% accuracy and min cues. (Est 24)  Status: Goal met     7. Patient will accurately state numerical personal information (, address, zip, phone) with 80% accuracy given min cues. (New goal, Est 24)      8. Patient will verbalize integers and money to a maximum of value of thousands when given a verbal or written prompt with 80% accuracy given min cues. (New goal, Est 24)      9. Patient will complete calculation tasks (add, subtract, multiply, divide) with the use of a calculator to 80% accuracy given min cues. (New goal, Est 24)      10.  Patient will complete functional time and money tasks with 80% accuracy given min cues. (New goal, Est 24)       Objective Data collected this date via informal assessment of functional math skills:  - Counting by 2, 2-20: 100%  - Mental calculation, single digits and  teens: 50% add /50% sub   -Numerical sequences (fill in the blank), 50% improved to 100% when given FO3 choices  -Stating integers: single digit - 100%, double - 50%, triple - 0%  -Patient was unable to correctly input numbers correctly on a calculator in order to complete an additional task  -Clock drawing, numbers only: 75%, 9/12 correct, placement was accurate.    Pain:  Pain Assessment  Pain Assessment: 0-10  0-10 (Numeric) Pain Score: 0 - No pain     Outpatient Education:  Adult Outpatient Education  Individual(s) Educated: Patient  Verbal Education : Yes  Risk and Benefits Discussed with Patient/Caregiver/Other: yes  Patient/Caregiver Demonstrated Understanding: yes  Patient Response to Education: Patient/Caregiver Verbalized Understanding of Information

## 2024-07-22 ENCOUNTER — APPOINTMENT (OUTPATIENT)
Dept: SPEECH THERAPY | Facility: CLINIC | Age: 79
End: 2024-07-22
Payer: MEDICARE

## 2024-07-25 ENCOUNTER — APPOINTMENT (OUTPATIENT)
Dept: SPEECH THERAPY | Facility: CLINIC | Age: 79
End: 2024-07-25
Payer: MEDICARE

## 2024-07-25 DIAGNOSIS — I69.320 APHASIA AS LATE EFFECT OF CEREBROVASCULAR ACCIDENT: Primary | ICD-10-CM

## 2024-07-25 PROCEDURE — 92507 TX SP LANG VOICE COMM INDIV: CPT | Mod: GN | Performed by: STUDENT IN AN ORGANIZED HEALTH CARE EDUCATION/TRAINING PROGRAM

## 2024-07-25 ASSESSMENT — PAIN SCALES - GENERAL: PAINLEVEL_OUTOF10: 0 - NO PAIN

## 2024-07-25 ASSESSMENT — PAIN - FUNCTIONAL ASSESSMENT: PAIN_FUNCTIONAL_ASSESSMENT: 0-10

## 2024-07-25 NOTE — PROGRESS NOTES
"SLP Adult Outpatient Speech-Language Pathology Treatment     Patient Name: Roslyn Cabrera \"Ivette"  MRN: [id number]  Today's Date: 2024  Time Calculation  Start Time: 1115  Stop Time: 1200  Time Calculation (min): 45 min       Current Problem:   1. Aphasia as late effect of cerebrovascular accident [I69.320]            SLP Assessment:  SLP TX Intervention Outcome: Making Progress Towards Goals  Prognosis: Good    Patient actively participated in therapeutic tasks targeting integer recognition and verbalization. The patient demonstrated improvement writing numbers when given the integer. She improved verbalization of numbers when she had the written form of the word as a cue both with numbers (1-10, tens) and home address. The patient has received a master copy of written numbers for integers and personal information for home exercise. Skilled speech therapy services are warranted in order to improve the patient's ability to independently recognize, verbalize and calculate numbers in the context of everyday activities.    Interventions:  Communication Strategies, see assessment    Subjective:  Patient reports no new or worsening symptoms. She had a lot of difficulty verifying her address when on the phone with cable service.    General Visit Information:  Certification Period Start Date: 24  Certification Period End Date: 10/16/24  Number of Authorized Treatments : 20  Total Number of Visits : 2    Plan:  Plan  Inpatient/Swing Bed or Outpatient: Outpatient  Treatment/Interventions: Communication functioning  SLP TX Plan: Continue Plan of Care  SLP Plan: Skilled SLP  SLP Frequency: 1x per week  Duration: 12 weeks  Discussed POC: Patient, Caregiver/family  Discussed Risks/Benefits: Yes  Patient/Caregiver Agreeable: Yes    Objective Data & Progress:  Goals: Established 24  Short-term goals:     7. Patient will accurately state numerical personal information (, address, zip, phone) with 80% accuracy " "given min cues. (New goal, Est 7/18/24)  Goalstatus: Ongoing  Goal not addressed this date   -    8. Patient will verbalize and write integers and money to a maximum of value of thousands when given a verbal or written prompt with 80% accuracy given min cues. (New goal, Est 7/18/24)  Goalstatus: Ongoing  Progressing as follows   -Written form 1-10: 70%, improved to 90% with min cues   -State #1-10: 70%, improved to 90% with min cues   -State \"tens\": 90% with restarts    9. Patient will complete calculation tasks (add, subtract, multiply, divide) with the use of a calculator to 80% accuracy given min cues. (New goal, Est 7/18/24)  Goalstatus: Ongoing  Goal not addressed this date    10.  Patient will complete functional time and money tasks with 80% accuracy given min cues. (New goal, Est 7/18/24)   Goalstatus: Ongoing  Goal not addressed this date    Pain:  Pain Assessment  Pain Assessment: 0-10  0-10 (Numeric) Pain Score: 0 - No pain     Outpatient Education:  Adult Outpatient Education  Individual(s) Educated: Patient, Child  Verbal Education : Yes  Risk and Benefits Discussed with Patient/Caregiver/Other: yes  Patient/Caregiver Demonstrated Understanding: yes  Patient Response to Education: Patient/Caregiver Verbalized Understanding of Information         "

## 2024-07-29 ENCOUNTER — APPOINTMENT (OUTPATIENT)
Dept: SPEECH THERAPY | Facility: CLINIC | Age: 79
End: 2024-07-29
Payer: MEDICARE

## 2024-07-29 DIAGNOSIS — R48.8 DYSCALCULIA: Primary | ICD-10-CM

## 2024-07-29 PROCEDURE — 92507 TX SP LANG VOICE COMM INDIV: CPT | Mod: GN | Performed by: STUDENT IN AN ORGANIZED HEALTH CARE EDUCATION/TRAINING PROGRAM

## 2024-07-29 ASSESSMENT — PAIN SCALES - GENERAL: PAINLEVEL_OUTOF10: 0 - NO PAIN

## 2024-07-29 ASSESSMENT — PAIN - FUNCTIONAL ASSESSMENT: PAIN_FUNCTIONAL_ASSESSMENT: 0-10

## 2024-07-29 NOTE — PROGRESS NOTES
"Speech-Language PathologyS Adult Outpatient Speech-Language Pathology Treatment     Patient Name: Roslyn Cabrera \"aPm\"  MRN: [id number]  Today's Date: 2024  Time Calculation  Start Time: 1301  Stop Time: 1345  Time Calculation (min): 44 min       Current Problem:   1. Dyscalculia [R48.8]          SLP Assessment:  SLP TX Intervention Outcome: Making Progress Towards Goals  Prognosis: Good    Patient actively participated in therapeutic tasks targeting integer recognition and verbalization. Pt demonstrated difficulty inputting numbers in the calculator when verbally presented. The patient had more success when solving written equations. Re-education provided for how to use homework sheets given to her last week. Instructions include writing the written form of a number (0-10) in random order and also writing numbers 20x each while saying the number out loud. Skilled speech therapy services are warranted in order to improve the patient's ability to independently recognize, verbalize and calculate numbers in the context of everyday activities.    Interventions:  Communication Strategies, see assessment    Subjective:  Patient reports no new or worsening symptoms. She had a lot of difficulty verifying her address when on the phone with cable service.    General Visit Information:  Certification Period Start Date: 24  Certification Period End Date: 10/16/24  Number of Authorized Treatments : 20  Total Number of Visits : 3    Plan:  Plan  Inpatient/Swing Bed or Outpatient: Outpatient  Treatment/Interventions: Cognitive communication functioning  SLP TX Plan: Continue Plan of Care  SLP Plan: Skilled SLP  SLP Frequency: 1x per week  Duration: 12 weeks  Discussed POC: Patient, Caregiver/family  Discussed Risks/Benefits: Yes  Patient/Caregiver Agreeable: Yes    Objective Data & Progress:  Goals: Established 24  Short-term goals:     7. Patient will accurately state numerical personal information (, " "address, zip, phone) with 80% accuracy given min cues. (New goal, Est 7/18/24)  In progress  Goal Progress: Progressing as follows   - Patient required max cues to read the written form of the word as a guide or \"crutch\" when stating her home address and apartment number    8. Patient will verbalize and write integers and money to a maximum of value of thousands when given a verbal or written prompt with 80% accuracy given min cues. (New goal, Est 7/18/24)  Goalstatus: Ongoing  Progressing as follows     Previous:  -Written form 1-10: 70%, improved to 90% with min cues   -State #1-10: 70%, improved to 90% with min cues   -State \"tens\": 90% with restarts    9. Patient will complete calculation tasks (add, subtract, multiply, divide) with the use of a calculator to 80% accuracy given min cues. (New goal, Est 7/18/24)  In progress  Goal Progress: Progressing as follows   -Calculating addition/subtraction problems, written: 100%   -Calculating addition/subtraction problems, verbally presented: 67%, cues needed for \"+, -, +\"    10.  Patient will complete functional time and money tasks with 80% accuracy given min cues. (New goal, Est 7/18/24)   Goalstatus: Ongoing  Goal not addressed this date    Pain:  Pain Assessment  Pain Assessment: 0-10  0-10 (Numeric) Pain Score: 0 - No pain     Outpatient Education:  Adult Outpatient Education  Individual(s) Educated: Patient, Child  Verbal Education : Yes  Risk and Benefits Discussed with Patient/Caregiver/Other: yes  Patient/Caregiver Demonstrated Understanding: yes  Patient Response to Education: Patient/Caregiver Verbalized Understanding of Information         "

## 2024-08-09 ENCOUNTER — APPOINTMENT (OUTPATIENT)
Dept: OPHTHALMOLOGY | Facility: CLINIC | Age: 79
End: 2024-08-09
Payer: MEDICARE

## 2024-08-19 ENCOUNTER — HOSPITAL ENCOUNTER (OUTPATIENT)
Dept: CARDIOLOGY | Facility: CLINIC | Age: 79
Discharge: HOME | End: 2024-08-19
Payer: MEDICARE

## 2024-08-19 DIAGNOSIS — Z95.0 PACEMAKER: ICD-10-CM

## 2024-08-19 DIAGNOSIS — R00.1 BRADYCARDIA ON ECG: ICD-10-CM

## 2024-08-19 PROCEDURE — 93298 REM INTERROG DEV EVAL SCRMS: CPT

## 2024-08-23 ENCOUNTER — APPOINTMENT (OUTPATIENT)
Dept: SPEECH THERAPY | Facility: CLINIC | Age: 79
End: 2024-08-23
Payer: MEDICARE

## 2024-08-26 ENCOUNTER — APPOINTMENT (OUTPATIENT)
Dept: SPEECH THERAPY | Facility: CLINIC | Age: 79
End: 2024-08-26
Payer: MEDICARE

## 2024-08-29 ENCOUNTER — HOSPITAL ENCOUNTER (EMERGENCY)
Facility: HOSPITAL | Age: 79
Discharge: HOME | End: 2024-08-29
Attending: EMERGENCY MEDICINE
Payer: MEDICARE

## 2024-08-29 ENCOUNTER — APPOINTMENT (OUTPATIENT)
Dept: RADIOLOGY | Facility: HOSPITAL | Age: 79
End: 2024-08-29
Payer: MEDICARE

## 2024-08-29 VITALS
HEIGHT: 60 IN | DIASTOLIC BLOOD PRESSURE: 69 MMHG | RESPIRATION RATE: 16 BRPM | BODY MASS INDEX: 26.5 KG/M2 | WEIGHT: 135 LBS | HEART RATE: 68 BPM | SYSTOLIC BLOOD PRESSURE: 121 MMHG | OXYGEN SATURATION: 99 % | TEMPERATURE: 97.5 F

## 2024-08-29 DIAGNOSIS — N30.00 ACUTE CYSTITIS WITHOUT HEMATURIA: Primary | ICD-10-CM

## 2024-08-29 DIAGNOSIS — N17.9 AKI (ACUTE KIDNEY INJURY) (CMS-HCC): ICD-10-CM

## 2024-08-29 LAB
ALBUMIN SERPL BCP-MCNC: 4 G/DL (ref 3.4–5)
ALP SERPL-CCNC: 79 U/L (ref 33–136)
ALT SERPL W P-5'-P-CCNC: 19 U/L (ref 7–45)
ANION GAP SERPL CALC-SCNC: 10 MMOL/L (ref 10–20)
APPEARANCE UR: ABNORMAL
AST SERPL W P-5'-P-CCNC: 17 U/L (ref 9–39)
BACTERIA #/AREA URNS AUTO: ABNORMAL /HPF
BASOPHILS # BLD AUTO: 0.03 X10*3/UL (ref 0–0.1)
BASOPHILS NFR BLD AUTO: 0.2 %
BILIRUB SERPL-MCNC: 0.5 MG/DL (ref 0–1.2)
BILIRUB UR STRIP.AUTO-MCNC: NEGATIVE MG/DL
BUN SERPL-MCNC: 49 MG/DL (ref 6–23)
CALCIUM SERPL-MCNC: 9.3 MG/DL (ref 8.6–10.3)
CARDIAC TROPONIN I PNL SERPL HS: 11 NG/L (ref 0–13)
CHLORIDE SERPL-SCNC: 107 MMOL/L (ref 98–107)
CO2 SERPL-SCNC: 21 MMOL/L (ref 21–32)
COLOR UR: YELLOW
CREAT SERPL-MCNC: 1.46 MG/DL (ref 0.5–1.05)
EGFRCR SERPLBLD CKD-EPI 2021: 37 ML/MIN/1.73M*2
EOSINOPHIL # BLD AUTO: 0.06 X10*3/UL (ref 0–0.4)
EOSINOPHIL NFR BLD AUTO: 0.4 %
ERYTHROCYTE [DISTWIDTH] IN BLOOD BY AUTOMATED COUNT: 13.2 % (ref 11.5–14.5)
GLUCOSE SERPL-MCNC: 115 MG/DL (ref 74–99)
GLUCOSE UR STRIP.AUTO-MCNC: NORMAL MG/DL
HCT VFR BLD AUTO: 38.6 % (ref 36–46)
HGB BLD-MCNC: 12.4 G/DL (ref 12–16)
HYALINE CASTS #/AREA URNS AUTO: ABNORMAL /LPF
IMM GRANULOCYTES # BLD AUTO: 0.05 X10*3/UL (ref 0–0.5)
IMM GRANULOCYTES NFR BLD AUTO: 0.4 % (ref 0–0.9)
KETONES UR STRIP.AUTO-MCNC: NEGATIVE MG/DL
LEUKOCYTE ESTERASE UR QL STRIP.AUTO: ABNORMAL
LIPASE SERPL-CCNC: 58 U/L (ref 9–82)
LYMPHOCYTES # BLD AUTO: 1.76 X10*3/UL (ref 0.8–3)
LYMPHOCYTES NFR BLD AUTO: 12.6 %
MAGNESIUM SERPL-MCNC: 2.09 MG/DL (ref 1.6–2.4)
MCH RBC QN AUTO: 29.5 PG (ref 26–34)
MCHC RBC AUTO-ENTMCNC: 32.1 G/DL (ref 32–36)
MCV RBC AUTO: 92 FL (ref 80–100)
MONOCYTES # BLD AUTO: 0.89 X10*3/UL (ref 0.05–0.8)
MONOCYTES NFR BLD AUTO: 6.4 %
MUCOUS THREADS #/AREA URNS AUTO: ABNORMAL /LPF
NEUTROPHILS # BLD AUTO: 11.2 X10*3/UL (ref 1.6–5.5)
NEUTROPHILS NFR BLD AUTO: 80 %
NITRITE UR QL STRIP.AUTO: NEGATIVE
NRBC BLD-RTO: 0 /100 WBCS (ref 0–0)
PH UR STRIP.AUTO: 5.5 [PH]
PLATELET # BLD AUTO: 256 X10*3/UL (ref 150–450)
POTASSIUM SERPL-SCNC: 4.8 MMOL/L (ref 3.5–5.3)
PROT SERPL-MCNC: 6.5 G/DL (ref 6.4–8.2)
PROT UR STRIP.AUTO-MCNC: ABNORMAL MG/DL
RBC # BLD AUTO: 4.21 X10*6/UL (ref 4–5.2)
RBC # UR STRIP.AUTO: NEGATIVE /UL
RBC #/AREA URNS AUTO: ABNORMAL /HPF
SODIUM SERPL-SCNC: 133 MMOL/L (ref 136–145)
SP GR UR STRIP.AUTO: 1.02
SQUAMOUS #/AREA URNS AUTO: ABNORMAL /HPF
UROBILINOGEN UR STRIP.AUTO-MCNC: NORMAL MG/DL
WBC # BLD AUTO: 14 X10*3/UL (ref 4.4–11.3)
WBC #/AREA URNS AUTO: ABNORMAL /HPF

## 2024-08-29 PROCEDURE — 96361 HYDRATE IV INFUSION ADD-ON: CPT

## 2024-08-29 PROCEDURE — 2500000001 HC RX 250 WO HCPCS SELF ADMINISTERED DRUGS (ALT 637 FOR MEDICARE OP)

## 2024-08-29 PROCEDURE — 85025 COMPLETE CBC W/AUTO DIFF WBC: CPT

## 2024-08-29 PROCEDURE — 70450 CT HEAD/BRAIN W/O DYE: CPT

## 2024-08-29 PROCEDURE — 87086 URINE CULTURE/COLONY COUNT: CPT | Mod: STJLAB

## 2024-08-29 PROCEDURE — 2500000004 HC RX 250 GENERAL PHARMACY W/ HCPCS (ALT 636 FOR OP/ED)

## 2024-08-29 PROCEDURE — 71045 X-RAY EXAM CHEST 1 VIEW: CPT

## 2024-08-29 PROCEDURE — 99284 EMERGENCY DEPT VISIT MOD MDM: CPT | Mod: 25

## 2024-08-29 PROCEDURE — 81001 URINALYSIS AUTO W/SCOPE: CPT

## 2024-08-29 PROCEDURE — 96360 HYDRATION IV INFUSION INIT: CPT

## 2024-08-29 PROCEDURE — 83690 ASSAY OF LIPASE: CPT

## 2024-08-29 PROCEDURE — 80053 COMPREHEN METABOLIC PANEL: CPT

## 2024-08-29 PROCEDURE — 84484 ASSAY OF TROPONIN QUANT: CPT

## 2024-08-29 PROCEDURE — 70450 CT HEAD/BRAIN W/O DYE: CPT | Performed by: RADIOLOGY

## 2024-08-29 PROCEDURE — 71045 X-RAY EXAM CHEST 1 VIEW: CPT | Performed by: RADIOLOGY

## 2024-08-29 PROCEDURE — 99285 EMERGENCY DEPT VISIT HI MDM: CPT | Performed by: EMERGENCY MEDICINE

## 2024-08-29 PROCEDURE — 83735 ASSAY OF MAGNESIUM: CPT

## 2024-08-29 PROCEDURE — 36415 COLL VENOUS BLD VENIPUNCTURE: CPT

## 2024-08-29 RX ORDER — ONDANSETRON HYDROCHLORIDE 2 MG/ML
4 INJECTION, SOLUTION INTRAVENOUS ONCE
Status: DISCONTINUED | OUTPATIENT
Start: 2024-08-29 | End: 2024-08-29 | Stop reason: HOSPADM

## 2024-08-29 RX ORDER — CEPHALEXIN 500 MG/1
500 CAPSULE ORAL ONCE
Status: COMPLETED | OUTPATIENT
Start: 2024-08-29 | End: 2024-08-29

## 2024-08-29 RX ADMIN — SODIUM CHLORIDE 1000 ML: 9 INJECTION, SOLUTION INTRAVENOUS at 16:34

## 2024-08-29 RX ADMIN — CEPHALEXIN 500 MG: 500 CAPSULE ORAL at 18:42

## 2024-08-29 ASSESSMENT — PAIN - FUNCTIONAL ASSESSMENT: PAIN_FUNCTIONAL_ASSESSMENT: 0-10

## 2024-08-29 NOTE — ED PROVIDER NOTES
HPI   Chief Complaint   Patient presents with    Altered Mental Status     Past several days. Concern for UTI (daughter).          Ms. Cabrera is a 79 year old female with a significant PMHx of ischemic stroke, HTN, aphasia, TB, and HLD presenting to the ED today for altered mental status.  Patient is here with her daughter.  Her daughter reports that over the last several days to weeks, the patient has had a slow decline in cognitive ability but no concrete examples.  She does have history of urinary tract infections and her daughter is concerned about another UTI.  Patient herself feels overall well without any complaints such as chest pain, shortness of breath, cough, fever chills, dysuria, diarrhea, black or bloody stools.  Patient did have a fall about 2 weeks ago and broke a rib on the left side, found on evaluation at an outside ED.  She was discharged home.      History provided by:  Patient and relative          Patient History   Past Medical History:   Diagnosis Date    Cataract extraction status, left eye 03/09/2016    Status post left cataract extraction    Cataract extraction status, right eye 03/09/2016    Status post right cataract extraction    COPD (chronic obstructive pulmonary disease) (Multi)     Hypertension     Pain in leg, unspecified 03/18/2015    Leg pain    Peripheral vascular disease, unspecified (CMS-[facility]) 06/08/2016    Claudication    Personal history of nicotine dependence 03/18/2015    Quit smoking    Sciatica, unspecified side 09/16/2015    Acute sciatica    Tobacco use 06/08/2016    Tobacco abuse     Past Surgical History:   Procedure Laterality Date    CARDIAC ELECTROPHYSIOLOGY PROCEDURE Left 01/16/2024    Procedure: Loop Insertion;  Surgeon: Odilon Arreaga MD;  Location: [place] [address] Cardiac Cath Lab;  Service: Electrophysiology;  Laterality: Left;  Norbert to do. Pt aphasic/crypto sroke    CATARACT EXTRACTION      EYE SURGERY      JOINT REPLACEMENT      OTHER SURGICAL HISTORY   2022    Knee replacement    OTHER SURGICAL HISTORY  2022    Cataract surgery     No family history on file.  Social History     Tobacco Use    Smoking status: Former     Current packs/day: 0.00     Average packs/day: 0.5 packs/day for 44.0 years (22.0 ttl pk-yrs)     Types: Cigarettes     Start date:      Quit date:      Years since quittin.6     Passive exposure: Past    Smokeless tobacco: Never   Substance Use Topics    Alcohol use: Never    Drug use: Never       Physical Exam   ED Triage Vitals [24 1314]   Temperature Heart Rate Respirations BP   36.4 °C (97.5 °F) 70 16 113/55      Pulse Ox Temp Source Heart Rate Source Patient Position   98 % Temporal Monitor Sitting      BP Location FiO2 (%)     Right arm --       Physical Exam  Vitals and nursing note reviewed.   Constitutional:       General: She is not in acute distress.     Appearance: She is well-developed.   HENT:      Head: Normocephalic and atraumatic.      Right Ear: External ear normal.      Left Ear: External ear normal.      Nose: Nose normal.      Mouth/Throat:      Mouth: Mucous membranes are moist.   Eyes:      General: No scleral icterus.     Extraocular Movements: Extraocular movements intact.      Conjunctiva/sclera: Conjunctivae normal.      Pupils: Pupils are equal, round, and reactive to light.   Cardiovascular:      Rate and Rhythm: Normal rate and regular rhythm.      Heart sounds: No murmur heard.  Pulmonary:      Effort: Pulmonary effort is normal. No respiratory distress.      Breath sounds: Normal breath sounds.   Abdominal:      Palpations: Abdomen is soft.      Tenderness: There is no abdominal tenderness.   Musculoskeletal:         General: Tenderness present. No swelling.      Cervical back: Neck supple.      Comments: Tenderness to palpation of the lower posterior lateral left ribs   Skin:     General: Skin is warm and dry.   Neurological:      General: No focal deficit present.      Mental Status:  She is alert.      Cranial Nerves: No cranial nerve deficit.      Sensory: No sensory deficit.      Motor: No weakness.   Psychiatric:         Mood and Affect: Mood normal.           ED Course & MDM   Diagnoses as of 08/30/24 0015   Acute cystitis without hematuria   PARIS (acute kidney injury) (CMS-HCC)                 No data recorded     [place] Coma Scale Score: 15 (08/29/24 1348 : Corina Meraz RN)       NIH Stroke Scale: 1 (08/29/24 1348 : Corina Meraz RN)                   Medical Decision Making  Patient is a 79-year-old female presenting to the emergency department for altered mental status.  On arrival, she is afebrile and hemodynamically stable.  She is awake and alert, oriented, no acute distress.  She is overall well-appearing without any focal neurological deficits.  Patient's daughter reports that it appears to be a subtle cognitive decline over the last several days to weeks and is considered a possible urinary tract infection.  Patient still has no complaints.  Will obtain lab work and imaging to eval for any metabolic or infectious causes of her change in mentation.  She did have a recent fall with a fractured left rib.  Will obtain chest x-ray to evaluate for any signs of pneumonia secondary to the rib fracture, as well as urinary tract infection.    CBC does show leukocytosis of 14.0.  No anemia.  CMP shows an PARIS with a creatinine of 1.46.  She is given a liter of IV fluids as we do suspect dehydration from poor oral intake.  Lipase is within normal limits.  Troponin negative.  Urinalysis shows 75 leukocyte esterase, 11-20 white cells, 1+ bacteria.  Patient is unsure if they obtained a CT of her head after her fall several weeks ago, so a CT of her head is obtained today to eval for a delayed bleed or old intracranial hemorrhage.  CT is negative for any acute intracranial process.  Chest x-ray is negative for any acute pathology.    On reevaluation, patient is walking around  the emergency department and overall feels well.  We did offer admission as she does have a leukocytosis, but she is hemodynamically stable.  She is able to function and she and her daughter feel comfortable being discharged home.  She is given dose of Keflex here and discharged home with a prescription for Keflex.  Home care and return instructions discussed. Patient expressed understanding and agreement. Patient discharged in stable condition.    Semaj Chew DO, PGY-4  Emergency Medicine Resident    Amount and/or Complexity of Data Reviewed  Labs: ordered.  Radiology: ordered. Decision-making details documented in ED Course.  ECG/medicine tests: ordered and independent interpretation performed. Decision-making details documented in ED Course.        Procedure  Procedures     Semaj Chew DO  Resident  08/30/24 0015

## 2024-08-29 NOTE — DISCHARGE INSTRUCTIONS
Please make sure you drink plenty of fluids to stay hydrated.  Take the full course of antibiotics even you start to feel better.  Return to the emergency department if you have worsening back or flank pain, fevers, nausea or vomiting, review any questions or concerns.  Follow-up with your primary care doctor in 2 to 3 days.

## 2024-08-30 LAB — HOLD SPECIMEN: NORMAL

## 2024-08-30 RX ORDER — CEPHALEXIN 500 MG/1
500 CAPSULE ORAL 2 TIMES DAILY
Qty: 14 CAPSULE | Refills: 0 | Status: SHIPPED | OUTPATIENT
Start: 2024-08-30 | End: 2024-09-04 | Stop reason: WASHOUT

## 2024-08-31 LAB — BACTERIA UR CULT: NO GROWTH

## 2024-09-03 NOTE — PROGRESS NOTES
Subjective   Pam Cabrera is a 79 y.o. female who presents for urinary tract infection follow-up.  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  Takes medication prescribed.  Patient is accompanied by daughter helps with history.    Review of Systems  10 system review pertinent as above  Objective     Visit Vitals  /78   Pulse 78   Temp 36.5 °C (97.7 °F)   Resp 16        Physical Exam  HEENT: Atraumatic normocephalic the pupils are equal and round and reactive to light the sclerae nonicteric extraocular motion are intact.  Neck: Is supple without JVD no carotid bruits the trachea is midline there are no masses pulses are equal and bilateral with normal upstroke.  Skin: Normal.  Skin good texture.  Moist.  Good turgor.  No lesions, no rashes.  Lymph: No lymphadenopathy appreciated, no masses, no lesions  Lungs: Are clear to auscultation and percussion, good breath sounds bilaterally, no rhonchi, no wheezing, good diaphragmatic excursion.  Heart: Normal rate and normal rhythm S1, S2, no S3, no gallop, murmur or rub.  Abdomen: Soft, nontender, no organomegaly, good bowel sounds.    Extremities: Full range of motion, good pulses bilateral.  No cyanosis, no clubbing or edema.  Neuro: Cranial nerves II-XII are grossly intact there is no sensory or motor deficits.  Able to move all extremities.      Assessment/Plan     Acute kidney injury  In the setting of dehydration  Patient is instructed to increase fluid intake  She is accompanied by her daughter  Will check a BMP once well-hydrated  Long discussion  About increasing fluid  intake    History of urinary tract  Was treated and completed antibiotic therapy  Will check a urinalysis today    CVA, January 2024  MCA embolic aphasia, apraxia  Speech therapy physical therapy  Follows with cardiology no atrial fibrillation  Follows with device clinic loop recorder  Continue baby aspirin, Lipitor carvedilol  On Baby ASA  Needs speech therapy     Cardiovascular disease prevention  Diet exercise weight loss  Eat healthy  Remain on current medications  Would repeat your lipid panel    Hypertension  No added salt diet, do not and salt to your food  Try to exercise every other day for 30 minutes  Continue current medications  Continue carvedilol 12.5 mg twice a day hydralazine 25 mg 2 times a day  Losartan 100 mg daily    Open angle glaucoma  Follows with ophthalmology  Continue eyedrops as prescribed    History of tobacco abuse  Stopped smoking January 2024  Consult with patient regarding remaining smoke-free    Continue with the low-fat, low-cholesterol diet,  I recommended Mediterranean diet, which include fish, chicken, vegetables and olive oil  Exercise daily for 30 minutes at least 3 times a week  Continue home medications    Mammogram  Follows with GYN Dr. 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 Justen Lopez      Right inguinal hernia  Repair with Dr. Cristofer Cartagena  Circa September 2022    Vaginal hysterectomy May 30, 2013  Continues to follow with GYN         Problem List Items Addressed This Visit       Anxiety and depression    Relevant Medications    venlafaxine XR (Effexor-XR) 150 mg 24 hr capsule    Malignant neoplasm of cervix, unspecified site (Multi)     Other Visit Diagnoses       Cystitis    -  Primary    Relevant Orders    POCT UA (Automated) docked device    Benign  essential hypertension        Relevant Medications    hydrALAZINE (Apresoline) 25 mg tablet            Oscar Milner MD

## 2024-09-04 ENCOUNTER — APPOINTMENT (OUTPATIENT)
Dept: PRIMARY CARE | Facility: CLINIC | Age: 79
End: 2024-09-04
Payer: MEDICARE

## 2024-09-04 VITALS
BODY MASS INDEX: 25.91 KG/M2 | RESPIRATION RATE: 16 BRPM | SYSTOLIC BLOOD PRESSURE: 130 MMHG | DIASTOLIC BLOOD PRESSURE: 78 MMHG | WEIGHT: 132 LBS | HEART RATE: 78 BPM | HEIGHT: 60 IN | TEMPERATURE: 97.7 F

## 2024-09-04 DIAGNOSIS — F32.A ANXIETY AND DEPRESSION: ICD-10-CM

## 2024-09-04 DIAGNOSIS — N30.90 CYSTITIS: Primary | ICD-10-CM

## 2024-09-04 DIAGNOSIS — I10 BENIGN ESSENTIAL HYPERTENSION: ICD-10-CM

## 2024-09-04 DIAGNOSIS — C53.9 MALIGNANT NEOPLASM OF CERVIX, UNSPECIFIED SITE (MULTI): ICD-10-CM

## 2024-09-04 DIAGNOSIS — F41.9 ANXIETY AND DEPRESSION: ICD-10-CM

## 2024-09-04 LAB
APPEARANCE UR: CLEAR
BILIRUB UR QL STRIP: NEGATIVE
COLOR UR: YELLOW
GLUCOSE UR STRIP-MCNC: NEGATIVE MG/DL
HGB UR QL STRIP: NEGATIVE
KETONES UR STRIP-MCNC: NEGATIVE MG/DL
LEUKOCYTE ESTERASE UR QL STRIP: ABNORMAL
NITRITE UR QL STRIP: NEGATIVE
PH UR STRIP: 5.5 [PH]
PROT UR STRIP-MCNC: ABNORMAL MG/DL
SP GR UR STRIP.AUTO: >=1.03
UROBILINOGEN UR STRIP-ACNC: 0.2 E.U./DL

## 2024-09-04 PROCEDURE — 3078F DIAST BP <80 MM HG: CPT | Performed by: INTERNAL MEDICINE

## 2024-09-04 PROCEDURE — G2211 COMPLEX E/M VISIT ADD ON: HCPCS | Performed by: INTERNAL MEDICINE

## 2024-09-04 PROCEDURE — 81003 URINALYSIS AUTO W/O SCOPE: CPT | Performed by: INTERNAL MEDICINE

## 2024-09-04 PROCEDURE — 1126F AMNT PAIN NOTED NONE PRSNT: CPT | Performed by: INTERNAL MEDICINE

## 2024-09-04 PROCEDURE — 1158F ADVNC CARE PLAN TLK DOCD: CPT | Performed by: INTERNAL MEDICINE

## 2024-09-04 PROCEDURE — 99214 OFFICE O/P EST MOD 30 MIN: CPT | Performed by: INTERNAL MEDICINE

## 2024-09-04 PROCEDURE — 1159F MED LIST DOCD IN RCRD: CPT | Performed by: INTERNAL MEDICINE

## 2024-09-04 PROCEDURE — 1123F ACP DISCUSS/DSCN MKR DOCD: CPT | Performed by: INTERNAL MEDICINE

## 2024-09-04 PROCEDURE — 90662 IIV NO PRSV INCREASED AG IM: CPT | Performed by: INTERNAL MEDICINE

## 2024-09-04 PROCEDURE — 1036F TOBACCO NON-USER: CPT | Performed by: INTERNAL MEDICINE

## 2024-09-04 PROCEDURE — G0008 ADMIN INFLUENZA VIRUS VAC: HCPCS | Performed by: INTERNAL MEDICINE

## 2024-09-04 PROCEDURE — 3075F SYST BP GE 130 - 139MM HG: CPT | Performed by: INTERNAL MEDICINE

## 2024-09-04 RX ORDER — HYDRALAZINE HYDROCHLORIDE 25 MG/1
25 TABLET, FILM COATED ORAL 2 TIMES DAILY
Qty: 90 TABLET | Refills: 1 | Status: SHIPPED | OUTPATIENT
Start: 2024-09-04

## 2024-09-04 RX ORDER — VENLAFAXINE HYDROCHLORIDE 150 MG/1
150 CAPSULE, EXTENDED RELEASE ORAL
Qty: 30 CAPSULE | Refills: 1 | Status: SHIPPED | OUTPATIENT
Start: 2024-09-04 | End: 2024-11-03

## 2024-09-04 ASSESSMENT — PAIN SCALES - GENERAL: PAINLEVEL: 0-NO PAIN

## 2024-09-04 ASSESSMENT — ENCOUNTER SYMPTOMS
OCCASIONAL FEELINGS OF UNSTEADINESS: 0
LOSS OF SENSATION IN FEET: 0
DEPRESSION: 0

## 2024-09-11 ENCOUNTER — APPOINTMENT (OUTPATIENT)
Dept: PRIMARY CARE | Facility: CLINIC | Age: 79
End: 2024-09-11
Payer: MEDICARE

## 2024-09-12 ENCOUNTER — APPOINTMENT (OUTPATIENT)
Dept: SPEECH THERAPY | Facility: CLINIC | Age: 79
End: 2024-09-12
Payer: MEDICARE

## 2024-09-18 ENCOUNTER — APPOINTMENT (OUTPATIENT)
Dept: NEUROLOGY | Facility: CLINIC | Age: 79
End: 2024-09-18
Payer: MEDICARE

## 2024-09-18 VITALS
BODY MASS INDEX: 27.09 KG/M2 | HEIGHT: 60 IN | WEIGHT: 138 LBS | SYSTOLIC BLOOD PRESSURE: 110 MMHG | TEMPERATURE: 97.1 F | HEART RATE: 61 BPM | DIASTOLIC BLOOD PRESSURE: 82 MMHG

## 2024-09-18 DIAGNOSIS — I69.320 APHASIA AS LATE EFFECT OF CEREBROVASCULAR ACCIDENT: ICD-10-CM

## 2024-09-18 DIAGNOSIS — G47.30 SLEEP APNEA, UNSPECIFIED TYPE: ICD-10-CM

## 2024-09-18 DIAGNOSIS — I63.412 STROKE DUE TO EMBOLISM OF LEFT MIDDLE CEREBRAL ARTERY (MULTI): Primary | ICD-10-CM

## 2024-09-18 DIAGNOSIS — I69.390 APRAXIA AS LATE EFFECT OF CEREBROVASCULAR ACCIDENT (CVA): ICD-10-CM

## 2024-09-18 PROBLEM — R48.8 DYSCALCULIA: Status: RESOLVED | Noted: 2024-07-18 | Resolved: 2024-09-18

## 2024-09-18 PROBLEM — I63.9 ACUTE ISCHEMIC STROKE (MULTI): Status: RESOLVED | Noted: 2024-01-15 | Resolved: 2024-09-18

## 2024-09-18 PROBLEM — R47.01 APHASIA: Status: RESOLVED | Noted: 2024-01-15 | Resolved: 2024-09-18

## 2024-09-18 PROCEDURE — 1160F RVW MEDS BY RX/DR IN RCRD: CPT | Performed by: STUDENT IN AN ORGANIZED HEALTH CARE EDUCATION/TRAINING PROGRAM

## 2024-09-18 PROCEDURE — 3074F SYST BP LT 130 MM HG: CPT | Performed by: STUDENT IN AN ORGANIZED HEALTH CARE EDUCATION/TRAINING PROGRAM

## 2024-09-18 PROCEDURE — 3079F DIAST BP 80-89 MM HG: CPT | Performed by: STUDENT IN AN ORGANIZED HEALTH CARE EDUCATION/TRAINING PROGRAM

## 2024-09-18 PROCEDURE — 1159F MED LIST DOCD IN RCRD: CPT | Performed by: STUDENT IN AN ORGANIZED HEALTH CARE EDUCATION/TRAINING PROGRAM

## 2024-09-18 PROCEDURE — 1036F TOBACCO NON-USER: CPT | Performed by: STUDENT IN AN ORGANIZED HEALTH CARE EDUCATION/TRAINING PROGRAM

## 2024-09-18 PROCEDURE — 99214 OFFICE O/P EST MOD 30 MIN: CPT | Performed by: STUDENT IN AN ORGANIZED HEALTH CARE EDUCATION/TRAINING PROGRAM

## 2024-09-18 ASSESSMENT — LIFESTYLE VARIABLES
HOW MANY STANDARD DRINKS CONTAINING ALCOHOL DO YOU HAVE ON A TYPICAL DAY: PATIENT DOES NOT DRINK
AUDIT-C TOTAL SCORE: 0
HOW OFTEN DO YOU HAVE A DRINK CONTAINING ALCOHOL: NEVER
HOW OFTEN DO YOU HAVE SIX OR MORE DRINKS ON ONE OCCASION: NEVER
SKIP TO QUESTIONS 9-10: 1

## 2024-09-18 ASSESSMENT — PATIENT HEALTH QUESTIONNAIRE - PHQ9
2. FEELING DOWN, DEPRESSED OR HOPELESS: NOT AT ALL
1. LITTLE INTEREST OR PLEASURE IN DOING THINGS: NOT AT ALL
SUM OF ALL RESPONSES TO PHQ9 QUESTIONS 1 & 2: 0

## 2024-09-18 ASSESSMENT — ENCOUNTER SYMPTOMS
DEPRESSION: 0
OCCASIONAL FEELINGS OF UNSTEADINESS: 0
LOSS OF SENSATION IN FEET: 0

## 2024-09-18 NOTE — PROGRESS NOTES
"Chief Complaint   Patient presents with    Stroke     Pt here today for a follow up. Pt has no concerns today      Subjective     Roslyn Cabrera is a 79 y.o. year old female for follow-up of stroke.    She was last seen 3/21/2024.     To review, \"She was hospitalized 1/15-20/2024 for left MCA stroke. She presented with sudden onset difficulty speaking, wernicke's aphasia. NIHSS 1. CT head w/o contrast shows global atrophy and advanced white matter disease. CTA shows no significant evidence of intracranial or extracranial disease. MRI brain shows new diffusion restriction in left parietal and left occipital regions in distal MCA distribution. 1h EEG negative for epileptiform discharges. Echo revealed severely dilated left atrium, no afib on telemetry. Pt had implanted loop recorder 1/16.   Mechanism of stroke likely cardioembolic\"    Vascular RF: HTN, HLD, tobacco use     9/18/2024 - she had an illness in August, during that time she fell and fractured a rib and had a decline in her speech. She also had had a gap in SLP also during that time but she restarts SLP therapy tomorrow. She has most difficulty with numbers and time.     Patient Active Problem List   Diagnosis    Depression    Benign essential HTN    Bilateral optic nerve atrophy    Blurry vision    Dyslipidemia    Vitamin D deficiency    Tobacco abuse    Ankle edema    Cellulitis of leg, left    Cellulitis of right lower extremity    Cobalamin deficiency    Hallucinatory illusions    Hemangioma of skin and subcutaneous tissue    Hyperlipidemia    Intermediate stage nonexudative age-related macular degeneration of both eyes    Traumatic injury of right lower extremity    Nail abnormality    OP (osteoporosis)    Osteoarthritis of both knees    Osteopenia    Other melanin hyperpigmentation    Inflamed seborrheic keratosis    Primary open angle glaucoma (POAG) of both eyes    Rheumatoid arthritis (Multi)    Right inguinal hernia    Sensorineural hearing loss, " bilateral    Tuberculosis    Uncontrolled hypertension    Vision impairment    Visual distortions    Wound cellulitis    Wound dehiscence    Anxiety and depression    Aphasia    Acute ischemic stroke (Multi)    Stroke due to embolism of left middle cerebral artery (Multi)    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  Mildly dysfluent speech with paraphasic errors .    Cranial Nerves  CN III, IV, VI: Extraocular movements intact bilaterally.  CN VII: Full and symmetric facial movement.  CN VIII: Hearing is normal.    Motor   Strength is 5/5 throughout all four extremities.    Gait  Casual gait is normal including stance, stride, and arm swing.    Physical Exam  Eyes:      Extraocular Movements: Extraocular movements intact.   Neurological:      Motor: Motor strength is normal.      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 Continue aspirin 81mg and atorvastatin 40mg daily. Recrudescence of her aphasia in the setting of probable viral illness. Agree with restarting SLP. Able to live independently. Not ok to drive.     Follow-up in 6 months     There are no Patient Instructions on file for this visit.

## 2024-09-19 ENCOUNTER — TREATMENT (OUTPATIENT)
Dept: SPEECH THERAPY | Facility: CLINIC | Age: 79
End: 2024-09-19
Payer: MEDICARE

## 2024-09-19 DIAGNOSIS — I69.390 APRAXIA AS LATE EFFECT OF CEREBROVASCULAR ACCIDENT (CVA): ICD-10-CM

## 2024-09-19 DIAGNOSIS — I69.320 APHASIA AS LATE EFFECT OF CEREBROVASCULAR ACCIDENT: Primary | ICD-10-CM

## 2024-09-19 PROCEDURE — 92507 TX SP LANG VOICE COMM INDIV: CPT | Mod: GN | Performed by: STUDENT IN AN ORGANIZED HEALTH CARE EDUCATION/TRAINING PROGRAM

## 2024-09-19 ASSESSMENT — PAIN - FUNCTIONAL ASSESSMENT: PAIN_FUNCTIONAL_ASSESSMENT: 0-10

## 2024-09-19 ASSESSMENT — PAIN SCALES - GENERAL: PAINLEVEL_OUTOF10: 0 - NO PAIN

## 2024-09-19 NOTE — PROGRESS NOTES
"SLP Adult Outpatient Speech-Language Pathology Treatment     Patient Name: Roslyn Cabrera \"Ivette"  MRN: [id number]  Today's Date: 9/19/2024  Time Calculation  Start Time: 1115  Stop Time: 1201  Time Calculation (min): 46 min       Current Problem:   1. Aphasia as late effect of cerebrovascular accident [I69.320]        2. 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 The patient had an extended pause from therapy after sustaining a fall and fracturing her rib. The patient reports she has done some of the home exercises targeting dyscalculia but does not feel like she is progressing. The patient was very successful identifying correct written integers with the written form of the number. She continued to have difficulty verbalizing single digits, teens and double digits. Numbers 5, 6,7 and 8 tend to be more challenging for her. The patient also feels her expressive language has declined. This will be monitored during future sessions to determine if language goals should be included in her plan of care. Skilled speech therapy services are warranted in order to improve the patient's ability to express wants, needs and opinions, personal information and engage in meaningful conversation in the home, social and clinical settings.    Interventions:   Language Expression  Language Expression Interventions: Verbal Reading (numbers)    Subjective:  Patient reports no new or worsening symptoms. She had a lot of difficulty verifying her address when on the phone with cable service.    General Visit Information:  Certification Period Start Date: 07/18/24  Certification Period End Date: 10/16/24  Number of Authorized Treatments : 20  Total Number of Visits : 4    Plan:  Plan  Inpatient/Swing Bed or Outpatient: Outpatient  Treatment/Interventions: " "Communication functioning  SLP TX Plan: Continue Plan of Care  SLP Plan: Skilled SLP  SLP Frequency: 1x per week  Duration: 12 weeks  Discussed POC: Patient, Caregiver/family  Discussed Risks/Benefits: Yes  Patient/Caregiver Agreeable: Yes    Objective Data & Progress:  Goals: Established 24  Short-term goals:     7. Patient will accurately state numerical personal information (, address, zip, phone) with 80% accuracy given min cues. (New goal, Est 24)  In progress  Goal Progress: Goal not addressed this date     Previous:  - Patient required max cues to read the written form of the word as a guide or \"crutch\" when stating her home address and apartment number    8. Patient will verbalize and write integers and money to a maximum of value of thousands when given a verbal or written prompt with 80% accuracy given min cues. (New goal, Est 24)  Goalstatus: Ongoing  Progressing as follows   -Identifying if integer matches written form - 95%   -Verbalizing single, teens, double digit #s - 65%    Previous:  -Written form 1-10: 70%, improved to 90% with min cues   -State #1-10: 70%, improved to 90% with min cues   -State \"tens\": 90% with restarts    9. Patient will complete calculation tasks (add, subtract, multiply, divide) with the use of a calculator to 80% accuracy given min cues. (New goal, Est 24)  In progress  Goal Progress: Progressing as follows     Previous:  -Calculating add/sub problems, written: 100%   -Calculating add/sub problems, verbally presented: 67%, cues needed for \"+, -, +\"    10.  Patient will complete functional time and money tasks with 80% accuracy given min cues. (New goal, Est 24)   Goalstatus: Ongoing  Goal not addressed this date    Pain:  Pain Assessment  Pain Assessment: 0-10  0-10 (Numeric) Pain Score: 0 - No pain     Outpatient Education:  Adult Outpatient Education  Individual(s) Educated: Patient  Verbal Education : Yes  Risk and Benefits Discussed with " Patient/Caregiver/Other: yes  Patient/Caregiver Demonstrated Understanding: yes  Patient Response to Education: Patient/Caregiver Verbalized Understanding of Information

## 2024-09-26 ENCOUNTER — TREATMENT (OUTPATIENT)
Dept: SPEECH THERAPY | Facility: CLINIC | Age: 79
End: 2024-09-26
Payer: MEDICARE

## 2024-09-26 DIAGNOSIS — I69.320 APHASIA AS LATE EFFECT OF CEREBROVASCULAR ACCIDENT: Primary | ICD-10-CM

## 2024-09-26 DIAGNOSIS — I69.390 APRAXIA AS LATE EFFECT OF CEREBROVASCULAR ACCIDENT (CVA): ICD-10-CM

## 2024-09-26 PROCEDURE — 92507 TX SP LANG VOICE COMM INDIV: CPT | Mod: GN | Performed by: STUDENT IN AN ORGANIZED HEALTH CARE EDUCATION/TRAINING PROGRAM

## 2024-09-26 ASSESSMENT — PAIN SCALES - GENERAL: PAINLEVEL_OUTOF10: 0 - NO PAIN

## 2024-09-26 ASSESSMENT — PAIN - FUNCTIONAL ASSESSMENT: PAIN_FUNCTIONAL_ASSESSMENT: 0-10

## 2024-09-26 NOTE — PROGRESS NOTES
"SLP Adult Outpatient Speech-Language Pathology Treatment     Patient Name: Roslyn Cabrera \"Pam\"  MRN: [id number]  Today's Date: 9/26/2024  Time Calculation  Start Time: 1350  Stop Time: 1430  Time Calculation (min): 40 min       Current Problem:   1. Aphasia as late effect of cerebrovascular accident [I69.320]        2. 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 She completed a simple clock math task with full accuracy but reported the task was still challenging. She had improvement stating single digit numbers on the first attempt but repeated attempts were inconsistent. She had difficulty verbalizing \"teen\" numbers and required re-education as to why she needed to incorporate written forms of numbers as part of her HEP. The daughter reported understanding of the rationale when she joined the last few minutes of the session. Skilled speech therapy services are warranted in order to improve the patient's ability to express wants, needs and opinions, personal information and engage in meaningful conversation in the home, social and clinical settings.    Interventions:   Language Expression  Language Expression Interventions: Verbal Reading (numbers)    Subjective:  Patient reports she feels \"foggy\" both cognitively and at times visually.    General Visit Information:  Certification Period Start Date: 07/18/24  Certification Period End Date: 10/16/24  Number of Authorized Treatments : 20  Total Number of Visits : 5    Plan:  Plan  Inpatient/Swing Bed or Outpatient: Outpatient  Treatment/Interventions: Communication functioning  SLP TX Plan: Continue Plan of Care  SLP Plan: Skilled SLP  SLP Frequency: 1x per week  Duration: 12 weeks  Discussed POC: Patient  Discussed Risks/Benefits: Yes  Patient/Caregiver Agreeable: Yes    Objective Data & " "Progress:  Goals: Established 24  Short-term goals:     7. Patient will accurately state numerical personal information (, address, zip, phone) with 80% accuracy given min cues. (New goal, Est 24)  In progress  Goal Progress: Goal not addressed this date     Previous:  - Patient required max cues to read the written form of the word as a guide or \"crutch\" when stating her home address and apartment number    8. Patient will verbalize and write integers and money to a maximum of value of thousands when given a verbal or written prompt with 80% accuracy given min cues. (New goal, Est 24)  Goalstatus: Ongoing  Progressing as follows   -Stating number 0-10: 100% for initial attempt, attempts later were inconsistent   -Stating 11-19: max verbal and written cues required    Previous:  -Identifying if integer matches written form - 95%  -Verbalizing single, teens, double digit #s - 65%  -Written form 1-10: 70%, improved to 90% with min cues   -State #1-10: 70%, improved to 90% with min cues   -State \"tens\": 90% with restarts    9. Patient will complete calculation tasks (add, subtract, multiply, divide) with the use of a calculator to 80% accuracy given min cues. (New goal, Est 24)  In progress  Goal Progress: Progressing as follows     Previous:  -Calculating add/sub problems, written: 100%   -Calculating add/sub problems, verbally presented: 67%, cues needed for \"+, -, +\"    10.  Patient will complete functional time and money tasks with 80% accuracy given min cues. (New goal, Est 24)   Goalstatus: Ongoing  Progressing as follows   -Clock math, level 1/3: 100%, pt reported it was somewhat challenging despite full accuracy  Pain:  Pain Assessment  Pain Assessment: 0-10  0-10 (Numeric) Pain Score: 0 - No pain     Outpatient Education:  Adult Outpatient Education  Individual(s) Educated: Patient  Verbal Education : Yes  Risk and Benefits Discussed with Patient/Caregiver/Other: " yes  Patient/Caregiver Demonstrated Understanding: yes  Patient Response to Education: Patient/Caregiver Verbalized Understanding of Information

## 2024-10-01 ENCOUNTER — TREATMENT (OUTPATIENT)
Dept: SPEECH THERAPY | Facility: CLINIC | Age: 79
End: 2024-10-01
Payer: MEDICARE

## 2024-10-01 DIAGNOSIS — I69.320 APHASIA AS LATE EFFECT OF CEREBROVASCULAR ACCIDENT: Primary | ICD-10-CM

## 2024-10-01 PROCEDURE — 92507 TX SP LANG VOICE COMM INDIV: CPT | Mod: GN | Performed by: STUDENT IN AN ORGANIZED HEALTH CARE EDUCATION/TRAINING PROGRAM

## 2024-10-01 ASSESSMENT — PAIN - FUNCTIONAL ASSESSMENT: PAIN_FUNCTIONAL_ASSESSMENT: 0-10

## 2024-10-01 ASSESSMENT — PAIN SCALES - GENERAL: PAINLEVEL_OUTOF10: 0 - NO PAIN

## 2024-10-01 NOTE — PROGRESS NOTES
"SLP Adult Outpatient Speech-Language Pathology Treatment     Patient Name: Roslyn Cabrera \"Pam\"  MRN: [id number]  Today's Date: 10/3/2024          Current Problem:   1. Aphasia as late effect of cerebrovascular accident [I69.320]            SLP Assessment:  SLP TX Intervention Outcome: Making Progress Towards Goals  Prognosis: Good    Patient was seen for aphasia therapy this date. The patient demonstrated improvement with her ability to state her  and street address as well as writing [place]ian numerals that correspond to the written form. She demonstrated difficulty stating her apartment number and phone number. Skilled speech therapy services are warranted in order to improve the patient's ability to express wants, needs and opinions, personal information and engage in meaningful conversation in the home, social and clinical settings.    Interventions:   Language Expression  Language Expression Interventions: Verbal Reading (numbers)    Subjective:  Patient reports she had a lot of trouble writing out the numbers one day but then the next day she could do it much easier.     General Visit Information:  Certification Period Start Date: 24  Certification Period End Date: 10/16/24  Number of Authorized Treatments : 20  Total Number of Visits : 6    Plan:  Plan  Inpatient/Swing Bed or Outpatient: Outpatient  Treatment/Interventions: Communication functioning  SLP TX Plan: Continue Plan of Care  SLP Plan: Skilled SLP  SLP Frequency: 1x per week  Duration: 12 weeks  Discussed POC: Patient  Discussed Risks/Benefits: Yes  Patient/Caregiver Agreeable: Yes    Objective Data & Progress:  Goals: Established 24  Short-term goals:     7. Patient will accurately state numerical personal information (, address, zip, phone) with 80% accuracy given min cues. (New goal, Est 24)  In progress  Goal Progress: Progressing as follows   -Patient able to state her  and street address. She could not state her " "apartment # or phone #    Previous:  - Patient required max cues to read the written form of the word as a guide or \"crutch\" when stating her home address and apartment number    8. Patient will verbalize and write integers and money to a maximum of value of thousands when given a verbal or written prompt with 80% accuracy given min cues. (New goal, Est 7/18/24)  Goalstatus: Ongoing  Progressing as follows   -Writing Polish numerals when given written form: 92% indep    Previous:  -Stating number 0-10: 100% for initial attempt, attempts later were inconsistent  -Stating 11-19: max verbal and written cues required  -Identifying if integer matches written form - 95%  -Verbalizing single, teens, double digit #s - 65%  -Written form 1-10: 70%, improved to 90% with min cues   -State #1-10: 70%, improved to 90% with min cues   -State \"tens\": 90% with restarts    9. Patient will complete calculation tasks (add, subtract, multiply, divide) with the use of a calculator to 80% accuracy given min cues. (New goal, Est 7/18/24)  In progress  Goal Progress: Progressing as follows     Previous:  -Calculating add/sub problems, written: 100%   -Calculating add/sub problems, verbally presented: 67%, cues needed for \"+, -, +\"    10.  Patient will complete functional time and money tasks with 80% accuracy given min cues. (New goal, Est 7/18/24)   Goalstatus: Ongoing  Progressing as follows   -Clock math, level 1/3: 100%, pt reported it was somewhat challenging despite full accuracy  Pain:  Pain Assessment  Pain Assessment: 0-10  0-10 (Numeric) Pain Score: 0 - No pain     Outpatient Education:  Adult Outpatient Education  Individual(s) Educated: Patient  Verbal Education : Yes  Risk and Benefits Discussed with Patient/Caregiver/Other: yes  Patient/Caregiver Demonstrated Understanding: yes  Patient Response to Education: Patient/Caregiver Verbalized Understanding of Information     "

## 2024-10-08 ENCOUNTER — TREATMENT (OUTPATIENT)
Dept: SPEECH THERAPY | Facility: CLINIC | Age: 79
End: 2024-10-08
Payer: MEDICARE

## 2024-10-08 DIAGNOSIS — I69.390 APRAXIA AS LATE EFFECT OF CEREBROVASCULAR ACCIDENT (CVA): ICD-10-CM

## 2024-10-08 DIAGNOSIS — I69.320 APHASIA AS LATE EFFECT OF CEREBROVASCULAR ACCIDENT: Primary | ICD-10-CM

## 2024-10-08 PROCEDURE — 92507 TX SP LANG VOICE COMM INDIV: CPT | Mod: GN | Performed by: STUDENT IN AN ORGANIZED HEALTH CARE EDUCATION/TRAINING PROGRAM

## 2024-10-08 ASSESSMENT — PAIN - FUNCTIONAL ASSESSMENT: PAIN_FUNCTIONAL_ASSESSMENT: 0-10

## 2024-10-08 ASSESSMENT — PAIN SCALES - GENERAL: PAINLEVEL_OUTOF10: 0 - NO PAIN

## 2024-10-08 NOTE — PROGRESS NOTES
"SLP Adult Outpatient Speech-Language Pathology Treatment     Patient Name: Roslyn Cabrera \"Pam\"  MRN: [id number]  Today's Date: 10/8/2024  Time Calculation  Start Time: 1350  Stop Time: 1433  Time Calculation (min): 43 min       Current Problem:   1. Aphasia as late effect of cerebrovascular accident        2. 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 The patient had continues to make improvement stating individual numbers and counting by tens, but errors are present. Much of the focus this treatment included stating teens or numbers to the tens or hundreds value for which the patient required extensive instruction for how to verbalize numbers in the various positions. The patient felt using cents was a better tool to work on this concept as it ties into something more familiar to her than random numbers. The patient benefited from associating number values to coin values and graphic representations of number values one, tens and hundreds. Skilled speech therapy services are warranted in order to improve the patient's ability to express wants, needs and opinions, personal information and engage in meaningful conversation in the home, social and clinical settings.    Interventions:   Language Expression  Language Expression Interventions: Verbal Reading (numbers)    Subjective:  Patient reports no new or worsening symptoms.    General Visit Information:  Certification Period Start Date: 07/18/24  Certification Period End Date: 10/16/24  Number of Authorized Treatments : 20  Total Number of Visits : 7    Plan:  Plan  Inpatient/Swing Bed or Outpatient: Outpatient  Treatment/Interventions: Communication functioning  SLP TX Plan: Continue Plan of Care  SLP Plan: Skilled SLP  SLP Frequency: 1x per " "week  Duration: 12 weeks  Discussed POC: Patient  Discussed Risks/Benefits: Yes  Patient/Caregiver Agreeable: Yes    Objective Data & Progress:  Goals: Established 24  Short-term goals:     7. Patient will accurately state numerical personal information (, address, zip, phone) with 80% accuracy given min cues. (New goal, Est 24)  In progress  Goal Progress: Progressing as follows   -Patient able to state her  and street address. She could not state her apartment # or phone #    Previous:  - Patient required max cues to read the written form of the word as a guide or \"crutch\" when stating her home address and apartment number    8. Patient will verbalize and write integers and money to a maximum of value of thousands when given a verbal or written prompt with 80% accuracy given min cues. (New goal, Est 24)  Goalstatus: Ongoing  Progressing as follows   -Stating money (up to 10s, mixed cents) - 67%    Previous:   -Writing Irish numerals when given written form: 92% indep  -Stating number 0-10: 100% for initial attempt, attempts later were inconsistent  -Stating 11-19: max verbal and written cues required  -Identifying if integer matches written form - 95%  -Verbalizing single, teens, double digit #s - 65%  -Written form 1-10: 70%, improved to 90% with min cues   -State #1-10: 70%, improved to 90% with min cues   -State \"tens\": 90% with restarts    9. Patient will complete calculation tasks (add, subtract, multiply, divide) with the use of a calculator to 80% accuracy given min cues. (New goal, Est 24)  In progress  Goal Progress: Goal not addressed this date     Previous:  -Calculating add/sub problems, written: 100%   -Calculating add/sub problems, verbally presented: 67%, cues needed for \"+, -, +\"    10.  Patient will complete functional time and money tasks with 80% accuracy given min cues. (New goal, Est 24)   Goalstatus: Ongoing  Progressing as follows     Previous:  -Clock math, " level 1/3: 100%, pt reported it was somewhat challenging despite full accuracy    Pain:  Pain Assessment  Pain Assessment: 0-10  0-10 (Numeric) Pain Score: 0 - No pain     Outpatient Education:  Adult Outpatient Education  Individual(s) Educated: Patient  Verbal Education : Yes  Risk and Benefits Discussed with Patient/Caregiver/Other: yes  Patient/Caregiver Demonstrated Understanding: yes  Patient Response to Education: Patient/Caregiver Verbalized Understanding of Information

## 2024-10-14 ENCOUNTER — HOSPITAL ENCOUNTER (OUTPATIENT)
Dept: CARDIOLOGY | Facility: CLINIC | Age: 79
Discharge: HOME | End: 2024-10-14
Payer: MEDICARE

## 2024-10-14 DIAGNOSIS — R00.1 BRADYCARDIA ON ECG: ICD-10-CM

## 2024-10-14 DIAGNOSIS — Z95.0 PACEMAKER: ICD-10-CM

## 2024-10-14 PROCEDURE — 93298 REM INTERROG DEV EVAL SCRMS: CPT

## 2024-10-14 NOTE — PROGRESS NOTES
10/16/2024 CC: 79 y.o. presents for 6 mo glaucoma FU, Dr grant.    Past ocular history: POAG, Pseudophakia O, AMD OU  Family history: no family history of glaucoma   Past medical history: HTN, CVA, others per chart   Social history: denies tobacco     Eye medications:   Both eyes: Latanoprost qhs, Timolol qam    Allergy:  As per chart     Testing:    HVF 24-2 10/16/2024: OD- IAD>SAD approaching fixation, MD -15.4. OS- SAD involving fixation> IAD, MD -23.7 dB-stable    OCTcirrus  10/16/2024: OD- thin I/S, avg 66. OS- thin I/S, avg 59 um-stable    Assessment:   1. Advanced POAG OU   - Prev cared for by Dr. Dave (private practice ophthalmologist in [place]) but never diagnosed with glaucoma   - Tmax 21/24 per our records, /564, FH neg   - No trauma, prolonged steroid use, cbd oil   - Gonio open 360   - HVF 24-2 (8/22/23): OD inf>sup arcuate; OS dense sup>inf arcuate . both stable from previous   - OCT RNFL (8/22/23): sup/inf thinning OU, stable from previous   - s/p SLT OU in August 2022, excellent response  IOP remains above goal but some improvement from pre-laser, will give it a little longer   -goal IOP <15   -continue latan qhs OU and qam timolol ou -   -PATIENT is poor surgical candidate right now given recent stroke  Rtc 6-8 weeks for repeat IOP check  Long discussion with patient re-not meeting criteria for driving given degree of field loss. She was very tearful. I have [facility] referral for both vision related occupational therapy and support groups that can help her cope with new normal  10/16/2024: IOP 14 OU, VA/exam/testing stable      2. Gilbert Bonnet phenomenon    - Pt describing visual hallucinations   - As per Dr. Dumont      3. Pseudophakia OU   - Monitor      4. AMD OU   - Mac OCT (3/17/22): flat OU   - As per Dr. Pickens    Plan:   I explained my findings. POAG, IOP acceptable    Eye medications:   Both eyes: continue Latanoprost qhs, Timolol qam     Return in 4 mo,   Priscilla, OCT

## 2024-10-15 ENCOUNTER — TREATMENT (OUTPATIENT)
Dept: SPEECH THERAPY | Facility: CLINIC | Age: 79
End: 2024-10-15
Payer: MEDICARE

## 2024-10-15 DIAGNOSIS — I69.320 APHASIA AS LATE EFFECT OF CEREBROVASCULAR ACCIDENT: Primary | ICD-10-CM

## 2024-10-15 PROCEDURE — 92507 TX SP LANG VOICE COMM INDIV: CPT | Mod: GN | Performed by: STUDENT IN AN ORGANIZED HEALTH CARE EDUCATION/TRAINING PROGRAM

## 2024-10-15 ASSESSMENT — PAIN SCALES - GENERAL: PAINLEVEL_OUTOF10: 0 - NO PAIN

## 2024-10-15 ASSESSMENT — PAIN - FUNCTIONAL ASSESSMENT: PAIN_FUNCTIONAL_ASSESSMENT: 0-10

## 2024-10-15 NOTE — LETTER
October 15, 2024    Oscar Milner MD  [address] [facility] [address]    Patient: Pam Cabrera   YOB: 1945   Date of Visit: 10/15/2024       Dear No referring provider defined for this encounter.    The attached plan of care is being sent to you because your patient’s medical reimbursement requires that you certify the plan of care. Your signature is required to allow uninterrupted insurance coverage.      You may indicate your approval by signing below and faxing this form back to us at Dept Fax: [phone].    Please call Dept: [phone] with any questions or concerns.    Thank you for this referral,        Noemi J Porachan, CCC-SLP  [address] [place] [address]    Payer: Payor: [facility] MEDICARE / Plan: [facility] MEDICARE / Product Type: *No Product type* /                                                                         Date:     Dear KEVIN Ovalles,     Re: Ms. Roslyn Cabrera, MRN:[id number]    I certify that I have reviewed the attached plan of care and it is medically necessary for Ms. Roslyn Cabrera (1945) who is under my care.          ______________________________________                    _________________  Provider name and credentials                                           Date and time                                                                                      The following plan is in draft form.  Please refer to the current version for the most up-to-date information.                Plan of Care 10/15/24   Effective from: 10/15/2024  Effective to: 1/13/2025    Draft  Plan ID: [id number]               Participants as of 10/15/2024      Name Type Comments Contact Info    Oscar Milner MD PCP - General  [phone]          Last Plan Note       Author: KEVIN Ovalles Status: Sign when Signing Visit Last edited: 10/15/2024  1:00 PM  "        SLP Adult Outpatient Speech-Language Progress Note     Patient Name: Roslyn Cabrera \"Pam\"  MRN: [id number]  Today's Date: 10/15/2024  Time Calculation  Start Time: 1300  Stop Time: 1350  Time Calculation (min): 50 min       Current Problem:   1. Aphasia as late effect of cerebrovascular accident [I69.320]          SLP Assessment:  SLP TX Intervention Outcome: Making Progress Towards Goals  Prognosis: Good    Ms. Cabrera has been actively participating in skilled speech therapy services for language skills and has made considerable progress in her ability to communicate with others. The course of her therapy transitioned to math language and functional math skills during the last reporting period. The patient has made progress understanding, speaking and writing Kazakh numbers but continues to exhibit acalculia which impacts her ability to accurately verbalize, calculate and reason number concepts for time and money. Skilled speech therapy services are warranted in order to improve the patient's ability to comprehend and engage in functional math tasks in the home, social and clinical settings and decrease her dependence on others to manage these aspects of her life.    Interventions:   Language Expression  Language Expression Interventions: Verbal Reading (numbers)    Subjective:  Patient reports no new or worsening symptoms.    General Visit Information:  Certification Period Start Date: 10/15/24  Certification Period End Date: 01/13/25  Number of Authorized Treatments : 20       Plan:  Plan  Inpatient/Swing Bed or Outpatient: Outpatient  Treatment/Interventions: Communication functioning  SLP TX Plan: Continue Plan of Care  SLP Plan: Skilled SLP  SLP Frequency: 1x per week  Duration: 12 weeks  Discussed POC: Patient  Discussed Risks/Benefits: Yes  Patient/Caregiver Agreeable: Yes    Objective Data & Progress:  Goals: Established 2/26/24  Short-term goals:     7. Patient will accurately state numerical " "personal information (, address, zip, phone) with 80% accuracy given min cues. (New goal, Est 24)  Goal Status: In progress  Goal Progress: Goal not addressed this date     Previous:  -Patient able to state her  and street address. She could not state her apartment # or phone #  - Patient required max cues to read the written form of the word as a guide or \"crutch\" when stating her home address and apartment number    8. Patient will verbalize and write integers and money to a maximum of value of thousands when given a verbal or written prompt with 80% accuracy given min cues.   Goal Status: In progress  Goal Progress: Goal not addressed this date     Previous:   -Stating money (up to 10s, mixed cents) - 67%  -Writing Khmer numerals when given written form: 92% indep  -Stating number 0-10: 100% for initial attempt, attempts later were inconsistent  -Stating 11-19: max verbal and written cues required  -Identifying if integer matches written form - 95%  -Verbalizing single, teens, double digit #s - 65%  -Written form 1-10: 70%, improved to 90% with min cues   -State #1-10: 70%, improved to 90% with min cues   -State \"tens\": 90% with restarts    9. Patient will complete calculation tasks (add, subtract, multiply, divide) with the use of a calculator to 80% accuracy given min cues.  Goal Status: In progress  Goal Progress: Goal not addressed this date     Previous:  -Calculating add/sub problems, written: 100%   -Calculating add/sub problems, verbally presented: 67%, cues needed for \"+, -, +\"    10.  Patient will complete functional time and money tasks with 80% accuracy given min cues.   Goalstatus: Ongoing  Progressing as follows   Calendar readin%, 30%    Previous:  -Clock math, level 1/3: 100%, pt reported it was somewhat challenging despite full accuracy    Pain:  Pain Assessment  Pain Assessment: 0-10  0-10 (Numeric) Pain Score: 0 - No pain     Outpatient Education:  Adult Outpatient " Education  Individual(s) Educated: Patient  Verbal Education : Yes  Risk and Benefits Discussed with Patient/Caregiver/Other: yes  Patient/Caregiver Demonstrated Understanding: yes  Patient Response to Education: Patient/Caregiver Verbalized Understanding of Information

## 2024-10-15 NOTE — PROGRESS NOTES
"SLP Adult Outpatient Speech-Language Progress Note     Patient Name: Roslyn Cabrera \"Pam\"  MRN: [id number]  Today's Date: 10/15/2024  Time Calculation  Start Time: 1300  Stop Time: 1350  Time Calculation (min): 50 min       Current Problem:   1. Aphasia as late effect of cerebrovascular accident [I69.320]          SLP Assessment:  SLP TX Intervention Outcome: Making Progress Towards Goals  Prognosis: Good    Ms. Cabrera has been actively participating in skilled speech therapy services for language skills and has made considerable progress in her ability to communicate with others. The course of her therapy transitioned to math language and functional math skills during the last reporting period. The patient has made progress understanding, speaking and writing Slovenian numbers but continues to exhibit acalculia which impacts her ability to accurately verbalize, calculate and reason number concepts for time and money. Skilled speech therapy services are warranted in order to improve the patient's ability to comprehend and engage in functional math tasks in the home, social and clinical settings and decrease her dependence on others to manage these aspects of her life.    Interventions:   Language Expression  Language Expression Interventions: Verbal Reading (numbers)    Subjective:  Patient reports no new or worsening symptoms.    General Visit Information:  Certification Period Start Date: 10/15/24  Certification Period End Date: 01/13/25  Number of Authorized Treatments : 20       Plan:  Plan  Inpatient/Swing Bed or Outpatient: Outpatient  Treatment/Interventions: Communication functioning  SLP TX Plan: Continue Plan of Care  SLP Plan: Skilled SLP  SLP Frequency: 1x per week  Duration: 12 weeks  Discussed POC: Patient  Discussed Risks/Benefits: Yes  Patient/Caregiver Agreeable: Yes    Objective Data & Progress:  Goals: Established 2/26/24  Short-term goals:     7. Patient will accurately state numerical personal " "information (, address, zip, phone) with 80% accuracy given min cues. (New goal, Est 24)  Goal Status: In progress  Goal Progress: Goal not addressed this date     Previous:  -Patient able to state her  and street address. She could not state her apartment # or phone #  - Patient required max cues to read the written form of the word as a guide or \"crutch\" when stating her home address and apartment number    8. Patient will verbalize and write integers and money to a maximum of value of thousands when given a verbal or written prompt with 80% accuracy given min cues.   Goal Status: In progress  Goal Progress: Goal not addressed this date     Previous:   -Stating money (up to 10s, mixed cents) - 67%  -Writing Polish numerals when given written form: 92% indep  -Stating number 0-10: 100% for initial attempt, attempts later were inconsistent  -Stating 11-19: max verbal and written cues required  -Identifying if integer matches written form - 95%  -Verbalizing single, teens, double digit #s - 65%  -Written form 1-10: 70%, improved to 90% with min cues   -State #1-10: 70%, improved to 90% with min cues   -State \"tens\": 90% with restarts    9. Patient will complete calculation tasks (add, subtract, multiply, divide) with the use of a calculator to 80% accuracy given min cues.  Goal Status: In progress  Goal Progress: Goal not addressed this date     Previous:  -Calculating add/sub problems, written: 100%   -Calculating add/sub problems, verbally presented: 67%, cues needed for \"+, -, +\"    10.  Patient will complete functional time and money tasks with 80% accuracy given min cues.   Goalstatus: Ongoing  Progressing as follows   Calendar readin%, 30%    Previous:  -Clock math, level 1/3: 100%, pt reported it was somewhat challenging despite full accuracy    Pain:  Pain Assessment  Pain Assessment: 0-10  0-10 (Numeric) Pain Score: 0 - No pain     Outpatient Education:  Adult Outpatient " Education  Individual(s) Educated: Patient  Verbal Education : Yes  Risk and Benefits Discussed with Patient/Caregiver/Other: yes  Patient/Caregiver Demonstrated Understanding: yes  Patient Response to Education: Patient/Caregiver Verbalized Understanding of Information        No

## 2024-10-16 ENCOUNTER — APPOINTMENT (OUTPATIENT)
Dept: OPHTHALMOLOGY | Facility: CLINIC | Age: 79
End: 2024-10-16
Payer: MEDICARE

## 2024-10-16 DIAGNOSIS — H40.1133 PRIMARY OPEN ANGLE GLAUCOMA (POAG) OF BOTH EYES, SEVERE STAGE: Primary | ICD-10-CM

## 2024-10-16 DIAGNOSIS — H35.3132 INTERMEDIATE STAGE NONEXUDATIVE AGE-RELATED MACULAR DEGENERATION OF BOTH EYES: ICD-10-CM

## 2024-10-16 DIAGNOSIS — E08.37X3: ICD-10-CM

## 2024-10-16 PROCEDURE — 92133 CPTRZD OPH DX IMG PST SGM ON: CPT | Performed by: OPHTHALMOLOGY

## 2024-10-16 PROCEDURE — 99213 OFFICE O/P EST LOW 20 MIN: CPT | Performed by: OPHTHALMOLOGY

## 2024-10-16 PROCEDURE — 92083 EXTENDED VISUAL FIELD XM: CPT | Performed by: OPHTHALMOLOGY

## 2024-10-16 ASSESSMENT — EXTERNAL EXAM - RIGHT EYE: OD_EXAM: NORMAL

## 2024-10-16 ASSESSMENT — PACHYMETRY
OD_CT(UM): 556
OS_CT(UM): 564

## 2024-10-16 ASSESSMENT — REFRACTION_WEARINGRX
OS_SPHERE: PLANO
OS_CYLINDER: -1.50
OD_SPHERE: +0.50
OD_AXIS: 085
OS_AXIS: 115
OS_ADD: +2.50
OD_ADD: +2.50
OD_CYLINDER: -2.00

## 2024-10-16 ASSESSMENT — VISUAL ACUITY
OD_SC: 20/20
OD_SC+: +2
OS_SC+: -2
OS_SC: 20/25
CORRECTION_TYPE: GLASSES
METHOD: SNELLEN - LINEAR

## 2024-10-16 ASSESSMENT — CONF VISUAL FIELD
OD_NORMAL: 1
OS_NORMAL: 1
OS_SUPERIOR_TEMPORAL_RESTRICTION: 0
OS_INFERIOR_TEMPORAL_RESTRICTION: 0
OD_INFERIOR_NASAL_RESTRICTION: 0
OS_INFERIOR_NASAL_RESTRICTION: 0
OS_SUPERIOR_NASAL_RESTRICTION: 0
OD_INFERIOR_TEMPORAL_RESTRICTION: 0
OD_SUPERIOR_TEMPORAL_RESTRICTION: 0
METHOD: COUNTING FINGERS
OD_SUPERIOR_NASAL_RESTRICTION: 0

## 2024-10-16 ASSESSMENT — SLIT LAMP EXAM - LIDS
COMMENTS: GOOD POSITION, 2+ MGD
COMMENTS: GOOD POSITION, 2+ MGD

## 2024-10-16 ASSESSMENT — ENCOUNTER SYMPTOMS
RESPIRATORY NEGATIVE: 0
CARDIOVASCULAR NEGATIVE: 0
NEUROLOGICAL NEGATIVE: 0
MUSCULOSKELETAL NEGATIVE: 0
CONSTITUTIONAL NEGATIVE: 0
ENDOCRINE NEGATIVE: 0
ALLERGIC/IMMUNOLOGIC NEGATIVE: 0
HEMATOLOGIC/LYMPHATIC NEGATIVE: 0
PSYCHIATRIC NEGATIVE: 0
GASTROINTESTINAL NEGATIVE: 0
EYES NEGATIVE: 0

## 2024-10-16 ASSESSMENT — TONOMETRY
IOP_METHOD: GOLDMANN APPLANATION
OD_IOP_MMHG: 14
OS_IOP_MMHG: 14

## 2024-10-16 ASSESSMENT — EXTERNAL EXAM - LEFT EYE: OS_EXAM: NORMAL

## 2024-10-22 ENCOUNTER — TREATMENT (OUTPATIENT)
Dept: SPEECH THERAPY | Facility: CLINIC | Age: 79
End: 2024-10-22
Payer: MEDICARE

## 2024-10-22 DIAGNOSIS — I69.320 APHASIA AS LATE EFFECT OF CEREBROVASCULAR ACCIDENT: Primary | ICD-10-CM

## 2024-10-22 PROCEDURE — 92507 TX SP LANG VOICE COMM INDIV: CPT | Mod: GN | Performed by: STUDENT IN AN ORGANIZED HEALTH CARE EDUCATION/TRAINING PROGRAM

## 2024-10-22 ASSESSMENT — PAIN - FUNCTIONAL ASSESSMENT: PAIN_FUNCTIONAL_ASSESSMENT: 0-10

## 2024-10-22 ASSESSMENT — PAIN SCALES - GENERAL: PAINLEVEL_OUTOF10: 0 - NO PAIN

## 2024-10-22 NOTE — PROGRESS NOTES
"SLP Adult Outpatient Speech-Language Progress Note     Patient Name: Roslyn Cabrera \"Pam\"  MRN: [id number]  Today's Date: 10/22/2024  Time Calculation  Start Time: 1300  Stop Time: 1346  Time Calculation (min): 46 min       Current Problem:   1. Aphasia as late effect of cerebrovascular accident [I69.320]          SLP Assessment:  SLP TX Intervention Outcome: Making Progress Towards Goals  Prognosis: Good    The patient actively participated in structured tasks for acalculia, specifically money skills. The patient demonstrated improvement recognizing basic coins and bills, she could manipulate \"coins/bills\" to demonstrate knowledge of values of each and effectively compared two groups of money. She had difficulty assembling coins to make a target amount, switching from values of coins to using the number of coins, counting by 5 or 10 with the correct coins or use multiple combinations of coins to equal the same amount. Skilled speech therapy services are warranted in order to improve the patient's ability to comprehend and engage in functional math tasks in the home, social and clinical settings and decrease her dependence on others to manage these aspects of her life.    Interventions:   Language Expression  Language Expression Interventions: Verbal Reading (numbers)    Subjective:  Patient reports no new or worsening symptoms.    General Visit Information:  Certification Period Start Date: 10/15/24  Certification Period End Date: 01/13/25  Number of Authorized Treatments : 1 of 20  Total Number of Visits : 20    Plan:  Plan  Inpatient/Swing Bed or Outpatient: Outpatient  Treatment/Interventions:  (dyscalculia treatment)  SLP TX Plan: Continue Plan of Care  SLP Plan: Skilled SLP  SLP Frequency: 1x per week  Duration: 12 weeks  Discussed POC: Patient  Discussed Risks/Benefits: Yes  Patient/Caregiver Agreeable: Yes    Objective Data & Progress:  Goals: Established 2/26/24  Short-term goals:     7. Patient will " Appointment scheduled 10:40    "accurately state numerical personal information (, address, zip, phone) with 80% accuracy given min cues. (New goal, Est 24)  Goal Status: In progress  Goal Progress: Goal not addressed this date     Previous:  -Patient able to state her  and street address. She could not state her apartment # or phone #  - Patient required max cues to read the written form of the word as a guide or \"crutch\" when stating her home address and apartment number    8. Patient will verbalize and write integers and money to a maximum of value of thousands when given a verbal or written prompt with 80% accuracy given min cues.   Goal Status: In progress  Goal Progress: Goal not addressed this date     Previous:   -Stating money (up to 10s, mixed cents) - 67%  -Writing Malay numerals when given written form: 92% indep  -Stating number 0-10: 100% for initial attempt, attempts later were inconsistent  -Stating 11-19: max verbal and written cues required  -Identifying if integer matches written form - 95%  -Verbalizing single, teens, double digit #s - 65%  -Written form 1-10: 70%, improved to 90% with min cues   -State #1-10: 70%, improved to 90% with min cues   -State \"tens\": 90% with restarts    9. Patient will complete calculation tasks (add, subtract, multiply, divide) with the use of a calculator to 80% accuracy given min cues.  Goal Status: In progress  Goal Progress: Goal not addressed this date     Previous:  -Calculating add/sub problems, written: 100%   -Calculating add/sub problems, verbally presented: 67%, cues needed for \"+, -, +\"    10.  Patient will complete functional time and money tasks with 80% accuracy given min cues.   Goalstatus: Ongoing  Progressing as follows   -Money values (ie: how many___in a___?): 73%   -Comparing 2 money groups: 100%   -Showing correct currency given a total: unable to complete this date, limited trials     Previous:  -Calendar readin%, 30%  -Clock math, level 1/3: 100%, pt " reported it was somewhat challenging despite full accuracy    Pain:  Pain Assessment  Pain Assessment: 0-10  0-10 (Numeric) Pain Score: 0 - No pain     Outpatient Education:  Adult Outpatient Education  Individual(s) Educated: Patient  Patient Response to Education: Patient/Caregiver Verbalized Understanding of Information

## 2024-10-23 ENCOUNTER — APPOINTMENT (OUTPATIENT)
Dept: OPHTHALMOLOGY | Facility: CLINIC | Age: 79
End: 2024-10-23
Payer: MEDICARE

## 2024-10-23 DIAGNOSIS — H53.19 VISUAL DISTORTIONS: ICD-10-CM

## 2024-10-23 DIAGNOSIS — H35.3191 EARLY DRY STAGE NONEXUDATIVE AGE-RELATED MACULAR DEGENERATION, UNSPECIFIED LATERALITY: ICD-10-CM

## 2024-10-23 DIAGNOSIS — E08.37X3: Primary | ICD-10-CM

## 2024-10-23 DIAGNOSIS — H40.1133 PRIMARY OPEN ANGLE GLAUCOMA (POAG) OF BOTH EYES, SEVERE STAGE: Primary | ICD-10-CM

## 2024-10-23 PROCEDURE — 92134 CPTRZ OPH DX IMG PST SGM RTA: CPT | Mod: BILATERAL PROCEDURE | Performed by: OPHTHALMOLOGY

## 2024-10-23 PROCEDURE — 99213 OFFICE O/P EST LOW 20 MIN: CPT | Performed by: OPHTHALMOLOGY

## 2024-10-23 RX ORDER — LATANOPROST 50 UG/ML
SOLUTION/ DROPS OPHTHALMIC
Qty: 7.5 ML | Refills: 3 | Status: SHIPPED | OUTPATIENT
Start: 2024-10-23

## 2024-10-23 ASSESSMENT — CUP TO DISC RATIO
OD_RATIO: .9
OS_RATIO: .95

## 2024-10-23 ASSESSMENT — ENCOUNTER SYMPTOMS
HEMATOLOGIC/LYMPHATIC NEGATIVE: 0
MUSCULOSKELETAL NEGATIVE: 0
EYES NEGATIVE: 1
ENDOCRINE NEGATIVE: 0
NEUROLOGICAL NEGATIVE: 0
CARDIOVASCULAR NEGATIVE: 0
RESPIRATORY NEGATIVE: 0
ALLERGIC/IMMUNOLOGIC NEGATIVE: 0
PSYCHIATRIC NEGATIVE: 0
CONSTITUTIONAL NEGATIVE: 0
GASTROINTESTINAL NEGATIVE: 0

## 2024-10-23 ASSESSMENT — REFRACTION_WEARINGRX
OD_ADD: +2.50
OD_SPHERE: +0.50
OS_AXIS: 115
OD_AXIS: 085
OS_SPHERE: PLANO
OS_CYLINDER: -1.50
OD_CYLINDER: -2.00
OS_ADD: +2.50

## 2024-10-23 ASSESSMENT — EXTERNAL EXAM - LEFT EYE: OS_EXAM: NORMAL

## 2024-10-23 ASSESSMENT — SLIT LAMP EXAM - LIDS
COMMENTS: GOOD POSITION, 2+ MGD
COMMENTS: GOOD POSITION, 2+ MGD

## 2024-10-23 ASSESSMENT — VISUAL ACUITY
CORRECTION_TYPE: GLASSES
METHOD: SNELLEN - LINEAR

## 2024-10-23 ASSESSMENT — PACHYMETRY
OS_CT(UM): 564
OD_CT(UM): 556

## 2024-10-23 ASSESSMENT — TONOMETRY: IOP_METHOD: GOLDMANN APPLANATION

## 2024-10-23 ASSESSMENT — EXTERNAL EXAM - RIGHT EYE: OD_EXAM: NORMAL

## 2024-10-23 NOTE — PROGRESS NOTES
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  3 H31.111 Age-related choroidal atrophy of right eye-Stable  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  6 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  7 H40.1130 Primary open angle glaucoma (poag) of both eyes-Stable    DIAGNOSTIC PROCEDURE DONE    OCT DONE OD/OS            REASON FOR TEST: will help address and tailor  therapy by detecting subclinical CME SRF     Hi quality OCT  scans obtained  signal good    OCT OD - Normal Foveal Contour, No Edema, IS/OS Junction Normal  OCT OS - Normal Foveal Contour, No Edema, IS/OS Junction Normal    additional commnents:        Discussion    would reccommend exercise more  no other intervention    this has improved her cognition and hallucinations    She has myopic degeneration more than AMD    Hi quality OCT  scans obtained  signal good    OCT OD - Normal Foveal Contour, No Edema, IS/OS Junction Normal drusenoid lesion large  OCT OS - Normal Foveal Contour, No Edema, IS/OS Junction Normal    additional commnents: created by:Miller Pickens    FU 6m

## 2024-10-29 ENCOUNTER — APPOINTMENT (OUTPATIENT)
Dept: GYNECOLOGIC ONCOLOGY | Facility: CLINIC | Age: 79
End: 2024-10-29
Payer: MEDICARE

## 2024-10-29 ENCOUNTER — APPOINTMENT (OUTPATIENT)
Dept: SPEECH THERAPY | Facility: CLINIC | Age: 79
End: 2024-10-29
Payer: MEDICARE

## 2024-10-31 ENCOUNTER — TREATMENT (OUTPATIENT)
Dept: SPEECH THERAPY | Facility: CLINIC | Age: 79
End: 2024-10-31
Payer: MEDICARE

## 2024-10-31 DIAGNOSIS — I69.320 APHASIA AS LATE EFFECT OF CEREBROVASCULAR ACCIDENT: Primary | ICD-10-CM

## 2024-10-31 PROCEDURE — 92507 TX SP LANG VOICE COMM INDIV: CPT | Mod: GN | Performed by: STUDENT IN AN ORGANIZED HEALTH CARE EDUCATION/TRAINING PROGRAM

## 2024-10-31 ASSESSMENT — PAIN SCALES - GENERAL: PAINLEVEL_OUTOF10: 0 - NO PAIN

## 2024-10-31 ASSESSMENT — PAIN - FUNCTIONAL ASSESSMENT: PAIN_FUNCTIONAL_ASSESSMENT: 0-10

## 2024-11-05 ENCOUNTER — TREATMENT (OUTPATIENT)
Dept: SPEECH THERAPY | Facility: CLINIC | Age: 79
End: 2024-11-05
Payer: MEDICARE

## 2024-11-05 DIAGNOSIS — I69.320 APHASIA AS LATE EFFECT OF CEREBROVASCULAR ACCIDENT: Primary | ICD-10-CM

## 2024-11-05 PROCEDURE — 92507 TX SP LANG VOICE COMM INDIV: CPT | Mod: GN | Performed by: STUDENT IN AN ORGANIZED HEALTH CARE EDUCATION/TRAINING PROGRAM

## 2024-11-05 ASSESSMENT — PAIN SCALES - GENERAL: PAINLEVEL_OUTOF10: 0 - NO PAIN

## 2024-11-05 ASSESSMENT — PAIN - FUNCTIONAL ASSESSMENT: PAIN_FUNCTIONAL_ASSESSMENT: 0-10

## 2024-11-05 NOTE — PROGRESS NOTES
"SLP Adult Outpatient Speech-Language Progress Note     Patient Name: Roslyn Cabrera \"Pam\"  MRN: [id number]  Today's Date: 11/5/2024  Time Calculation  Start Time: 1300  Stop Time: 1345  Time Calculation (min): 45 min       Current Problem:   1. Aphasia as late effect of cerebrovascular accident [I69.320]          SLP Assessment:  SLP TX Intervention Outcome: Making Progress Towards Goals  Prognosis: Good    The patient actively participated in structured tasks for acalculia, specifically money skills. The patient has not make progress verbalizing her personal information but she has the information available in her wallet to provide those that need it. The information was added in a pinned note on her iPhone to make it easier to retrieve the information. This goal will be discontinued. The patient had difficulty assembling the correct coins to make a target amount and she was unable to discern if two sets of coins were of equal amounts. She benefitted from cues to count by 5 and 10. Skilled speech therapy services are warranted in order to improve the patient's ability to comprehend and engage in functional math tasks in the home, social and clinical settings and decrease her dependence on others to manage these aspects of her life.    Interventions:   Language Expression  Language Expression Interventions: Verbal Reading (numbers)    Subjective:  Patient reports no new or worsening symptoms. She is considering a trip to [place].    General Visit Information:  Certification Period Start Date: 10/15/24  Certification Period End Date: 01/13/25  Number of Authorized Treatments : 4 of 20       Plan:  Plan  Inpatient/Swing Bed or Outpatient: Outpatient  SLP TX Plan: Continue Plan of Care  SLP Plan: Skilled SLP  SLP Frequency: 1x per week  Duration: 12 weeks  Discussed POC: Patient  Discussed Risks/Benefits: Yes  Patient/Caregiver Agreeable: Yes    Objective Data & Progress:  Goals: Established 2/26/24  Short-term " "goals:     7. Patient will accurately state numerical personal information (, address, zip, phone) with 80% accuracy given min cues. (New goal, Est 24)  Goal Status: Not progressing toward goal  Goal Progress: Goal not met, discontinue goal   -Patient was unable to verbalize her address although she could write the building number. She was able to state her  after multiple attempts. She cannot state her phone number or address.    Previous:  -Patient able to state her  and street address. She could not state her apartment # or phone #  - Patient required max cues to read the written form of the word as a guide or \"crutch\" when stating her home address and apartment number    8. Patient will verbalize and write integers and money to a maximum of value of thousands when given a verbal or written prompt with 80% accuracy given min cues.   Goal Status: In progress  Goal Progress: Goal not addressed this date     Previous:   -Stating money (up to 10s, mixed cents) - 67%  -Writing Hebrew numerals when given written form: 92% indep  -Stating number 0-10: 100% for initial attempt, attempts later were inconsistent  -Stating 11-19: max verbal and written cues required  -Identifying if integer matches written form - 95%  -Verbalizing single, teens, double digit #s - 65%  -Written form 1-10: 70%, improved to 90% with min cues   -State #1-10: 70%, improved to 90% with min cues   -State \"tens\": 90% with restarts    9. Patient will complete calculation tasks (add, subtract, multiply, divide) with the use of a calculator to 80% accuracy given min cues.  Goal Status: In progress  Goal Progress: Goal not addressed this date     Previous:  -Calculating add/sub problems, written: 100%   -Calculating add/sub problems, verbally presented: 67%, cues needed for \"+, -, +\"    10.  Patient will complete functional time and money tasks with 80% accuracy given min cues.   Goalstatus: Ongoing  Progressing as follows   -Showing $ " amount in coins - 54%   -Stating if 2 sets of coins are equal in value - 0/2    Previous:  -Counting dollar/cents: 70% over 5 trials, written cue for quarter progression was helpful  -Money values (ie: how many___in a___?): 73%   -Comparing 2 money groups: 100%   -Showing correct currency given a total: unable to complete this date, limited trials  -Calendar readin%, 30%  -Clock math, level 1/3: 100%, pt reported it was somewhat challenging despite full accuracy    Pain:  Pain Assessment  Pain Assessment: 0-10  0-10 (Numeric) Pain Score: 0 - No pain     Outpatient Education:  Adult Outpatient Education  Individual(s) Educated: Patient  Patient Response to Education: Patient/Caregiver Verbalized Understanding of Information

## 2024-11-08 NOTE — PROGRESS NOTES
Patient ID: Pam Cabrera is a 79 y.o. female.  Primary Care Provider: Oscar Milner MD    Subjective    Patient with long standing history of cervical dysplasia with definitive total vaginal hysterectomy performed on 5/30/13.      Objective    Visit Vitals  /84 (BP Location: Right arm, Patient Position: Sitting, BP Cuff Size: Adult)   Pulse 62   Temp 36.8 °C (98.2 °F) (Temporal)   Resp 16          Interval history:  Patient is a 79 year old female with cervical dysplasia s/p vaginal hysterectomy 5/30/13.  Last pap 10/4/23 was negative, HPV-.  Here for annual pap. Patient had recent stroke, was in the hospital for 2 weeks, no physical limitations remain but she is having issues with aphasia and computing numbers.  Patient denies any vaginal bleeding, pink tinged discharge. Patient has chronic diarrhea since her stroke.   Appetite has been good.  Energy levels are baseline.  Patient denies hematuria.  Patient denies urinary leakage or incontinence. Mammogram done in June.           Physical Exam:    Constitutional: Doing well. KODY  Eyes: PERRL  ENMT: Moist mucus membranes  Head/Neck: Supple. Symmetrical  Cardiovascular: Regular, rate and rhythm. 2+ equal pulses of the extremities  Respiratory/Thorax: CTA. RRR. Chest rise symmetrical.  Gastrointestinal: Non-distended, soft, non-tender  Genitourinary:   Normal external female genitalia. No vulvar lesions noted  Speculum exam: Smooth vaginal walls without lesions or masses. Vaginal cuff visualized without lesions. Surgically absent cervix.  Bimanual exam: Smooth vaginal wall without lesions or masses.  Surgically absent uterus, cervix, and adnexa.  Rectovaginal exam: smooth rectovaginal septum without lesions or masses  Musculoskeletal: ROM intact, no joint swelling, normal strength  Extremities: No edema  Neurological: Alert and oriented x 3. Pleasant and cooperative.  Lymphatic: No lymphadenopathy. No lymphedema  Psychological: Appropriate mood and behavior  Skin:  Warm and dry, no lesions, no rashes    A complete review of systems was performed and all systems were normal except what is noted in the interval history.          Assessment/Plan   Patient is a 79 year old female with cervical dysplasia s/p vaginal hysterectomy 5/30/13.  Well woman exam.      PLAN:  Pap today, F/U results  If negative F/U in 1 year or as needed  Mammogram order for 2025

## 2024-11-12 ENCOUNTER — TREATMENT (OUTPATIENT)
Dept: SPEECH THERAPY | Facility: CLINIC | Age: 79
End: 2024-11-12
Payer: MEDICARE

## 2024-11-12 ENCOUNTER — OFFICE VISIT (OUTPATIENT)
Dept: GYNECOLOGIC ONCOLOGY | Facility: CLINIC | Age: 79
End: 2024-11-12
Payer: MEDICARE

## 2024-11-12 VITALS
HEART RATE: 62 BPM | RESPIRATION RATE: 16 BRPM | SYSTOLIC BLOOD PRESSURE: 147 MMHG | OXYGEN SATURATION: 97 % | DIASTOLIC BLOOD PRESSURE: 84 MMHG | WEIGHT: 142.86 LBS | BODY MASS INDEX: 27.9 KG/M2 | TEMPERATURE: 98.2 F

## 2024-11-12 DIAGNOSIS — Z12.31 OTHER SCREENING MAMMOGRAM: ICD-10-CM

## 2024-11-12 DIAGNOSIS — Z12.31 SCREENING MAMMOGRAM, ENCOUNTER FOR: ICD-10-CM

## 2024-11-12 DIAGNOSIS — C53.9 MALIGNANT NEOPLASM OF CERVIX, UNSPECIFIED SITE (MULTI): Primary | ICD-10-CM

## 2024-11-12 DIAGNOSIS — I69.320 APHASIA AS LATE EFFECT OF CEREBROVASCULAR ACCIDENT: Primary | ICD-10-CM

## 2024-11-12 PROCEDURE — 1036F TOBACCO NON-USER: CPT | Performed by: NURSE PRACTITIONER

## 2024-11-12 PROCEDURE — 1159F MED LIST DOCD IN RCRD: CPT | Performed by: NURSE PRACTITIONER

## 2024-11-12 PROCEDURE — 3077F SYST BP >= 140 MM HG: CPT | Performed by: NURSE PRACTITIONER

## 2024-11-12 PROCEDURE — 3079F DIAST BP 80-89 MM HG: CPT | Performed by: NURSE PRACTITIONER

## 2024-11-12 PROCEDURE — 1126F AMNT PAIN NOTED NONE PRSNT: CPT | Performed by: NURSE PRACTITIONER

## 2024-11-12 PROCEDURE — 99214 OFFICE O/P EST MOD 30 MIN: CPT | Performed by: NURSE PRACTITIONER

## 2024-11-12 PROCEDURE — 92507 TX SP LANG VOICE COMM INDIV: CPT | Mod: GN | Performed by: STUDENT IN AN ORGANIZED HEALTH CARE EDUCATION/TRAINING PROGRAM

## 2024-11-12 PROCEDURE — 87624 HPV HI-RISK TYP POOLED RSLT: CPT | Performed by: NURSE PRACTITIONER

## 2024-11-12 ASSESSMENT — PAIN SCALES - GENERAL
PAINLEVEL_OUTOF10: 0-NO PAIN
PAINLEVEL_OUTOF10: 0 - NO PAIN

## 2024-11-12 ASSESSMENT — PAIN - FUNCTIONAL ASSESSMENT: PAIN_FUNCTIONAL_ASSESSMENT: 0-10

## 2024-11-12 NOTE — PROGRESS NOTES
"SLP Adult Outpatient Speech-Language Progress Note     Patient Name: Roslyn Cabrera \"Pam\"  MRN: [id number]  Today's Date: 2024  Time Calculation  Start Time: 1434  Stop Time: 1519  Time Calculation (min): 45 min       Current Problem:   1. Aphasia as late effect of cerebrovascular accident [I69.320]          SLP Assessment:  SLP TX Intervention Outcome: Making Progress Towards Goals  Prognosis: Good    The patient actively participated in structured tasks for acalculia, specifically money skills. The patient demonstrated improvement matching a set of coins to a written amount but still required min cues to achieve 80% accuracy. The patient can count by 10s and 5s but when doing so when counting coins or from a starting point other than zero, she had more difficulty. Quarters continue to be a challenging coin for the patient to calculate. Skilled speech therapy services are warranted in order to improve the patient's ability to comprehend and engage in functional math tasks in the home, social and clinical settings and decrease her dependence on others to manage these aspects of her life.    Interventions:   Language Expression  Language Expression Interventions: Verbal Reading (numbers)    Subjective:  Patient reports no new or worsening symptoms. She is considering a trip to [place].    General Visit Information:  Certification Period Start Date: 10/15/24  Certification Period End Date: 25  Number of Authorized Treatments : 5 of 20       Plan:  Plan  Inpatient/Swing Bed or Outpatient: Outpatient  SLP TX Plan: Continue Plan of Care  SLP Plan: Skilled SLP  SLP Frequency: 1x daily until discharge  Duration: 12 weeks  Discussed POC: Patient  Discussed Risks/Benefits: Yes  Patient/Caregiver Agreeable: Yes    Objective Data & Progress:  Goals: Established 24  Short-term goals:     7. Patient will accurately state numerical personal information (, address, zip, phone) with 80% accuracy given min cues. " "(New goal, Est 24)  Goal Status: Not progressing toward goal  Goal Progress: Goal not met, discontinue goal   -Patient was unable to verbalize her address although she could write the building number. She was able to state her  after multiple attempts. She cannot state her phone number or address.    Previous:  -Patient able to state her  and street address. She could not state her apartment # or phone #  - Patient required max cues to read the written form of the word as a guide or \"crutch\" when stating her home address and apartment number    8. Patient will verbalize and write integers and money to a maximum of value of thousands when given a verbal or written prompt with 80% accuracy given min cues.   Goal Status: In progress  Goal Progress: Goal not addressed this date     Previous:   -Stating money (up to 10s, mixed cents) - 67%  -Writing Maori numerals when given written form: 92% indep  -Stating number 0-10: 100% for initial attempt, attempts later were inconsistent  -Stating 11-19: max verbal and written cues required  -Identifying if integer matches written form - 95%  -Verbalizing single, teens, double digit #s - 65%  -Written form 1-10: 70%, improved to 90% with min cues   -State #1-10: 70%, improved to 90% with min cues   -State \"tens\": 90% with restarts    9. Patient will complete calculation tasks (add, subtract, multiply, divide) with the use of a calculator to 80% accuracy given min cues.  Goal Status: In progress  Goal Progress: Goal not addressed this date     Previous:  -Calculating add/sub problems, written: 100%   -Calculating add/sub problems, verbally presented: 67%, cues needed for \"+, -, +\"    10.  Patient will complete functional time and money tasks with 80% accuracy given min cues.   Goalstatus: Ongoing  Progressing as follows   -Counting nickels - 80%, min cues to 100%   -Counting dimes - 50%, mod cues to 100%   -Counting quarters incrementally - 60% with max written cues to " achieve 100%   -Matching coin value to written cents, FO6 choices:   50% improved to 80% with min-mod cues    Previous:  -Showing $ amount in coins - 54%   -Stating if 2 sets of coins are equal in value - 0/2  -Counting dollar/cents: 70% over 5 trials, written cue for quarter progression was helpful  -Money values (ie: how many___in a___?): 73%   -Comparing 2 money groups: 100%   -Showing correct currency given a total: unable to complete this date, limited trials  -Calendar readin%, 30%  -Clock math, level 1/3: 100%, pt reported it was somewhat challenging despite full accuracy    Pain:  Pain Assessment  Pain Assessment: 0-10  0-10 (Numeric) Pain Score: 0 - No pain     Outpatient Education:  Adult Outpatient Education  Individual(s) Educated: Patient  Patient Response to Education: Patient/Caregiver Verbalized Understanding of Information

## 2024-11-19 ENCOUNTER — TREATMENT (OUTPATIENT)
Dept: SPEECH THERAPY | Facility: CLINIC | Age: 79
End: 2024-11-19
Payer: MEDICARE

## 2024-11-19 DIAGNOSIS — I69.320 APHASIA AS LATE EFFECT OF CEREBROVASCULAR ACCIDENT: Primary | ICD-10-CM

## 2024-11-19 PROCEDURE — 92507 TX SP LANG VOICE COMM INDIV: CPT | Mod: GN | Performed by: STUDENT IN AN ORGANIZED HEALTH CARE EDUCATION/TRAINING PROGRAM

## 2024-11-19 ASSESSMENT — PAIN SCALES - GENERAL: PAINLEVEL_OUTOF10: 0 - NO PAIN

## 2024-11-19 ASSESSMENT — PAIN - FUNCTIONAL ASSESSMENT: PAIN_FUNCTIONAL_ASSESSMENT: 0-10

## 2024-11-19 NOTE — PROGRESS NOTES
"SLP Adult Outpatient Speech-Language Progress Note     Patient Name: Roslyn Cabrera \"Pam\"  MRN: [id number]  Today's Date: 11/19/2024  Time Calculation  Start Time: 1115  Stop Time: 1208  Time Calculation (min): 53 min       Current Problem:   1. Aphasia as late effect of cerebrovascular accident [I69.320]            SLP Assessment:  SLP TX Intervention Outcome: Making Progress Towards Goals  Prognosis: Good    The patient actively participated in structured tasks for acalculia, specifically money skills. The patient demonstrates difficulty counting currency (mostly coins), but is generally able to tell if she has enough money to purchase an item and what she would give a person to pay for an item. However, the patient does not regularly use cash and her daughter has been managing her finances since before the stroke so she is unsure if this is a skill that warrants continued therapy. The patient has been using her credit card without negative consequences for quite some time. With her progress note in October the goals of therapy switched to address acalculia but language production is still a challenge. The patient and her daughter were encouraged to ponder what specific impairment she would like to address in therapy. A new progress note will be completed next visit. Skilled speech therapy services are warranted in order to improve the patient's ability to verbalize personal information and engage in meaningful conversation in the home, social and clinical settings.     Interventions:   Language Expression  Language Expression Interventions: Verbal Reading (numbers)    Subjective:  Patient reports no new or worsening symptoms. She is considering a trip to [place].    General Visit Information:  Certification Period Start Date: 10/15/24  Certification Period End Date: 01/13/25  Number of Authorized Treatments : 6 of 20       Plan:  Plan  Inpatient/Swing Bed or Outpatient: Outpatient  SLP TX Plan: Continue Plan of " "Care  SLP Plan: Skilled SLP  SLP Frequency: 1x daily until discharge  Duration: 12 weeks  Discussed POC: Patient, Caregiver/family  Discussed Risks/Benefits: Yes  Patient/Caregiver Agreeable: Yes    Objective Data & Progress:  Goals: Established 24  Short-term goals:     7. Patient will accurately state numerical personal information (, address, zip, phone) with 80% accuracy given min cues. (New goal, Est 24)  Goal Status: Not progressing toward goal  Goal Progress: Goal not met, discontinue goal     Previous:  -Patient was unable to verbalize her address although she could write the building number. She was able to state her  after multiple attempts. She cannot state her phone number or address.  -Patient able to state her  and street address. She could not state her apartment # or phone #  - Patient required max cues to read the written form of the word as a guide or \"crutch\" when stating her home address and apartment number    8. Patient will verbalize and write integers and money to a maximum of value of thousands when given a verbal or written prompt with 80% accuracy given min cues.   Goal Status: In progress  Goal Progress: Goal not addressed this date     Previous:   -Stating money (up to 10s, mixed cents) - 67%  -Writing Ukrainian numerals when given written form: 92% indep  -Stating number 0-10: 100% for initial attempt, attempts later were inconsistent  -Stating 11-19: max verbal and written cues required  -Identifying if integer matches written form - 95%  -Verbalizing single, teens, double digit #s - 65%  -Written form 1-10: 70%, improved to 90% with min cues   -State #1-10: 70%, improved to 90% with min cues   -State \"tens\": 90% with restarts    9. Patient will complete calculation tasks (add, subtract, multiply, divide) with the use of a calculator to 80% accuracy given min cues.  Goal Status: In progress  Goal Progress: DC goal   -Patient does not feel comfortable using a calculator. " "She is used to using an adding machine.     Previous:  -Calculating add/sub problems, written: 100%   -Calculating add/sub problems, verbally presented: 67%, cues needed for \"+, -, +\"    10.  Patient will complete functional time and money tasks with 80% accuracy given min cues.   Goalstatus: Ongoing  Progressing as follows   -Comparing physical currency with written currency: 50%      Previous:  -Counting nickels - 80%, min cues to 100%   -Counting dimes - 50%, mod cues to 100%   -Counting quarters incrementally - 60% with max written cues to achieve 100%   -Matching coin value to written cents, FO6 choices:   50% improved to 80% with min-mod cues  -Showing $ amount in coins - 54%   -Stating if 2 sets of coins are equal in value - 0/2  -Counting dollar/cents: 70% over 5 trials, written cue for quarter progression was helpful  -Money values (ie: how many___in a___?): 73%   -Comparing 2 money groups: 100%   -Showing correct currency given a total: unable to complete this date, limited trials  -Calendar readin%, 30%  -Clock math, level 1/3: 100%, pt reported it was somewhat challenging despite full accuracy    Pain:  Pain Assessment  Pain Assessment: 0-10  0-10 (Numeric) Pain Score: 0 - No pain     Outpatient Education:  Adult Outpatient Education  Individual(s) Educated: Patient, Child  Patient/Caregiver Demonstrated Understanding: yes  Plan of Care Discussed and Agreed Upon: yes  Patient Response to Education: Patient/Caregiver Verbalized Understanding of Information         "

## 2024-11-26 ENCOUNTER — TREATMENT (OUTPATIENT)
Dept: SPEECH THERAPY | Facility: CLINIC | Age: 79
End: 2024-11-26
Payer: MEDICARE

## 2024-11-26 DIAGNOSIS — I69.320 APHASIA AS LATE EFFECT OF CEREBROVASCULAR ACCIDENT: ICD-10-CM

## 2024-11-26 DIAGNOSIS — I69.390 APRAXIA AS LATE EFFECT OF CEREBROVASCULAR ACCIDENT (CVA): Primary | ICD-10-CM

## 2024-11-26 PROCEDURE — 92507 TX SP LANG VOICE COMM INDIV: CPT | Mod: GN | Performed by: STUDENT IN AN ORGANIZED HEALTH CARE EDUCATION/TRAINING PROGRAM

## 2024-11-26 ASSESSMENT — PAIN - FUNCTIONAL ASSESSMENT: PAIN_FUNCTIONAL_ASSESSMENT: 0-10

## 2024-11-26 ASSESSMENT — PAIN SCALES - GENERAL: PAINLEVEL_OUTOF10: 0 - NO PAIN

## 2024-11-26 NOTE — PROGRESS NOTES
"SLP Adult Outpatient Speech-Language Progress Note     Patient Name: Roslyn Cabrera \"Pam\"  MRN: [id number]  Today's Date: 11/26/2024  Time Calculation  Start Time: 1035  Stop Time: 1125  Time Calculation (min): 50 min       Current Problem:   1. 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 Cabrera was seen for apraxia reduction/communication strategies this date. The patient confirmed she does not feel verbalizing numbers is not a high priority for her. She reported that speech production continues to be challenging. Conversational speech is often characterized by groping, phonemic errors and restarts. These were also evident when she read the [place]. Using learned strategies in conjunction with modeling improved her ability to read the passage but motor planning impairments were evident. The patient may benefit from an updated home exercise program that she can execute independently or with minimal help from family as she prepares to transition out of speech therapy.  Skilled speech therapy services are warranted in order to improve the patient's ability to verbalize personal information and engage in meaningful conversation in the home, social and clinical settings.     Interventions:   Language Expression  Language Expression Interventions: Verbal Reading (numbers)    Subjective:  Patient reports no new or worsening symptoms. She is considering a trip to [place].    General Visit Information:  Certification Period Start Date: 10/15/24  Certification Period End Date: 01/13/25  Number of Authorized Treatments : 7 of 20       Plan:  Plan  Inpatient/Swing Bed or Outpatient: Outpatient  SLP TX Plan: Continue Plan of Care  SLP Plan: Skilled SLP  SLP Frequency: PRN until discharge  Discussed POC: Patient, " "Caregiver/family  Discussed Risks/Benefits: Yes  Patient/Caregiver Agreeable: Yes    Objective Data & Progress:  Goals: Established 24  Short-term goals:     7. Patient will accurately state numerical personal information (, address, zip, phone) with 80% accuracy given min cues. (New goal, Est 24)  Goal Status: Not progressing toward goal  Goal Progress: Goal not met, discontinue goal     Previous:  -Patient was unable to verbalize her address although she could write the building number. She was able to state her  after multiple attempts. She cannot state her phone number or address.  -Patient able to state her  and street address. She could not state her apartment # or phone #  - Patient required max cues to read the written form of the word as a guide or \"crutch\" when stating her home address and apartment number    8. Patient will verbalize and write integers and money to a maximum of value of thousands when given a verbal or written prompt with 80% accuracy given min cues.   Goal Status: In progress  Goal Progress: Goal not addressed this date   -Pt read the [place] - <50% accurate for first attempt    Previous:   -Stating money (up to 10s, mixed cents) - 67%  -Writing Maori numerals when given written form: 92% indep  -Stating number 0-10: 100% for initial attempt, attempts later were inconsistent  -Stating 11-19: max verbal and written cues required  -Identifying if integer matches written form - 95%  -Verbalizing single, teens, double digit #s - 65%  -Written form 1-10: 70%, improved to 90% with min cues   -State #1-10: 70%, improved to 90% with min cues   -State \"tens\": 90% with restarts    9. Patient will complete calculation tasks (add, subtract, multiply, divide) with the use of a calculator to 80% accuracy given min cues.  Goal Status: In progress  Goal Progress: DC goal     Previous:  -Patient does not feel comfortable using a calculator. She is used to using an adding " "machine.   -Calculating add/sub problems, written: 100%   -Calculating add/sub problems, verbally presented: 67%, cues needed for \"+, -, +\"    10.  Patient will complete functional time and money tasks with 80% accuracy given min cues.   Goalstatus: Ongoing  Progressing as follows     Previous:  -Comparing physical currency with written currency: 50%    -Counting nickels - 80%, min cues to 100%   -Counting dimes - 50%, mod cues to 100%   -Counting quarters incrementally - 60% with max written cues to achieve 100%   -Matching coin value to written cents, FO6 choices:   50% improved to 80% with min-mod cues  -Showing $ amount in coins - 54%   -Stating if 2 sets of coins are equal in value - 0/2  -Counting dollar/cents: 70% over 5 trials, written cue for quarter progression was helpful  -Money values (ie: how many___in a___?): 73%   -Comparing 2 money groups: 100%   -Showing correct currency given a total: unable to complete this date, limited trials  -Calendar readin%, 30%  -Clock math, level 1/3: 100%, pt reported it was somewhat challenging despite full accuracy    Pain:  Pain Assessment  Pain Assessment: 0-10  0-10 (Numeric) Pain Score: 0 - No pain     Outpatient Education:  Adult Outpatient Education  Individual(s) Educated: Patient, Child  Patient/Caregiver Demonstrated Understanding: yes  Plan of Care Discussed and Agreed Upon: yes  Patient Response to Education: Patient/Caregiver Verbalized Understanding of Information    "

## 2024-12-02 ENCOUNTER — TELEPHONE (OUTPATIENT)
Dept: GYNECOLOGIC ONCOLOGY | Facility: HOSPITAL | Age: 79
End: 2024-12-02
Payer: MEDICARE

## 2024-12-02 LAB
CYTOLOGY CMNT CVX/VAG CYTO-IMP: NORMAL
HPV HR 12 DNA GENITAL QL NAA+PROBE: NEGATIVE
HPV HR GENOTYPES PNL CVX NAA+PROBE: NEGATIVE
HPV16 DNA SPEC QL NAA+PROBE: NEGATIVE
HPV18 DNA SPEC QL NAA+PROBE: NEGATIVE
LAB AP HPV GENOTYPE QUESTION: YES
LAB AP HPV HR: NORMAL
LABORATORY COMMENT REPORT: NORMAL
PATH REPORT.TOTAL CANCER: NORMAL

## 2024-12-02 NOTE — TELEPHONE ENCOUNTER
Phoned patient to notify that pap results are negative/-HPV and Bhumi Carl CNP recommends keeping follow up as scheduled November 2025.    Patient verbalized her understanding of information given.

## 2024-12-03 ENCOUNTER — APPOINTMENT (OUTPATIENT)
Dept: SPEECH THERAPY | Facility: CLINIC | Age: 79
End: 2024-12-03
Payer: MEDICARE

## 2024-12-03 ENCOUNTER — TELEPHONE (OUTPATIENT)
Dept: GYNECOLOGIC ONCOLOGY | Facility: HOSPITAL | Age: 79
End: 2024-12-03
Payer: MEDICARE

## 2024-12-03 DIAGNOSIS — F32.A DEPRESSION, UNSPECIFIED DEPRESSION TYPE: Primary | ICD-10-CM

## 2024-12-03 RX ORDER — VENLAFAXINE HYDROCHLORIDE 150 MG/1
150 CAPSULE, EXTENDED RELEASE ORAL DAILY
Qty: 30 CAPSULE | Refills: 1 | Status: SHIPPED | OUTPATIENT
Start: 2024-12-03 | End: 2025-02-01

## 2024-12-03 NOTE — TELEPHONE ENCOUNTER
I left a message for Pam that her pap and HPV were negative and she can follow up with her NP in one year as scheduled. I told her to call the office with any questions or concerns.

## 2024-12-10 ENCOUNTER — APPOINTMENT (OUTPATIENT)
Dept: PRIMARY CARE | Facility: CLINIC | Age: 79
End: 2024-12-10
Payer: MEDICARE

## 2024-12-10 ENCOUNTER — TREATMENT (OUTPATIENT)
Dept: SPEECH THERAPY | Facility: CLINIC | Age: 79
End: 2024-12-10
Payer: MEDICARE

## 2024-12-10 VITALS
WEIGHT: 140 LBS | SYSTOLIC BLOOD PRESSURE: 124 MMHG | BODY MASS INDEX: 27.48 KG/M2 | TEMPERATURE: 97.1 F | DIASTOLIC BLOOD PRESSURE: 80 MMHG | HEIGHT: 60 IN

## 2024-12-10 DIAGNOSIS — S39.012A STRAIN OF LUMBAR REGION, INITIAL ENCOUNTER: Primary | ICD-10-CM

## 2024-12-10 DIAGNOSIS — F32.A DEPRESSION, UNSPECIFIED DEPRESSION TYPE: ICD-10-CM

## 2024-12-10 DIAGNOSIS — I69.320 APHASIA AS LATE EFFECT OF CEREBROVASCULAR ACCIDENT: ICD-10-CM

## 2024-12-10 DIAGNOSIS — I69.390 APRAXIA AS LATE EFFECT OF CEREBROVASCULAR ACCIDENT (CVA): Primary | ICD-10-CM

## 2024-12-10 PROCEDURE — 1036F TOBACCO NON-USER: CPT | Performed by: PHYSICIAN ASSISTANT

## 2024-12-10 PROCEDURE — 3074F SYST BP LT 130 MM HG: CPT | Performed by: PHYSICIAN ASSISTANT

## 2024-12-10 PROCEDURE — 1158F ADVNC CARE PLAN TLK DOCD: CPT | Performed by: PHYSICIAN ASSISTANT

## 2024-12-10 PROCEDURE — 1123F ACP DISCUSS/DSCN MKR DOCD: CPT | Performed by: PHYSICIAN ASSISTANT

## 2024-12-10 PROCEDURE — 99214 OFFICE O/P EST MOD 30 MIN: CPT | Performed by: PHYSICIAN ASSISTANT

## 2024-12-10 PROCEDURE — 1126F AMNT PAIN NOTED NONE PRSNT: CPT | Performed by: PHYSICIAN ASSISTANT

## 2024-12-10 PROCEDURE — 92507 TX SP LANG VOICE COMM INDIV: CPT | Mod: GN | Performed by: STUDENT IN AN ORGANIZED HEALTH CARE EDUCATION/TRAINING PROGRAM

## 2024-12-10 PROCEDURE — 3079F DIAST BP 80-89 MM HG: CPT | Performed by: PHYSICIAN ASSISTANT

## 2024-12-10 PROCEDURE — 1159F MED LIST DOCD IN RCRD: CPT | Performed by: PHYSICIAN ASSISTANT

## 2024-12-10 PROCEDURE — 1160F RVW MEDS BY RX/DR IN RCRD: CPT | Performed by: PHYSICIAN ASSISTANT

## 2024-12-10 RX ORDER — CYCLOBENZAPRINE HCL 5 MG
5 TABLET ORAL NIGHTLY PRN
Qty: 14 TABLET | Refills: 0 | Status: SHIPPED | OUTPATIENT
Start: 2024-12-10 | End: 2025-02-08

## 2024-12-10 RX ORDER — VENLAFAXINE HYDROCHLORIDE 75 MG/1
75 CAPSULE, EXTENDED RELEASE ORAL DAILY
Qty: 30 CAPSULE | Refills: 1 | Status: SHIPPED | OUTPATIENT
Start: 2024-12-10 | End: 2025-02-08

## 2024-12-10 RX ORDER — METHYLPREDNISOLONE 4 MG/1
TABLET ORAL
Qty: 21 TABLET | Refills: 0 | Status: SHIPPED | OUTPATIENT
Start: 2024-12-10 | End: 2024-12-16

## 2024-12-10 ASSESSMENT — ENCOUNTER SYMPTOMS
FACIAL SWELLING: 0
APPETITE CHANGE: 0
CHEST TIGHTNESS: 0
CONSTIPATION: 0
ABDOMINAL PAIN: 0
CHILLS: 0
TREMORS: 0
FATIGUE: 0
SLEEP DISTURBANCE: 0
SORE THROAT: 0
HEMATURIA: 0
POLYDIPSIA: 0
POLYPHAGIA: 0
CONFUSION: 0
BACK PAIN: 1
MYALGIAS: 0
ARTHRALGIAS: 0
VOMITING: 0
WEAKNESS: 0
FEVER: 0
DIFFICULTY URINATING: 0
DIZZINESS: 0
COUGH: 0
HEADACHES: 0
NUMBNESS: 0
EYE DISCHARGE: 0
FREQUENCY: 0
WHEEZING: 0
ABDOMINAL DISTENTION: 0
DIARRHEA: 0
JOINT SWELLING: 0
SHORTNESS OF BREATH: 0
EYE PAIN: 0
PALPITATIONS: 0
NAUSEA: 0
NERVOUS/ANXIOUS: 0
COLOR CHANGE: 0
CHOKING: 0
ANAL BLEEDING: 0

## 2024-12-10 ASSESSMENT — PAIN - FUNCTIONAL ASSESSMENT: PAIN_FUNCTIONAL_ASSESSMENT: 0-10

## 2024-12-10 ASSESSMENT — PAIN SCALES - GENERAL
PAINLEVEL_OUTOF10: 0 - NO PAIN
PAINLEVEL_OUTOF10: 0-NO PAIN

## 2024-12-10 NOTE — PROGRESS NOTES
"Subjective   Patient ID: Pam Cabrera is a 79 y.o. 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  4 weeks).    HPI the patient is presented with her daughter with complaints of low back pain which she developed a month ago after tripping over her rug.  She did not fall.  Her daughter gave her ibuprofen 800 mg as needed which temporarily relieves her pain.  Per daughter, she also feels depressed and would like to increase venlafaxine.  She denies suicidal or homicidal ideations.    Review of Systems   Constitutional:  Negative for appetite change, chills, fatigue and fever.   HENT:  Negative for congestion, ear pain, facial swelling, hearing loss, nosebleeds and sore throat.    Eyes:  Negative for pain, discharge and visual disturbance.   Respiratory:  Negative for cough, choking, chest tightness, shortness of breath and wheezing.    Cardiovascular:  Negative for chest pain, palpitations and leg swelling.   Gastrointestinal:  Negative for abdominal distention, abdominal pain, anal bleeding, constipation, diarrhea, nausea and vomiting.   Endocrine: Negative for cold intolerance, heat intolerance, polydipsia, polyphagia and polyuria.   Genitourinary:  Negative for difficulty urinating, frequency, hematuria and urgency.   Musculoskeletal:  Positive for back pain. Negative for arthralgias, gait problem, joint swelling and myalgias.   Skin:  Negative for color change and rash.   Neurological:  Negative for dizziness, tremors, syncope, weakness, numbness and headaches.   Psychiatric/Behavioral:  Negative for behavioral problems, confusion, sleep disturbance and suicidal ideas. The patient is not nervous/anxious.        Objective   /80   Temp 36.2 °C (97.1 °F) (Temporal)   Ht 1.511 m (4' 11.5\")   Wt 63.5 kg (140 lb)   LMP  (LMP Unknown)   Breastfeeding No   BMI 27.80 kg/m² "     Physical Exam  Constitutional:       General: She is not in acute distress.     Appearance: Normal appearance.   HENT:      Head: Normocephalic and atraumatic.      Nose: Nose normal.   Eyes:      Extraocular Movements: Extraocular movements intact.      Conjunctiva/sclera: Conjunctivae normal.      Pupils: Pupils are equal, round, and reactive to light.   Cardiovascular:      Rate and Rhythm: Normal rate and regular rhythm.      Pulses: Normal pulses.      Heart sounds: Normal heart sounds.   Pulmonary:      Effort: Pulmonary effort is normal.      Breath sounds: Normal breath sounds.   Abdominal:      General: Bowel sounds are normal.      Palpations: Abdomen is soft.   Musculoskeletal:         General: Normal range of motion.      Cervical back: Normal range of motion and neck supple.   Neurological:      General: No focal deficit present.      Mental Status: She is alert and oriented to person, place, and time.   Psychiatric:         Mood and Affect: Mood normal.         Behavior: Behavior normal.         Thought Content: Thought content normal.         Judgment: Judgment normal.         Assessment/Plan     Low back pain  Most likely due to strain  Take cyclobenzaprine 5 mg at bedtime  Take Medrol Dosepak as directed  Do not take ibuprofen  Take Tylenol Extra Strength 3 times a day as needed for pain  Apply heating pads  Apply topical cream such as lidocaine, Bengay, Voltaren or Motrin    Depression  Worsening  Increase venlafaxine 150 mg to 225 mg daily    Follow-up with PCP in 4 weeks

## 2024-12-10 NOTE — PROGRESS NOTES
"SLP Adult Outpatient Speech-Language Progress Note     Patient Name: Roslyn Cabrera \"Pam\"  MRN: [id number]  Today's Date: 12/10/2024  Time Calculation  Start Time: 1308  Stop Time: 1355  Time Calculation (min): 47 min       Current Problem:   1. 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 Cabrera has been actively participating in speech therapy since 2/26/24 and had made great improvements with motor speech/apraxia reduction skills and expressive language skills. The patient struggles to fully understanding her diagnosis (apraxia vs aphasia) because she is very aware of what she is reading, hearing and reports no difficulty with word finding. She is frustrated with the challenges she has producing speech and is aware of her errors which create ongoing frustration. Therapy goals shifted to address functional money skills but this is no longer a priority for the patient. Following a discussion with the patient and her daughter, the patient is willing to reduce the frequencies of visits to every 3-4 weeks as she focuses on independent practice of speech skills. Materials were provided to the patient ([place], [place]). The patient was unable to read past the first sentence of the [place] and even then required max cues. This was read with ~50% accuracy during the prior visit. Skilled speech therapy services are warranted in order to improve the patient's ability to verbalize personal information and engage in meaningful conversation in the home, social and clinical settings. New plan of care to be created at the next visit.    Interventions:   Language Expression  Language Expression Interventions: Verbal Reading (numbers)    Subjective:  Patient reports no new or worsening symptoms. She is " "considering a trip to [place].    General Visit Information:  Certification Period Start Date: 10/15/24  Certification Period End Date: 25  Number of Authorized Treatments : 8 of 20       Plan:  Plan  Inpatient/Swing Bed or Outpatient: Outpatient  SLP TX Plan: Continue Plan of Care  SLP Plan: Skilled SLP  SLP Frequency: PRN until discharge  Discussed POC: Patient, Caregiver/family  Discussed Risks/Benefits: Yes  Patient/Caregiver Agreeable: Yes    Objective Data & Progress:  Goals: Established 24  Short-term goals:     7. Patient will accurately state numerical personal information (, address, zip, phone) with 80% accuracy given min cues. (New goal, Est 24)  Goal Status: Not progressing toward goal  Goal Progress: Goal not met, discontinue goal     Previous:  -Patient was unable to verbalize her address although she could write the building number. She was able to state her  after multiple attempts. She cannot state her phone number or address.  -Patient able to state her  and street address. She could not state her apartment # or phone #  - Patient required max cues to read the written form of the word as a guide or \"crutch\" when stating her home address and apartment number    8. Patient will verbalize and write integers and money to a maximum of value of thousands when given a verbal or written prompt with 80% accuracy given min cues.   Goal Status: In progress  Goal Progress: Goal not addressed this date    Previous:   -Pt read the [place] - <50% accurate for first attempt  -Stating money (up to 10s, mixed cents) - 67%  -Writing Latvian numerals when given written form: 92% indep  -Stating number 0-10: 100% for initial attempt, attempts later were inconsistent  -Stating 11-19: max verbal and written cues required  -Identifying if integer matches written form - 95%  -Verbalizing single, teens, double digit #s - 65%  -Written form 1-10: 70%, improved to 90% with min cues   -State " "#1-10: 70%, improved to 90% with min cues   -State \"tens\": 90% with restarts    9. Patient will complete calculation tasks (add, subtract, multiply, divide) with the use of a calculator to 80% accuracy given min cues.  Goal Status: In progress  Goal Progress: DC goal     Previous:  -Patient does not feel comfortable using a calculator. She is used to using an adding machine.   -Calculating add/sub problems, written: 100%   -Calculating add/sub problems, verbally presented: 67%, cues needed for \"+, -, +\"    10.  Patient will complete functional time and money tasks with 80% accuracy given min cues.   Goalstatus: Ongoing  Progressing as follows     Previous:  -Comparing physical currency with written currency: 50%    -Counting nickels - 80%, min cues to 100%   -Counting dimes - 50%, mod cues to 100%   -Counting quarters incrementally - 60% with max written cues to achieve 100%   -Matching coin value to written cents, FO6 choices:   50% improved to 80% with min-mod cues  -Showing $ amount in coins - 54%   -Stating if 2 sets of coins are equal in value - 0/2  -Counting dollar/cents: 70% over 5 trials, written cue for quarter progression was helpful  -Money values (ie: how many___in a___?): 73%   -Comparing 2 money groups: 100%   -Showing correct currency given a total: unable to complete this date, limited trials  -Calendar readin%, 30%  -Clock math, level 1/3: 100%, pt reported it was somewhat challenging despite full accuracy    Pain:  Pain Assessment  Pain Assessment: 0-10  0-10 (Numeric) Pain Score: 0 - No pain     Outpatient Education:  Adult Outpatient Education  Individual(s) Educated: Patient, Child  Patient/Caregiver Demonstrated Understanding: yes  Plan of Care Discussed and Agreed Upon: yes  Patient Response to Education: Patient/Caregiver Verbalized Understanding of Information    "

## 2024-12-17 ENCOUNTER — APPOINTMENT (OUTPATIENT)
Dept: SPEECH THERAPY | Facility: CLINIC | Age: 79
End: 2024-12-17
Payer: MEDICARE

## 2024-12-17 DIAGNOSIS — I10 BENIGN ESSENTIAL HYPERTENSION: ICD-10-CM

## 2024-12-17 RX ORDER — HYDRALAZINE HYDROCHLORIDE 25 MG/1
25 TABLET, FILM COATED ORAL 2 TIMES DAILY
Qty: 90 TABLET | Refills: 3 | Status: SHIPPED | OUTPATIENT
Start: 2024-12-17

## 2024-12-23 ENCOUNTER — APPOINTMENT (OUTPATIENT)
Dept: SPEECH THERAPY | Facility: CLINIC | Age: 79
End: 2024-12-23
Payer: MEDICARE

## 2024-12-30 ENCOUNTER — APPOINTMENT (OUTPATIENT)
Dept: SPEECH THERAPY | Facility: CLINIC | Age: 79
End: 2024-12-30
Payer: MEDICARE

## 2025-01-07 ENCOUNTER — APPOINTMENT (OUTPATIENT)
Dept: SPEECH THERAPY | Facility: CLINIC | Age: 80
End: 2025-01-07
Payer: MEDICARE

## 2025-01-10 NOTE — PROGRESS NOTES
Subjective   Pam Cabrera is a 79 y.o. female who presents for urinary tract infection follow-up.  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  Takes medication prescribed.  Patient is accompanied by daughter helps with history.    Review of Systems  10 system review pertinent as above  Objective     Visit Vitals  /82   Pulse 74   Temp 36.6 °C (97.9 °F)   Resp 16        Physical Exam  HEENT: Atraumatic normocephalic the pupils are equal and round and reactive to light the sclerae nonicteric extraocular motion are intact.  Neck: Is supple without JVD no carotid bruits the trachea is midline there are no masses pulses are equal and bilateral with normal upstroke.  Skin: Normal.  Skin good texture.  Moist.  Good turgor.  No lesions, no rashes.  Lymph: No lymphadenopathy appreciated, no masses, no lesions  Lungs: Are clear to auscultation and percussion, good breath sounds bilaterally, no rhonchi, no wheezing, good diaphragmatic excursion.  Heart: Normal rate and normal rhythm S1, S2, no S3, no gallop, murmur or rub.  Abdomen: Soft, nontender, no organomegaly, good bowel sounds.    Extremities: Full range of motion, good pulses bilateral.  No cyanosis, no clubbing or edema.  Neuro: Cranial nerves II-XII are grossly intact there is no sensory or motor deficits.  Able to move all extremities.      Assessment/Plan     Acute kidney injury  In the setting of dehydration  Patient is instructed to increase fluid intake  She is accompanied by her daughter  Will check a BMP once well-hydrated  Long discussion  About increasing fluid  intake    History of urinary tract  Was treated and completed antibiotic therapy  Will check a urinalysis today    CVA, January 2024  MCA embolic aphasia, apraxia  Speech therapy physical therapy  Follows with cardiology no atrial fibrillation  Follows with device clinic loop recorder  Continue baby aspirin, Lipitor carvedilol  On Baby ASA  Needs speech therapy     Cardiovascular disease prevention  Diet exercise weight loss  Eat healthy  Remain on current medications  Would repeat your lipid panel  We spoke of health and activities  To improve cardiovascular    Patient remains tobacco free  Consulted with patient regarding remaining tobacco free    Hypertension  No added salt diet, do not and salt to your food  Try to exercise every other day for 30 minutes  Continue current medications  Continue carvedilol 12.5 mg twice a day hydralazine 25 mg 2 times a day  Losartan 100 mg daily    Open angle glaucoma  Follows with ophthalmology  Continue eyedrops as prescribed    History of tobacco abuse  Stopped smoking January 2024  Consult with patient regarding remaining smoke-free    Continue with the low-fat, low-cholesterol diet,  I recommended Mediterranean diet, which include fish, chicken, vegetables and olive oil  Exercise daily for 30 minutes at least 3 times a week  Continue home medications    Mammogram  Follows with GYN Dr. 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 Justen Lopez      Right inguinal hernia  Repair with Dr. Cristofer Cartagena  Circa September 2022    Vaginal hysterectomy May 30, 2013  Continues to follow with GYN         Problem List Items Addressed This Visit       Dyslipidemia    Relevant Orders    Lipid Panel    AST    ALT    Rheumatoid arthritis    Pulmonary emphysema, unspecified  emphysema type (Multi)    Hemiplegia affecting right dominant side, unspecified etiology, unspecified hemiplegia type (Multi) - Primary    Relevant Orders    CBC w/5 Part Differential, [facility]    Chronic diastolic (congestive) heart failure    Relevant Orders    CBC w/5 Part Differential, [facility]    Diabetes mellitus due to underlying condition with diabetic macular edema of both eyes resolved after treatment, without long-term current use of insulin    Relevant Orders    Basic Metabolic Panel    Chronic kidney disease, stage 3a (Multi)     Other Visit Diagnoses       Vitamin D insufficiency        Relevant Orders    Vitamin D 25-Hydroxy,Total (for eval of Vitamin D levels)              Oscar Milner MD

## 2025-01-13 ENCOUNTER — HOSPITAL ENCOUNTER (OUTPATIENT)
Dept: CARDIOLOGY | Facility: CLINIC | Age: 80
Discharge: HOME | End: 2025-01-13
Payer: MEDICARE

## 2025-01-13 DIAGNOSIS — I63.9 CEREBRAL INFARCTION, UNSPECIFIED: ICD-10-CM

## 2025-01-13 DIAGNOSIS — F32.A DEPRESSION, UNSPECIFIED DEPRESSION TYPE: ICD-10-CM

## 2025-01-13 PROCEDURE — 93298 REM INTERROG DEV EVAL SCRMS: CPT

## 2025-01-13 RX ORDER — VENLAFAXINE HYDROCHLORIDE 75 MG/1
75 CAPSULE, EXTENDED RELEASE ORAL DAILY
Qty: 30 CAPSULE | Refills: 1 | Status: SHIPPED | OUTPATIENT
Start: 2025-01-13 | End: 2025-03-14

## 2025-01-14 ENCOUNTER — TREATMENT (OUTPATIENT)
Dept: SPEECH THERAPY | Facility: CLINIC | Age: 80
End: 2025-01-14
Payer: MEDICARE

## 2025-01-14 DIAGNOSIS — I69.390 APRAXIA AS LATE EFFECT OF CEREBROVASCULAR ACCIDENT (CVA): Primary | ICD-10-CM

## 2025-01-14 PROCEDURE — 92507 TX SP LANG VOICE COMM INDIV: CPT | Mod: GN | Performed by: STUDENT IN AN ORGANIZED HEALTH CARE EDUCATION/TRAINING PROGRAM

## 2025-01-14 PROCEDURE — 92522 EVALUATE SPEECH PRODUCTION: CPT | Mod: GN | Performed by: STUDENT IN AN ORGANIZED HEALTH CARE EDUCATION/TRAINING PROGRAM

## 2025-01-14 ASSESSMENT — PAIN SCALES - GENERAL: PAINLEVEL_OUTOF10: 0 - NO PAIN

## 2025-01-14 ASSESSMENT — PAIN - FUNCTIONAL ASSESSMENT: PAIN_FUNCTIONAL_ASSESSMENT: 0-10

## 2025-01-14 NOTE — PROGRESS NOTES
"Speech-Language Pathology    SLP Adult Outpatient Speech-Language Cognition    Patient Name: Roslyn Cabrera \"Pam\"  MRN: [id number]  Today's Date: 1/14/2025   Time Calculation  Start Time: 1307  Stop Time: 1350  Time Calculation (min): 43 min         Current Problem:   1. 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 The patient has been participating in speech therapy services and after a short break would like to resume therapy. The patient may benefit from skilled speech therapy services to improve her ability to effectively communicate in the home, social and clinical settings.       SLP Assessment  SLP Assessment Results: Motor Speech Deficits  Prognosis: Good    Treatment: Home exercise program was initiated to include concentrated production of /p/ and /b/ phonemes in the initial position with single, bisyllabic and trisyllabic words. Speech production will progress to sentences and ultimately more phonemes as the patient continues in [facility].      Plan:  Plan  Inpatient/Swing Bed or Outpatient: Outpatient  SLP Plan: Skilled SLP  SLP Frequency: 1x per week  Discussed POC: Patient, Caregiver/family  Discussed Risks/Benefits: Yes  Patient/Caregiver Agreeable: Yes    Subjective & Reason for Referral:  Patient is returning to therapy after a break during the holidays. Her daughter brings her to therapy.    Cognition:  Cognition  Overall Cognitive Status: Within Functional Limits    Motor Speech:  Motor Speech Production  Assessments Used: Apraxia Battery  Oral Motor : WFL, no evidence of oral apraxia  Diadochokinetic Rate: Impaired, 7 reps of \"buttercup\" in 5 sec (WFL = 10 reps)    Portions of the Apraxia Battery administered as follows:  Increasing word length, trial 1; no " deterioration   Single syllable - 100%   Bisyllablic - 100%   Trisyllable - 100%    Increasing word length + sentence   Single syllable - 91%   Bisyllablic - 82%   Trisyllable - 64%    3 Repetitions, trisyllabic words: 90%       Language (Receptive and Expressive):  WFL    Goals:   Patient will read complex sentences with 80% accuracy given min cues.   Establish Date:  1/14/2025      Timeframe: 3 months   Re-Eval date: 4/14/2025       Status: Progressing  Comments:    Patient will verbalize or demonstrate understanding of apraxia reduction strategies to enhance speech production with 90% accuracy.   Establish Date:  1/14/2025      Timeframe: 3 months   Re-Eval date: 4/14/2025       Status: Progressing  Comments:      General Visit Information  General Information  Past Medical History Relevant to Rehab: 77 YO F with PMH of HTN, HLD, tobacco abuse, glaucoma; L MCA CVA Jan 2024  Patient Seen During This Visit: Yes  Certification Period Start Date: 01/14/25  Certification Period End Date: 04/14/25  Total Number of Visits : 1    Pain  Pain Assessment  Pain Assessment: 0-10  0-10 (Numeric) Pain Score: 0 - No pain      Adult OP Education  Adult Outpatient Education  Individual(s) Educated: Patient, Child  Patient/Caregiver Demonstrated Understanding: yes  Plan of Care Discussed and Agreed Upon: yes  Patient Response to Education: Patient/Caregiver Verbalized Understanding of Information

## 2025-01-15 ENCOUNTER — APPOINTMENT (OUTPATIENT)
Dept: PRIMARY CARE | Facility: CLINIC | Age: 80
End: 2025-01-15
Payer: MEDICARE

## 2025-01-15 VITALS
BODY MASS INDEX: 27.48 KG/M2 | WEIGHT: 140 LBS | DIASTOLIC BLOOD PRESSURE: 82 MMHG | HEIGHT: 60 IN | RESPIRATION RATE: 16 BRPM | SYSTOLIC BLOOD PRESSURE: 130 MMHG | HEART RATE: 74 BPM | TEMPERATURE: 97.9 F

## 2025-01-15 DIAGNOSIS — N18.31 CHRONIC KIDNEY DISEASE, STAGE 3A (MULTI): ICD-10-CM

## 2025-01-15 DIAGNOSIS — G81.91 HEMIPLEGIA AFFECTING RIGHT DOMINANT SIDE, UNSPECIFIED ETIOLOGY, UNSPECIFIED HEMIPLEGIA TYPE (MULTI): Primary | ICD-10-CM

## 2025-01-15 DIAGNOSIS — E08.37X3: ICD-10-CM

## 2025-01-15 DIAGNOSIS — C53.9 MALIGNANT NEOPLASM OF CERVIX, UNSPECIFIED SITE (MULTI): ICD-10-CM

## 2025-01-15 DIAGNOSIS — M06.9 RHEUMATOID ARTHRITIS, INVOLVING UNSPECIFIED SITE, UNSPECIFIED WHETHER RHEUMATOID FACTOR PRESENT (MULTI): ICD-10-CM

## 2025-01-15 DIAGNOSIS — I50.32 CHRONIC DIASTOLIC (CONGESTIVE) HEART FAILURE: ICD-10-CM

## 2025-01-15 DIAGNOSIS — J43.9 PULMONARY EMPHYSEMA, UNSPECIFIED EMPHYSEMA TYPE (MULTI): ICD-10-CM

## 2025-01-15 DIAGNOSIS — E78.5 DYSLIPIDEMIA: ICD-10-CM

## 2025-01-15 DIAGNOSIS — E55.9 VITAMIN D INSUFFICIENCY: ICD-10-CM

## 2025-01-15 DIAGNOSIS — Z00.00 ROUTINE GENERAL MEDICAL EXAMINATION AT HEALTH CARE FACILITY: ICD-10-CM

## 2025-01-15 LAB
25(OH)D3 SERPL-MCNC: 40 NG/ML (ref 30–100)
ALT SERPL W P-5'-P-CCNC: 21 U/L (ref 16–63)
ANION GAP SERPL CALC-SCNC: 15 MMOL/L (ref 10–20)
AST SERPL W P-5'-P-CCNC: 16 U/L (ref 15–37)
BASOPHILS # BLD AUTO: 0.05 X10*3/UL (ref 0.1–1.6)
BASOPHILS NFR BLD AUTO: 0.53 % (ref 0–0.3)
BUN SERPL-MCNC: 28 MG/DL (ref 7–18)
CALCIUM SERPL-MCNC: 9.2 MG/DL (ref 8.5–10.1)
CHLORIDE SERPL-SCNC: 106 MMOL/L (ref 98–107)
CHOLEST SERPL-MCNC: 134 MG/DL (ref 0–199)
CHOLESTEROL/HDL RATIO: 2.2 (ref 4.2–7)
CO2 SERPL-SCNC: 25 MMOL/L (ref 21–32)
CREAT SERPL-MCNC: 1.5 MG/DL (ref 0.6–1.1)
EGFRCR SERPLBLD CKD-EPI 2021: 36 ML/MIN/1.73M*2
EOSINOPHIL # BLD AUTO: 0.19 X10*3/UL (ref 0.04–0.5)
EOSINOPHIL NFR BLD AUTO: 2.24 % (ref 0.7–7)
ERYTHROCYTE [DISTWIDTH] IN BLOOD BY AUTOMATED COUNT: 14.4 % (ref 11.5–14.5)
GLUCOSE SERPL-MCNC: 116 MG/DL (ref 74–100)
HCT VFR BLD AUTO: 33.7 % (ref 36.6–46.6)
HDLC SERPL-MCNC: 61 MG/DL (ref 40–59)
HGB BLD-MCNC: 11.09 G/DL (ref 12–15.4)
IS PATIENT FASTING: YES
LDLC SERPL DIRECT ASSAY-MCNC: 57 MG/DL (ref 0–100)
LYMPHOCYTES # BLD AUTO: 1.47 X10*3/UL (ref 0–6)
LYMPHOCYTES NFR BLD AUTO: 17.05 % (ref 20.5–51.1)
MCH RBC QN AUTO: 29.5 PG (ref 26–32)
MCHC RBC AUTO-ENTMCNC: 32.9 G/DL (ref 31–38)
MCV RBC AUTO: 89.6 FL (ref 80–96)
MONOCYTES # BLD AUTO: 0.83 X10*3/UL (ref 1.6–24.9)
MONOCYTES NFR BLD AUTO: 9.66 % (ref 1.7–9.3)
NEUTROPHILS # BLD AUTO: 6.06 X10*3/UL (ref 1.4–6.5)
NEUTROPHILS NFR BLD AUTO: 70.52 % (ref 42.2–75.2)
PLATELET # BLD AUTO: 250.2 X10*3/UL (ref 150–450)
PMV BLD AUTO: 8.28 FL (ref 7.8–11)
POTASSIUM SERPL-SCNC: 5 MMOL/L (ref 3.5–5.1)
RBC # BLD AUTO: 3.76 X10*6/UL (ref 3.9–5.3)
SODIUM SERPL-SCNC: 141 MMOL/L (ref 136–145)
TRIGL SERPL-MCNC: 113 MG/DL
WBC # BLD AUTO: 8.6 X10*3/UL (ref 4.5–10.5)

## 2025-01-15 PROCEDURE — G2211 COMPLEX E/M VISIT ADD ON: HCPCS | Performed by: INTERNAL MEDICINE

## 2025-01-15 PROCEDURE — 1126F AMNT PAIN NOTED NONE PRSNT: CPT | Performed by: INTERNAL MEDICINE

## 2025-01-15 PROCEDURE — 99214 OFFICE O/P EST MOD 30 MIN: CPT | Performed by: INTERNAL MEDICINE

## 2025-01-15 PROCEDURE — 82306 VITAMIN D 25 HYDROXY: CPT | Performed by: INTERNAL MEDICINE

## 2025-01-15 PROCEDURE — 1159F MED LIST DOCD IN RCRD: CPT | Performed by: INTERNAL MEDICINE

## 2025-01-15 PROCEDURE — G0439 PPPS, SUBSEQ VISIT: HCPCS | Performed by: INTERNAL MEDICINE

## 2025-01-15 PROCEDURE — 84460 ALANINE AMINO (ALT) (SGPT): CPT | Performed by: INTERNAL MEDICINE

## 2025-01-15 PROCEDURE — 80048 BASIC METABOLIC PNL TOTAL CA: CPT | Performed by: INTERNAL MEDICINE

## 2025-01-15 PROCEDURE — 85025 COMPLETE CBC W/AUTO DIFF WBC: CPT | Performed by: INTERNAL MEDICINE

## 2025-01-15 PROCEDURE — G0442 ANNUAL ALCOHOL SCREEN 15 MIN: HCPCS | Performed by: INTERNAL MEDICINE

## 2025-01-15 PROCEDURE — 1158F ADVNC CARE PLAN TLK DOCD: CPT | Performed by: INTERNAL MEDICINE

## 2025-01-15 PROCEDURE — 99497 ADVNCD CARE PLAN 30 MIN: CPT | Performed by: INTERNAL MEDICINE

## 2025-01-15 PROCEDURE — 80061 LIPID PANEL: CPT | Performed by: INTERNAL MEDICINE

## 2025-01-15 PROCEDURE — 84450 TRANSFERASE (AST) (SGOT): CPT | Performed by: INTERNAL MEDICINE

## 2025-01-15 PROCEDURE — 3079F DIAST BP 80-89 MM HG: CPT | Performed by: INTERNAL MEDICINE

## 2025-01-15 PROCEDURE — G0446 INTENS BEHAVE THER CARDIO DX: HCPCS | Performed by: INTERNAL MEDICINE

## 2025-01-15 PROCEDURE — 1123F ACP DISCUSS/DSCN MKR DOCD: CPT | Performed by: INTERNAL MEDICINE

## 2025-01-15 PROCEDURE — 3075F SYST BP GE 130 - 139MM HG: CPT | Performed by: INTERNAL MEDICINE

## 2025-01-15 PROCEDURE — 1170F FXNL STATUS ASSESSED: CPT | Performed by: INTERNAL MEDICINE

## 2025-01-15 ASSESSMENT — ACTIVITIES OF DAILY LIVING (ADL)
TOILETING: INDEPENDENT
MANAGING FINANCES: INDEPENDENT
GROOMING: INDEPENDENT
EATING: INDEPENDENT
ADEQUATE_TO_COMPLETE_ADL: YES
FEEDING YOURSELF: INDEPENDENT
TAKING MEDICATION: INDEPENDENT
HEARING - RIGHT EAR: FUNCTIONAL
JUDGMENT_ADEQUATE_SAFELY_COMPLETE_DAILY_ACTIVITIES: YES
USING TELEPHONE: INDEPENDENT
DRESSING YOURSELF: INDEPENDENT
WALKS IN HOME: INDEPENDENT
BATHING: INDEPENDENT
USING TRANSPORTATION: INDEPENDENT
STIL DRIVING: NO
PILL BOX USED: NO
PREPARING MEALS: INDEPENDENT
DOING HOUSEWORK: INDEPENDENT
NEEDS ASSISTANCE WITH FOOD: INDEPENDENT
GROCERY SHOPPING: INDEPENDENT
HEARING - LEFT EAR: FUNCTIONAL
PATIENT'S MEMORY ADEQUATE TO SAFELY COMPLETE DAILY ACTIVITIES?: YES

## 2025-01-15 ASSESSMENT — GERIATRIC MINI NUTRITIONAL ASSESSMENT (MNA)
B WEIGHT LOSS DURING THE LAST 3 MONTHS: NO WEIGHT LOSS
D HAS SUFFERED PSYCHOLOGICAL STRESS OR ACUTE DISEASE IN THE PAST 3 MONTHS?: NO
C GENERAL MOBILITY: GOES OUT
E NEUROPSYCHOLOGICAL PROBLEMS: NO PSYCHOLOGICAL PROBLEMS
A HAS FOOD INTAKE DECLINED OVER THE PAST 3 MONTHS DUE TO LOSS OF APPETITE, DIGESTIVE PROBLEMS, CHEWING OR SWALLOWING DIFFICULTIES?: NO DECREASE IN FOOD INTAKE

## 2025-01-15 ASSESSMENT — ANXIETY QUESTIONNAIRES
GAD7 TOTAL SCORE: 2
2. NOT BEING ABLE TO STOP OR CONTROL WORRYING: NOT AT ALL
3. WORRYING TOO MUCH ABOUT DIFFERENT THINGS: SEVERAL DAYS
4. TROUBLE RELAXING: NOT AT ALL
7. FEELING AFRAID AS IF SOMETHING AWFUL MIGHT HAPPEN: NOT AT ALL
1. FEELING NERVOUS, ANXIOUS, OR ON EDGE: NOT AT ALL
5. BEING SO RESTLESS THAT IT IS HARD TO SIT STILL: SEVERAL DAYS
6. BECOMING EASILY ANNOYED OR IRRITABLE: NOT AT ALL
IF YOU CHECKED OFF ANY PROBLEMS ON THIS QUESTIONNAIRE, HOW DIFFICULT HAVE THESE PROBLEMS MADE IT FOR YOU TO DO YOUR WORK, TAKE CARE OF THINGS AT HOME, OR GET ALONG WITH OTHER PEOPLE: NOT DIFFICULT AT ALL

## 2025-01-15 ASSESSMENT — COLUMBIA-SUICIDE SEVERITY RATING SCALE - C-SSRS
2. HAVE YOU ACTUALLY HAD ANY THOUGHTS OF KILLING YOURSELF?: NO
6. HAVE YOU EVER DONE ANYTHING, STARTED TO DO ANYTHING, OR PREPARED TO DO ANYTHING TO END YOUR LIFE?: NO
1. IN THE PAST MONTH, HAVE YOU WISHED YOU WERE DEAD OR WISHED YOU COULD GO TO SLEEP AND NOT WAKE UP?: NO

## 2025-01-15 ASSESSMENT — ENCOUNTER SYMPTOMS
LOSS OF SENSATION IN FEET: 0
OCCASIONAL FEELINGS OF UNSTEADINESS: 0
DEPRESSION: 0

## 2025-01-15 ASSESSMENT — PAIN SCALES - GENERAL: PAINLEVEL_OUTOF10: 0-NO PAIN

## 2025-01-15 NOTE — PROGRESS NOTES
"Subjective   Reason for Visit: Roslyn Cabrera is an 79 y.o. female here for a Medicare Wellness visit.   Reviewed all medications by prescribing practitioner or clinical pharmacist (such as prescriptions, OTCs, herbal therapies and supplements) and documented in the medical record.    HPI  Roslyn is 79 she is self-sufficient lives at home she is accompanied by her daughter patient suffered a stroke about a year ago, since she has been recovering to physical therapy speech therapy she still suffers with expressive aphasia, she denies fever chills cough nausea vomiting.  Denies any history of falling  Patient Care Team:  Oscar Milner MD as PCP - General (Internal Medicine)  Oscar Milner MD as PCP - [facility] Medicare Advantage PCP     Review of Systems  10 system are pertinent as above  Objective   Vitals:  /82   Pulse 74   Temp 36.6 °C (97.9 °F)   Resp 16   Ht 1.511 m (4' 11.5\")   Wt 63.5 kg (140 lb)   LMP  (LMP Unknown)   BMI 27.80 kg/m²       Physical Exam  HEENT: Atraumatic normocephalic the pupils are equal and round and reactive to light the sclerae nonicteric extraocular motion are intact.  Neck: Is supple without JVD no carotid bruits the trachea is midline there are no masses pulses are equal and bilateral with normal upstroke.  Skin: Normal.  Skin good texture.  Moist.  Good turgor.  No lesions, no rashes.  Lymph: No lymphadenopathy appreciated, no masses, no lesions  Lungs: Are clear to auscultation and percussion, good breath sounds bilaterally, no rhonchi, no wheezing, good diaphragmatic excursion.  Heart: Normal rate and normal rhythm S1, S2, no S3, no gallop, murmur or rub.  Abdomen: Soft, nontender, no organomegaly, good bowel sounds.    Extremities: Full range of motion, good pulses bilateral.  No cyanosis, no clubbing or edema.  Neuro: Cranial nerves II-XII are grossly intact there is no sensory or motor deficits.  Able to move all extremities.    Expressive aphasia mild  Assessment & " Plan  Hemiplegia affecting right dominant side, unspecified etiology, unspecified hemiplegia type (Multi)    Orders:    CBC w/5 Part Differential, [facility]    Chronic diastolic (congestive) heart failure    Orders:    CBC w/5 Part Differential, [facility]    Diabetes mellitus due to underlying condition with diabetic macular edema of both eyes resolved after treatment, without long-term current use of insulin    Orders:    Basic Metabolic Panel    Chronic kidney disease, stage 3a (Multi)         Rheumatoid arthritis, involving unspecified site, unspecified whether rheumatoid factor present (Multi)         Pulmonary emphysema, unspecified emphysema type (Multi)         Dyslipidemia    Orders:    Lipid Panel    AST    ALT    Vitamin D insufficiency    Orders:    Vitamin D 25-Hydroxy,Total (for eval of Vitamin D levels)    Routine general medical examination at health care facility    Orders:    1 Year Follow Up In Primary Care - Wellness Exam; Future    Malignant neoplasm of cervix, unspecified site (Multi)         Medicare wellness

## 2025-01-21 ENCOUNTER — APPOINTMENT (OUTPATIENT)
Dept: SPEECH THERAPY | Facility: CLINIC | Age: 80
End: 2025-01-21
Payer: MEDICARE

## 2025-01-24 ENCOUNTER — TREATMENT (OUTPATIENT)
Dept: SPEECH THERAPY | Facility: CLINIC | Age: 80
End: 2025-01-24
Payer: MEDICARE

## 2025-01-24 DIAGNOSIS — I69.390 APRAXIA AS LATE EFFECT OF CEREBROVASCULAR ACCIDENT (CVA): Primary | ICD-10-CM

## 2025-01-24 PROCEDURE — 92507 TX SP LANG VOICE COMM INDIV: CPT | Mod: GN | Performed by: STUDENT IN AN ORGANIZED HEALTH CARE EDUCATION/TRAINING PROGRAM

## 2025-01-24 ASSESSMENT — PAIN SCALES - GENERAL: PAINLEVEL_OUTOF10: 0 - NO PAIN

## 2025-01-24 ASSESSMENT — PAIN - FUNCTIONAL ASSESSMENT: PAIN_FUNCTIONAL_ASSESSMENT: 0-10

## 2025-01-24 NOTE — PROGRESS NOTES
"Speech-Language Pathology    SLP Adult Outpatient Speech-Language Pathology Treatment     Patient Name: Roslyn Cabrera \"Ivette"  MRN: [id number]  Today's Date: 1/27/2025  Time Calculation  Start Time: 1300  Stop Time: 1345  Time Calculation (min): 45 min         Current Problem:   1. 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 The patient had more errors as phonemic sequence got longer. She was able to read sentences aloud with high accuracy when slowing her rate of speech. The patient requires regular cueing to utilize this strategy. The patient may benefit from speech therapy services to improve her ability to effectively communicate and engage in meaningful conversations in the home, social and clinical settings.     Plan:  Plan  Inpatient/Swing Bed or Outpatient: Outpatient  Treatment/Interventions: Oral motor exercises  SLP TX Plan: Continue Plan of Care  SLP Plan: Skilled SLP  SLP Frequency: 1x per week  Duration: 12 weeks  Discussed POC: Patient  Discussed Risks/Benefits: Yes  Patient/Caregiver Agreeable: Yes    Visit Information:  General  Past Medical History Relevant to Rehab: 77 YO F with PMH of HTN, HLD, tobacco abuse, glaucoma; L MCA CVA Jan 2024  Certification Period Start Date: 01/14/25  Certification Period End Date: 04/14/25  Number of Authorized Treatments : 2 of 20    Subjective:  Patient reports no new or worsening symptoms.    Interventions:     Motor Speech  Motor Speech Intervention : Compensatory Speech Strategies (Apraxia reduction drills)                     Objective Data & Progress:     Goals:   Patient will read complex sentences with 80% accuracy given min cues.   Establish Date: "  1/14/2025      Timeframe: 3 months   Re-Eval date: 4/14/2025       Status: Progressing  Comments:     Progressive phonemic production (mon-multi syllable words, initial position), first attempt:   /p/: mono 92%, bi 64%, multi 56%; all improved to 100% with additional attempts and cues  ` /p/ sentences: 92%, -> to 98% with repeat attempt   /b/: mono 84%, bi 92%, multi 64% -> to 96% with repeat attempts     Patient will verbalize or demonstrate understanding of apraxia reduction strategies to enhance speech production with 90% accuracy.   Establish Date:  1/14/2025      Timeframe: 3 months   Re-Eval date: 4/14/2025       Status: Progressing  Comments:    Long-term goals:  Patient will improve functional global communication skills in order to promote safety and independence with ADLs and communicate wants/needs/preferences/opinions.    Pain:  Pain Assessment  Pain Assessment: 0-10  0-10 (Numeric) Pain Score: 0 - No pain     Outpatient Education:  Adult Outpatient Education  Individual(s) Educated: Patient, Child  Written Education : y  Verbal Education : y  Patient/Caregiver Demonstrated Understanding: yes  Plan of Care Discussed and Agreed Upon: yes  Patient Response to Education: Patient/Caregiver Verbalized Understanding of Information

## 2025-01-28 ENCOUNTER — TREATMENT (OUTPATIENT)
Dept: SPEECH THERAPY | Facility: CLINIC | Age: 80
End: 2025-01-28
Payer: MEDICARE

## 2025-01-28 DIAGNOSIS — I69.390 APRAXIA AS LATE EFFECT OF CEREBROVASCULAR ACCIDENT (CVA): Primary | ICD-10-CM

## 2025-01-28 PROCEDURE — 92507 TX SP LANG VOICE COMM INDIV: CPT | Mod: GN | Performed by: STUDENT IN AN ORGANIZED HEALTH CARE EDUCATION/TRAINING PROGRAM

## 2025-01-28 ASSESSMENT — PAIN - FUNCTIONAL ASSESSMENT: PAIN_FUNCTIONAL_ASSESSMENT: 0-10

## 2025-01-28 ASSESSMENT — PAIN SCALES - GENERAL: PAINLEVEL_OUTOF10: 0 - NO PAIN

## 2025-01-28 NOTE — PROGRESS NOTES
"Speech-Language Pathology    SLP Adult Outpatient Speech-Language Pathology Treatment     Patient Name: Roslyn Cabrera \"Ivette"  MRN: [id number]  Today's Date: 1/28/2025  Time Calculation  Start Time: 1300  Stop Time: 1346  Time Calculation (min): 46 min         Current Problem:   1. 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 The patient had more errors as phonemic sequence got longer. The patient requires fewer cues to slow her rate of speech. The patient may benefit from speech therapy services to improve her ability to effectively communicate and engage in meaningful conversations in the home, social and clinical settings.     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    Interventions:     Motor Speech  Motor Speech Intervention : Compensatory Speech Strategies                     Objective Data & Progress:     Goals:   Patient will read complex sentences with 80% accuracy given min cues.   Establish Date:  1/14/2025      Timeframe: 3 months   Re-Eval date: 4/14/2025       Status: " Progressing  Comments:   /b/:  bi 80% -> 100% with repeat attempts  multi 64% -> to 68%     /b/ sentences: 92%   /m/:  mono 72% ->100% w/ repeat attempts    bi 76% ->100% w/ RA    multi 52% -> to 96% w/ RA     Progressive phonemic production (mon-multi syllable words, initial position), first attempt:   /p/: mono 92%, bi 64%, multi 56%; all improved to 100% with additional attempts and cues  ` /p/ sentences: 92%, -> to 98% with repeat attempt   /b/: mono 84%, bi 92%, multi 64% -> to 96% with repeat attempts     Patient will verbalize or demonstrate understanding of apraxia reduction strategies to enhance speech production with 90% accuracy.   Establish Date:  1/14/2025      Timeframe: 3 months   Re-Eval date: 4/14/2025       Status: Progressing  Comments:   Patient required fewer cues to slow rate of speech    Long-term goals:  Patient will improve functional global communication skills in order to promote safety and independence with ADLs and communicate wants/needs/preferences/opinions.    Pain:  Pain Assessment  Pain Assessment: 0-10  0-10 (Numeric) Pain Score: 0 - No pain     Outpatient Education:  Adult Outpatient Education  Individual(s) Educated: Patient, Child  Verbal Education : yes  Patient/Caregiver Demonstrated Understanding: yes  Plan of Care Discussed and Agreed Upon: yes  Patient Response to Education: Patient/Caregiver Verbalized Understanding of Information

## 2025-02-06 ENCOUNTER — TREATMENT (OUTPATIENT)
Dept: SPEECH THERAPY | Facility: CLINIC | Age: 80
End: 2025-02-06
Payer: MEDICARE

## 2025-02-06 DIAGNOSIS — I69.390 APRAXIA AS LATE EFFECT OF CEREBROVASCULAR ACCIDENT (CVA): Primary | ICD-10-CM

## 2025-02-06 PROCEDURE — 92507 TX SP LANG VOICE COMM INDIV: CPT | Mod: GN | Performed by: STUDENT IN AN ORGANIZED HEALTH CARE EDUCATION/TRAINING PROGRAM

## 2025-02-06 ASSESSMENT — PAIN SCALES - GENERAL: PAINLEVEL_OUTOF10: 0 - NO PAIN

## 2025-02-06 ASSESSMENT — PAIN - FUNCTIONAL ASSESSMENT: PAIN_FUNCTIONAL_ASSESSMENT: 0-10

## 2025-02-06 NOTE — PROGRESS NOTES
"Speech-Language Pathology    SLP Adult Outpatient Speech-Language Pathology Treatment     Patient Name: Roslyn Cabrera \"Ivette"  MRN: [id number]  Today's Date: 2/6/2025  Time Calculation  Start Time: 1358  Stop Time: 1430  Time Calculation (min): 32 min         Current Problem:   1. 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 The patient required fewer cues to slow rate of speech and production of simple sentences for the phonemes targeted thus far had been no lower than 88% for the first attempt. The patient may benefit from speech therapy services to improve her ability to effectively communicate and engage in meaningful conversations in the home, social and clinical settings.     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    Interventions:     Motor Speech  Motor Speech Intervention : Compensatory Speech Strategies (word, sentence reading)                     Objective Data & Progress:     Goals:   Patient will read complex sentences with 80% accuracy given min cues.   Establish Date:  1/14/2025      " Timeframe: 3 months   Re-Eval date: 4/14/2025       Status: Progressing  Comments:   /m/: bi 96%, multi 80%   /m/ sentences: 88%   /w/: mono 88%, bi 100%, 79% (all -> 100% w/ RA)      Progressive phonemic production (mon-multi syllable words, initial position), first attempt and with RA (Repeat Attempt)     /p/: mono 92%, bi 64%, multi 56%; (all -> 100% w/ RA)  ` /p/ sentences: 92%, -> 98%   /b/: mono 84%, bi 92%, multi 64% -> 96%  /b/:  bi 80% ->100%, multi 64% -> 68%     /b/ sentences: 92%   /m/: mono 72%, bi 76%, multi 52% -> 96%     Patient will verbalize or demonstrate understanding of apraxia reduction strategies to enhance speech production with 90% accuracy.   Establish Date:  1/14/2025      Timeframe: 3 months   Re-Eval date: 4/14/2025       Status: Progressing  Comments:   Patient required fewer cues to slow rate of speech    Long-term goals:  Patient will improve functional global communication skills in order to promote safety and independence with ADLs and communicate wants/needs/preferences/opinions.    Pain:  Pain Assessment  Pain Assessment: 0-10  0-10 (Numeric) Pain Score: 0 - No pain     Outpatient Education:  Adult Outpatient Education  Individual(s) Educated: Patient, Child  Verbal Education : yes  Patient/Caregiver Demonstrated Understanding: yes  Plan of Care Discussed and Agreed Upon: yes  Patient Response to Education: Patient/Caregiver Verbalized Understanding of Information

## 2025-02-13 ENCOUNTER — APPOINTMENT (OUTPATIENT)
Dept: SPEECH THERAPY | Facility: CLINIC | Age: 80
End: 2025-02-13
Payer: MEDICARE

## 2025-02-18 ENCOUNTER — TREATMENT (OUTPATIENT)
Dept: SPEECH THERAPY | Facility: CLINIC | Age: 80
End: 2025-02-18
Payer: MEDICARE

## 2025-02-18 DIAGNOSIS — F32.A DEPRESSION, UNSPECIFIED DEPRESSION TYPE: ICD-10-CM

## 2025-02-18 DIAGNOSIS — I69.390 APRAXIA AS LATE EFFECT OF CEREBROVASCULAR ACCIDENT (CVA): Primary | ICD-10-CM

## 2025-02-18 PROCEDURE — 92507 TX SP LANG VOICE COMM INDIV: CPT | Mod: GN | Performed by: STUDENT IN AN ORGANIZED HEALTH CARE EDUCATION/TRAINING PROGRAM

## 2025-02-18 RX ORDER — VENLAFAXINE HYDROCHLORIDE 150 MG/1
150 CAPSULE, EXTENDED RELEASE ORAL DAILY
Qty: 30 CAPSULE | Refills: 1 | Status: SHIPPED | OUTPATIENT
Start: 2025-02-18 | End: 2025-04-19

## 2025-02-18 ASSESSMENT — PAIN - FUNCTIONAL ASSESSMENT: PAIN_FUNCTIONAL_ASSESSMENT: 0-10

## 2025-02-18 ASSESSMENT — PAIN SCALES - GENERAL: PAINLEVEL_OUTOF10: 0 - NO PAIN

## 2025-02-18 NOTE — PROGRESS NOTES
"Speech-Language Pathology    SLP Adult Outpatient Speech-Language Pathology Treatment     Patient Name: Roslyn Cabrera \"Pam\"  MRN: [id number]  Today's Date: 2/18/2025  Time Calculation  Start Time: 1301  Stop Time: 1348  Time Calculation (min): 47 min         Current Problem:   1. 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 The patient required occasional cues to slow rate of speech. Accuracy diminished as the word length increased. Accuracy for simple sentence production was 88% and 93% for initial attempt. SLP identified various communication settings that can increase anxiety or cognitive load for motor planning such as large groups, unknown communication partners or when emotions are heightened. Pt was encouraged to be mindful of this and focus on slow rate during these times. The patient may benefit from speech therapy services to improve her ability to effectively communicate and engage in meaningful conversations in the home, social and clinical settings.     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    Interventions:     Motor Speech  Motor Speech Intervention : Compensatory Speech Strategies                     Objective Data & Progress:     Goals:   Patient will read complex sentences with 80% accuracy given min cues.   Establish Date:  1/14/2025      Timeframe: 3 months   Re-Eval date: 4/14/2025       Status: Progressing  Comments:   /w/: multi 79% (increased to 100% w/ RA)   /w/ sentences: 93%, -> 100%   /f/: mono 88%, 92%, 76% (all -> 100% w/ RA)  /f/ sentences: 88%, -> 100%        Progressive phonemic production (mon-multi syllable words, initial position), first attempt and with RA (Repeat Attempt)    /m/: bi 96%, multi 80%   /m/ sentences: 88%   /w/: mono 88%, bi 100%, 79% (all -> 100% w/ RA)   /p/: mono 92%, bi 64%, multi 56%; (all -> 100% w/ RA)  ` /p/ sentences: 92%, -> 98%   /b/: mono 84%, bi 92%, multi 64% -> 96%  /b/:  bi 80% ->100%, multi 64% -> 68%     /b/ sentences: 92%   /m/: mono 72%, bi 76%, multi 52% -> 96%     Patient will verbalize or demonstrate understanding of apraxia reduction strategies to enhance speech production with 90% accuracy.   Establish Date:  1/14/2025      Timeframe: 3 months   Re-Eval date: 4/14/2025       Status: Progressing  Comments:   Patient required fewer cues to slow rate of speech    Long-term goals:  Patient will improve functional global communication skills in order to promote safety and independence with ADLs and communicate wants/needs/preferences/opinions.    Pain:  Pain Assessment  Pain Assessment: 0-10  0-10 (Numeric) Pain Score: 0 - No pain     Outpatient Education:  Adult Outpatient Education  Individual(s) Educated: Patient  Verbal Education : yes  Patient/Caregiver Demonstrated Understanding: yes  Plan of Care Discussed and Agreed Upon: yes  Patient Response to Education: Patient/Caregiver Verbalized Understanding of Information

## 2025-02-20 ENCOUNTER — APPOINTMENT (OUTPATIENT)
Dept: SPEECH THERAPY | Facility: CLINIC | Age: 80
End: 2025-02-20
Payer: MEDICARE

## 2025-02-25 ENCOUNTER — APPOINTMENT (OUTPATIENT)
Dept: SPEECH THERAPY | Facility: CLINIC | Age: 80
End: 2025-02-25
Payer: MEDICARE

## 2025-02-27 ENCOUNTER — APPOINTMENT (OUTPATIENT)
Dept: SPEECH THERAPY | Facility: CLINIC | Age: 80
End: 2025-02-27
Payer: MEDICARE

## 2025-03-04 ENCOUNTER — TREATMENT (OUTPATIENT)
Dept: SPEECH THERAPY | Facility: CLINIC | Age: 80
End: 2025-03-04
Payer: MEDICARE

## 2025-03-04 DIAGNOSIS — I69.390 APRAXIA AS LATE EFFECT OF CEREBROVASCULAR ACCIDENT (CVA): Primary | ICD-10-CM

## 2025-03-04 PROCEDURE — 92507 TX SP LANG VOICE COMM INDIV: CPT | Mod: GN | Performed by: STUDENT IN AN ORGANIZED HEALTH CARE EDUCATION/TRAINING PROGRAM

## 2025-03-04 ASSESSMENT — PAIN - FUNCTIONAL ASSESSMENT: PAIN_FUNCTIONAL_ASSESSMENT: 0-10

## 2025-03-04 ASSESSMENT — PAIN SCALES - GENERAL: PAINLEVEL_OUTOF10: 0 - NO PAIN

## 2025-03-04 NOTE — PROGRESS NOTES
"Speech-Language Pathology    SLP Adult Outpatient Speech-Language Pathology Treatment     Patient Name: Roslyn Cabrera \"Pam\"  MRN: [id number]  Today's Date: 3/4/2025  Time Calculation  Start Time: 1305  Stop Time: 1348  Time Calculation (min): 43 min         Current Problem:   1. 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 The patient was demonstrating accurate pacing for the task. Cues to ID the root word when able provided. Phoneme /v/ appeared to be more difficult because the list of words were not high frequency words. Accuracy for word production for mono and bisyllabic words was at target level. Multisyllabic /v/ words were more difficult to produce on the first attempt but re attempts and occasional cues enabled achievement to target accuraycy. Simple sentence production was 84% for initial attempt. The patient may benefit from speech therapy services to improve her ability to effectively communicate and engage in meaningful conversations in the home, social and clinical settings.     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    Interventions:     Motor Speech  Motor Speech Intervention : Compensatory Speech Strategies                     Objective Data & Progress:     Goals:   Patient will read complex sentences with 80% accuracy given min cues.   Establish Date:  1/14/2025      Timeframe: 3 months   Re-Eval date: 4/14/2025       Status: Progressing  Comments:   /v/: mono 88%, bi 92%, multi 76% (mono/bi -> 100%, multi -> 96% with RA)   /v/ sentences (simple): 84%, -> 91% w/cues or RA      Progressive phonemic production (mon-multi syllable words, initial position), first attempt and with RA (Repeat Attempt)   /p/: mono 92%, bi 64%, multi 56%; (all -> 100% w/ RA)  ` /p/ sentences (simple) : 92%, -> 98%   /b/: mono 84%, bi 92%, multi 64% -> 96%  /b/:  bi 80% ->100%, multi 64% -> 68%     /b/ sentences (simple) : 92%   /m/: mono 72%, bi 76%, multi 52% -> 96%  /m/: bi 96%, multi 80%   /m/ sentences (simple): 88%   /w/: mono 88%, bi 100%, 79% (all -> 100% w/ RA)    /w/: multi 79% (increased to 100% w/ RA)   /w/ sentences (simple): 93%, -> 100%   /f/: mono 88%, 92%, 76% (all -> 100% w/ RA)  /f/ sentences (simple): 88%, -> 100%     Patient will verbalize or demonstrate understanding of apraxia reduction strategies to enhance speech production with 90% accuracy.   Establish Date:  1/14/2025      Timeframe: 3 months   Re-Eval date: 4/14/2025       Status: Progressing  Comments:   Patient required x1 cue to slow rate of speech. Patient cued to identify root words when applicable.    Long-term goals:  Patient will improve functional global communication skills in order to promote safety and independence with ADLs and communicate wants/needs/preferences/opinions.    Pain:  Pain Assessment  Pain Assessment: 0-10  0-10 (Numeric) Pain Score: 0 - No pain     Outpatient Education:  Adult Outpatient Education  Individual(s) Educated: Patient  Verbal Education : yes  Patient/Caregiver  Demonstrated Understanding: yes  Plan of Care Discussed and Agreed Upon: yes  Patient Response to Education: Patient/Caregiver Verbalized Understanding of Information

## 2025-03-09 ENCOUNTER — HOSPITAL ENCOUNTER (INPATIENT)
Facility: HOSPITAL | Age: 80
End: 2025-03-09
Attending: STUDENT IN AN ORGANIZED HEALTH CARE EDUCATION/TRAINING PROGRAM | Admitting: STUDENT IN AN ORGANIZED HEALTH CARE EDUCATION/TRAINING PROGRAM
Payer: MEDICARE

## 2025-03-09 VITALS
WEIGHT: 141 LBS | TEMPERATURE: 97.9 F | SYSTOLIC BLOOD PRESSURE: 159 MMHG | BODY MASS INDEX: 27.68 KG/M2 | RESPIRATION RATE: 17 BRPM | OXYGEN SATURATION: 95 % | HEIGHT: 60 IN | DIASTOLIC BLOOD PRESSURE: 102 MMHG | HEART RATE: 79 BPM

## 2025-03-09 DIAGNOSIS — S42.351A COMMINUTED FRACTURE OF HUMERUS, RIGHT, CLOSED, INITIAL ENCOUNTER: Primary | ICD-10-CM

## 2025-03-09 PROCEDURE — 2500000001 HC RX 250 WO HCPCS SELF ADMINISTERED DRUGS (ALT 637 FOR MEDICARE OP)

## 2025-03-09 PROCEDURE — 1210000001 HC SEMI-PRIVATE ROOM DAILY

## 2025-03-09 PROCEDURE — 2500000004 HC RX 250 GENERAL PHARMACY W/ HCPCS (ALT 636 FOR OP/ED)

## 2025-03-09 PROCEDURE — 99223 1ST HOSP IP/OBS HIGH 75: CPT

## 2025-03-09 PROCEDURE — 2500000002 HC RX 250 W HCPCS SELF ADMINISTERED DRUGS (ALT 637 FOR MEDICARE OP, ALT 636 FOR OP/ED)

## 2025-03-09 PROCEDURE — 2500000005 HC RX 250 GENERAL PHARMACY W/O HCPCS

## 2025-03-09 RX ORDER — POLYETHYLENE GLYCOL 3350 17 G/17G
17 POWDER, FOR SOLUTION ORAL DAILY PRN
Status: ACTIVE | OUTPATIENT
Start: 2025-03-09

## 2025-03-09 RX ORDER — ACETAMINOPHEN 325 MG/1
650 TABLET ORAL EVERY 4 HOURS PRN
Status: ACTIVE | OUTPATIENT
Start: 2025-03-09

## 2025-03-09 RX ORDER — OXYCODONE HYDROCHLORIDE 5 MG/1
5 TABLET ORAL EVERY 6 HOURS PRN
Status: DISPENSED | OUTPATIENT
Start: 2025-03-09

## 2025-03-09 RX ORDER — VENLAFAXINE HYDROCHLORIDE 150 MG/1
150 CAPSULE, EXTENDED RELEASE ORAL
Status: DISPENSED | OUTPATIENT
Start: 2025-03-09

## 2025-03-09 RX ORDER — CARVEDILOL 12.5 MG/1
12.5 TABLET ORAL 2 TIMES DAILY
Status: DISPENSED | OUTPATIENT
Start: 2025-03-09

## 2025-03-09 RX ORDER — ACETAMINOPHEN 650 MG/1
650 SUPPOSITORY RECTAL EVERY 4 HOURS PRN
Status: DISPENSED | OUTPATIENT
Start: 2025-03-09

## 2025-03-09 RX ORDER — ONDANSETRON 4 MG/1
4 TABLET, FILM COATED ORAL EVERY 8 HOURS PRN
Status: DISPENSED | OUTPATIENT
Start: 2025-03-09

## 2025-03-09 RX ORDER — IBUPROFEN 400 MG/1
400 TABLET ORAL EVERY 4 HOURS PRN
Status: DISCONTINUED | OUTPATIENT
Start: 2025-03-09 | End: 2025-03-09

## 2025-03-09 RX ORDER — ONDANSETRON HYDROCHLORIDE 2 MG/ML
4 INJECTION, SOLUTION INTRAVENOUS EVERY 8 HOURS PRN
Status: ACTIVE | OUTPATIENT
Start: 2025-03-09

## 2025-03-09 RX ORDER — TIMOLOL MALEATE 5 MG/ML
1 SOLUTION/ DROPS OPHTHALMIC
Status: DISPENSED | OUTPATIENT
Start: 2025-03-10

## 2025-03-09 RX ORDER — NAPROXEN SODIUM 220 MG/1
81 TABLET, FILM COATED ORAL DAILY
Status: DISPENSED | OUTPATIENT
Start: 2025-03-09

## 2025-03-09 RX ORDER — VENLAFAXINE HYDROCHLORIDE 75 MG/1
75 CAPSULE, EXTENDED RELEASE ORAL
Status: DISPENSED | OUTPATIENT
Start: 2025-03-09

## 2025-03-09 RX ORDER — ATORVASTATIN CALCIUM 40 MG/1
40 TABLET, FILM COATED ORAL NIGHTLY
Status: DISPENSED | OUTPATIENT
Start: 2025-03-09

## 2025-03-09 RX ORDER — ENOXAPARIN SODIUM 100 MG/ML
40 INJECTION SUBCUTANEOUS EVERY 24 HOURS
Status: DISPENSED | OUTPATIENT
Start: 2025-03-09

## 2025-03-09 RX ORDER — LATANOPROST 50 UG/ML
1 SOLUTION/ DROPS OPHTHALMIC NIGHTLY
Status: DISPENSED | OUTPATIENT
Start: 2025-03-09

## 2025-03-09 RX ORDER — TALC
3 POWDER (GRAM) TOPICAL NIGHTLY PRN
Status: DISPENSED | OUTPATIENT
Start: 2025-03-09

## 2025-03-09 RX ORDER — ACETAMINOPHEN 160 MG/5ML
650 SOLUTION ORAL EVERY 4 HOURS PRN
Status: ACTIVE | OUTPATIENT
Start: 2025-03-09

## 2025-03-09 RX ORDER — OXYCODONE HYDROCHLORIDE 5 MG/1
10 TABLET ORAL EVERY 6 HOURS PRN
Status: DISPENSED | OUTPATIENT
Start: 2025-03-09

## 2025-03-09 RX ORDER — LOSARTAN POTASSIUM 50 MG/1
50 TABLET ORAL DAILY
Status: DISPENSED | OUTPATIENT
Start: 2025-03-09

## 2025-03-09 RX ORDER — HYDRALAZINE HYDROCHLORIDE 25 MG/1
25 TABLET, FILM COATED ORAL 2 TIMES DAILY
Status: DISPENSED | OUTPATIENT
Start: 2025-03-09

## 2025-03-09 RX ADMIN — OXYCODONE 10 MG: 5 TABLET ORAL at 21:38

## 2025-03-09 RX ADMIN — VENLAFAXINE HYDROCHLORIDE 150 MG: 150 CAPSULE, EXTENDED RELEASE ORAL at 08:31

## 2025-03-09 RX ADMIN — CARVEDILOL 12.5 MG: 12.5 TABLET, FILM COATED ORAL at 08:30

## 2025-03-09 RX ADMIN — Medication 3 MG: at 03:03

## 2025-03-09 RX ADMIN — VENLAFAXINE HYDROCHLORIDE 75 MG: 75 CAPSULE, EXTENDED RELEASE ORAL at 08:30

## 2025-03-09 RX ADMIN — ENOXAPARIN SODIUM 40 MG: 100 INJECTION SUBCUTANEOUS at 06:13

## 2025-03-09 RX ADMIN — HYDRALAZINE HYDROCHLORIDE 25 MG: 25 TABLET ORAL at 21:09

## 2025-03-09 RX ADMIN — ASPIRIN 81 MG: 81 TABLET, CHEWABLE ORAL at 08:30

## 2025-03-09 RX ADMIN — Medication 3 MG: at 21:09

## 2025-03-09 RX ADMIN — OXYCODONE 5 MG: 5 TABLET ORAL at 16:59

## 2025-03-09 RX ADMIN — ATORVASTATIN CALCIUM 40 MG: 40 TABLET, FILM COATED ORAL at 21:09

## 2025-03-09 RX ADMIN — CARVEDILOL 12.5 MG: 12.5 TABLET, FILM COATED ORAL at 21:09

## 2025-03-09 RX ADMIN — LOSARTAN POTASSIUM 50 MG: 50 TABLET, FILM COATED ORAL at 08:30

## 2025-03-09 RX ADMIN — HYDRALAZINE HYDROCHLORIDE 25 MG: 25 TABLET ORAL at 08:30

## 2025-03-09 RX ADMIN — IBUPROFEN 400 MG: 400 TABLET ORAL at 03:03

## 2025-03-09 RX ADMIN — LATANOPROST 1 DROP: 50 SOLUTION OPHTHALMIC at 21:09

## 2025-03-09 SDOH — ECONOMIC STABILITY: INCOME INSECURITY: IN THE PAST 12 MONTHS HAS THE ELECTRIC, GAS, OIL, OR WATER COMPANY THREATENED TO SHUT OFF SERVICES IN YOUR HOME?: NO

## 2025-03-09 SDOH — SOCIAL STABILITY: SOCIAL INSECURITY: HAS ANYONE EVER THREATENED TO HURT YOUR FAMILY OR YOUR PETS?: NO

## 2025-03-09 SDOH — ECONOMIC STABILITY: HOUSING INSECURITY: AT ANY TIME IN THE PAST 12 MONTHS, WERE YOU HOMELESS OR LIVING IN A SHELTER (INCLUDING NOW)?: NO

## 2025-03-09 SDOH — ECONOMIC STABILITY: FOOD INSECURITY: WITHIN THE PAST 12 MONTHS, YOU WORRIED THAT YOUR FOOD WOULD RUN OUT BEFORE YOU GOT THE MONEY TO BUY MORE.: NEVER TRUE

## 2025-03-09 SDOH — HEALTH STABILITY: MENTAL HEALTH: HOW MANY DRINKS CONTAINING ALCOHOL DO YOU HAVE ON A TYPICAL DAY WHEN YOU ARE DRINKING?: PATIENT DOES NOT DRINK

## 2025-03-09 SDOH — HEALTH STABILITY: PHYSICAL HEALTH: ON AVERAGE, HOW MANY DAYS PER WEEK DO YOU ENGAGE IN MODERATE TO STRENUOUS EXERCISE (LIKE A BRISK WALK)?: 0 DAYS

## 2025-03-09 SDOH — HEALTH STABILITY: PHYSICAL HEALTH: ON AVERAGE, HOW MANY MINUTES DO YOU ENGAGE IN EXERCISE AT THIS LEVEL?: 0 MIN

## 2025-03-09 SDOH — HEALTH STABILITY: MENTAL HEALTH
DO YOU FEEL STRESS - TENSE, RESTLESS, NERVOUS, OR ANXIOUS, OR UNABLE TO SLEEP AT NIGHT BECAUSE YOUR MIND IS TROUBLED ALL THE TIME - THESE DAYS?: NOT AT ALL

## 2025-03-09 SDOH — HEALTH STABILITY: MENTAL HEALTH: HOW OFTEN DO YOU HAVE SIX OR MORE DRINKS ON ONE OCCASION?: NEVER

## 2025-03-09 SDOH — SOCIAL STABILITY: SOCIAL INSECURITY
WITHIN THE LAST YEAR, HAVE YOU BEEN RAPED OR FORCED TO HAVE ANY KIND OF SEXUAL ACTIVITY BY YOUR PARTNER OR EX-PARTNER?: NO

## 2025-03-09 SDOH — ECONOMIC STABILITY: HOUSING INSECURITY: IN THE PAST 12 MONTHS, HOW MANY TIMES HAVE YOU MOVED WHERE YOU WERE LIVING?: 0

## 2025-03-09 SDOH — HEALTH STABILITY: MENTAL HEALTH: HOW OFTEN DO YOU HAVE A DRINK CONTAINING ALCOHOL?: NEVER

## 2025-03-09 SDOH — SOCIAL STABILITY: SOCIAL INSECURITY: WITHIN THE LAST YEAR, HAVE YOU BEEN HUMILIATED OR EMOTIONALLY ABUSED IN OTHER WAYS BY YOUR PARTNER OR EX-PARTNER?: NO

## 2025-03-09 SDOH — ECONOMIC STABILITY: HOUSING INSECURITY: IN THE LAST 12 MONTHS, WAS THERE A TIME WHEN YOU WERE NOT ABLE TO PAY THE MORTGAGE OR RENT ON TIME?: NO

## 2025-03-09 SDOH — SOCIAL STABILITY: SOCIAL INSECURITY: HAVE YOU HAD ANY THOUGHTS OF HARMING ANYONE ELSE?: NO

## 2025-03-09 SDOH — SOCIAL STABILITY: SOCIAL INSECURITY: DO YOU FEEL ANYONE HAS EXPLOITED OR TAKEN ADVANTAGE OF YOU FINANCIALLY OR OF YOUR PERSONAL PROPERTY?: NO

## 2025-03-09 SDOH — SOCIAL STABILITY: SOCIAL INSECURITY: DO YOU FEEL UNSAFE GOING BACK TO THE PLACE WHERE YOU ARE LIVING?: NO

## 2025-03-09 SDOH — ECONOMIC STABILITY: FOOD INSECURITY: WITHIN THE PAST 12 MONTHS, THE FOOD YOU BOUGHT JUST DIDN'T LAST AND YOU DIDN'T HAVE MONEY TO GET MORE.: NEVER TRUE

## 2025-03-09 SDOH — SOCIAL STABILITY: SOCIAL INSECURITY: DOES ANYONE TRY TO KEEP YOU FROM HAVING/CONTACTING OTHER FRIENDS OR DOING THINGS OUTSIDE YOUR HOME?: NO

## 2025-03-09 SDOH — SOCIAL STABILITY: SOCIAL INSECURITY: WITHIN THE LAST YEAR, HAVE YOU BEEN AFRAID OF YOUR PARTNER OR EX-PARTNER?: NO

## 2025-03-09 SDOH — ECONOMIC STABILITY: TRANSPORTATION INSECURITY: IN THE PAST 12 MONTHS, HAS LACK OF TRANSPORTATION KEPT YOU FROM MEDICAL APPOINTMENTS OR FROM GETTING MEDICATIONS?: NO

## 2025-03-09 SDOH — ECONOMIC STABILITY: FOOD INSECURITY: HOW HARD IS IT FOR YOU TO PAY FOR THE VERY BASICS LIKE FOOD, HOUSING, MEDICAL CARE, AND HEATING?: NOT VERY HARD

## 2025-03-09 SDOH — SOCIAL STABILITY: SOCIAL INSECURITY: HAVE YOU HAD THOUGHTS OF HARMING ANYONE ELSE?: NO

## 2025-03-09 SDOH — SOCIAL STABILITY: SOCIAL INSECURITY: ARE THERE ANY APPARENT SIGNS OF INJURIES/BEHAVIORS THAT COULD BE RELATED TO ABUSE/NEGLECT?: NO

## 2025-03-09 SDOH — SOCIAL STABILITY: SOCIAL INSECURITY: ARE YOU OR HAVE YOU BEEN THREATENED OR ABUSED PHYSICALLY, EMOTIONALLY, OR SEXUALLY BY ANYONE?: NO

## 2025-03-09 SDOH — SOCIAL STABILITY: SOCIAL NETWORK: IN A TYPICAL WEEK, HOW MANY TIMES DO YOU TALK ON THE PHONE WITH FAMILY, FRIENDS, OR NEIGHBORS?: ONCE A WEEK

## 2025-03-09 SDOH — SOCIAL STABILITY: SOCIAL INSECURITY: WERE YOU ABLE TO COMPLETE ALL THE BEHAVIORAL HEALTH SCREENINGS?: YES

## 2025-03-09 SDOH — SOCIAL STABILITY: SOCIAL INSECURITY: ABUSE: ADULT

## 2025-03-09 ASSESSMENT — PATIENT HEALTH QUESTIONNAIRE - PHQ9
1. LITTLE INTEREST OR PLEASURE IN DOING THINGS: NOT AT ALL
SUM OF ALL RESPONSES TO PHQ9 QUESTIONS 1 & 2: 0
2. FEELING DOWN, DEPRESSED OR HOPELESS: NOT AT ALL

## 2025-03-09 ASSESSMENT — COGNITIVE AND FUNCTIONAL STATUS - GENERAL
DAILY ACTIVITIY SCORE: 19
WALKING IN HOSPITAL ROOM: A LITTLE
TURNING FROM BACK TO SIDE WHILE IN FLAT BAD: A LOT
TOILETING: A LITTLE
WALKING IN HOSPITAL ROOM: A LITTLE
MOBILITY SCORE: 15
MOVING TO AND FROM BED TO CHAIR: A LITTLE
MOVING TO AND FROM BED TO CHAIR: A LITTLE
DRESSING REGULAR LOWER BODY CLOTHING: A LITTLE
MOBILITY SCORE: 15
PERSONAL GROOMING: A LITTLE
CLIMB 3 TO 5 STEPS WITH RAILING: A LOT
MOVING FROM LYING ON BACK TO SITTING ON SIDE OF FLAT BED WITH BEDRAILS: A LOT
CLIMB 3 TO 5 STEPS WITH RAILING: A LOT
STANDING UP FROM CHAIR USING ARMS: A LITTLE
PATIENT BASELINE BEDBOUND: NO
PERSONAL GROOMING: A LITTLE
HELP NEEDED FOR BATHING: A LITTLE
DRESSING REGULAR UPPER BODY CLOTHING: A LITTLE
TOILETING: A LITTLE
HELP NEEDED FOR BATHING: A LITTLE
MOVING FROM LYING ON BACK TO SITTING ON SIDE OF FLAT BED WITH BEDRAILS: A LOT
DRESSING REGULAR LOWER BODY CLOTHING: A LITTLE
STANDING UP FROM CHAIR USING ARMS: A LITTLE
DAILY ACTIVITIY SCORE: 19
TURNING FROM BACK TO SIDE WHILE IN FLAT BAD: A LOT
DRESSING REGULAR UPPER BODY CLOTHING: A LITTLE

## 2025-03-09 ASSESSMENT — LIFESTYLE VARIABLES
SKIP TO QUESTIONS 9-10: 1
AUDIT-C TOTAL SCORE: 0
HOW OFTEN DO YOU HAVE A DRINK CONTAINING ALCOHOL: NEVER
AUDIT-C TOTAL SCORE: 0
SKIP TO QUESTIONS 9-10: 1
HOW MANY STANDARD DRINKS CONTAINING ALCOHOL DO YOU HAVE ON A TYPICAL DAY: PATIENT DOES NOT DRINK
HOW OFTEN DO YOU HAVE 6 OR MORE DRINKS ON ONE OCCASION: NEVER
AUDIT-C TOTAL SCORE: 0

## 2025-03-09 ASSESSMENT — ACTIVITIES OF DAILY LIVING (ADL)
LACK_OF_TRANSPORTATION: NO
JUDGMENT_ADEQUATE_SAFELY_COMPLETE_DAILY_ACTIVITIES: YES
BATHING: NEEDS ASSISTANCE
GROOMING: NEEDS ASSISTANCE
PATIENT'S MEMORY ADEQUATE TO SAFELY COMPLETE DAILY ACTIVITIES?: YES
HEARING - RIGHT EAR: FUNCTIONAL
FEEDING YOURSELF: NEEDS ASSISTANCE
ADEQUATE_TO_COMPLETE_ADL: YES
HEARING - LEFT EAR: FUNCTIONAL
WALKS IN HOME: NEEDS ASSISTANCE
LACK_OF_TRANSPORTATION: NO
TOILETING: NEEDS ASSISTANCE
DRESSING YOURSELF: NEEDS ASSISTANCE
LACK_OF_TRANSPORTATION: NO

## 2025-03-09 ASSESSMENT — PAIN DESCRIPTION - DESCRIPTORS
DESCRIPTORS: ACHING
DESCRIPTORS: ACHING

## 2025-03-09 ASSESSMENT — PAIN SCALES - GENERAL
PAINLEVEL_OUTOF10: 7
PAINLEVEL_OUTOF10: 6
PAINLEVEL_OUTOF10: 5 - MODERATE PAIN
PAINLEVEL_OUTOF10: 4
PAINLEVEL_OUTOF10: 4

## 2025-03-09 ASSESSMENT — COLUMBIA-SUICIDE SEVERITY RATING SCALE - C-SSRS
6. HAVE YOU EVER DONE ANYTHING, STARTED TO DO ANYTHING, OR PREPARED TO DO ANYTHING TO END YOUR LIFE?: NO
1. IN THE PAST MONTH, HAVE YOU WISHED YOU WERE DEAD OR WISHED YOU COULD GO TO SLEEP AND NOT WAKE UP?: NO
2. HAVE YOU ACTUALLY HAD ANY THOUGHTS OF KILLING YOURSELF?: NO

## 2025-03-09 ASSESSMENT — ENCOUNTER SYMPTOMS
SHORTNESS OF BREATH: 0
VOMITING: 0
DIZZINESS: 0
CHEST TIGHTNESS: 0
ABDOMINAL PAIN: 0
LIGHT-HEADEDNESS: 0
NAUSEA: 0

## 2025-03-09 ASSESSMENT — PAIN - FUNCTIONAL ASSESSMENT
PAIN_FUNCTIONAL_ASSESSMENT: 0-10

## 2025-03-09 NOTE — CARE PLAN
Problem: Fall/Injury  Goal: Not fall by end of shift  Outcome: Progressing  Goal: Be free from injury by end of the shift  Outcome: Progressing  Goal: Verbalize understanding of personal risk factors for fall in the hospital  Outcome: Progressing     Problem: Pain - Adult  Goal: Verbalizes/displays adequate comfort level or baseline comfort level  Outcome: Progressing     Problem: Safety - Adult  Goal: Free from fall injury  Outcome: Progressing     Problem: Discharge Planning  Goal: Discharge to home or other facility with appropriate resources  Outcome: Progressing     Problem: Chronic Conditions and Co-morbidities  Goal: Patient's chronic conditions and co-morbidity symptoms are monitored and maintained or improved  Outcome: Progressing     Problem: Nutrition  Goal: Nutrient intake appropriate for maintaining nutritional needs  Outcome: Progressing   The patient's goals for the shift include      The clinical goals for the shift include Pt will remain safe and free from fall     never

## 2025-03-09 NOTE — PROGRESS NOTES
"Pharmacy Admission Order Reconciliation Review    Roslyn Cabrera \"Pam\" is a 79 y.o. female admitted for Comminuted fracture of humerus, right, closed, initial encounter. Pharmacy reviewed the patient's unreconciled admission medications.    Prior to admission medications that were reviewed and acted on by the pharmacist include:  coenzyme Q10  vitamin B12  coreg  aspirin  lipitor  hydralazine  xalatan 0.005% ophthalmic solution  losartan  venlafaxine XR 75 mg and 150 mg capsule  These medications have been reconciled.     Any other unreconcilied medications have been addressed and will be ordered or held by the patient's medical team. Medications addressed by the pharmacist may be added or changed by the patient's medical team at any time.    Kenroy Pickard, PharmD   Transitions of Care Pharmacist    Please reach out via Secure Chat for questions    "

## 2025-03-09 NOTE — H&P
"lipids considering history Of Present Illness  Roslyn Cabrera \"Ivette" is a 79 y.o. female presenting with hypertension hyperlipidemia stroke with resulting aphasia on low-dose aspirin daily presented to [facility] ED with traumatic right shoulder pain.  Patient reports she was walking in a parking garage that was poorly lit when she fell on her right shoulder.  X-rays of the right shoulder at outside hospital showed comminuted fracture of the humeral neck with suspected fracture of the inferior glenoid and patient was placed in right upper extremity sling.  The fracture management was deemed nonsurgical however patient is right-hand dominant and lives alone there was concern about patient's safety going back home to be able to accomplish her ADLs with this right humerus fracture, and poorly controlled pain and was transferred to [facility] for pain control and placement.  Patient was seen and evaluated at bedside.  She reports that right now she is not in pain as long as she does not move her right upper extremity.  She endorses slipping while walking in the parking garage towards her daughter's car and fell on her right side hitting her right shoulder.  Denies dizziness, hitting her head or loss of consciousness.  Denies any numbness or tingling in her right upper extremity.  Currently denies any nausea/vomiting/chest pain/shortness of breath/abdominal pain.  Her only concern at this time as difficulty falling asleep.  When asked about her medical conditions patient reported that she had a stroke in the past but otherwise her daughter is in charge of her medications and she is not aware of what she takes.  She did however endorse taking all her medications for today.     Past Medical History  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) 02/27/2024    Cataract extraction status, left eye 03/09/2016    Status " post left cataract extraction    Cataract extraction status, right eye 03/09/2016    Status post right cataract extraction    COPD (chronic obstructive pulmonary disease) (Multi)     Hypertension     Pain in leg, unspecified 03/18/2015    Leg pain    Peripheral vascular disease, unspecified ([facility]) 06/08/2016    Claudication    Personal history of nicotine dependence 03/18/2015    Quit smoking    Sciatica, unspecified side 09/16/2015    Acute sciatica    Tobacco use 06/08/2016    Tobacco abuse       Surgical History  Past Surgical History:   Procedure Laterality Date    CARDIAC ELECTROPHYSIOLOGY PROCEDURE Left 01/16/2024    Procedure: Loop Insertion;  Surgeon: Odilon Arreaga MD;  Location: [place] [address] Cardiac Cath Lab;  Service: Electrophysiology;  Laterality: Left;  Norbert to do. Pt aphasic/crypto sroke    CATARACT EXTRACTION      EYE SURGERY      JOINT REPLACEMENT      OTHER SURGICAL HISTORY  02/24/2022    Knee replacement    OTHER SURGICAL HISTORY  02/24/2022    Cataract surgery        Social History  She reports that she quit smoking about 14 months ago. Her smoking use included cigarettes. She started smoking about 45 years ago. She has a 22 pack-year smoking history. She has been exposed to tobacco smoke. She has never used smokeless tobacco. She reports that she does not drink alcohol and does not use drugs.    Family History  No family history on file.     Allergies  Patient has no known allergies.    Review of Systems   Respiratory:  Negative for chest tightness and shortness of breath.    Cardiovascular:  Negative for chest pain.   Gastrointestinal:  Negative for abdominal pain, nausea and vomiting.   Musculoskeletal:         Right shoulder pain   Neurological:  Negative for dizziness, syncope and light-headedness.        Physical Exam  General: well-appearing, NAD, notable aphasia while speaking  HEENT: NC/AT  Cardiac: rrr, no m/r/g  Lungs: normal work of breathing, CTAB  Abdomen: soft, non-tender,  non-distended  Neuro: grossly intact aside from aphasia  MSK: No peripheral edema, cyanosis, or pallor  Right upper extremity in sling position and internal rotation, no tenderness of palpation of right upper shoulder  Psych: no depression, anxiety       Last Recorded Vitals  Blood pressure (!) 171/91, pulse 68, temperature 36.6 °C (97.9 °F), SpO2 95%, not currently breastfeeding.    Relevant Results  Scheduled medications  aspirin, 81 mg, oral, Daily  atorvastatin, 40 mg, oral, Nightly  carvedilol, 12.5 mg, oral, BID  enoxaparin, 40 mg, subcutaneous, q24h  hydrALAZINE, 25 mg, oral, BID  latanoprost, 1 drop, Both Eyes, Nightly  losartan, 50 mg, oral, Daily  venlafaxine XR, 150 mg, oral, Daily with breakfast      Continuous medications     PRN medications  PRN medications: acetaminophen **OR** acetaminophen **OR** acetaminophen, ibuprofen, melatonin, ondansetron **OR** ondansetron, oxyCODONE, oxyCODONE, polyethylene glycol    No results found. However, due to the size of the patient record, not all encounters were searched. Please check Results Review for a complete set of results.    XR shoulder right 2+ views  Result Date: 3/8/2025  IMPRESSION: COMMINUTED FRACTURE OF THE HUMERAL NECK.  SUSPECTED FRACTURE OF THE INFERIOR GLENOID RIM : BISHNU   Transcribe Date/Time: Mar  8 2025  3:33P Dictated by : HERSON FUENTES MD This examination was interpreted and the report reviewed and electronically signed by: HERSON FUENTES MD on Mar  8 2025  3:34PM  EST        Assessment/Plan   Assessment & Plan  Comminuted fracture of humerus, right, closed, initial encounter      79 y.o. female presenting with hypertension hyperlipidemia stroke with resulting aphasia on low-dose aspirin daily presented to OSH ED with traumatic right shoulder pain.  X-ray at OSH showed comminuted fracture of the humeral neck and inferior glenoid fracture and managed nonsurgically with sling placement.  Reviewed CBC, CMP, mag from OSH  showing mild leukocytosis WBC 12.88 (N predominant), mildly elevated creatinine 1.03 (1.50 1/15/2025).  Patient with elevated blood pressure on arrival but otherwise asymptomatic and stable. There were safety concerns for patient returning home and she was transferred to [facility] for PT OT and placement as well as pain control.      #Comminuted fracture of the humeral neck  #Inferior glenoid fracture  :: Right upper extremity position in internal rotation with sling in place  -Consulted PT OT for assessment and placement  -Pain control with Tylenol for mild/Oxy 5 for moderate/Oxy 10 for severe    #HTN  #HLD  :: BP elevated likely in the setting of acute pain in the setting of fracture  -Continue home losartan 50 mg daily, hydralazine 25 mg twice daily, Coreg 12.5 mg twice daily, Lipitor 40 mg nightly    #History of CVA with residual aphasia  -Continue with aspirin 81 mg daily    #Depression  -Continue home venlafaxine 225 mg daily  -Melatonin as needed for insomnia    Meds: Ordered based on chart review, follow-up med rec team note who will will be in contact with her daughter in a.m.  F: As needed  E: As needed   D: Regular  Access: PIV  Abcx: Not indicated   DVT ppx: Lovenox subcu  GI ppx: Not indicated  CODE STATUS: Full code (confirmed on admission)  Emergency contact: Wendi Everett (Daughter) [phone]      Patient to be staffed in AM.    Plan preliminary until cosigned by attending physician.    Portions of this note have been composed using voice recognition and may contain transcription errors.     Yecenia Hathaway M.D.  Family Medicine  PGY-2

## 2025-03-09 NOTE — PROGRESS NOTES
03/09/25 1114   Discharge Planning   Living Arrangements Alone   Support Systems Children   Assistance Needed Pt was independent prior to admission.  Awaiting PT/OT evaluations.   Type of Residence Private residence   Number of Stairs to Enter Residence 0   Number of Stairs Within Residence 0   Do you have animals or pets at home? No   Who is requesting discharge planning? Patient   Home or Post Acute Services   (Pending PT/OT evaluations.)   Expected Discharge Disposition   (Pending PT/OT evaluations.)   Does the patient need discharge transport arranged? Yes  (Dtr would be able to provide.)   RoundTrip coordination needed? Yes   Has discharge transport been arranged? No   Financial Resource Strain   How hard is it for you to pay for the very basics like food, housing, medical care, and heating? Not hard   Housing Stability   In the last 12 months, was there a time when you were not able to pay the mortgage or rent on time? N   At any time in the past 12 months, were you homeless or living in a shelter (including now)? N   Transportation Needs   In the past 12 months, has lack of transportation kept you from medical appointments or from getting medications? no   In the past 12 months, has lack of transportation kept you from meetings, work, or from getting things needed for daily living? No   Intensity of Service   Intensity of Service 0-30 min     Assessment Note:  Met with pt, dtr Wendi, dtr in [facility] and dtr Jeimy via phone, and introduced myself as care coordinator and member of the Care Transitions team for discharge planning.   Pt was independent prior to admission.  Pt arranges for rides to her drs appts.  Pt's address, phone number and contact information was verified.  PT and OT evaluations were ordered.  Pt does not have any questions/concerns at this time.     Previous Home Care: None  DME: [employer] (available if needed).  [facility] [address] in [place].  Pt does not  have any problems affording her medications.    Falls: Pt was walking in her garage when she fell on her right shoulder. Pt's right arm is in a sling.    PCP:  DR. Oscar Milner (last visit was 3 weeks ago).    Sonia Becerra MSN, RN-BC  Transitional Care Coordinator (TCC)  [phone]

## 2025-03-09 NOTE — PROGRESS NOTES
"Pharmacy Medication History Review    Roslyn Cabrera \"Ivette" is a 79 y.o. female admitted for Comminuted fracture of humerus, right, closed, initial encounter. Pharmacy reviewed the patient's [id number] medications and allergies for accuracy.    Medications ADDED:  Vitamin B-12  Medications CHANGED:  N/A  Medications REMOVED:   N/A     The list below reflects the updated PTA list.   Prior to Admission Medications   Prescriptions Last Dose Informant   aspirin 81 mg EC tablet  Child   Sig: Take 1 tablet (81 mg) by mouth once daily. Do not start before January 19, 2024.   atorvastatin (Lipitor) 40 mg tablet  Child   Sig: Take 1 tablet (40 mg) by mouth once daily at bedtime.   carvedilol (Coreg) 12.5 mg tablet  Child   Sig: Take 1 tablet (12.5 mg) by mouth 2 times a day.   coenzyme Q-10 100 mg capsule  Child   Sig: Take 1 capsule (100 mg) by mouth once daily.   cyanocobalamin, vitamin B-12, (VITAMIN B-12 ORAL)  Child   Sig: Take 1 tablet by mouth once daily.   hydrALAZINE (Apresoline) 25 mg tablet  Child   Sig: Take 1 tablet (25 mg) by mouth 2 times a day.   latanoprost (Xalatan) 0.005 % ophthalmic solution  Child   Sig: INSTILL 1 DROP INTO EACH AFFECTED EYE EVERYDAY   losartan (Cozaar) 100 mg tablet  Child   Sig: Take 0.5 tablets (50 mg) by mouth once daily.   timolol (Timoptic) 0.5 % ophthalmic solution  Child   Sig: Administer 1 drop into both eyes once daily in the morning. Take before meals.   venlafaxine XR (Effexor-XR) 150 mg 24 hr capsule  Child   Sig: Take 1 capsule (150 mg) by mouth once daily. Do not crush or chew.   venlafaxine XR (Effexor-XR) 75 mg 24 hr capsule  Child   Sig: Take 1 capsule (75 mg) by mouth once daily. Take with food.      Facility-Administered Medications: None            The list below reflects the updated allergy list. Please review each documented allergy for additional clarification and justification.  Allergies  Reviewed by Crista Weber on 3/9/2025   No Known Allergies   " "      Patient declines M2B at discharge.     Sources:   [facility]  Pharmacy dispense history  Patient interview Poor historian  Child  Chart Review  Care Everywhere     Additional Comments:  Spoke with daughter Wendi Everett via phone #[phone] to verify patient's medication list (Reliable historian)  Patient's daughter stated that he patient also takes Vitamin B-12 but was unable to confirm the strength.      KATRINA JORDAN  Pharmacy Technician  03/09/25     Secure Chat preferred   If no response call [phone] or Vocera \"Med Rec\"    "

## 2025-03-10 PROCEDURE — 97162 PT EVAL MOD COMPLEX 30 MIN: CPT | Mod: GP | Performed by: PHYSICAL THERAPIST

## 2025-03-10 PROCEDURE — 2500000001 HC RX 250 WO HCPCS SELF ADMINISTERED DRUGS (ALT 637 FOR MEDICARE OP)

## 2025-03-10 PROCEDURE — 2500000004 HC RX 250 GENERAL PHARMACY W/ HCPCS (ALT 636 FOR OP/ED)

## 2025-03-10 PROCEDURE — 99232 SBSQ HOSP IP/OBS MODERATE 35: CPT | Performed by: STUDENT IN AN ORGANIZED HEALTH CARE EDUCATION/TRAINING PROGRAM

## 2025-03-10 PROCEDURE — 97530 THERAPEUTIC ACTIVITIES: CPT | Mod: GO

## 2025-03-10 PROCEDURE — 1100000001 HC PRIVATE ROOM DAILY

## 2025-03-10 PROCEDURE — 2500000001 HC RX 250 WO HCPCS SELF ADMINISTERED DRUGS (ALT 637 FOR MEDICARE OP): Performed by: STUDENT IN AN ORGANIZED HEALTH CARE EDUCATION/TRAINING PROGRAM

## 2025-03-10 PROCEDURE — 97530 THERAPEUTIC ACTIVITIES: CPT | Mod: GP | Performed by: PHYSICAL THERAPIST

## 2025-03-10 PROCEDURE — 2500000005 HC RX 250 GENERAL PHARMACY W/O HCPCS: Performed by: STUDENT IN AN ORGANIZED HEALTH CARE EDUCATION/TRAINING PROGRAM

## 2025-03-10 PROCEDURE — 2500000005 HC RX 250 GENERAL PHARMACY W/O HCPCS

## 2025-03-10 PROCEDURE — 97165 OT EVAL LOW COMPLEX 30 MIN: CPT | Mod: GO

## 2025-03-10 PROCEDURE — 2500000002 HC RX 250 W HCPCS SELF ADMINISTERED DRUGS (ALT 637 FOR MEDICARE OP, ALT 636 FOR OP/ED)

## 2025-03-10 PROCEDURE — 97535 SELF CARE MNGMENT TRAINING: CPT | Mod: GO

## 2025-03-10 RX ORDER — MORPHINE SULFATE 4 MG/ML
4 INJECTION INTRAVENOUS
Status: DISCONTINUED | OUTPATIENT
Start: 2025-03-10 | End: 2025-03-13 | Stop reason: HOSPADM

## 2025-03-10 RX ORDER — ACETAMINOPHEN 325 MG/1
975 TABLET ORAL 3 TIMES DAILY
Status: DISCONTINUED | OUTPATIENT
Start: 2025-03-10 | End: 2025-03-13 | Stop reason: HOSPADM

## 2025-03-10 RX ADMIN — LOSARTAN POTASSIUM 50 MG: 50 TABLET, FILM COATED ORAL at 09:12

## 2025-03-10 RX ADMIN — VENLAFAXINE HYDROCHLORIDE 75 MG: 75 CAPSULE, EXTENDED RELEASE ORAL at 09:18

## 2025-03-10 RX ADMIN — ASPIRIN 81 MG: 81 TABLET, CHEWABLE ORAL at 09:12

## 2025-03-10 RX ADMIN — CARVEDILOL 12.5 MG: 12.5 TABLET, FILM COATED ORAL at 20:47

## 2025-03-10 RX ADMIN — CARVEDILOL 12.5 MG: 12.5 TABLET, FILM COATED ORAL at 09:12

## 2025-03-10 RX ADMIN — ENOXAPARIN SODIUM 40 MG: 100 INJECTION SUBCUTANEOUS at 06:39

## 2025-03-10 RX ADMIN — HYDRALAZINE HYDROCHLORIDE 25 MG: 25 TABLET ORAL at 20:47

## 2025-03-10 RX ADMIN — ACETAMINOPHEN 650 MG: 325 TABLET ORAL at 07:04

## 2025-03-10 RX ADMIN — TIMOLOL MALEATE 1 DROP: 5 SOLUTION/ DROPS OPHTHALMIC at 06:55

## 2025-03-10 RX ADMIN — HYDRALAZINE HYDROCHLORIDE 25 MG: 25 TABLET ORAL at 09:12

## 2025-03-10 RX ADMIN — VENLAFAXINE HYDROCHLORIDE 150 MG: 75 CAPSULE, EXTENDED RELEASE ORAL at 09:12

## 2025-03-10 RX ADMIN — LATANOPROST 1 DROP: 50 SOLUTION OPHTHALMIC at 20:47

## 2025-03-10 RX ADMIN — OXYCODONE 10 MG: 5 TABLET ORAL at 11:46

## 2025-03-10 RX ADMIN — ATORVASTATIN CALCIUM 40 MG: 40 TABLET, FILM COATED ORAL at 20:47

## 2025-03-10 RX ADMIN — OXYCODONE 10 MG: 5 TABLET ORAL at 20:47

## 2025-03-10 RX ADMIN — Medication 3 MG: at 20:47

## 2025-03-10 RX ADMIN — ACETAMINOPHEN 975 MG: 325 TABLET, FILM COATED ORAL at 20:46

## 2025-03-10 ASSESSMENT — COGNITIVE AND FUNCTIONAL STATUS - GENERAL
TURNING FROM BACK TO SIDE WHILE IN FLAT BAD: A LOT
TOILETING: A LITTLE
CLIMB 3 TO 5 STEPS WITH RAILING: A LOT
DRESSING REGULAR LOWER BODY CLOTHING: A LITTLE
MOBILITY SCORE: 11
HELP NEEDED FOR BATHING: A LOT
WALKING IN HOSPITAL ROOM: A LITTLE
MOVING TO AND FROM BED TO CHAIR: A LOT
DRESSING REGULAR LOWER BODY CLOTHING: A LITTLE
MOVING FROM LYING ON BACK TO SITTING ON SIDE OF FLAT BED WITH BEDRAILS: A LOT
STANDING UP FROM CHAIR USING ARMS: A LOT
DAILY ACTIVITIY SCORE: 17
MOVING TO AND FROM BED TO CHAIR: A LITTLE
STANDING UP FROM CHAIR USING ARMS: A LOT
HELP NEEDED FOR BATHING: A LITTLE
PERSONAL GROOMING: A LITTLE
WALKING IN HOSPITAL ROOM: A LOT
PERSONAL GROOMING: A LITTLE
TOILETING: A LITTLE
DAILY ACTIVITIY SCORE: 19
TURNING FROM BACK TO SIDE WHILE IN FLAT BAD: A LITTLE
MOBILITY SCORE: 15
MOVING FROM LYING ON BACK TO SITTING ON SIDE OF FLAT BED WITH BEDRAILS: A LOT
DRESSING REGULAR UPPER BODY CLOTHING: A LITTLE
CLIMB 3 TO 5 STEPS WITH RAILING: TOTAL
DRESSING REGULAR UPPER BODY CLOTHING: A LOT

## 2025-03-10 ASSESSMENT — PAIN SCALES - GENERAL
PAINLEVEL_OUTOF10: 8
PAINLEVEL_OUTOF10: 6

## 2025-03-10 ASSESSMENT — PAIN - FUNCTIONAL ASSESSMENT
PAIN_FUNCTIONAL_ASSESSMENT: 0-10
PAIN_FUNCTIONAL_ASSESSMENT: 0-10

## 2025-03-10 ASSESSMENT — ACTIVITIES OF DAILY LIVING (ADL)
BATHING_ASSISTANCE: MODERATE
ADL_ASSISTANCE: INDEPENDENT
ADL_ASSISTANCE: INDEPENDENT
HOME_MANAGEMENT_TIME_ENTRY: 15
ADLS_ADDRESSED: YES

## 2025-03-10 NOTE — CARE PLAN
The patient's goals for the shift include      The clinical goals for the shift include Pt will use call light to ask for out of bed assistance throughout this shift      Problem: Fall/Injury  Goal: Not fall by end of shift  Outcome: Progressing  Goal: Be free from injury by end of the shift  Outcome: Progressing  Goal: Verbalize understanding of personal risk factors for fall in the hospital  Outcome: Progressing  Goal: Verbalize understanding of risk factor reduction measures to prevent injury from fall in the home  Outcome: Progressing  Goal: Use assistive devices by end of the shift  Outcome: Progressing  Goal: Pace activities to prevent fatigue by end of the shift  Outcome: Progressing     Problem: Pain - Adult  Goal: Verbalizes/displays adequate comfort level or baseline comfort level  Outcome: Progressing     Problem: Safety - Adult  Goal: Free from fall injury  Outcome: Progressing     Problem: Discharge Planning  Goal: Discharge to home or other facility with appropriate resources  Outcome: Progressing     Problem: Chronic Conditions and Co-morbidities  Goal: Patient's chronic conditions and co-morbidity symptoms are monitored and maintained or improved  Outcome: Progressing     Problem: Nutrition  Goal: Nutrient intake appropriate for maintaining nutritional needs  Outcome: Progressing

## 2025-03-10 NOTE — PROGRESS NOTES
Physical Therapy    Physical Therapy Evaluation & Treatment    Patient Name: Pam Cabrera  MRN: [id number]  Department: [place] [address]  Room: [address] [place], [place] [address]  Today's Date: 3/10/2025   Time Calculation  Start Time: 1104  Stop Time: 1145  Time Calculation (min): 41 min    Assessment/Plan   PT Assessment  PT Assessment Results: Decreased strength, Decreased range of motion, Decreased endurance, Impaired balance, Decreased mobility, Decreased coordination, Decreased safety awareness, Decreased cognition, Impaired tone, Orthopedic restrictions, Pain  Rehab Prognosis: Excellent  Barriers to Discharge Home: Physical needs, Caregiver assistance  Caregiver Assistance: Patient lives alone and/or does not have reliable caregiver assistance  Physical Needs: 24hr mobility assistance needed, 24hr ADL assistance needed, High falls risk due to function or environment  Evaluation/Treatment Tolerance: Patient tolerated treatment well  Medical Staff Made Aware: Yes  Strengths: Ability to acquire knowledge, Attitude of self, Coping skills, Premorbid level of function, Support of Caregivers  Barriers to Participation: Other (Comment) (pain, but participated fully)  End of Session Communication: Bedside nurse, Physician, PCT/NA/CTA (re: pain, ice, bed way to mobilize pt)  Assessment Comment: 80 yo female presents with new Right shoulder fx with pain, premorbid aphasia and apraxia, decreased activity tolerace and balance deficits. Recommend high intensity therapy as pt needs PT & OT (would also benefit from SLT), appears very motivated, is not at her active baseline, and appears to have good family support.  End of Session Patient Position: Up in chair, Alarm on   IP OR SWING BED PT PLAN  Inpatient or Swing Bed: Inpatient  PT Plan  Treatment/Interventions: Bed mobility, Transfer training, Gait training, Balance training, Neuromuscular re-education, Strengthening, Endurance training, Therapeutic exercise, Range of motion, Therapeutic activity, Home  exercise program, Positioning, Postural re-education, Orthotic fitting/training  PT Plan: Ongoing PT  PT Frequency: 5 times per week  PT Discharge Recommendations: High intensity level of continued care  Equipment Recommended upon Discharge: Other (comment) (TBD (quad cane, possibly rental WC or transport WC))  PT Recommended Transfer Status: Assist x1, Other (comment) (min/mod A no device, NWB Right UE)  PT - OK to Discharge: Yes (PT eval completed & DC recs made)      Subjective     General Visit Information:  General  Reason for Referral: Admitted 3/9 (transfer from OSH) after fall onto Right shoulder in parking lot; dx: Comminuted fracture of humeral neck, right, closed and suspected fracture of the inferior glenoid rim  Past Medical History Relevant to Rehab: HTN, HLD, stroke (aphasia, apraxia), COPD, cataracts, PVD, sciatica, depression  Family/Caregiver Present: No  Prior to Session Communication: Bedside nurse, Physician (OT)  Patient Position Received: Bed, 3 rail up, Alarm on  Preferred Learning Style: auditory, kinesthetic, visual (demonstration (apraxic, aphasic))  General Comment: Pt alert and engaged, reports Right shoulder pain with adjusting sling/putting on new gown. Pt didn't want to take pain meds prior to eating (d/w pt and RN). Pt unsure if she wants/is open to going to rehab. Pt aphasic (more expressively) and apraxic. Pt able to walk short distance assisted and transfer to chair. Pt reports fall while at [facility] in parking lot with dtr, it was dark and she has tinted glasses. She went to [facility] ER due to that is where she lives. She is unclear what the plan is.  Home Living:  Home Living  Type of Home: Apartment  Lives With: Alone  Home Adaptive Equipment:  (2 canes, shower equipment as noted)  Home Layout: One level  Home Access: Elevator  Bathroom Shower/Tub: Tub/shower unit  Bathroom Toilet: Standard  Bathroom Equipment: Shower chair with back, Grab bars in shower  Bathroom  "Accessibility: on 1st floor (all on one floor)  Prior Level of Function:  Prior Function Per Pt/Caregiver Report  Level of Camp:  (per pt: amb ind in/outdoors no device, no stairs, 1 fall in last 12 mos.)  Receives Help From:  (2 dtrs (1 works full time, other one is more flexible/able to assist))  ADL Assistance: Independent  Homemaking Assistance: Needs assistance (dtrs drive pt for groceries, appts)  Ambulatory Assistance: Independent  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 Deal 3/10 (asked him to add to orders NWB/sling); MD reports that he will adjust pain regimen and if pain continues despite adjustments, will consult ortho)  Braces Applied: Right sling adjusted           Objective   Pain:  Pain Assessment  Pain Assessment: 0-10  0-10 (Numeric) Pain Score:  (no pain at rest, 4/10 Right shoulder post; unrated pain with new gown/adjustment of Right UE sling)  Pain Interventions: Cold applied, Repositioned, Ambulation/increased activity, Rest, Therapeutic presence, Therapeutic touch, Emotional support  Response to Interventions:  (comfortable up in chair, ice on Right shoulder/elbow, RN bringing pain meds. sling adjusted and UE supported on blanket)  Cognition:  Cognition  Arousal/Alertness: Delayed responses to stimuli (likely d/t apraxia & aphasia)  Orientation Level:  (when given choices, oriented x 4)  Following Commands: Follows one step commands with repetition (delayed, requires demonstration (aphasic, apraxic))  Problem Solving: Assistance required to identify errors made  Cognition Comments: alert, engaged, appears oriented with memory of events, assist for problem solving (re pain control, mobility, DC " needs and rationale, etc.)  Problem Solving: Exceptions to WFL  Insight: Mild  Processing Speed: Delayed    General Assessments:     Activity Tolerance  Endurance: Endurance does not limit participation in activity    Sensation  Light Touch:  (grossly intact in testable areas)       Coordination  Movements are Fluid and Coordinated: No (Right UE limited by pain (NT), initially Right LE little slow with function)    Postural Control  Postural Control: Impaired  Posture Comment: standing: weight shifted to Left LE, mild posterior lean    Static Sitting Balance  Static Sitting-Balance Support: Feet supported, Left upper extremity supported (Right UE NWB)  Static Sitting-Level of Assistance: Contact guard, Minimum assistance  Dynamic Sitting Balance  Dynamic Sitting-Balance Support: Feet supported, Left upper extremity supported (Right UE NWB)  Dynamic Sitting-Level of Assistance: Minimum assistance, Moderate assistance  Dynamic Sitting-Balance:  (min to  mod A to scoot to EOB)    Static Standing Balance  Static Standing-Balance Support: Left upper extremity supported (on PT in front, NWB Right LE)  Static Standing-Level of Assistance: Minimum assistance, Moderate assistance (min to mod)  Dynamic Standing Balance  Dynamic Standing-Balance Support: Left upper extremity supported (on PT in front)  Dynamic Standing-Level of Assistance: Minimum assistance, Moderate assistance (min to mod A)  Dynamic Standing-Balance:  (transfers, gait)  Functional Assessments:  ADL  ADL's Addressed: Yes (PT assisted pt to put on new gown and adjust Right sling (PT limited any movement Right shoulder during))    Bed Mobility  Bed Mobility: Yes  Bed Mobility 1  Bed Mobility 1: Rolling left  Level of Assistance 1: Minimum assistance, Moderate tactile cues, Minimal verbal cues  Bed Mobility Comments 1: use of rail  Bed Mobility 2  Bed Mobility  2: Side lying left to sit  Level of Assistance 2: Moderate assistance, Moderate verbal cues, Moderate  tactile cues  Bed Mobility Comments 2: HOB elevated, use of rail  Bed Mobility 3  Bed Mobility 3: Scooting (in sitting to EOB)  Level of Assistance 3: Moderate assistance, Moderate verbal cues, Moderate tactile cues    Transfers  Transfer: Yes  Transfer 1  Transfer From 1: Stand to, Sit to  Transfer to 1: Sit, Stand  Technique 1: Sit to stand, Stand to sit  Transfer Device 1:  (no device, pt holding PTs arm with Left UE in front)  Transfer Level of Assistance 1: Moderate assistance, Moderate tactile cues, Moderate verbal cues  Transfers 2  Transfer From 2: Stand to  Transfer to 2: Chair with arms  Technique 2: Stand to sit, Stand pivot  Transfer Device 2:  (no device; PT in front, pt holding onto PT with Left UE)  Transfer Level of Assistance 2: Moderate assistance, Moderate verbal cues, Moderate tactile cues    Ambulation/Gait Training  Ambulation/Gait Training Performed: Yes  Ambulation/Gait Training 1  Surface 1: Level tile  Device 1: No device (PT in front, holding PT in front using Left UE)  Assistance 1: Moderate assistance, Moderate verbal cues, Moderate tactile cues  Quality of Gait 1:  (increased WB Left side, slight posterior lean, wider THOMAS)  Comments/Distance (ft) 1: 10    Stairs  Stairs: No (N/A)  Extremity/Trunk Assessments:  RUE   RUE :  (NT due to fx and pain; pt able to wiggle fingers and grasp)  LUE   LUE:  (AROM grossly: grasp, elbow flex/ext  and shoulder elevation grossly WFL; strength grossly 5/5 (grasp, elbow flex/ext and shoulder elevation))  RLE   RLE :  (AROM grossly: ankle DF/PF, knee flex/ext and hip flexion WFL; strength grossly: ankle DF, knee flex/ext 5/5; hip flex >3)  LLE   LLE :  (AROM grossly:ankle DF/PF, knee flex/ext, hip flex WFL; strength 5/5 (ankle DF, knee ext) and hip flex >3)  Treatments:  Therapeutic Activity  Therapeutic Activity Performed: Yes  Therapeutic Activity 1: assisted pt to don new gown, adjust sling while working on sitting balance; transfer training with focus  on safety, NWB Right UE  Outcome Measures:  [facility] Basic Mobility  Turning from your back to your side while in a flat bed without using bedrails: A lot  Moving from lying on your back to sitting on the side of a flat bed without using bedrails: A lot  Moving to and from bed to chair (including a wheelchair): A lot  Standing up from a chair using your arms (e.g. wheelchair or bedside chair): A lot  To walk in hospital room: A lot  Climbing 3-5 steps with railing: Total  Basic Mobility - Total Score: 11    Encounter Problems       Encounter Problems (Active)       General Goals       Pt will follow 100% of 2 step commands with demonstration without repetition, during session (Progressing)       Start:  03/10/25    Expected End:  03/24/25            Pt will roll to Left and come sidelying to/from sit (HOB flat, use of rail) with SBA NWB Right UE (Progressing)       Start:  03/10/25    Expected End:  03/24/25            Pt will scoot to EOB using Left LE/hips with SBA, NWB Right UE (Progressing)       Start:  03/10/25    Expected End:  03/24/25               Mobility       Pt will come sit to/from stand and bed to/from chair with cane and CGA, NWB Right UE (Progressing)       Start:  03/10/25    Expected End:  03/24/25            Pt will ambulate 150' with cane and CGA, NWB Right UE, no LOB (Progressing)       Start:  03/10/25    Expected End:  03/24/25            Pt will stand statically with cane and CGA, NWB Right UE >7 minutes, no LOB (Progressing)       Start:  03/10/25    Expected End:  03/24/25               Pain - Adult              Education Documentation  Body Mechanics, taught by Felecia Macias, PT at 3/10/2025 11:56 AM.  Learner: Patient  Readiness: Eager  Method: Explanation, Demonstration, Teach-back  Response: Needs Reinforcement  Comment: PT purpose/POC, NWB Right UE, importance of taking pain meds/pain control, safe bed mobility (in/out on Left side), transfers and gait with assist of PT, ice for pain  control, sling/positioning    Precautions, taught by Felecia Macias PT at 3/10/2025 11:56 AM.  Learner: Patient  Readiness: Eager  Method: Explanation, Demonstration, Teach-back  Response: Needs Reinforcement  Comment: PT purpose/POC, NWB Right UE, importance of taking pain meds/pain control, safe bed mobility (in/out on Left side), transfers and gait with assist of PT, ice for pain control, sling/positioning    Mobility Training, taught by Felecia Macias PT at 3/10/2025 11:56 AM.  Learner: Patient  Readiness: Eager  Method: Explanation, Demonstration, Teach-back  Response: Needs Reinforcement  Comment: PT purpose/POC, NWB Right UE, importance of taking pain meds/pain control, safe bed mobility (in/out on Left side), transfers and gait with assist of PT, ice for pain control, sling/positioning    Education Comments  No comments found.

## 2025-03-10 NOTE — PROGRESS NOTES
"   Medical Group Progress Note  ASSESSMENT & PLAN:   Roslyn Cabrera \"Ivette" is a 79 y.o. female admitted to [facility] for management of:  79 y.o. female presenting with hypertension hyperlipidemia stroke with resulting aphasia on low-dose aspirin daily presented to OSH ED with traumatic right shoulder pain.  X-ray at OSH showed comminuted fracture of the humeral neck and inferior glenoid fracture and managed nonsurgically with sling placement.  Reviewed CBC, CMP, mag from OSH showing mild leukocytosis WBC 12.88 (N predominant), mildly elevated creatinine 1.03 (1.50 1/15/2025).  Patient with elevated blood pressure on arrival but otherwise asymptomatic and stable. There were safety concerns for patient returning home and she was transferred to [facility] for PT OT and placement as well as pain control.       #Comminuted fracture of the humeral neck  #Inferior glenoid fracture  :: Right upper extremity position in internal rotation with sling in place  -PT/ OT recommend high intensity therapy, pending precert to [facility]  -Pain control with Tylenol for mild/Oxy 5 for moderate/Oxy 10 for severe     #HTN  #HLD  :: BP elevated likely in the setting of acute pain in the setting of fracture  -Continue home losartan 50 mg daily, hydralazine 25 mg twice daily, Coreg 12.5 mg twice daily, Lipitor 40 mg nightly     #History of CVA with residual aphasia  -Continue with aspirin 81 mg daily     #Depression  -Continue home venlafaxine 225 mg daily  -Melatonin as needed for insomnia     Meds: Ordered based on chart review, follow-up med rec team note who will will be in contact with her daughter in a.m.  F: As needed  E: As needed   D: Regular  Access: PIV  Abcx: Not indicated   DVT ppx: Lovenox subcu  GI ppx: Not indicated  CODE STATUS: Full code (confirmed on admission)  Emergency contact: Wendi Everett (Daughter) [phone]      Disposition: Medically appropriate for discharge, pending precert to [facility], " "DO  Hospital Medicine    SUBJECTIVE     Patient states to be overall doing well. She is frustrated that it is hard to do things such as typing and writing. She reports pain is much better controlled today.    OBJECTIVE:     Last Recorded Vitals:  Vitals:    03/09/25 2124 03/09/25 2126 03/10/25 0513 03/10/25 1421   BP: (!) 193/104 (!) 159/102 (!) 169/95 118/57   BP Location:       Patient Position:       Pulse: 77 79 68 70   Resp:   17 16   Temp: 36.6 °C (97.9 °F)  37.1 °C (98.8 °F) 36.1 °C (97 °F)   TempSrc:       SpO2: 96% 95% 96% 95%   Weight:       Height:         Last I/O:  No intake/output data recorded.    Physical Exam  General: well-appearing, NAD, notable aphasia while speaking  HEENT: NC/AT  Cardiac: rrr, no m/r/g  Lungs: normal work of breathing, CTAB  Abdomen: soft, non-tender, non-distended  Neuro: grossly intact aside from aphasia  MSK: No peripheral edema, cyanosis, or pallor  Right upper extremity in sling position and internal rotation, no tenderness of palpation of right upper shoulder  Psych: no depression, anxiety    Inpatient Medications:  acetaminophen, 975 mg, oral, TID  aspirin, 81 mg, oral, Daily  atorvastatin, 40 mg, oral, Nightly  carvedilol, 12.5 mg, oral, BID  enoxaparin, 40 mg, subcutaneous, q24h  hydrALAZINE, 25 mg, oral, BID  latanoprost, 1 drop, Both Eyes, Nightly  losartan, 50 mg, oral, Daily  timolol, 1 drop, Both Eyes, Daily before breakfast  venlafaxine XR, 150 mg, oral, Daily with breakfast  venlafaxine XR, 75 mg, oral, Daily with breakfast    PRN Medications  PRN medications: melatonin, morphine, ondansetron **OR** ondansetron, oxyCODONE, oxyCODONE, polyethylene glycol  Continuous Medications:     LABS AND IMAGING:     Labs:            No lab exists for component: \"GFRAA\", \"LABGLOM\", \"LABALBU\"          Imaging:  OCT, Retina - OU - Both Eyes  Right Eye  Quality was good. Scan locations included subfoveal. Progression has been   stable. Findings include normal foveal contour. "     Left Eye  Quality was good. Scan locations included subfoveal. Findings include   normal foveal contour.     Notes  Retinal multimodal imaging including photography was completed, and the   findings are described in the examination.

## 2025-03-10 NOTE — PROGRESS NOTES
Spoke with patient's daughter regarding discharge plans and PT recommendations for high intensity. Stated she would like referral sent to [facility]. Referral sent via [facility]. Per medical team, patient is medically ready for discharge. Pending acceptance and will need to start precert. Hanh Singh RN, TCC    [id number]: [facility] can accept. Requested [facility] team to start precert. Hanh Singh RN, TCC    6961: Auth started by [facility] as [facility] team unable to locate patient in portal. Auth ref: [id number]. Hanh Singh RN, TCC

## 2025-03-10 NOTE — PROGRESS NOTES
"Occupational Therapy    Evaluation/Treatment    Patient Name: Roslyn Cabrera \"Pam\"  MRN: [id number]  Department: [facility] 3  Room: [address] [place] [address]  Today's Date: 03/10/25  Time Calculation  Start Time: 0852  Stop Time: 0940  Time Calculation (min): 48 min       Assessment:  Barriers to Discharge Home: Physical needs, Caregiver assistance  End of Session Communication: Bedside nurse  End of Session Patient Position: Up in chair, Alarm on  OT Assessment Results: Decreased ADL status, Decreased upper extremity range of motion, Decreased cognition, Decreased endurance, Decreased functional mobility, Decreased IADLs    Plan:  Treatment Interventions: ADL retraining, Functional transfer training, UE strengthening/ROM, Endurance training, Cognitive reorientation, Patient/family training, Equipment evaluation/education, Compensatory technique education  OT Frequency: 4 times per week  OT Discharge Recommendations: High intensity level of continued care  OT Recommended Transfer Status: Assist of 1  OT - OK to Discharge: Yes    Subjective   Current Problem:  1. Comminuted fracture of humerus, right, closed, initial encounter          General:   OT Received On: 03/10/25  General  Reason for Referral: fall on R shoulder (fx of humeral neck with suspected fracture of the inferior glenoid)  Past Medical History Relevant to Rehab: hypertension hyperlipidemia stroke with resulting aphasia on low-dose aspirin daily  Prior to Session Communication: Bedside nurse  Patient Position Received: Bed, 2 rail up, Alarm on (Pt USP out of bed upon arrival, OT assists pt back to safe positioning/EOB)  General Comment: Pt willing to participate in therapy this date; demos moderate symptoms of expressive aphasia/requires increased time for conversation -- reports receiving SLP therapy consistently.    Precautions:  Medical Precautions: Fall precautions  Splinting: RUE sling -- OT provides assist with proper adjustment -- RUE NWB per Secure Chat with " MD; communication with PT/PT requests official WB'ing status to be entered into pt chart      Pain:  Pain Assessment  Pain Assessment: 0-10  0-10 (Numeric) Pain Score:  (0/10 at rest - 6/10 R shoulder during sling adjustment)    Objective   Cognition:  Overall Cognitive Status: Within Functional Limits (demos symptoms of expressive aphasia, however, WFL for conversation /orientation with extra time)  Arousal/Alertness: Appropriate responses to stimuli  Orientation Level:  (Pt initally appears disoriented, however, upon waking up a bit, pt A&Ox4 with increased time for verbal expression)  Following Commands: Follows multistep commands with increased time  Attention: Within Functional Limits  Safety/Judgement: Within Functional Limits  Insight: Within function limits  Impulsive: Within functional limits  Processing Speed: Within funtional limits    Home Living:  Type of Home: Apartment (2nd floor, elevator acess)  Lives With: Alone (2 dtrs that stop by often and able to assist as needed)  Home Adaptive Equipment: None  Home Layout: One level  Home Access: Level entry  Bathroom Shower/Tub: Walk-in shower  Bathroom Toilet: Standard  Bathroom Equipment: Shower chair with back    Prior Function:  Receives Help From: Family  ADL Assistance: Independent  Homemaking Assistance: Independent  Ambulatory Assistance: Independent  Hand Dominance: Right  Prior Function Comments: Unclear about additional fall hx    IADL History:  Current License: No  Mode of Transportation: Friends, Family  Occupation: Retired  Type of Occupation:   Leisure and Hobbies: Spending time with family, being out and about    ADL:  Eating Assistance:  (SET UP A; anticipate)  Grooming Assistance: Minimal (assist for throughness with hair hygiene; pt completes facial grooming/oral hygiene while standing sinkside)  Bathing Assistance: Moderate (anticipate)  UE Dressing Assistance: Moderate (anticipate)  LE Dressing Assistance: Minimal  (don/doff B socks)  Toileting Assistance with Device: Stand by (anticipate)    Bed Mobility/Transfers:   Bed Mobility 1  Bed Mobility 1: Supine to sitting  Level of Assistance 1: Moderate assistance  Bed Mobility Comments 1: Pt with BLEs dangling OOB- assisted at trunk with EOB sitting    Transfer 1  Technique 1: Sit to stand, Stand to sit  Transfer Device 1:  (HHA)  Transfer Level of Assistance 1: Minimum assistance  Trials/Comments 1: 3x trials throughout; VCs for body mechanics    Cognitive Skill Development:  Cognitive Skill Development Activity 1: OT provides pt with various paper puzzles to address func cog and engagement in lesiure occupations    *Pt appears frustrated 2/2 difficulty with word finding/articulation - OT provides options throughout along with vcs for mindful breathing and to take time during communication. Pt receptive and appreciative.     Vision:  Vision - Basic Assessment  Current Vision: Wears glasses only for reading  Patient Visual Report:  (Denies acute visual deficits)    Sensation:  Sensation Comment: Denies N/T; no apparent deficits    Strength:  Strength Comments: LUE grossly 3+/5; RUE not formally assessed d/t precautions    Hand Function:  Hand Function  Gross Grasp: Functional  Coordination: Functional    Outcome Measures:   [facility] Daily Activity  Putting on and taking off regular lower body clothing: A little  Bathing (including washing, rinsing, drying): A lot  Putting on and taking off regular upper body clothing: A lot  Toileting, which includes using toilet, bedpan or urinal: A little  Taking care of personal grooming such as brushing teeth: A little  Eating Meals: None  Daily Activity - Total Score: 17    Education Documentation  Handouts, taught by Linda Jones OT at 3/10/2025 10:34 AM.  Learner: Patient  Readiness: Acceptance  Method: Explanation  Response: Verbalizes Understanding    Body Mechanics, taught by Linda Jones OT at 3/10/2025 10:34 AM.  Learner:  Patient  Readiness: Acceptance  Method: Explanation  Response: Verbalizes Understanding    Precautions, taught by Linda Jones OT at 3/10/2025 10:34 AM.  Learner: Patient  Readiness: Acceptance  Method: Explanation  Response: Verbalizes Understanding    ADL Training, taught by Linda Jones OT at 3/10/2025 10:34 AM.  Learner: Patient  Readiness: Acceptance  Method: Explanation  Response: Verbalizes Understanding    Education Comments  No comments found.        Goals:  Encounter Problems       Encounter Problems (Active)       ADLs       Pt will complete UB /LB bathing tasks with modified independence while seated and AE as needed.  (Progressing)       Start:  03/10/25    Expected End:  03/31/25            Pt will complete LB dressing with modified independence while seated and/or standing and AE as needed.  (Progressing)       Start:  03/10/25    Expected End:  03/31/25            Pt will complete toilet hygiene while seated /standing with independent level of assistance.   (Progressing)       Start:  03/10/25    Expected End:  03/31/25               BALANCE       Pt will demo improved dynamic sitting /standing balance while engaging in I/ADL task with independent level of assistance and no LOB.  (Progressing)       Start:  03/10/25    Expected End:  03/31/25               MOBILITY       Pt will complete functional ambulation household /community distance with modified independence and LRAD.  (Progressing)       Start:  03/10/25    Expected End:  03/31/25               SPLINTING       Patient will safely don/doff RUE sling with independent level of assistance.  (Progressing)       Start:  03/10/25    Expected End:  03/31/25               TRANSFERS       Pt will complete functional transfers with modified independence and LRAD.  (Progressing)       Start:  03/10/25    Expected End:  03/31/25                   ---------------  Linda Jones (OTR/L, OTD)  Inpatient Occupational Therapist   Rehab Office: [phone]

## 2025-03-11 ENCOUNTER — APPOINTMENT (OUTPATIENT)
Dept: SPEECH THERAPY | Facility: CLINIC | Age: 80
End: 2025-03-11
Payer: MEDICARE

## 2025-03-11 ENCOUNTER — APPOINTMENT (OUTPATIENT)
Dept: RADIOLOGY | Facility: HOSPITAL | Age: 80
End: 2025-03-11
Payer: MEDICARE

## 2025-03-11 PROCEDURE — 99221 1ST HOSP IP/OBS SF/LOW 40: CPT

## 2025-03-11 PROCEDURE — 73090 X-RAY EXAM OF FOREARM: CPT | Mod: RIGHT SIDE | Performed by: RADIOLOGY

## 2025-03-11 PROCEDURE — 99232 SBSQ HOSP IP/OBS MODERATE 35: CPT | Performed by: STUDENT IN AN ORGANIZED HEALTH CARE EDUCATION/TRAINING PROGRAM

## 2025-03-11 PROCEDURE — 2500000001 HC RX 250 WO HCPCS SELF ADMINISTERED DRUGS (ALT 637 FOR MEDICARE OP): Performed by: STUDENT IN AN ORGANIZED HEALTH CARE EDUCATION/TRAINING PROGRAM

## 2025-03-11 PROCEDURE — 73090 X-RAY EXAM OF FOREARM: CPT | Mod: RT

## 2025-03-11 PROCEDURE — 97535 SELF CARE MNGMENT TRAINING: CPT | Mod: GO

## 2025-03-11 PROCEDURE — 97116 GAIT TRAINING THERAPY: CPT | Mod: GP | Performed by: PHYSICAL THERAPIST

## 2025-03-11 PROCEDURE — 73060 X-RAY EXAM OF HUMERUS: CPT | Mod: RT

## 2025-03-11 PROCEDURE — 73200 CT UPPER EXTREMITY W/O DYE: CPT | Mod: RIGHT SIDE | Performed by: RADIOLOGY

## 2025-03-11 PROCEDURE — 2500000005 HC RX 250 GENERAL PHARMACY W/O HCPCS: Performed by: STUDENT IN AN ORGANIZED HEALTH CARE EDUCATION/TRAINING PROGRAM

## 2025-03-11 PROCEDURE — 2500000004 HC RX 250 GENERAL PHARMACY W/ HCPCS (ALT 636 FOR OP/ED)

## 2025-03-11 PROCEDURE — 2500000002 HC RX 250 W HCPCS SELF ADMINISTERED DRUGS (ALT 637 FOR MEDICARE OP, ALT 636 FOR OP/ED)

## 2025-03-11 PROCEDURE — 73060 X-RAY EXAM OF HUMERUS: CPT | Mod: RIGHT SIDE | Performed by: RADIOLOGY

## 2025-03-11 PROCEDURE — 73200 CT UPPER EXTREMITY W/O DYE: CPT | Mod: RT

## 2025-03-11 PROCEDURE — 73030 X-RAY EXAM OF SHOULDER: CPT | Mod: RT

## 2025-03-11 PROCEDURE — 2500000001 HC RX 250 WO HCPCS SELF ADMINISTERED DRUGS (ALT 637 FOR MEDICARE OP)

## 2025-03-11 PROCEDURE — 1100000001 HC PRIVATE ROOM DAILY

## 2025-03-11 PROCEDURE — 2500000005 HC RX 250 GENERAL PHARMACY W/O HCPCS

## 2025-03-11 PROCEDURE — 97530 THERAPEUTIC ACTIVITIES: CPT | Mod: GO

## 2025-03-11 PROCEDURE — 73030 X-RAY EXAM OF SHOULDER: CPT | Mod: RIGHT SIDE | Performed by: RADIOLOGY

## 2025-03-11 RX ADMIN — LOSARTAN POTASSIUM 50 MG: 50 TABLET, FILM COATED ORAL at 09:02

## 2025-03-11 RX ADMIN — ACETAMINOPHEN 975 MG: 325 TABLET, FILM COATED ORAL at 15:22

## 2025-03-11 RX ADMIN — ASPIRIN 81 MG: 81 TABLET, CHEWABLE ORAL at 09:02

## 2025-03-11 RX ADMIN — OXYCODONE 10 MG: 5 TABLET ORAL at 19:47

## 2025-03-11 RX ADMIN — LATANOPROST 1 DROP: 50 SOLUTION OPHTHALMIC at 20:58

## 2025-03-11 RX ADMIN — ENOXAPARIN SODIUM 40 MG: 100 INJECTION SUBCUTANEOUS at 06:12

## 2025-03-11 RX ADMIN — HYDRALAZINE HYDROCHLORIDE 25 MG: 25 TABLET ORAL at 20:58

## 2025-03-11 RX ADMIN — ACETAMINOPHEN 975 MG: 325 TABLET, FILM COATED ORAL at 20:58

## 2025-03-11 RX ADMIN — VENLAFAXINE HYDROCHLORIDE 150 MG: 75 CAPSULE, EXTENDED RELEASE ORAL at 09:03

## 2025-03-11 RX ADMIN — ACETAMINOPHEN 975 MG: 325 TABLET, FILM COATED ORAL at 09:01

## 2025-03-11 RX ADMIN — ATORVASTATIN CALCIUM 40 MG: 40 TABLET, FILM COATED ORAL at 20:59

## 2025-03-11 RX ADMIN — VENLAFAXINE HYDROCHLORIDE 75 MG: 75 CAPSULE, EXTENDED RELEASE ORAL at 09:04

## 2025-03-11 RX ADMIN — TIMOLOL MALEATE 1 DROP: 5 SOLUTION/ DROPS OPHTHALMIC at 06:12

## 2025-03-11 RX ADMIN — Medication 3 MG: at 20:58

## 2025-03-11 RX ADMIN — CARVEDILOL 12.5 MG: 12.5 TABLET, FILM COATED ORAL at 20:58

## 2025-03-11 RX ADMIN — CARVEDILOL 12.5 MG: 12.5 TABLET, FILM COATED ORAL at 09:02

## 2025-03-11 RX ADMIN — HYDRALAZINE HYDROCHLORIDE 25 MG: 25 TABLET ORAL at 09:02

## 2025-03-11 ASSESSMENT — COGNITIVE AND FUNCTIONAL STATUS - GENERAL
MOVING TO AND FROM BED TO CHAIR: A LITTLE
EATING MEALS: A LITTLE
DRESSING REGULAR LOWER BODY CLOTHING: A LOT
HELP NEEDED FOR BATHING: A LOT
DRESSING REGULAR UPPER BODY CLOTHING: A LITTLE
WALKING IN HOSPITAL ROOM: A LITTLE
CLIMB 3 TO 5 STEPS WITH RAILING: A LOT
HELP NEEDED FOR BATHING: A LITTLE
TOILETING: A LOT
DAILY ACTIVITIY SCORE: 14
PERSONAL GROOMING: A LITTLE
DAILY ACTIVITIY SCORE: 19
PERSONAL GROOMING: A LITTLE
MOVING FROM LYING ON BACK TO SITTING ON SIDE OF FLAT BED WITH BEDRAILS: A LITTLE
DRESSING REGULAR LOWER BODY CLOTHING: A LITTLE
MOVING FROM LYING ON BACK TO SITTING ON SIDE OF FLAT BED WITH BEDRAILS: A LITTLE
STANDING UP FROM CHAIR USING ARMS: A LITTLE
MOBILITY SCORE: 17
CLIMB 3 TO 5 STEPS WITH RAILING: A LOT
TOILETING: A LITTLE
MOVING TO AND FROM BED TO CHAIR: A LITTLE
DRESSING REGULAR UPPER BODY CLOTHING: A LOT
MOBILITY SCORE: 17
TURNING FROM BACK TO SIDE WHILE IN FLAT BAD: A LITTLE
TURNING FROM BACK TO SIDE WHILE IN FLAT BAD: A LITTLE
WALKING IN HOSPITAL ROOM: A LITTLE
STANDING UP FROM CHAIR USING ARMS: A LITTLE

## 2025-03-11 ASSESSMENT — PAIN SCALES - GENERAL
PAINLEVEL_OUTOF10: 0 - NO PAIN
PAINLEVEL_OUTOF10: 10 - WORST POSSIBLE PAIN
PAINLEVEL_OUTOF10: 0 - NO PAIN

## 2025-03-11 ASSESSMENT — PAIN - FUNCTIONAL ASSESSMENT
PAIN_FUNCTIONAL_ASSESSMENT: 0-10
PAIN_FUNCTIONAL_ASSESSMENT: 0-10

## 2025-03-11 ASSESSMENT — ACTIVITIES OF DAILY LIVING (ADL): HOME_MANAGEMENT_TIME_ENTRY: 15

## 2025-03-11 NOTE — PROGRESS NOTES
"Occupational Therapy    Occupational Therapy Treatment    Name: Roslyn Cabrera \"Pam\"  MRN: [id number]  : 1945  Date: 25  Room: [address] [place] [address]      Time Calculation  Start Time: 1253  Stop Time: 1318  Time Calculation (min): 25 min    Assessment:  OT Assessment: Pt continues to present with decreased I/ADL and functional mobility independence. Pt would benefit from HIGH intensity OT.  Prognosis: Good  Barriers to Discharge Home: Physical needs, Caregiver assistance  Caregiver Assistance: Patient lives alone and/or does not have reliable caregiver assistance  Physical Needs: Intermittent mobility assistance needed, Intermittent ADL assistance needed, High falls risk due to function or environment  Evaluation/Treatment Tolerance: Patient tolerated treatment well  Medical Staff Made Aware: Yes  End of Session Communication: Bedside nurse  End of Session Patient Position: Bed, 2 rail up, Alarm off, caregiver present    Plan:  Treatment Interventions: ADL retraining, Functional transfer training, UE strengthening/ROM, Endurance training, Patient/family training, Equipment evaluation/education, Compensatory technique education  OT Frequency: 4 times per week  OT Discharge Recommendations: High intensity level of continued care  Equipment Recommended upon Discharge:  (tbd)  OT Recommended Transfer Status: Assist of 1  OT - OK to Discharge: Yes (OT eval complete and d/c recs made)    Subjective   General:  OT Last Visit  OT Received On: 25  Reason for Referral: Admitted 3/9 (transfer from OSH) after fall onto Right shoulder in parking lot; dx: Comminuted fracture of humeral neck, right, closed and suspected fracture of the inferior glenoid rim  Past Medical History Relevant to Rehab: HTN, HLD, stroke (aphasia, apraxia), COPD, cataracts, PVD, sciatica, depression  Prior to Session Communication: Bedside nurse  Patient Position Received: Bed, 3 rail up, Alarm off, not on at start of session  Family/Caregiver " "Present: Yes  Caregiver Feedback: daughter and grandquyenghter present - engaged and supportive  General Comment: Pt supine in bed and agreeable to therapy. Completed functional mobility max household distances as well as LB dressing. Pt remains appropriate for HIGH intensity OT.     Precautions:  UE Weight Bearing Status: Right Non-Weight Bearing (in sling)  Medical Precautions: Fall precautions  Precautions Comment: Sling donned upon arrival - min VCs throughout session to avoid utilizing RUE    Cognition:  Arousal/Alertness: Appropriate responses to stimuli  Orientation Level:  (Pt oriented to name, pt with difficulty statng place and current month/year but appears to be d/t aphasa. Able to correctly state \"yes\" when given choices for place, year, and current month)  Following Commands: Follows one step commands with repetition  Problem Solving: Assistance required to identify errors made  Insight: Mild  Impulsive: Mildly  Processing Speed:  (mildly slowed)    Pain Assessment:  Pain Assessment  Pain Assessment: 0-10  0-10 (Numeric) Pain Score: 0 - No pain     Objective   Activities of Daily Living:     UE Dressing  UE Dressing Where Assessed: Edge of bed  UE Dressing Comments: adjusted gown onto L shoulder with maxA  LE Dressing  LE Dressing: Yes  Pants Level of Assistance: Maximum assistance, Moderate verbal cues, Tactile cues (donning and doffing pants at EOB)  Sock Level of Assistance: Moderate verbal cues (SBA doffing socks, modA donning socks at EOB)  LE Dressing Where Assessed: Edge of bed     Functional Standing Tolerance:  Functional Mobility  Functional Mobility Performed: Yes  Functional Mobility 1  Surface 1: Level tile  Device 1: No device (HHA)  Assistance 1:  (CGA-Renzo)  Comments 1: max household distances through the hallway    Bed Mobility/Transfers:   Bed Mobility  Bed Mobility: Yes  Bed Mobility 1  Bed Mobility 1: Supine to sitting, Sitting to supine  Level of Assistance 1: Minimum assistance, " Minimal verbal cues  Transfers  Transfer: Yes  Transfer 1  Transfer From 1: Sit to, Stand to  Transfer to 1: Stand, Sit  Transfer Device 1:  (HHA)  Transfer Level of Assistance 1: Hand held assistance, Minimum assistance, Minimal verbal cues  Trials/Comments 1: 4 trials each    Balance:  Dynamic Sitting Balance  Dynamic Sitting-Level of Assistance: Contact guard  Dynamic Standing Balance  Dynamic Standing-Level of Assistance:  (CGA-Renzo HHA)  Static Sitting Balance  Static Sitting-Level of Assistance: Close supervision  Static Standing Balance  Static Standing-Level of Assistance:  (CGA-Renzo HHA)    Therapy/Activity:      Therapeutic Activity  Therapeutic Activity Performed: Yes  Therapeutic Activity 1: Skilled VCs and assist for dressing one-handed - VCs for maintaining WB precautions and sequencing of dressing  Therapeutic Activity 2: Pt challenged endurance and static and dynamic sitting and standing balance in preparation for seated/standing ADL/IADLs and functional mobility independence    Strength:  Strength  Strength Comments: RUE in sling, LUE appears WFL via ADLs    Outcome Measures:  [facility] Daily Activity  Putting on and taking off regular lower body clothing: A lot  Bathing (including washing, rinsing, drying): A lot  Putting on and taking off regular upper body clothing: A lot  Toileting, which includes using toilet, bedpan or urinal: A lot  Taking care of personal grooming such as brushing teeth: A little  Eating Meals: A little  Daily Activity - Total Score: 14     Education Documentation  Body Mechanics, taught by Amber Castillo OT at 3/11/2025  3:03 PM.  Learner: Patient  Readiness: Eager  Method: Explanation, Demonstration  Response: Verbalizes Understanding, Demonstrated Understanding    Precautions, taught by Amber Castillo OT at 3/11/2025  3:03 PM.  Learner: Patient  Readiness: Eager  Method: Explanation, Demonstration  Response: Verbalizes Understanding, Demonstrated Understanding    ADL  Training, taught by Amber Castillo OT at 3/11/2025  3:03 PM.  Learner: Patient  Readiness: Eager  Method: Explanation, Demonstration  Response: Verbalizes Understanding, Demonstrated Understanding    Education Comments  No comments found.      Goals:  Encounter Problems       Encounter Problems (Active)       ADLs       Pt will complete UB /LB bathing tasks with modified independence while seated and AE as needed.  (Progressing)       Start:  03/10/25    Expected End:  03/31/25            Pt will complete LB dressing with modified independence while seated and/or standing and AE as needed.  (Progressing)       Start:  03/10/25    Expected End:  03/31/25            Pt will complete toilet hygiene while seated /standing with independent level of assistance.   (Progressing)       Start:  03/10/25    Expected End:  03/31/25               BALANCE       Pt will demo improved dynamic sitting /standing balance while engaging in I/ADL task with independent level of assistance and no LOB.  (Progressing)       Start:  03/10/25    Expected End:  03/31/25               MOBILITY       Pt will complete functional ambulation household /community distance with modified independence and LRAD.  (Progressing)       Start:  03/10/25    Expected End:  03/31/25               SPLINTING       Patient will safely don/doff RUE sling with independent level of assistance.  (Progressing)       Start:  03/10/25    Expected End:  03/31/25               TRANSFERS       Pt will complete functional transfers with modified independence and LRAD.  (Progressing)       Start:  03/10/25    Expected End:  03/31/25 03/11/25 at 3:05 PM   Amber Castillo OTR/L  [phone]

## 2025-03-11 NOTE — CONSULTS
Orthopaedic Surgery Consult H&P    HPI:   Orthopaedic Problems/Injuries: R PHFx    79F RHD (CVA residual aphasia, COPD, HTN, HLD, former tobacco use) t/f [facility] p/a mGLF 3/8. Community ambulator at baseline. Denies numbness, tingling, and open wounds on the affected limb.     PMH: per above/EMR  PSH: per above/EMR  SocHx:      -  Former tobacco use      -  Denies EtOH use      -  Denies other drug use  FamHx:  Non-contributory to this patient's acute orthopaedic problem.   Allergies: Reviewed in EMR  Meds: Reviewed in EMR    ROS      - 14 point ROS negative except as above    Physical Exam:  Gen: AOx3, NAD  HEENT: normocephalic atraumatic  Psych: appropriate mood and affect  Resp: nonlabored breathing  Cardiac: Extremities WWP, RRR to peripheral palpation  Neuro: CN 2-12 grossly intact  Skin: no rashes    Right upper extremity:   -Skin intact.   -Tender at site of injury with painful ROM.  -Fires axillary/AIN/PIN/ulnar distributions  -SILT axillary/radial/median/ulnar distributions  -Hand warm, well perfused  -Palpable radial pulse, cap refill brisk  -Compartments soft and compressible    A full secondary exam was performed and all relevant findings discussed and noted above.    Imaging:  XR demonstrating likely 2 part PHFx, completion imaging/CT pending.    Assessment:  79F RHD (CVA residual aphasia, COPD, HTN, HLD) t/f [facility] p/a mGLF 3/8. Community ambulator. Denies n/t. Closed, NVI. Sling.      Plan:  - No acute orthopaedic intervention  - WB: NWB RUE in sling  - Fu OP Dr. King for nonop mgmt    Discussed w Dr. King, on call orthopaedic surgery trauma attending.    Artur Dupree MD  Orthopaedic Surgery PGY-1  On-call Resident  Pager: [phone]    This patient was seen within 30 minutes of initial consult.  _________________________________________________________

## 2025-03-11 NOTE — PROGRESS NOTES
Per  [facility]: A peer to peer is available and would need to be completed by the current level of care provider, by the end of day 03/13/2025 EST..... A peer to peer can be requested by the current level of care provider calling [phone] option 1, option 2. Medical team notified. Hanh Singh RN, TCC    1200: Dr. Gu stated he called for the P2P and was required to leave a voicemail, which he did, with a return number. Hanh Singh RN, TCC

## 2025-03-11 NOTE — PROGRESS NOTES
"                                                                                                                                                                                                                                                                                             INTERNAL MEDICINE PROGRESS NOTE     BRIEF NARRATIVE      Roslyn Cabrera \"Ivette" is a 79 y.o. female on day 1 of admission presenting with Comminuted fracture of humerus, right, closed, initial encounter.    Pt presented with hypertension hyperlipidemia stroke with resulting aphasia on low-dose aspirin daily presented to OSH ED with traumatic right shoulder pain. X-ray at OSH showed comminuted fracture of the humeral neck and inferior glenoid fracture and managed nonsurgically with sling placement. Reviewed CBC, CMP, mag from OSH showing mild leukocytosis WBC 12.88 (N predominant), mildly elevated creatinine 1.03 (1.50 1/15/2025). Patient with elevated blood pressure on arrival but otherwise asymptomatic and stable. There were safety concerns for patient returning home and she was transferred to [facility] for PT OT and placement as well as pain control.     ASSESSMENT / PLANS      #Comminuted fracture of the humeral neck  #Inferior glenoid fracture  :: Right upper extremity position in internal rotation with sling in place  - Will consult ortho for full assessment and recommendation on humerus fracture deemed non operative in OSH  -PT/ OT recommend high intensity therapy, pending precert to [facility]  - Insurance denies AR, they are requesting p2p. Attempted to scheduled p2p [phone] 1 twice at 1330 tomorrow (case #[id number])  -Pain control with Tylenol for mild/Oxy 5 for moderate/Oxy 10 for severe     #HTN  #HLD  :: BP elevated likely in the setting of acute pain in the setting of fracture  -Continue home losartan 50 mg daily, hydralazine 25 mg twice daily, Coreg 12.5 mg twice daily, Lipitor 40 mg nightly     #History of CVA with " "residual aphasia  -Continue with aspirin 81 mg daily     #Depression  -Continue home venlafaxine 225 mg daily  -Melatonin as needed for insomnia     D: Regular  Access: PIV  Abcx: Not indicated   DVT ppx: Lovenox subcu  GI ppx: Not indicated  CODE STATUS: Full code (confirmed on admission)  Emergency contact: Wendi Everett ([place]) [phone]      Disposition: Medically appropriate for discharge, pending precert to [facility]       SUBJECTIVE       Pt seen and examined today, stable in NAD   OBJECTIVE      Visit Vitals  /72   Pulse 72   Temp 36.9 °C (98.4 °F)   Resp 16      No intake or output data in the 24 hours ending 03/10/25 8664    Physical Exam   General: well-appearing, NAD, notable aphasia while speaking  HEENT: NC/AT  Cardiac: rrr, no m/r/g  Lungs: normal work of breathing, CTAB  Abdomen: soft, non-tender, non-distended  Neuro: grossly intact aside from aphasia  MSK: No peripheral edema, cyanosis, or pallor  Right upper extremity in sling position and internal rotation, no tenderness of palpation of right upper shoulder  Psych: no depression, anxiety    Current Meds   acetaminophen, 975 mg, oral, TID  aspirin, 81 mg, oral, Daily  atorvastatin, 40 mg, oral, Nightly  carvedilol, 12.5 mg, oral, BID  enoxaparin, 40 mg, subcutaneous, q24h  hydrALAZINE, 25 mg, oral, BID  latanoprost, 1 drop, Both Eyes, Nightly  losartan, 50 mg, oral, Daily  timolol, 1 drop, Both Eyes, Daily before breakfast  venlafaxine XR, 150 mg, oral, Daily with breakfast  venlafaxine XR, 75 mg, oral, Daily with breakfast       PRN medications: melatonin, morphine, ondansetron **OR** ondansetron, oxyCODONE, oxyCODONE, polyethylene glycol     LABS and IMAGING     No results found for: \"WBC\", \"HGB\", \"HCT\", \"GLU\", \"CO2\", \"CREATININE\", \"CALCIUM\", \"INR\", \"PTT\", \"TROPONINI\"    OCT, Retina - OU - Both Eyes  Right Eye  Quality was good. Scan locations included subfoveal. Progression has been   stable. Findings include normal foveal " contour.     Left Eye  Quality was good. Scan locations included subfoveal. Findings include   normal foveal contour.     Notes  Retinal multimodal imaging including photography was completed, and the   findings are described in the examination.         PROBLEM LISTS      Problem List Items Addressed This Visit          Musculoskeletal and Injuries    * (Principal) Comminuted fracture of humerus, right, closed, initial encounter - Primary       This dictation was created with voice recognition software. Although every effort is made to review the dictation as it is transcribed, on occasion spoken words, phrases, names, numbers, punctuation etc can be misinterpreted by the the technology leading to omissions or inappropriate outcomes.     Xochilt Rao MD

## 2025-03-11 NOTE — CARE PLAN
The patient's goals for the shift include      The clinical goals for the shift include PAtient will remain free of falls      Problem: Fall/Injury  Goal: Not fall by end of shift  Outcome: Progressing  Goal: Be free from injury by end of the shift  Outcome: Progressing  Goal: Verbalize understanding of personal risk factors for fall in the hospital  Outcome: Progressing  Goal: Verbalize understanding of risk factor reduction measures to prevent injury from fall in the home  Outcome: Progressing  Goal: Use assistive devices by end of the shift  Outcome: Progressing  Goal: Pace activities to prevent fatigue by end of the shift  Outcome: Progressing     Problem: Pain - Adult  Goal: Verbalizes/displays adequate comfort level or baseline comfort level  Outcome: Progressing

## 2025-03-11 NOTE — PROGRESS NOTES
"Physical Therapy    Physical Therapy Treatment    Patient Name: Roslyn Cabrera \"Pam\"  MRN: [id number]  Department: [facility] 3  Room: [address] [place], [place] [address]  Today's Date: 3/11/2025  Time Calculation  Start Time: 1425  Stop Time: 1459  Time Calculation (min): 34 min         Assessment/Plan   PT Assessment  PT Assessment Results: Decreased strength, Decreased range of motion, Decreased endurance, Impaired balance, Decreased mobility, Decreased coordination, Decreased safety awareness, Decreased cognition, Impaired tone, Orthopedic restrictions, Pain, Impaired judgement, Impaired vision  Rehab Prognosis: Excellent  Barriers to Discharge Home: Physical needs, Caregiver assistance  Caregiver Assistance: Patient lives alone and/or does not have reliable caregiver assistance  Physical Needs: 24hr mobility assistance needed, 24hr ADL assistance needed, High falls risk due to function or environment  Evaluation/Treatment Tolerance: Patient tolerated treatment well  Medical Staff Made Aware: Yes  Strengths: Ability to acquire knowledge, Attitude of self, Premorbid level of function, Support of Caregivers  End of Session Communication: Bedside nurse (re: pain, ice, bed way to mobilize pt)  Assessment Comment: 80 yo female presents with new Right shoulder fx with pain, premorbid aphasia and apraxia, decreased activity tolerance and balance deficits. Recommend high intensity therapy as pt needs PT & OT (would also benefit from SLT), lives alone, appears very motivated, is not at her active baseline, does not yet know how to don/doff clothes/function using only 1 arm, is high falls risk (see Tinetti score) and appears to have good family support.  End of Session Patient Position: Up in chair, Alarm on  PT Plan  Inpatient/Swing Bed or Outpatient: Inpatient  PT Plan  Treatment/Interventions: Bed mobility, Transfer training, Gait training, Balance training, Neuromuscular re-education, Strengthening, Endurance training, Therapeutic exercise, Home " "exercise program, Therapeutic activity, Postural re-education  PT Plan: Ongoing PT  PT Frequency: 5 times per week  PT Discharge Recommendations: High intensity level of continued care, Other (comment) (if pt refuses, will need 24/7 assist initially due to high fall risk, decreased safety, inability to adjust sling)  Equipment Recommended upon Discharge: Other (comment) (cane)  PT Recommended Transfer Status: Assist x1 (cane)  PT - OK to Discharge: Yes (PT eval completed & DC recs made)      General Visit Information:   PT  Visit  PT Received On: 03/11/25  Response to Previous Treatment: Patient with no complaints from previous session.  General  Reason for Referral: Admitted 3/9 (transfer from OSH) after fall onto Right shoulder in parking lot; dx: Comminuted fracture of humeral neck, right, closed and suspected fracture of the inferior glenoid rim  Past Medical History Relevant to Rehab: HTN, HLD, stroke (aphasia, apraxia), COPD, cataracts, PVD, sciatica, depression  Family/Caregiver Present: Yes  Caregiver Feedback: Pts dtr from [facility] present and involved in session.  Prior to Session Communication: Bedside nurse (OT)  Patient Position Received: Bed, 3 rail up, Alarm on  Preferred Learning Style: auditory, kinesthetic, visual (demonstration (apraxic, aphasic))  General Comment: Pt alert and engaged, reports intermittent Right shoulder pain with repositioning. Pt/dtr report that the dtr who can help pt is out of town until Sunday or Monday.    Subjective   Precautions:  Precautions  Hearing/Visual Limitations: glasses worn \"sometimes\"  UE Weight Bearing Status: Right Non-Weight Bearing (per Dr. Deal 3/10 pt is NWB Right UE  and sling)  Medical Precautions: Fall precautions  Braces Applied: Right sling adjusted    Objective   Pain:  Pain Assessment  Pain Assessment: 0-10  0-10 (Numeric) Pain Score:  (4/10 pain RIght shoulder with repositioning)  Pain Interventions: Ambulation/increased activity, " Repositioned, Therapeutic presence  Response to Interventions:  (comfortable sitting up in chair; pt politely declined ice on shoulder)  Cognition:  Cognition  Arousal/Alertness: Delayed responses to stimuli (apraxia, aphasia)  Orientation Level:  (when given choices, oriented x 4)  Following Commands: Follows one step commands with repetition  Problem Solving: Assistance required to identify errors made  Cognition Comments: alert and engaged  Problem Solving: Exceptions to WFL  Insight: Mild  Impulsive: Mildly  Coordination:  Movements are Fluid and Coordinated: No (Right UE limited by pain (NT), initially Right LE little slow with function)  Postural Control:  Postural Control  Postural Control: Impaired  Posture Comment: standing: weight shifted to Left LE, mild posterior lean  Static Sitting Balance  Static Sitting-Balance Support: Feet supported, Left upper extremity supported (Right UE NWB)  Static Sitting-Level of Assistance: Contact guard, Close supervision  Dynamic Sitting Balance  Dynamic Sitting-Balance Support: Feet supported, Left upper extremity supported (Right UE NWB)  Dynamic Sitting-Level of Assistance: Contact guard  Dynamic Sitting-Balance:  (scoot to EOB)  Static Standing Balance  Static Standing-Balance Support: Left upper extremity supported (min A no device)  Static Standing-Level of Assistance: Minimum assistance (min to mod)  Dynamic Standing Balance  Dynamic Standing-Balance Support: Left upper extremity supported (on PT , no device)  Dynamic Standing-Level of Assistance: Minimum assistance (min to mod A)  Dynamic Standing-Balance:  (transfers, gait)    Activity Tolerance:  Activity Tolerance  Endurance: Endurance does not limit participation in activity  Treatments:  Bed Mobility  Bed Mobility: Yes  Bed Mobility 1  Bed Mobility 1: Rolling left  Level of Assistance 1: Minimum assistance, Moderate tactile cues, Minimal verbal cues  Bed Mobility 2  Bed Mobility  2: Side lying left to  sit  Level of Assistance 2: Moderate assistance, Moderate verbal cues, Moderate tactile cues  Bed Mobility 3  Bed Mobility 3: Scooting (in sitting to EOB)  Level of Assistance 3: Moderate assistance, Moderate verbal cues, Moderate tactile cues    Ambulation/Gait Training  Ambulation/Gait Training Performed: Yes  Ambulation/Gait Training 1  Surface 1: Level tile  Device 1: No device (PT with min HHA or CGA)  Assistance 1: Contact guard, Minimum assistance, Minimal verbal cues  Quality of Gait 1:  (increased WB Left side, slight posterior lean, wider THOMAS, slower, unsteady)  Comments/Distance (ft) 1: 75' x 1, 130' x 1  Transfers  Transfer: Yes  Transfer 1  Transfer From 1: Stand to, Sit to  Transfer to 1: Sit, Stand  Technique 1: Sit to stand, Stand to sit  Transfer Device 1:  (no device, on Left side min HHA or CGA (variable))  Transfer Level of Assistance 1: Minimum assistance, Minimal verbal cues  Transfers 2  Transfer From 2: Stand to, Chair with arms to  Transfer to 2: Chair with arms, Stand  Technique 2: Stand to sit, Stand pivot, Sit to stand  Transfer Device 2:  (no device; PT on Left side)  Transfer Level of Assistance 2: Minimum assistance, Contact guard, Minimal verbal cues    Stairs  Stairs: No (N/A)         Outcome Measures:  [facility] Basic Mobility  Turning from your back to your side while in a flat bed without using bedrails: A little  Moving from lying on your back to sitting on the side of a flat bed without using bedrails: A little  Moving to and from bed to chair (including a wheelchair): A little  Standing up from a chair using your arms (e.g. wheelchair or bedside chair): A little  To walk in hospital room: A little  Climbing 3-5 steps with railing: A lot  Basic Mobility - Total Score: 17    Education Documentation  No documentation found.  Education Comments  No comments found.      Encounter Problems       Encounter Problems (Active)       General Goals       Pt will follow 100% of 2 step commands  with demonstration without repetition, during session (Progressing)       Start:  03/10/25    Expected End:  03/24/25            Pt will roll to Left and come sidelying to/from sit (HOB flat, use of rail) with SBA NWB Right UE (Progressing)       Start:  03/10/25    Expected End:  03/24/25            Pt will scoot to EOB using Left LE/hips with SBA, NWB Right UE (Progressing)       Start:  03/10/25    Expected End:  03/24/25               Mobility       Pt will come sit to/from stand and bed to/from chair with cane and CGA, NWB Right UE (Progressing)       Start:  03/10/25    Expected End:  03/24/25            Pt will ambulate 150' with cane and CGA, NWB Right UE, no LOB (Progressing)       Start:  03/10/25    Expected End:  03/24/25            Pt will stand statically with cane and CGA, NWB Right UE >7 minutes, no LOB (Progressing)       Start:  03/10/25    Expected End:  03/24/25               Pain - Adult

## 2025-03-12 PROCEDURE — 99222 1ST HOSP IP/OBS MODERATE 55: CPT | Performed by: PHYSICAL MEDICINE & REHABILITATION

## 2025-03-12 PROCEDURE — 1100000001 HC PRIVATE ROOM DAILY

## 2025-03-12 PROCEDURE — 2500000001 HC RX 250 WO HCPCS SELF ADMINISTERED DRUGS (ALT 637 FOR MEDICARE OP): Performed by: STUDENT IN AN ORGANIZED HEALTH CARE EDUCATION/TRAINING PROGRAM

## 2025-03-12 PROCEDURE — 2500000004 HC RX 250 GENERAL PHARMACY W/ HCPCS (ALT 636 FOR OP/ED)

## 2025-03-12 PROCEDURE — 2500000002 HC RX 250 W HCPCS SELF ADMINISTERED DRUGS (ALT 637 FOR MEDICARE OP, ALT 636 FOR OP/ED)

## 2025-03-12 PROCEDURE — 2500000004 HC RX 250 GENERAL PHARMACY W/ HCPCS (ALT 636 FOR OP/ED): Performed by: STUDENT IN AN ORGANIZED HEALTH CARE EDUCATION/TRAINING PROGRAM

## 2025-03-12 PROCEDURE — 99232 SBSQ HOSP IP/OBS MODERATE 35: CPT | Performed by: STUDENT IN AN ORGANIZED HEALTH CARE EDUCATION/TRAINING PROGRAM

## 2025-03-12 PROCEDURE — 2500000001 HC RX 250 WO HCPCS SELF ADMINISTERED DRUGS (ALT 637 FOR MEDICARE OP)

## 2025-03-12 RX ADMIN — OXYCODONE 10 MG: 5 TABLET ORAL at 06:40

## 2025-03-12 RX ADMIN — ACETAMINOPHEN 975 MG: 325 TABLET, FILM COATED ORAL at 21:56

## 2025-03-12 RX ADMIN — ENOXAPARIN SODIUM 40 MG: 100 INJECTION SUBCUTANEOUS at 06:25

## 2025-03-12 RX ADMIN — MORPHINE SULFATE 4 MG: 4 INJECTION, SOLUTION INTRAMUSCULAR; INTRAVENOUS at 10:12

## 2025-03-12 RX ADMIN — LOSARTAN POTASSIUM 50 MG: 50 TABLET, FILM COATED ORAL at 09:27

## 2025-03-12 RX ADMIN — VENLAFAXINE HYDROCHLORIDE 75 MG: 75 CAPSULE, EXTENDED RELEASE ORAL at 09:28

## 2025-03-12 RX ADMIN — ACETAMINOPHEN 975 MG: 325 TABLET, FILM COATED ORAL at 09:26

## 2025-03-12 RX ADMIN — LATANOPROST 1 DROP: 50 SOLUTION OPHTHALMIC at 21:56

## 2025-03-12 RX ADMIN — MORPHINE SULFATE 4 MG: 4 INJECTION, SOLUTION INTRAMUSCULAR; INTRAVENOUS at 01:00

## 2025-03-12 RX ADMIN — HYDRALAZINE HYDROCHLORIDE 25 MG: 25 TABLET ORAL at 09:26

## 2025-03-12 RX ADMIN — CARVEDILOL 12.5 MG: 12.5 TABLET, FILM COATED ORAL at 21:56

## 2025-03-12 RX ADMIN — TIMOLOL MALEATE 1 DROP: 5 SOLUTION/ DROPS OPHTHALMIC at 06:25

## 2025-03-12 RX ADMIN — HYDRALAZINE HYDROCHLORIDE 25 MG: 25 TABLET ORAL at 21:56

## 2025-03-12 RX ADMIN — VENLAFAXINE HYDROCHLORIDE 150 MG: 75 CAPSULE, EXTENDED RELEASE ORAL at 09:26

## 2025-03-12 RX ADMIN — ASPIRIN 81 MG: 81 TABLET, CHEWABLE ORAL at 09:26

## 2025-03-12 RX ADMIN — ACETAMINOPHEN 975 MG: 325 TABLET, FILM COATED ORAL at 14:52

## 2025-03-12 RX ADMIN — ATORVASTATIN CALCIUM 40 MG: 40 TABLET, FILM COATED ORAL at 21:56

## 2025-03-12 RX ADMIN — CARVEDILOL 12.5 MG: 12.5 TABLET, FILM COATED ORAL at 09:27

## 2025-03-12 ASSESSMENT — COGNITIVE AND FUNCTIONAL STATUS - GENERAL
DRESSING REGULAR LOWER BODY CLOTHING: A LITTLE
HELP NEEDED FOR BATHING: A LITTLE
TURNING FROM BACK TO SIDE WHILE IN FLAT BAD: A LITTLE
DAILY ACTIVITIY SCORE: 19
WALKING IN HOSPITAL ROOM: A LITTLE
CLIMB 3 TO 5 STEPS WITH RAILING: A LOT
MOVING FROM LYING ON BACK TO SITTING ON SIDE OF FLAT BED WITH BEDRAILS: A LITTLE
DRESSING REGULAR UPPER BODY CLOTHING: A LITTLE
MOBILITY SCORE: 17
TOILETING: A LITTLE
STANDING UP FROM CHAIR USING ARMS: A LITTLE
PERSONAL GROOMING: A LITTLE
MOVING TO AND FROM BED TO CHAIR: A LITTLE

## 2025-03-12 ASSESSMENT — PAIN SCALES - GENERAL
PAINLEVEL_OUTOF10: 10 - WORST POSSIBLE PAIN
PAINLEVEL_OUTOF10: 9
PAINLEVEL_OUTOF10: 0 - NO PAIN
PAINLEVEL_OUTOF10: 0 - NO PAIN

## 2025-03-12 ASSESSMENT — PAIN - FUNCTIONAL ASSESSMENT: PAIN_FUNCTIONAL_ASSESSMENT: 0-10
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
## 2025-03-12 NOTE — CARE PLAN
The patient's goals for the shift include      The clinical goals for the shift include Pt to be free from falls      Problem: Fall/Injury  Goal: Not fall by end of shift  Outcome: Progressing  Goal: Be free from injury by end of the shift  Outcome: Progressing  Goal: Verbalize understanding of personal risk factors for fall in the hospital  Outcome: Progressing  Goal: Verbalize understanding of risk factor reduction measures to prevent injury from fall in the home  Outcome: Progressing  Goal: Use assistive devices by end of the shift  Outcome: Progressing  Goal: Pace activities to prevent fatigue by end of the shift  Outcome: Progressing     Problem: Pain - Adult  Goal: Verbalizes/displays adequate comfort level or baseline comfort level  Outcome: Progressing

## 2025-03-12 NOTE — PROGRESS NOTES
"                                                                                                                                                                                                                                                                                             INTERNAL MEDICINE PROGRESS NOTE     BRIEF NARRATIVE      Roslyn Cabrera \"Ivette" is a 79 y.o. female on day 3 of admission presenting with Comminuted fracture of humerus, right, closed, initial encounter.    Pt presented with hypertension hyperlipidemia stroke with resulting aphasia on low-dose aspirin daily presented to OSH ED with traumatic right shoulder pain. X-ray at OSH showed comminuted fracture of the humeral neck and inferior glenoid fracture and managed nonsurgically with sling placement. Reviewed CBC, CMP, mag from OSH showing mild leukocytosis WBC 12.88 (N predominant), mildly elevated creatinine 1.03 (1.50 1/15/2025). Patient with elevated blood pressure on arrival but otherwise asymptomatic and stable. There were safety concerns for patient returning home and she was transferred to [facility] for PT OT and placement as well as pain control.     ASSESSMENT / PLANS      #Comminuted fracture of the humeral neck  #Inferior glenoid fracture  ::Xray and CT showed acute, comminuted impacted fracture of the humeral neck/metadiaphysis with intra-articular extension  :: Right upper extremity position in internal rotation with sling in place  - Ortho consulted, non operative management recommended. Pt to follow up, with Dr King in the clinic   -PT/ OT recommend high intensity therapy, pending precert to [facility]  - Insurance denies AR, they are requesting p2p.  schedule p2p on 3/12 @ 4270, phone number [phone] (option 1 and 1)  (case #[id number])  -Pain control with Tylenol for mild/Oxy 5 for moderate/Oxy 10 for severe     #HTN  #HLD  :: BP elevated likely in the setting of acute pain in the setting of fracture  -Continue home " "losartan 50 mg daily, hydralazine 25 mg twice daily, Coreg 12.5 mg twice daily, Lipitor 40 mg nightly     #History of CVA with residual aphasia  -Continue with aspirin 81 mg daily     #Depression  -Continue home venlafaxine 225 mg daily  -Melatonin as needed for insomnia     D: Regular  Access: PIV  Abcx: Not indicated   DVT ppx: Lovenox subcu  GI ppx: Not indicated  CODE STATUS: Full code (confirmed on admission)  Emergency contact: Wendi Everett (Daughter) [phone]      Disposition: Medically appropriate for discharge, pending precert to [facility]       SUBJECTIVE       Pt seen and examined today, stable in NAD   OBJECTIVE      Visit Vitals  /70   Pulse 70   Temp 36.6 °C (97.9 °F)   Resp 18      No intake or output data in the 24 hours ending 03/12/25 0800    Physical Exam   General: well-appearing, NAD, notable aphasia while speaking  HEENT: NC/AT  Cardiac: rrr, no m/r/g  Lungs: normal work of breathing, CTAB  Abdomen: soft, non-tender, non-distended  Neuro: grossly intact aside from aphasia  MSK: No peripheral edema, cyanosis, or pallor  Right upper extremity in sling position and internal rotation, no tenderness of palpation of right upper shoulder  Psych: no depression, anxiety    Current Meds   acetaminophen, 975 mg, oral, TID  aspirin, 81 mg, oral, Daily  atorvastatin, 40 mg, oral, Nightly  carvedilol, 12.5 mg, oral, BID  enoxaparin, 40 mg, subcutaneous, q24h  hydrALAZINE, 25 mg, oral, BID  latanoprost, 1 drop, Both Eyes, Nightly  losartan, 50 mg, oral, Daily  timolol, 1 drop, Both Eyes, Daily before breakfast  venlafaxine XR, 150 mg, oral, Daily with breakfast  venlafaxine XR, 75 mg, oral, Daily with breakfast       PRN medications: melatonin, morphine, ondansetron **OR** ondansetron, oxyCODONE, oxyCODONE, polyethylene glycol     LABS and IMAGING     No results found for: \"WBC\", \"HGB\", \"HCT\", \"GLU\", \"CO2\", \"CREATININE\", \"CALCIUM\", \"INR\", \"PTT\", \"TROPONINI\"    XR humerus right, XR shoulder right " 2+ views, XR forearm right 2 views  Narrative: Interpreted By:  Sebastien Weaver and Ritchie Brandon   STUDY:  XR HUMERUS RIGHT; XR SHOULDER RIGHT 2+ VIEWS; XR FOREARM RIGHT 2  VIEWS; ;  3/11/2025 7:45 pm      INDICATION:  Signs/Symptoms:fx; Signs/Symptoms:R/o fx.      COMPARISON:  CT of the shoulder 03/11/2025, 7 p.m..      ACCESSION NUMBER(S):  [id number]; [id number]; [id number]      ORDERING CLINICIAN:  REINA FERNANDEZ      TECHNIQUE:  Three views of the right shoulder, two views of the right humerus,  and two views of the right forearm were provided for review.      FINDINGS:      Redemonstration of acute, comminuted fracture of the humeral  neck/metadiaphysis with intra-articular extension of the glenohumeral  joint. There is medial displacement of the distal fracture fragment  with estimated degree of 2.4 cm of foreshortening on current  radiograph, better characterized on CT of the shoulder from  03/11/2025. Mild degenerative changes of the glenohumeral and  acromioclavicular joint with osteophyte formation is noted.      No evidence of radiopaque foreign body or soft tissue gas.      The remaining osseous structures of the of the right humerus  demonstrate no focal osseous abnormality.      No evidence of traumatic malalignment of the elbow joint or evidence  of elbow joint effusion.      The radius and ulna demonstrate no acute osseous abnormality. Note is  made of joint space loss/narrowing due to degenerative change of the  radiocarpal joint.      Impression: 1. Acute, comminuted impacted fracture of the humeral  neck/metadiaphysis with intra-articular extension. Please refer to  dedicated CT of the shoulder from 03/11/2025 for further  characterization of the fracture/findings.  2. Otherwise, no evidence of acute osseous abnormality of the  visualized right upper extremity/forearm.  3. Varying degrees of mild degenerative changes as above.      I personally reviewed the images/study and I agree  with the findings  as stated by resident Joseph Ribera. This study was interpreted  at [facility], [place], [place].      MACRO:  None      Signed by: Sebastien Weaver 3/12/2025 7:54 AM  Dictation workstation:   [id number]  CT shoulder right wo IV contrast  Narrative: Interpreted By:  Sebastien Weaver and Ritchie Brandon   STUDY:  CT SHOULDER RIGHT WO IV CONTRAST; ;  3/11/2025 7:00 pm      INDICATION:  Signs/Symptoms:PHfx.      COMPARISON:  Radiograph of the right shoulder 03/08/2025.      ACCESSION NUMBER(S):  [id number]      ORDERING CLINICIAN:  REINA FERNANDEZ      TECHNIQUE:  Axial CT of the right humerus was performed without intravenous  contrast.  Sagittal and coronal two-dimensional reformats were  obtained.      FINDINGS:  Bones:  Acute, comminuted, impacted fracture of the right humeral  neck/metadiaphysis with estimated 1.0 cm of foreshortening. There is  0.8 cm of medial displacement of the distal fracture fragment of the  humerus. There is intra-articular extension noted into the  glenohumeral joint (series 207, image 64).      Soft tissues: There is soft tissue swelling/edema with posttraumatic  hematoma noted surrounding the fracture site.      Muscles:  Muscles of the upper arm demonstrate normal bulk and  morphology. Tendons are limited evaluation on current modality  however grossly within normal limits.      Joints:  Mild degenerative changes of the acromioclavicular and glenohumeral  joints are noted with osteophytes and joint space loss. There is a  small mixed density joint effusion, a component of which is likely  hematoma formation.      Impression: 1. Acute, comminuted impacted fracture of the right humeral  neck/metadiaphysis as detailed above with intra-articular extension  into the glenohumeral joint.  2. Additional sequela of traumatic injury including mixed density  joint effusion and posttraumatic hematoma surrounding the fracture  site as  above.      I personally reviewed the images/study and I agree with the findings  as stated by resident Joseph Ribera. This study was interpreted  at [facility], [place], [place].      MACRO:  None      Signed by: Sebastien Weaver 3/12/2025 7:38 AM  Dictation workstation:   [id number]         PROBLEM LISTS      Problem List Items Addressed This Visit          Musculoskeletal and Injuries    * (Principal) Comminuted fracture of humerus, right, closed, initial encounter - Primary       This dictation was created with voice recognition software. Although every effort is made to review the dictation as it is transcribed, on occasion spoken words, phrases, names, numbers, punctuation etc can be misinterpreted by the the technology leading to omissions or inappropriate outcomes.     Xochilt Rao MD

## 2025-03-13 VITALS
SYSTOLIC BLOOD PRESSURE: 114 MMHG | OXYGEN SATURATION: 94 % | DIASTOLIC BLOOD PRESSURE: 61 MMHG | RESPIRATION RATE: 17 BRPM | HEIGHT: 60 IN | BODY MASS INDEX: 27.68 KG/M2 | HEART RATE: 70 BPM | WEIGHT: 141 LBS | TEMPERATURE: 97.2 F

## 2025-03-13 PROCEDURE — 2500000001 HC RX 250 WO HCPCS SELF ADMINISTERED DRUGS (ALT 637 FOR MEDICARE OP)

## 2025-03-13 PROCEDURE — 2500000004 HC RX 250 GENERAL PHARMACY W/ HCPCS (ALT 636 FOR OP/ED)

## 2025-03-13 PROCEDURE — 2500000001 HC RX 250 WO HCPCS SELF ADMINISTERED DRUGS (ALT 637 FOR MEDICARE OP): Performed by: STUDENT IN AN ORGANIZED HEALTH CARE EDUCATION/TRAINING PROGRAM

## 2025-03-13 PROCEDURE — 99239 HOSP IP/OBS DSCHRG MGMT >30: CPT | Performed by: STUDENT IN AN ORGANIZED HEALTH CARE EDUCATION/TRAINING PROGRAM

## 2025-03-13 PROCEDURE — 2500000002 HC RX 250 W HCPCS SELF ADMINISTERED DRUGS (ALT 637 FOR MEDICARE OP, ALT 636 FOR OP/ED)

## 2025-03-13 RX ORDER — OXYCODONE HYDROCHLORIDE 5 MG/1
5 TABLET ORAL EVERY 6 HOURS PRN
Qty: 15 TABLET | Refills: 0 | Status: SHIPPED | OUTPATIENT
Start: 2025-03-13

## 2025-03-13 RX ADMIN — VENLAFAXINE HYDROCHLORIDE 150 MG: 75 CAPSULE, EXTENDED RELEASE ORAL at 08:59

## 2025-03-13 RX ADMIN — LOSARTAN POTASSIUM 50 MG: 50 TABLET, FILM COATED ORAL at 08:53

## 2025-03-13 RX ADMIN — OXYCODONE 10 MG: 5 TABLET ORAL at 11:21

## 2025-03-13 RX ADMIN — CARVEDILOL 12.5 MG: 12.5 TABLET, FILM COATED ORAL at 08:53

## 2025-03-13 RX ADMIN — HYDRALAZINE HYDROCHLORIDE 25 MG: 25 TABLET ORAL at 08:53

## 2025-03-13 RX ADMIN — ENOXAPARIN SODIUM 40 MG: 100 INJECTION SUBCUTANEOUS at 08:53

## 2025-03-13 RX ADMIN — ACETAMINOPHEN 975 MG: 325 TABLET, FILM COATED ORAL at 08:53

## 2025-03-13 RX ADMIN — TIMOLOL MALEATE 1 DROP: 5 SOLUTION/ DROPS OPHTHALMIC at 08:53

## 2025-03-13 RX ADMIN — VENLAFAXINE HYDROCHLORIDE 75 MG: 75 CAPSULE, EXTENDED RELEASE ORAL at 08:59

## 2025-03-13 RX ADMIN — ASPIRIN 81 MG: 81 TABLET, CHEWABLE ORAL at 08:53

## 2025-03-13 ASSESSMENT — PAIN - FUNCTIONAL ASSESSMENT
PAIN_FUNCTIONAL_ASSESSMENT: 0-10
PAIN_FUNCTIONAL_ASSESSMENT: 0-10

## 2025-03-13 ASSESSMENT — PAIN SCALES - GENERAL
PAINLEVEL_OUTOF10: 4
PAINLEVEL_OUTOF10: 0 - NO PAIN
PAINLEVEL_OUTOF10: 10 - WORST POSSIBLE PAIN

## 2025-03-13 NOTE — CARE PLAN
Problem: Fall/Injury  Goal: Not fall by end of shift  Outcome: Progressing  Goal: Be free from injury by end of the shift  Outcome: Progressing  Goal: Verbalize understanding of personal risk factors for fall in the hospital  Outcome: Progressing  Goal: Verbalize understanding of risk factor reduction measures to prevent injury from fall in the home  Outcome: Progressing  Goal: Use assistive devices by end of the shift  Outcome: Progressing  Goal: Pace activities to prevent fatigue by end of the shift  Outcome: Progressing     Problem: Pain - Adult  Goal: Verbalizes/displays adequate comfort level or baseline comfort level  Outcome: Progressing     Problem: Safety - Adult  Goal: Free from fall injury  Outcome: Progressing     Problem: Discharge Planning  Goal: Discharge to home or other facility with appropriate resources  Outcome: Progressing     Problem: Chronic Conditions and Co-morbidities  Goal: Patient's chronic conditions and co-morbidity symptoms are monitored and maintained or improved  Outcome: Progressing   The patient's goals for the shift include      The clinical goals for the shift include Patient will remain fall free

## 2025-03-13 NOTE — DISCHARGE SUMMARY
"                                                   INTERNAL MEDICINE DISCHARGE SUMMARY             Discharge Diagnosis   Comminuted fracture of humerus, right, closed, initial encounter    Issues Requiring Follow-Up   Outpatient orthopedic surgery follow up     Test Results Pending At Discharge     Pending Labs       No current pending labs.          Hospital Course     Roslyn Cabrera \"Ivette" is a 79 y.o. female presented with hypertension hyperlipidemia stroke with resulting aphasia on low-dose aspirin daily presented to OSH ED with traumatic right shoulder pain. X-ray at OSH showed comminuted fracture of the humeral neck and inferior glenoid fracture and managed nonsurgically with sling placement. Reviewed CBC, CMP, mag from OSH showing mild leukocytosis WBC 12.88 (N predominant), mildly elevated creatinine 1.03 (1.50 1/15/2025). Patient with elevated blood pressure on arrival but otherwise asymptomatic and stable. There were safety concerns for patient returning home and she was transferred to [facility] for PT OT and placement as well as pain control.     Orthopedic surgery was reconsulted and still recommended non op management for humerus fracture. PT/OT recommended acute rehab, however insurance denies AR even after peer to peer. Pt to be discharged to SNF        Pertinent Physical Exam At Time of Discharge     Physical Exam  General: well-appearing, NAD, notable aphasia while speaking  HEENT: NC/AT  Cardiac: rrr, no m/r/g  Lungs: normal work of breathing, CTAB  Abdomen: soft, non-tender, non-distended  Neuro: grossly intact aside from aphasia  MSK: No peripheral edema, cyanosis, or pallor  Right upper extremity in sling position and internal rotation, no tenderness of palpation of right upper shoulder  Psych: no depression, anxiety       Home Medications        Medication List      START taking these medications     oxyCODONE 5 mg immediate release tablet; Commonly known as: Roxicodone;   Take 1 tablet (5 mg) by " mouth every 6 hours if needed for severe pain (7 -   10).     CONTINUE taking these medications     aspirin 81 mg EC tablet; Take 1 tablet (81 mg) by mouth once daily. Do   not start before January 19, 2024.   atorvastatin 40 mg tablet; Commonly known as: Lipitor; Take 1 tablet (40   mg) by mouth once daily at bedtime.   carvedilol 12.5 mg tablet; Commonly known as: Coreg; Take 1 tablet (12.5   mg) by mouth 2 times a day.   coenzyme Q-10 100 mg capsule   hydrALAZINE 25 mg tablet; Commonly known as: Apresoline; Take 1 tablet   (25 mg) by mouth 2 times a day.   latanoprost 0.005 % ophthalmic solution; Commonly known as: Xalatan;   INSTILL 1 DROP INTO EACH AFFECTED EYE EVERYDAY   losartan 100 mg tablet; Commonly known as: Cozaar; Take 0.5 tablets (50   mg) by mouth once daily.   timolol 0.5 % ophthalmic solution; Commonly known as: Timoptic;   Administer 1 drop into both eyes once daily in the morning. Take before   meals.   * venlafaxine XR 75 mg 24 hr capsule; Commonly known as: Effexor-XR;   Take 1 capsule (75 mg) by mouth once daily. Take with food.   * venlafaxine  mg 24 hr capsule; Commonly known as: Effexor-XR;   Take 1 capsule (150 mg) by mouth once daily. Do not crush or chew.   VITAMIN B-12 ORAL  * This list has 2 medication(s) that are the same as other medications   prescribed for you. Read the directions carefully, and ask your doctor or   other care provider to review them with you.     Outpatient Follow-Up     Future Appointments   Date Time Provider Department Center   3/18/2025  1:00 PM GIANCARLO Ovalles [id number] [facility]   3/25/2025  1:00 PM GIANCARLO Ovalles [id number] [facility]   4/24/2025 10:30 AM Corina Marshall MD [id number] [place]   5/16/2025  3:00 PM Joan Wells MD [id number] [place]   7/23/2025  1:00 PM Miller Pickens MD [id number] [place]   11/18/2025 11:20 AM Bhumi Carl ELDA-CNP [id number] Raman Rao MD

## 2025-03-13 NOTE — CONSULTS
PM&R Consult Note  Patient: Roslyn Cabrera   Age/sex: 79 y.o.   Medical Record #: [id number]     Consulting physician: Dr. Garcia      Procedures performed on/related to this admission:  None    Chief complaint:   Impairments and acitivities limitations in ADLs, mobility, functional communication, and cognition secondary to right humerus fracture.    HPI:   Roslyn Cabrera is a 79 y.o. year old female patient with  PMHx of hypertension, hyperlipidemia, and prior stroke with resulting aphasia on low-dose aspirin daily presented to OSH ED with traumatic right shoulder pain. X-ray at OSH showed comminuted fracture of the humeral neck and inferior glenoid fracture and managed nonsurgically with sling placement.      PM&R was consulted for recommendations and for IRF appropriateness.    Present Status: Stable   PO: adult reg  Pain: controlled on tylenol  Bowel: volitional  Bladder: volitional  DVT prophylaxis with lovenox.   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) 02/27/2024    Cataract extraction status, left eye 03/09/2016    Status post left cataract extraction    Cataract extraction status, right eye 03/09/2016    Status post right cataract extraction    COPD (chronic obstructive pulmonary disease) (Multi)     Hypertension     Pain in leg, unspecified 03/18/2015    Leg pain    Peripheral vascular disease, unspecified ([facility]) 06/08/2016    Claudication    Personal history of nicotine dependence 03/18/2015    Quit smoking    Sciatica, unspecified side 09/16/2015    Acute sciatica    Tobacco use 06/08/2016    Tobacco abuse        Past Surgical History:   Procedure Laterality Date    CARDIAC ELECTROPHYSIOLOGY PROCEDURE Left 01/16/2024    Procedure: Loop Insertion;  Surgeon: Odilon Arreaga MD;  Location: [place] [address] Cardiac Cath  Lab;  Service: Electrophysiology;  Laterality: Left;  Norbert to do. Pt aphasic/crypto sroke    CATARACT EXTRACTION      EYE SURGERY      JOINT REPLACEMENT      OTHER SURGICAL HISTORY  02/24/2022    Knee replacement    OTHER SURGICAL HISTORY  02/24/2022    Cataract surgery        No family history on file.     No Known Allergies     acetaminophen, 975 mg, oral, TID  aspirin, 81 mg, oral, Daily  atorvastatin, 40 mg, oral, Nightly  carvedilol, 12.5 mg, oral, BID  enoxaparin, 40 mg, subcutaneous, q24h  hydrALAZINE, 25 mg, oral, BID  latanoprost, 1 drop, Both Eyes, Nightly  losartan, 50 mg, oral, Daily  timolol, 1 drop, Both Eyes, Daily before breakfast  venlafaxine XR, 150 mg, oral, Daily with breakfast  venlafaxine XR, 75 mg, oral, Daily with breakfast         PRN medications: melatonin, morphine, ondansetron **OR** ondansetron, oxyCODONE, oxyCODONE, polyethylene glycol     Social History:  Social supports: alone, 24 hour assistance not available  Home situation: Lives in alone in 3 level [facility] with one step entry    Functional History:  Home DME: none   Premorbid ADL's: independent   Premorbid Mobility: independent   Present: right arm fracture    Physical Therapy: 3/11 walking 130 ft CGA w/ no device    Occupational Therapy: 3/11   Activities of Daily Living:      UE Dressing  UE Dressing Where Assessed: Edge of bed  UE Dressing Comments: adjusted gown onto L shoulder with maxA  LE Dressing  LE Dressing: Yes  Pants Level of Assistance: Maximum assistance, Moderate verbal cues, Tactile cues (donning and doffing pants at EOB)  Sock Level of Assistance: Moderate verbal cues (SBA doffing socks, modA donning socks at EOB)  LE Dressing Where Assessed: Edge of bed    Review of Systems   Constitutional: Denies fever, chills, fatigue, appetite/weight change  HEENT: Denies hearing loss, tinnitus, voice change, troubling swallowing  Cardio: Denies chest pain, leg swelling, palpitations  Respiratory: Denies SOB, cough,  wheezing  GI: Denies Abd pain, constipation, diarrhea, N/V  : Denies difficulty w/ urination, dysuria, urgency, decreased urination  MSK: right arm pain  Neuro: Aphasia, Denies weakness, numbness,  dizziness, light-headedness  Skin: Denies color change, wound, rash  Psych: Denies anxiety, depression, hallucinations, agitation, sleep disturbance      Physical Exam   GENERAL: Awake and alert, well-developed, well-nourished, No acute distress  HEENT - NC/AT  CARDIOVASCULAR: extremities warm, well perfused, DP pulses 2+ bilaterally  RESPIRATORY: no conversational dyspnea, comfortable on room air, symmetrical chest rise  ABDOMEN: Soft, non-tender, normal bowel sounds, no distention  MSK: Ecchymosis to right arm with TTP  SKIN: Normal color, warm, dry, no rashes   Psych: Cooperative, affect appropriate, conversational, good-eye contact    NEURO: A&O x 4, gross motor and sensation intact bilaterally, no focal neurologic deficits  5/5 strength throughout except for RUE secondary to pain  Expressive aphasia      Lab Results   Component Value Date    WBC 8.60 01/15/2025    HGB 11.09 (L) 01/15/2025    HCT 33.7 (L) 01/15/2025    MCV 89.6 01/15/2025    .2 01/15/2025        Lab Results   Component Value Date    CREATININE 1.50 (H) 01/15/2025    BUN 28 (H) 01/15/2025     01/15/2025    K 5.0 01/15/2025     01/15/2025    CO2 25 01/15/2025        IMPRESSION:  Roslyn Cabrera is a 79 y.o. year old female patient with PMHx of hypertension, hyperlipidemia, and prior stroke with resulting aphasia on low-dose aspirin daily presented to OSH ED with traumatic right shoulder pain. X-ray at OSH showed comminuted fracture of the humeral neck and inferior glenoid fracture and managed nonsurgically with sling placement.     Recommendations:  # R humerus Fx  - Non-operative treatment  - NWB to RUE    # HX of CVA  - residual expressive aphasia    # Impaired mobility and Impaired independence with ADLs and I/ADLs  - Continue  PT, working on bed mobility, transfers, balance, endurance, strength, gait, eval for appropriate assistive device  - Continue OT, working on functional cognition, functional mobility, upper limb strength/coordination, balance, endurance, eval for appropriate adaptive equipment, ADLs, and I/ADLs.    # Dispo  - Patient is not appropriate for acute inpatient rehabilitation at this time. Recommend subacute rehabilitation in SNF or home with home therapy if 24/7 care available.     We will follow along with you.  Thank you for the consult.    Tomy Anglin, DO  Physical Medicine and Rehabilitation PGY-4  Available via [place]     Patient seen and examined with attending Dr. Garcia, who agrees with assessment and plan.     Supervisory note:  Seen with Dr BHAVIN Anglin, resident.  I saw and evaluated the patient. I personally obtained the key and critical portions of the history and physical exam. I reviewed the resident's documentation and discussed the patient with the resident. I agree with the resident's medical decision making as documented in the resident's note.     D/w daughter.  SNF more appropriate for this type of injury as otherwise uncomplicated and her old stroke deficit is static.    Anirudh Garcia M.D, FAAPMR, R-MSK  Chief, Division of PM&R  Board Certified in PM&R, Sports Medicine and Musculoskeletal Ultrasound

## 2025-03-13 NOTE — CARE PLAN
The patient's goals for the shift include      The clinical goals for the shift include pt will remain safe and free from falls this shift      Problem: Fall/Injury  Goal: Not fall by end of shift  Outcome: Progressing  Goal: Be free from injury by end of the shift  Outcome: Progressing  Goal: Verbalize understanding of personal risk factors for fall in the hospital  Outcome: Progressing  Goal: Verbalize understanding of risk factor reduction measures to prevent injury from fall in the home  Outcome: Progressing     Problem: Pain - Adult  Goal: Verbalizes/displays adequate comfort level or baseline comfort level  Outcome: Progressing     Problem: Safety - Adult  Goal: Free from fall injury  Outcome: Progressing     Problem: Discharge Planning  Goal: Discharge to home or other facility with appropriate resources  Outcome: Progressing     Problem: Chronic Conditions and Co-morbidities  Goal: Patient's chronic conditions and co-morbidity symptoms are monitored and maintained or improved  Outcome: Progressing

## 2025-03-13 NOTE — PROGRESS NOTES
"Physical Therapy                 Therapy Communication Note    Patient Name: Roslyn Cabrera \"Pam\"  MRN: [id number]  Department: [facility] 3  Room: [address] [place] [address]-A  Today's Date: 3/13/2025     Discipline: Physical Therapy    PT Missed Visit: Yes     Missed Visit Reason: Missed Visit Reason:  (Attempted follow up however pt. in increased pain - alerted RN who said they would address with more medication. Adjusted sling as it was not supporting right ue.)    Missed Time: Attempt    Comment:  "

## 2025-03-13 NOTE — NURSING NOTE
Assumed care of patient. Patient resting in bed. Patient can be anxious, she is an assist to bathroom. Bed alarm on and call bell within reach. Bed locked in low position. Safety maintained.     This evening patient's daughter was in to see patient. She wanted patient's sling put back onto her right arm. Patient was in the bathroom and nurse had to go to another patient's room.     Patient sling was back on her shoulder and arm and she stated someone else put it on and she couldn't remember. Patient is alert x 2-3, very forgetful. Safety was maintained.

## 2025-03-13 NOTE — PROGRESS NOTES
03/13/25 1000   Discharge Planning   Home or Post Acute Services Post acute facilities (Rehab/SNF/etc)   Type of Post Acute Facility Services Skilled nursing   Expected Discharge Disposition SNF   Does the patient need discharge transport arranged? Yes   RoundTrip coordination needed? Yes   Has discharge transport been arranged? Yes   What day is the transport expected? 03/13/25   What time is the transport expected? 1430     Patient to discharge to [facility] SNF today. Transport arranged via [facility] stretcher for 230p. Patient, dtr MD Wendi, Bhumi HOLMAN aware. Blue slip delivered to unit secretary.  Willow MATHUR, RN  Transitional Care Coordinator (TCC)  [phone]

## 2025-03-18 ENCOUNTER — APPOINTMENT (OUTPATIENT)
Dept: SPEECH THERAPY | Facility: CLINIC | Age: 80
End: 2025-03-18
Payer: MEDICARE

## 2025-03-25 ENCOUNTER — APPOINTMENT (OUTPATIENT)
Dept: SPEECH THERAPY | Facility: CLINIC | Age: 80
End: 2025-03-25
Payer: MEDICARE

## 2025-03-25 ENCOUNTER — TELEPHONE (OUTPATIENT)
Dept: PRIMARY CARE | Facility: CLINIC | Age: 80
End: 2025-03-25
Payer: MEDICARE

## 2025-03-25 DIAGNOSIS — N30.00 ACUTE CYSTITIS WITHOUT HEMATURIA: Primary | ICD-10-CM

## 2025-03-25 RX ORDER — CIPROFLOXACIN 500 MG/1
500 TABLET ORAL 2 TIMES DAILY
Qty: 20 TABLET | Refills: 0 | Status: SHIPPED | OUTPATIENT
Start: 2025-03-25 | End: 2025-04-04

## 2025-03-26 NOTE — PROGRESS NOTES
Subjective   Pam Cabrera is a 79 y.o. female who presents for follow-up status post ED visit for right humeral fracture.    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  Takes medication prescribed.  Patient was in the emergency room March 13, 2025 with comminuted fracture of the humerus right patient is recommended nonsurgical approach and physical therapy.  She has since also developed what daughter describes a urinary tract infection for which she is on Cipro.  Patient is accompanied by daughter helps with history.    Review of Systems  10 system review pertinent as above  Objective     Visit Vitals  /74   Pulse 78   Temp 36.5 °C (97.7 °F)   Resp 14          Physical Exam  HEENT: Atraumatic normocephalic the pupils are equal and round and reactive to light the sclerae nonicteric extraocular motion are intact.  Neck: Is supple without JVD no carotid bruits the trachea is midline there are no masses pulses are equal and bilateral with normal upstroke.  Skin: Normal.  Skin good texture.  Moist.  Good turgor.  No lesions, no rashes.  Lymph: No lymphadenopathy appreciated, no masses, no lesions  Lungs: Are clear to auscultation and percussion, good breath sounds bilaterally, no rhonchi, no wheezing, good diaphragmatic excursion.  Heart: Normal rate and normal rhythm S1, S2, no S3, no gallop, murmur or rub.  Abdomen: Soft, nontender, no organomegaly, good bowel sounds.    Extremities: Full range of motion, good pulses bilateral.  No cyanosis, no clubbing or edema.  Neuro: Cranial nerves II-XII are grossly intact there is no  sensory or motor deficits.  Able to move all extremities.    Right humerus dressed with a sling      Assessment/Plan     Accompanied  her daughter    ED visit March 13, 2025  Comminuted fracture of the humerus right outpatient orthopedic surgery follow-up recommended physical therapy  Nonsurgical approaches  Will refer to ortho   [place]    Acute uti ( home test)  On cipro for 10 days  Will recheck urine 10 days after antibiotics      History of acute kidney injury  In the setting of dehydration  Patient is instructed to increase fluid intake  She is accompanied by her daughter  BMP      CVA, January 2024  MCA embolic aphasia, apraxia  Speech therapy physical therapy  Follows with cardiology no atrial fibrillation  Follows with device clinic loop recorder  Continue baby aspirin, Lipitor carvedilol  On Baby ASA  Needs speech therapy     Cardiovascular disease prevention  Diet exercise weight loss  Eat healthy  Remain on current medications  Would repeat your lipid panel  We spoke of health and activities  To improve cardiovascular    Patient remains tobacco free  Consulted with patient regarding remaining tobacco free    Hypertension  No added salt diet, do not and salt to your food  Try to exercise every other day for 30 minutes  Continue current medications  Continue carvedilol 12.5 mg twice a day hydralazine 25 mg 2 times a day  Losartan 100 mg daily    Open angle glaucoma  Follows with ophthalmology  Continue eyedrops as prescribed    History of tobacco abuse  Stopped smoking January 2024  Consult with patient regarding remaining smoke-free    Continue with the low-fat, low-cholesterol diet,  I recommended Mediterranean diet, which include fish, chicken, vegetables and olive oil  Exercise daily for 30 minutes at least 3 times a week  Continue home medications    Mammogram  Follows with GYN Dr. 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 Justen Lopez      Right inguinal hernia  Repair with Dr. Cristofer Cartagena  Circa September 2022    Vaginal hysterectomy May 30, 2013  Continues to follow with GYN         Problem List Items Addressed This Visit       Benign essential HTN    Dyslipidemia    Aphasia as late effect of cerebrovascular accident    History of CVA (cerebrovascular accident)    Fracture of right shoulder with routine healing - Primary    Relevant Orders    Referral to Orthopedics and Sports Medicine    Acute cystitis without hematuria           Oscar Milner MD

## 2025-03-27 ENCOUNTER — OFFICE VISIT (OUTPATIENT)
Dept: PRIMARY CARE | Facility: CLINIC | Age: 80
End: 2025-03-27
Payer: MEDICARE

## 2025-03-27 VITALS
HEART RATE: 78 BPM | HEIGHT: 60 IN | TEMPERATURE: 97.7 F | BODY MASS INDEX: 26.9 KG/M2 | DIASTOLIC BLOOD PRESSURE: 74 MMHG | WEIGHT: 137 LBS | SYSTOLIC BLOOD PRESSURE: 110 MMHG | RESPIRATION RATE: 14 BRPM

## 2025-03-27 DIAGNOSIS — I69.320 APHASIA AS LATE EFFECT OF CEREBROVASCULAR ACCIDENT: ICD-10-CM

## 2025-03-27 DIAGNOSIS — S42.91XD CLOSED FRACTURE OF RIGHT SHOULDER WITH ROUTINE HEALING, SUBSEQUENT ENCOUNTER: Primary | ICD-10-CM

## 2025-03-27 DIAGNOSIS — Z86.73 HISTORY OF CVA (CEREBROVASCULAR ACCIDENT): ICD-10-CM

## 2025-03-27 DIAGNOSIS — E78.5 DYSLIPIDEMIA: ICD-10-CM

## 2025-03-27 DIAGNOSIS — I10 BENIGN ESSENTIAL HTN: ICD-10-CM

## 2025-03-27 DIAGNOSIS — N30.00 ACUTE CYSTITIS WITHOUT HEMATURIA: ICD-10-CM

## 2025-03-27 PROCEDURE — G2211 COMPLEX E/M VISIT ADD ON: HCPCS | Performed by: INTERNAL MEDICINE

## 2025-03-27 PROCEDURE — 1111F DSCHRG MED/CURRENT MED MERGE: CPT | Performed by: INTERNAL MEDICINE

## 2025-03-27 PROCEDURE — 3074F SYST BP LT 130 MM HG: CPT | Performed by: INTERNAL MEDICINE

## 2025-03-27 PROCEDURE — 3078F DIAST BP <80 MM HG: CPT | Performed by: INTERNAL MEDICINE

## 2025-03-27 PROCEDURE — 1123F ACP DISCUSS/DSCN MKR DOCD: CPT | Performed by: INTERNAL MEDICINE

## 2025-03-27 PROCEDURE — 1125F AMNT PAIN NOTED PAIN PRSNT: CPT | Performed by: INTERNAL MEDICINE

## 2025-03-27 PROCEDURE — 1160F RVW MEDS BY RX/DR IN RCRD: CPT | Performed by: INTERNAL MEDICINE

## 2025-03-27 PROCEDURE — 1159F MED LIST DOCD IN RCRD: CPT | Performed by: INTERNAL MEDICINE

## 2025-03-27 PROCEDURE — 1036F TOBACCO NON-USER: CPT | Performed by: INTERNAL MEDICINE

## 2025-03-27 PROCEDURE — 1158F ADVNC CARE PLAN TLK DOCD: CPT | Performed by: INTERNAL MEDICINE

## 2025-03-27 PROCEDURE — 99214 OFFICE O/P EST MOD 30 MIN: CPT | Performed by: INTERNAL MEDICINE

## 2025-03-27 ASSESSMENT — PAIN SCALES - GENERAL: PAINLEVEL_OUTOF10: 4

## 2025-03-27 ASSESSMENT — ENCOUNTER SYMPTOMS
OCCASIONAL FEELINGS OF UNSTEADINESS: 0
DEPRESSION: 0
LOSS OF SENSATION IN FEET: 0

## 2025-03-28 ENCOUNTER — DOCUMENTATION (OUTPATIENT)
Dept: CARE COORDINATION | Facility: CLINIC | Age: 80
End: 2025-03-28
Payer: MEDICARE

## 2025-03-28 ENCOUNTER — PATIENT OUTREACH (OUTPATIENT)
Dept: CARE COORDINATION | Facility: CLINIC | Age: 80
End: 2025-03-28
Payer: MEDICARE

## 2025-03-28 SDOH — ECONOMIC STABILITY: GENERAL: WOULD YOU LIKE HELP WITH ANY OF THE FOLLOWING NEEDS?: I DONT NEED HELP WITH ANY OF THESE

## 2025-03-28 NOTE — PROGRESS NOTES
Outreach call to patient to support a smooth transition of care from recent admission.  Spoke with patient, reviewed discharge medications, discharge instructions, assessed social needs, and provided education on importance of follow-up appointment with provider.  Will continue to monitor through transition period.  Medications  Medications reviewed with patient/caregiver?: Yes (3/28/2025  2:03 PM)  Is the patient having any side effects they believe may be caused by any medication additions or changes?: No (3/28/2025  2:03 PM)  Does the patient have all medications ordered at discharge?: Yes (3/28/2025  2:03 PM)  Care Management Interventions: No intervention needed (3/28/2025  2:03 PM)  Is the patient taking all medications as directed (includes completed medication regime)?: Yes (3/28/2025  2:03 PM)    Appointments  Does the patient have a primary care provider?: Yes (3/28/2025  2:03 PM)  Care Management Interventions: Verified appointment date/time/provider (3/28/2025  2:03 PM)  Has the patient kept scheduled appointments due by today?: Yes (3/28/2025  2:03 PM)  Care Management Interventions: Advised patient to keep appointment (3/28/2025  2:03 PM)    Patient Teaching  Does the patient have access to their discharge instructions?: Yes (3/28/2025  2:03 PM)  Care Management Interventions: Reviewed instructions with patient (3/28/2025  2:03 PM)  What is the patient's perception of their health status since discharge?: Improving (3/28/2025  2:03 PM)      Fabiana BROCKN, RN, [facility]  O: [phone]

## 2025-03-31 ENCOUNTER — HOSPITAL ENCOUNTER (OUTPATIENT)
Dept: CARDIOLOGY | Facility: CLINIC | Age: 80
Discharge: HOME | End: 2025-03-31
Payer: MEDICARE

## 2025-03-31 DIAGNOSIS — I63.9 CEREBRAL INFARCTION, UNSPECIFIED: ICD-10-CM

## 2025-04-03 PROCEDURE — G0180 MD CERTIFICATION HHA PATIENT: HCPCS | Performed by: INTERNAL MEDICINE

## 2025-04-08 ENCOUNTER — HOSPITAL ENCOUNTER (OUTPATIENT)
Dept: RADIOLOGY | Facility: CLINIC | Age: 80
Discharge: HOME | End: 2025-04-08
Payer: MEDICARE

## 2025-04-08 ENCOUNTER — OFFICE VISIT (OUTPATIENT)
Dept: ORTHOPEDIC SURGERY | Facility: CLINIC | Age: 80
End: 2025-04-08
Payer: MEDICARE

## 2025-04-08 DIAGNOSIS — M25.511 RIGHT SHOULDER PAIN, UNSPECIFIED CHRONICITY: Primary | ICD-10-CM

## 2025-04-08 DIAGNOSIS — M25.511 RIGHT SHOULDER PAIN, UNSPECIFIED CHRONICITY: ICD-10-CM

## 2025-04-08 DIAGNOSIS — S42.91XD CLOSED FRACTURE OF RIGHT SHOULDER WITH ROUTINE HEALING, SUBSEQUENT ENCOUNTER: ICD-10-CM

## 2025-04-08 PROCEDURE — 99214 OFFICE O/P EST MOD 30 MIN: CPT | Performed by: STUDENT IN AN ORGANIZED HEALTH CARE EDUCATION/TRAINING PROGRAM

## 2025-04-08 PROCEDURE — 1036F TOBACCO NON-USER: CPT | Performed by: STUDENT IN AN ORGANIZED HEALTH CARE EDUCATION/TRAINING PROGRAM

## 2025-04-08 PROCEDURE — 73030 X-RAY EXAM OF SHOULDER: CPT | Mod: RT

## 2025-04-08 PROCEDURE — 1159F MED LIST DOCD IN RCRD: CPT | Performed by: STUDENT IN AN ORGANIZED HEALTH CARE EDUCATION/TRAINING PROGRAM

## 2025-04-08 PROCEDURE — 73030 X-RAY EXAM OF SHOULDER: CPT | Mod: RIGHT SIDE

## 2025-04-08 PROCEDURE — 1111F DSCHRG MED/CURRENT MED MERGE: CPT | Performed by: STUDENT IN AN ORGANIZED HEALTH CARE EDUCATION/TRAINING PROGRAM

## 2025-04-08 NOTE — PROGRESS NOTES
CHIEF COMPLAINT:   Chief Complaint   Patient presents with    Right Shoulder - Pain     History: 79 y.o. female presents to the office today for evaluation of her right shoulder.  The patient is accompanied by her daughter.  She does have aphasia due to her previous stroke.  Unfortunately, she was walking across the parking and fell about 4 weeks ago.  She has been in a sling for a proximal humerus fracture.  Prior to this her shoulder was functioning well.    Past medical history, past surgical history, medications, allergies, family history, social history, and review of systems were reviewed today.    A 12 point review of systems was negative other than as stated in the HPI.    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) 02/27/2024    Cataract extraction status, left eye 03/09/2016    Status post left cataract extraction    Cataract extraction status, right eye 03/09/2016    Status post right cataract extraction    COPD (chronic obstructive pulmonary disease) (Multi)     Hypertension     Pain in leg, unspecified 03/18/2015    Leg pain    Peripheral vascular disease, unspecified ([facility]) 06/08/2016    Claudication    Personal history of nicotine dependence 03/18/2015    Quit smoking    Sciatica, unspecified side 09/16/2015    Acute sciatica    Tobacco use 06/08/2016    Tobacco abuse        No Known Allergies     Past Surgical History:   Procedure Laterality Date    CARDIAC ELECTROPHYSIOLOGY PROCEDURE Left 01/16/2024    Procedure: Loop Insertion;  Surgeon: Odilon Arreaga MD;  Location: [place] [address] Cardiac Cath Lab;  Service: Electrophysiology;  Laterality: Left;  Norbert to do. Pt aphasic/crypto sroke    CATARACT EXTRACTION      EYE SURGERY      JOINT REPLACEMENT      OTHER SURGICAL HISTORY  02/24/2022    Knee replacement    OTHER SURGICAL HISTORY  02/24/2022    Cataract surgery        No family  history on file.     Social History     Socioeconomic History    Marital status: Single     Spouse name: Not on file    Number of children: Not on file    Years of education: Not on file    Highest education level: Not on file   Occupational History    Not on file   Tobacco Use    Smoking status: Former     Current packs/day: 0.00     Average packs/day: 0.5 packs/day for 44.0 years (22.0 ttl pk-yrs)     Types: Cigarettes     Start date:      Quit date:      Years since quittin.2     Passive exposure: Past    Smokeless tobacco: Never   Vaping Use    Vaping status: Never Used   Substance and Sexual Activity    Alcohol use: Never    Drug use: Never    Sexual activity: Not Currently     Partners: Male   Other Topics Concern    Not on file   Social History Narrative    Not on file     Social Drivers of Health     Financial Resource Strain: Low Risk  (3/9/2025)    Overall Financial Resource Strain (CARDIA)     Difficulty of Paying Living Expenses: Not hard at all   Food Insecurity: No Food Insecurity (3/9/2025)    Hunger Vital Sign     Worried About Running Out of Food in the Last Year: Never true     Ran Out of Food in the Last Year: Never true   Transportation Needs: No Transportation Needs (3/9/2025)    PRAPARE - Transportation     Lack of Transportation (Medical): No     Lack of Transportation (Non-Medical): No   Physical Activity: Inactive (3/9/2025)    Exercise Vital Sign     Days of Exercise per Week: 0 days     Minutes of Exercise per Session: 0 min   Stress: No Stress Concern Present (3/9/2025)    [employer] [place] [employer] - Occupational Stress Questionnaire     Feeling of Stress : Not at all   Social Connections: Unknown (3/9/2025)    Social Connection and Isolation Panel [NHANES]     Frequency of Communication with Friends and Family: Once a week     Frequency of Social Gatherings with Friends and Family: Not on file     Attends [employer] Services: Not on file     Active Member of  Clubs or Organizations: Not on file     Attends Club or Organization Meetings: Not on file     Marital Status: Not on file   Intimate Partner Violence: Not At Risk (3/9/2025)    Humiliation, Afraid, Rape, and Kick questionnaire     Fear of Current or Ex-Partner: No     Emotionally Abused: No     Physically Abused: No     Sexually Abused: No   Housing Stability: Low Risk  (3/9/2025)    Housing Stability Vital Sign     Unable to Pay for Housing in the Last Year: No     Number of Times Moved in the Last Year: 0     Homeless in the Last Year: No        CURRENT MEDICATIONS:   Current Outpatient Medications   Medication Sig Dispense Refill    aspirin 81 mg EC tablet Take 1 tablet (81 mg) by mouth once daily. Do not start before January 19, 2024. 90 tablet 3    atorvastatin (Lipitor) 40 mg tablet Take 1 tablet (40 mg) by mouth once daily at bedtime. 90 tablet 3    carvedilol (Coreg) 12.5 mg tablet Take 1 tablet (12.5 mg) by mouth 2 times a day. 180 tablet 3    coenzyme Q-10 100 mg capsule Take 1 capsule (100 mg) by mouth once daily.      cyanocobalamin, vitamin B-12, (VITAMIN B-12 ORAL) Take 1 tablet by mouth once daily.      hydrALAZINE (Apresoline) 25 mg tablet Take 1 tablet (25 mg) by mouth 2 times a day. 90 tablet 3    latanoprost (Xalatan) 0.005 % ophthalmic solution INSTILL 1 DROP INTO EACH AFFECTED EYE EVERYDAY (Patient taking differently: Administer 1 drop into affected eye(s) once daily.) 7.5 mL 3    losartan (Cozaar) 100 mg tablet Take 0.5 tablets (50 mg) by mouth once daily. 90 tablet 3    oxyCODONE (Roxicodone) 5 mg immediate release tablet Take 1 tablet (5 mg) by mouth every 6 hours if needed for severe pain (7 - 10). 15 tablet 0    timolol (Timoptic) 0.5 % ophthalmic solution Administer 1 drop into both eyes once daily in the morning. Take before meals. 30 mL 3    venlafaxine XR (Effexor-XR) 150 mg 24 hr capsule Take 1 capsule (150 mg) by mouth once daily. Do not crush or chew. 30 capsule 1    venlafaxine XR  (Effexor-XR) 75 mg 24 hr capsule Take 1 capsule (75 mg) by mouth once daily. Take with food. 30 capsule 1     No current facility-administered medications for this visit.       Physical Examination:      3/12/2025     5:24 AM 3/12/2025     1:34 PM 3/12/2025     8:53 PM 3/13/2025     5:43 AM 3/13/2025     9:00 AM 3/13/2025     1:43 PM 3/27/2025     1:41 PM   Vitals   Systolic 138 107 123 145 142 114 110   Diastolic 70 49 54 73 85 61 74   Heart Rate 70 67 63 70 78 70 78   Temp 36.6 °C (97.9 °F) 35.8 °C (96.4 °F) 36 °C (96.8 °F) 36.3 °C (97.3 °F)  36.2 °C (97.2 °F) 36.5 °C (97.7 °F)   Resp 18  16 17   14   Height       1.524 m (5')   Weight (lb)       137   BMI       26.76 kg/m2   BSA (m2)       1.62 m2   Visit Report       Report      There is no height or weight on file to calculate BMI.    Well-appearing, appears stated age, pleasant and cooperative, appropriate mood and behavior. Height and weight reviewed. Alert and oriented x3.  Auditory function intact.  No acute distress.  Intact ocular function, RICARDO, EOMI. Breathing is unlabored .  There is no evidence of jugular venous distension. Skin appearance is normal without evidence of rash or other lesions. 2+ radial pulses bilaterally, fingers pink and wwp, good capillary refill, no pitting edema. No appreciable lymphadenopathy in bilateral upper extremities. SILT throughout both upper extremities, median/radial/ulnar/musculocutaneous/axillary nerve motor and sensory intact (except for abnormalities noted in focused musculoskeletal exam section below).     Neck exam: Full range of motion of the neck in flexion/extension and rotational movements. No significant areas of tenderness to palpation in the neck.    On exam of bilateral upper extremities, swelling about the right shoulder.  Passively, the forward flexion about 70, external rotated 10.  The shoulder appears to be moving as a unit.  Neurovascular intact.    Imaging: Radiographs of the right shoulder  performed today.  First interpreted by myself.  Minimally displaced proximal humerus fracture with signs of callus formation.    Assessment: Right proximal humerus fracture    Plan: Discussed with the patient and family that her fracture is amenable to nonoperative treatment.  Discussed that these fractures often take up to 6 months to get the final clinical result and can take up to 2 months to heal if not longer.  At this point though, she is already forming callus and I am okay with her coming out of the sling to start physical therapy.  Therapy was ordered for her today.  We will try to have her do home therapy.  From my standpoint she can use the arm as tolerated and gradually increase her activity.  Discussed that with proximal humerus fractures, she likely will never get the range of motion that she used to have, but should be good enough to be able to do daily activities.  Worst-case scenario we could always converted to a shoulder replacement if needed.  Follow-up in about 6 weeks with new x-rays.    Dragon software was used to dictate this note, please be aware that minor errors in transcription may be present.    Rajeev Cooper MD    Shoulder/Elbow Surgery  [facility] [place]

## 2025-04-09 ENCOUNTER — PATIENT OUTREACH (OUTPATIENT)
Dept: CARE COORDINATION | Facility: CLINIC | Age: 80
End: 2025-04-09
Payer: MEDICARE

## 2025-04-09 NOTE — PROGRESS NOTES
Attempted outreach call to patient following up on an appointment with the care provider.  No answer, no voice mail set up.    Will continue to follow.        Fabiana MATHUR, RN, [facility]  O: [phone]

## 2025-04-14 ENCOUNTER — HOSPITAL ENCOUNTER (OUTPATIENT)
Dept: CARDIOLOGY | Facility: CLINIC | Age: 80
Discharge: HOME | End: 2025-04-14
Payer: MEDICARE

## 2025-04-14 DIAGNOSIS — I63.9 CEREBRAL INFARCTION, UNSPECIFIED: ICD-10-CM

## 2025-04-14 PROCEDURE — 93298 REM INTERROG DEV EVAL SCRMS: CPT

## 2025-04-21 ENCOUNTER — TELEPHONE (OUTPATIENT)
Dept: PRIMARY CARE | Facility: CLINIC | Age: 80
End: 2025-04-21
Payer: MEDICARE

## 2025-04-21 DIAGNOSIS — N39.0 ACUTE UTI: Primary | ICD-10-CM

## 2025-04-21 RX ORDER — NITROFURANTOIN (MACROCRYSTALS) 100 MG/1
100 CAPSULE ORAL 4 TIMES DAILY
Qty: 40 CAPSULE | Refills: 0 | Status: SHIPPED | OUTPATIENT
Start: 2025-04-21 | End: 2025-05-01

## 2025-04-23 RX ORDER — FLUCONAZOLE 100 MG/1
100 TABLET ORAL DAILY
Qty: 5 TABLET | Refills: 0 | Status: SHIPPED | OUTPATIENT
Start: 2025-04-23 | End: 2025-04-24 | Stop reason: WASHOUT

## 2025-04-24 ENCOUNTER — APPOINTMENT (OUTPATIENT)
Dept: NEUROLOGY | Facility: CLINIC | Age: 80
End: 2025-04-24
Payer: MEDICARE

## 2025-04-24 VITALS
WEIGHT: 135.3 LBS | HEART RATE: 62 BPM | BODY MASS INDEX: 26.56 KG/M2 | SYSTOLIC BLOOD PRESSURE: 110 MMHG | HEIGHT: 60 IN | DIASTOLIC BLOOD PRESSURE: 68 MMHG | TEMPERATURE: 99.6 F

## 2025-04-24 DIAGNOSIS — F32.A DEPRESSION, UNSPECIFIED DEPRESSION TYPE: ICD-10-CM

## 2025-04-24 DIAGNOSIS — I69.320 APHASIA AS LATE EFFECT OF CEREBROVASCULAR ACCIDENT: ICD-10-CM

## 2025-04-24 DIAGNOSIS — I69.390 APRAXIA AS LATE EFFECT OF CEREBROVASCULAR ACCIDENT (CVA): ICD-10-CM

## 2025-04-24 DIAGNOSIS — I63.412 STROKE DUE TO EMBOLISM OF LEFT MIDDLE CEREBRAL ARTERY (MULTI): Primary | ICD-10-CM

## 2025-04-24 DIAGNOSIS — G47.30 SLEEP APNEA, UNSPECIFIED TYPE: ICD-10-CM

## 2025-04-24 PROCEDURE — 1159F MED LIST DOCD IN RCRD: CPT | Performed by: STUDENT IN AN ORGANIZED HEALTH CARE EDUCATION/TRAINING PROGRAM

## 2025-04-24 PROCEDURE — 3078F DIAST BP <80 MM HG: CPT | Performed by: STUDENT IN AN ORGANIZED HEALTH CARE EDUCATION/TRAINING PROGRAM

## 2025-04-24 PROCEDURE — 1160F RVW MEDS BY RX/DR IN RCRD: CPT | Performed by: STUDENT IN AN ORGANIZED HEALTH CARE EDUCATION/TRAINING PROGRAM

## 2025-04-24 PROCEDURE — 1036F TOBACCO NON-USER: CPT | Performed by: STUDENT IN AN ORGANIZED HEALTH CARE EDUCATION/TRAINING PROGRAM

## 2025-04-24 PROCEDURE — 99214 OFFICE O/P EST MOD 30 MIN: CPT | Performed by: STUDENT IN AN ORGANIZED HEALTH CARE EDUCATION/TRAINING PROGRAM

## 2025-04-24 PROCEDURE — G2211 COMPLEX E/M VISIT ADD ON: HCPCS | Performed by: STUDENT IN AN ORGANIZED HEALTH CARE EDUCATION/TRAINING PROGRAM

## 2025-04-24 PROCEDURE — 3074F SYST BP LT 130 MM HG: CPT | Performed by: STUDENT IN AN ORGANIZED HEALTH CARE EDUCATION/TRAINING PROGRAM

## 2025-04-24 RX ORDER — VENLAFAXINE HYDROCHLORIDE 150 MG/1
150 CAPSULE, EXTENDED RELEASE ORAL DAILY
Qty: 90 CAPSULE | Refills: 3 | Status: SHIPPED | OUTPATIENT
Start: 2025-04-24 | End: 2026-04-24

## 2025-04-24 RX ORDER — VENLAFAXINE HYDROCHLORIDE 75 MG/1
75 CAPSULE, EXTENDED RELEASE ORAL DAILY
Qty: 90 CAPSULE | Refills: 3 | Status: SHIPPED | OUTPATIENT
Start: 2025-04-24 | End: 2026-04-24

## 2025-04-24 ASSESSMENT — LIFESTYLE VARIABLES
SKIP TO QUESTIONS 9-10: 1
AUDIT-C TOTAL SCORE: 0
HOW OFTEN DO YOU HAVE A DRINK CONTAINING ALCOHOL: NEVER
HOW MANY STANDARD DRINKS CONTAINING ALCOHOL DO YOU HAVE ON A TYPICAL DAY: PATIENT DOES NOT DRINK
HOW OFTEN DO YOU HAVE SIX OR MORE DRINKS ON ONE OCCASION: NEVER

## 2025-04-24 ASSESSMENT — PATIENT HEALTH QUESTIONNAIRE - PHQ9
SUM OF ALL RESPONSES TO PHQ9 QUESTIONS 1 & 2: 0
2. FEELING DOWN, DEPRESSED OR HOPELESS: NOT AT ALL
1. LITTLE INTEREST OR PLEASURE IN DOING THINGS: NOT AT ALL

## 2025-04-24 NOTE — PROGRESS NOTES
[facility]   Referring: No ref. provider found  PCP: @PCP@  Chief Complaint   Patient presents with    Stroke     Patient is here for a follow up from a stroke in Jan. 2024.  Patient is here with her daughter and has some forgetfulness.  Patient states she is having some balance and gait issues.  Patient is taking her BP medication losartan and hydralazine.  She is also on a low dose aspirin.  Patient would like to discuss her apraxia and aphasia.  She is still in PT.         Subjective   Roslyn Cabrera is a 79 y.o. year old female presenting for visit for follow-up  for stroke. The patient has a history of a L MCA stroke after presenting with sudden onset of difficulty speaking, Wernicke's aphasia. NIHSS 1. CT head w/o contrast shows global atrophy and advanced white matter disease. CTA shows no significant evidence of intracranial or extracranial disease. MRI brain shows new diffusion restriction in left parietal and left occipital regions in distal MCA distribution. 1h EEG negative for epileptiform discharges. Echo revealed severely dilated left atrium, no afib on telemetry.     4/24/25: She continues to have difficulty with expressive aphasia. She was recently hospitalized from 3/8-3/13 for a humeral fracture managed nonoperatively as well as a urinary tract infection. She is still on treatment for the UTI. She is still following with speech therapy. Daughter notes mild cognitive decline since the last hospitalization and UTI Dx. Patient does not drive, manage finances, or manage her medications. Patient lives on her own and is able to do basic chores around the house. Still maintaining on Aspirin 81mg and Atorvastatin 40mg daily. ILR in place with no arrhythmia noted.     She was last seen 9/18/2024.     Problem List[1]    Objective   Neurological Exam  Mental Status   Speech: Mildly dysfluent speech. Expressive aphasia present.    Cranial Nerves  CN III, IV, VI: Extraocular movements  intact bilaterally.  CN VII: Full and symmetric facial movement.  CN VIII: Hearing is normal.    Motor   Strength is 5/5 throughout all four extremities.    Physical Exam  Eyes:      Extraocular Movements: Extraocular movements intact.   Neurological:      Motor: Motor strength is normal.        Assessment/Plan     History of Cryptogenic Embolic L Parietal Stroke  Apraxia  Aphasia  -Continue Aspirin 81mg daily   -Continue Atorvastatin 40mg daily  -Patient to continue working with speech therapy  -Patient is able to live independently however does not drive. Family provides assistance with transportation, organizing medications, and finances.   -ILR without arrhythmias    UTI on Nitrofurantoin  Hx humeral fracture  -Suspect UTI and recent hospitalization are associated with the patients most recent memory issues    Follow up in 6 months          [1]   Patient Active Problem List  Diagnosis    Depression    Benign essential HTN    Bilateral optic nerve atrophy    Blurry vision    Dyslipidemia    Vitamin D insufficiency    Tobacco abuse    Ankle edema    Cellulitis of leg, left    Cellulitis of right lower extremity    Cobalamin deficiency    Hallucinatory illusions    Hemangioma of skin and subcutaneous tissue    Hyperlipidemia    Intermediate stage nonexudative age-related macular degeneration of both eyes    Traumatic injury of right lower extremity    Nail abnormality    OP (osteoporosis)    Osteoarthritis of both knees    Osteopenia    Other melanin hyperpigmentation    Inflamed seborrheic keratosis    Primary open angle glaucoma (POAG) of both eyes    Rheumatoid arthritis    Right inguinal hernia    Sensorineural hearing loss, bilateral    Tuberculosis    Uncontrolled hypertension    Vision impairment    Visual distortions    Wound cellulitis    Wound dehiscence    Anxiety and depression    Stroke due to embolism of left middle cerebral artery (Multi)    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)     Aphasia as late effect of cerebrovascular accident    Malignant neoplasm of cervix, unspecified site (Multi)    Pulmonary emphysema, unspecified emphysema type (Multi)    History of CVA (cerebrovascular accident)    Strain of lumbar region    Hemiplegia affecting right dominant side, unspecified etiology, unspecified hemiplegia type (Multi)    Chronic diastolic (congestive) heart failure    Diabetes mellitus due to underlying condition with diabetic macular edema of both eyes resolved after treatment, without long-term current use of insulin    Chronic kidney disease, stage 3a (Multi)    Routine general medical examination at health care facility    Comminuted fracture of humerus, right, closed, initial encounter    Fracture of right shoulder with routine healing    Acute cystitis without hematuria

## 2025-04-28 ENCOUNTER — PATIENT OUTREACH (OUTPATIENT)
Dept: CARE COORDINATION | Facility: CLINIC | Age: 80
End: 2025-04-28
Payer: MEDICARE

## 2025-04-28 NOTE — PROGRESS NOTES
Outreach call to patient to check in 30 days after hospital discharge to support smooth transition of care.  Patient with no additional needs noted. No additional outreach needed at this time.     Fabiana BROCKN, RN, [facility]  O: [phone]

## 2025-05-05 ENCOUNTER — HOSPITAL ENCOUNTER (OUTPATIENT)
Dept: CARDIOLOGY | Facility: CLINIC | Age: 80
Discharge: HOME | End: 2025-05-05
Payer: MEDICARE

## 2025-05-05 DIAGNOSIS — I63.9 CEREBRAL INFARCTION, UNSPECIFIED: ICD-10-CM

## 2025-05-14 ENCOUNTER — HOSPITAL ENCOUNTER (OUTPATIENT)
Dept: CARDIOLOGY | Facility: CLINIC | Age: 80
Discharge: HOME | End: 2025-05-14
Payer: MEDICARE

## 2025-05-14 DIAGNOSIS — I63.9 CEREBRAL INFARCTION, UNSPECIFIED: ICD-10-CM

## 2025-05-16 ENCOUNTER — APPOINTMENT (OUTPATIENT)
Dept: OPHTHALMOLOGY | Facility: CLINIC | Age: 80
End: 2025-05-16
Payer: MEDICARE

## 2025-05-20 ENCOUNTER — TELEPHONE (OUTPATIENT)
Dept: PRIMARY CARE | Facility: CLINIC | Age: 80
End: 2025-05-20
Payer: MEDICARE

## 2025-05-20 DIAGNOSIS — N30.00 ACUTE CYSTITIS WITHOUT HEMATURIA: Primary | ICD-10-CM

## 2025-05-20 DIAGNOSIS — K59.00 CONSTIPATION, UNSPECIFIED CONSTIPATION TYPE: ICD-10-CM

## 2025-05-20 NOTE — TELEPHONE ENCOUNTER
Janette from home care  left a message with the answering service, patient is need of a urinalysis, please place the order and she will have it done at the facility.     [phone]

## 2025-05-27 ENCOUNTER — APPOINTMENT (OUTPATIENT)
Dept: OPHTHALMOLOGY | Facility: CLINIC | Age: 80
End: 2025-05-27
Payer: MEDICARE

## 2025-05-27 ENCOUNTER — TELEPHONE (OUTPATIENT)
Dept: PRIMARY CARE | Facility: CLINIC | Age: 80
End: 2025-05-27

## 2025-05-27 ENCOUNTER — HOSPITAL ENCOUNTER (OUTPATIENT)
Dept: RADIOLOGY | Facility: CLINIC | Age: 80
Discharge: HOME | End: 2025-05-27
Payer: MEDICARE

## 2025-05-27 ENCOUNTER — OFFICE VISIT (OUTPATIENT)
Dept: ORTHOPEDIC SURGERY | Facility: CLINIC | Age: 80
End: 2025-05-27
Payer: MEDICARE

## 2025-05-27 DIAGNOSIS — H35.3131 EARLY DRY STAGE NONEXUDATIVE AGE-RELATED MACULAR DEGENERATION OF BOTH EYES: ICD-10-CM

## 2025-05-27 DIAGNOSIS — M25.511 RIGHT SHOULDER PAIN, UNSPECIFIED CHRONICITY: ICD-10-CM

## 2025-05-27 DIAGNOSIS — S42.91XD CLOSED FRACTURE OF RIGHT SHOULDER WITH ROUTINE HEALING, SUBSEQUENT ENCOUNTER: Primary | ICD-10-CM

## 2025-05-27 DIAGNOSIS — H40.1133 PRIMARY OPEN ANGLE GLAUCOMA (POAG) OF BOTH EYES, SEVERE STAGE: Primary | ICD-10-CM

## 2025-05-27 PROCEDURE — 99213 OFFICE O/P EST LOW 20 MIN: CPT | Performed by: OPHTHALMOLOGY

## 2025-05-27 PROCEDURE — 99213 OFFICE O/P EST LOW 20 MIN: CPT | Performed by: STUDENT IN AN ORGANIZED HEALTH CARE EDUCATION/TRAINING PROGRAM

## 2025-05-27 PROCEDURE — 1159F MED LIST DOCD IN RCRD: CPT | Performed by: STUDENT IN AN ORGANIZED HEALTH CARE EDUCATION/TRAINING PROGRAM

## 2025-05-27 PROCEDURE — 99212 OFFICE O/P EST SF 10 MIN: CPT | Performed by: STUDENT IN AN ORGANIZED HEALTH CARE EDUCATION/TRAINING PROGRAM

## 2025-05-27 PROCEDURE — G2211 COMPLEX E/M VISIT ADD ON: HCPCS | Performed by: OPHTHALMOLOGY

## 2025-05-27 PROCEDURE — 73030 X-RAY EXAM OF SHOULDER: CPT | Mod: RIGHT SIDE | Performed by: RADIOLOGY

## 2025-05-27 PROCEDURE — 73030 X-RAY EXAM OF SHOULDER: CPT | Mod: RT

## 2025-05-27 PROCEDURE — 1036F TOBACCO NON-USER: CPT | Performed by: STUDENT IN AN ORGANIZED HEALTH CARE EDUCATION/TRAINING PROGRAM

## 2025-05-27 PROCEDURE — 92133 CPTRZD OPH DX IMG PST SGM ON: CPT | Performed by: OPHTHALMOLOGY

## 2025-05-27 RX ORDER — NITROFURANTOIN (MACROCRYSTALS) 100 MG/1
100 CAPSULE ORAL 4 TIMES DAILY
Qty: 40 CAPSULE | Refills: 0 | Status: SHIPPED | OUTPATIENT
Start: 2025-05-27 | End: 2025-06-06

## 2025-05-27 RX ORDER — LACTULOSE 10 G/15ML
10 SOLUTION ORAL 2 TIMES DAILY
Qty: 237 ML | Refills: 0 | Status: SHIPPED | OUTPATIENT
Start: 2025-05-27 | End: 2025-05-27 | Stop reason: ENTERED-IN-ERROR

## 2025-05-27 RX ORDER — TIMOLOL MALEATE 5 MG/ML
1 SOLUTION/ DROPS OPHTHALMIC DAILY
Qty: 15 ML | Refills: 3 | Status: SHIPPED | OUTPATIENT
Start: 2025-05-27 | End: 2026-05-27

## 2025-05-27 RX ORDER — LATANOPROST 50 UG/ML
1 SOLUTION/ DROPS OPHTHALMIC NIGHTLY
Qty: 7.5 ML | Refills: 3 | Status: SHIPPED | OUTPATIENT
Start: 2025-05-27 | End: 2026-05-27

## 2025-05-27 ASSESSMENT — PACHYMETRY
OS_CT(UM): 564
OD_CT(UM): 556

## 2025-05-27 ASSESSMENT — ENCOUNTER SYMPTOMS
ALLERGIC/IMMUNOLOGIC NEGATIVE: 0
CONSTITUTIONAL NEGATIVE: 0
PSYCHIATRIC NEGATIVE: 0
NEUROLOGICAL NEGATIVE: 0
RESPIRATORY NEGATIVE: 0
CARDIOVASCULAR NEGATIVE: 0
MUSCULOSKELETAL NEGATIVE: 0
HEMATOLOGIC/LYMPHATIC NEGATIVE: 0
ENDOCRINE NEGATIVE: 0
EYES NEGATIVE: 1
GASTROINTESTINAL NEGATIVE: 0

## 2025-05-27 ASSESSMENT — TONOMETRY
OS_IOP_MMHG: 17
IOP_METHOD: GOLDMANN APPLANATION
OD_IOP_MMHG: 16

## 2025-05-27 ASSESSMENT — SLIT LAMP EXAM - LIDS
COMMENTS: GOOD POSITION, 2+ MGD
COMMENTS: GOOD POSITION, 2+ MGD

## 2025-05-27 ASSESSMENT — VISUAL ACUITY
CORRECTION_TYPE: GLASSES
OS_CC: 20/30
METHOD: SNELLEN - LINEAR
OD_CC: 20/25
OS_CC+: +2

## 2025-05-27 ASSESSMENT — EXTERNAL EXAM - LEFT EYE: OS_EXAM: NORMAL

## 2025-05-27 ASSESSMENT — PAIN - FUNCTIONAL ASSESSMENT: PAIN_FUNCTIONAL_ASSESSMENT: NO/DENIES PAIN

## 2025-05-27 ASSESSMENT — EXTERNAL EXAM - RIGHT EYE: OD_EXAM: NORMAL

## 2025-05-27 NOTE — PROGRESS NOTES
CHIEF COMPLAINT:   No chief complaint on file.    History: 79 y.o. female returns to clinic for follow up of her R proximal humerus fracture, now more than 2 months from injury.  Has been working with physical therapy.  Overall is feeling much better.    4/8/25: presents to the office today for evaluation of her right shoulder.  The patient is accompanied by her daughter.  She does have aphasia due to her previous stroke.  Unfortunately, she was walking across the parking and fell about 4 weeks ago.  She has been in a sling for a proximal humerus fracture.  Prior to this her shoulder was functioning well.    Past medical history, past surgical history, medications, allergies, family history, social history, and review of systems were reviewed today.    A 12 point review of systems was negative other than as stated in the HPI.    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) 02/27/2024    Cataract extraction status, left eye 03/09/2016    Status post left cataract extraction    Cataract extraction status, right eye 03/09/2016    Status post right cataract extraction    COPD (chronic obstructive pulmonary disease) (Multi)     Hypertension     Pain in leg, unspecified 03/18/2015    Leg pain    Peripheral vascular disease, unspecified 06/08/2016    Claudication    Personal history of nicotine dependence 03/18/2015    Quit smoking    Sciatica, unspecified side 09/16/2015    Acute sciatica    Tobacco use 06/08/2016    Tobacco abuse        No Known Allergies     Past Surgical History:   Procedure Laterality Date    CARDIAC ELECTROPHYSIOLOGY PROCEDURE Left 01/16/2024    Procedure: Loop Insertion;  Surgeon: Odilon Arreaga MD;  Location: [place] [address] Cardiac Cath Lab;  Service: Electrophysiology;  Laterality: Left;  Norbert to do. Pt aphasic/crypto sroke    CATARACT EXTRACTION      EYE SURGERY      JOINT  REPLACEMENT      OTHER SURGICAL HISTORY  2022    Knee replacement    OTHER SURGICAL HISTORY  2022    Cataract surgery        No family history on file.     Social History     Socioeconomic History    Marital status: Single     Spouse name: Not on file    Number of children: Not on file    Years of education: Not on file    Highest education level: Not on file   Occupational History    Not on file   Tobacco Use    Smoking status: Former     Current packs/day: 0.00     Average packs/day: 0.5 packs/day for 44.0 years (22.0 ttl pk-yrs)     Types: Cigarettes     Start date:      Quit date:      Years since quittin.4     Passive exposure: Past    Smokeless tobacco: Never   Vaping Use    Vaping status: Never Used   Substance and Sexual Activity    Alcohol use: Never    Drug use: Never    Sexual activity: Not Currently     Partners: Male   Other Topics Concern    Not on file   Social History Narrative    Not on file     Social Drivers of Health     Financial Resource Strain: Low Risk  (3/9/2025)    Overall Financial Resource Strain (CARDIA)     Difficulty of Paying Living Expenses: Not hard at all   Food Insecurity: No Food Insecurity (3/9/2025)    Hunger Vital Sign     Worried About Running Out of Food in the Last Year: Never true     Ran Out of Food in the Last Year: Never true   Transportation Needs: No Transportation Needs (3/9/2025)    PRAPARE - Transportation     Lack of Transportation (Medical): No     Lack of Transportation (Non-Medical): No   Physical Activity: Inactive (3/9/2025)    Exercise Vital Sign     Days of Exercise per Week: 0 days     Minutes of Exercise per Session: 0 min   Stress: No Stress Concern Present (3/9/2025)    [place] [place] [employer] - Occupational Stress Questionnaire     Feeling of Stress : Not at all   Social Connections: Unknown (3/9/2025)    Social Connection and Isolation Panel [NHANES]     Frequency of Communication with Friends and Family: Once  a week     Frequency of Social Gatherings with Friends and Family: Not on file     Attends [employer] Services: Not on file     Active Member of Clubs or Organizations: Not on file     Attends Club or Organization Meetings: Not on file     Marital Status: Not on file   Intimate Partner Violence: Not At Risk (3/9/2025)    Humiliation, Afraid, Rape, and Kick questionnaire     Fear of Current or Ex-Partner: No     Emotionally Abused: No     Physically Abused: No     Sexually Abused: No   Housing Stability: Low Risk  (3/9/2025)    Housing Stability Vital Sign     Unable to Pay for Housing in the Last Year: No     Number of Times Moved in the Last Year: 0     Homeless in the Last Year: No        CURRENT MEDICATIONS:   Current Outpatient Medications   Medication Sig Dispense Refill    aspirin 81 mg EC tablet Take 1 tablet (81 mg) by mouth once daily. Do not start before January 19, 2024. 90 tablet 3    atorvastatin (Lipitor) 40 mg tablet Take 1 tablet (40 mg) by mouth once daily at bedtime. 90 tablet 3    carvedilol (Coreg) 12.5 mg tablet Take 1 tablet (12.5 mg) by mouth 2 times a day. 180 tablet 3    coenzyme Q-10 100 mg capsule Take 1 capsule (100 mg) by mouth once daily.      cyanocobalamin, vitamin B-12, (VITAMIN B-12 ORAL) Take 1 tablet by mouth once daily.      hydrALAZINE (Apresoline) 25 mg tablet Take 1 tablet (25 mg) by mouth 2 times a day. 90 tablet 3    latanoprost (Xalatan) 0.005 % ophthalmic solution INSTILL 1 DROP INTO EACH AFFECTED EYE EVERYDAY (Patient taking differently: Administer 1 drop into affected eye(s) once daily.) 7.5 mL 3    losartan (Cozaar) 100 mg tablet Take 0.5 tablets (50 mg) by mouth once daily. 90 tablet 3    timolol (Timoptic) 0.5 % ophthalmic solution Administer 1 drop into both eyes once daily in the morning. Take before meals. 30 mL 3    venlafaxine XR (Effexor-XR) 150 mg 24 hr capsule Take 1 capsule (150 mg) by mouth once daily. Do not crush or chew. 90 capsule 3    venlafaxine XR  (Effexor-XR) 75 mg 24 hr capsule Take 1 capsule (75 mg) by mouth once daily. Take with food. 90 capsule 3     No current facility-administered medications for this visit.       Physical Examination:      3/12/2025     1:34 PM 3/12/2025     8:53 PM 3/13/2025     5:43 AM 3/13/2025     9:00 AM 3/13/2025     1:43 PM 3/27/2025     1:41 PM 4/24/2025    10:56 AM   Vitals   Systolic 107 123 145 142 114 110 110   Diastolic 49 54 73 85 61 74 68   BP Location       Left arm   Heart Rate 67 63 70 78 70 78 62   Temp 35.8 °C (96.4 °F) 36 °C (96.8 °F) 36.3 °C (97.3 °F)  36.2 °C (97.2 °F) 36.5 °C (97.7 °F) 37.6 °C (99.6 °F)   Resp  16 17   14    Height      1.524 m (5') 1.524 m (5')   Weight (lb)      137 135.3   BMI      26.76 kg/m2 26.42 kg/m2   BSA (m2)      1.62 m2 1.61 m2   Visit Report      Report Report      There is no height or weight on file to calculate BMI.    Well-appearing, appears stated age, pleasant and cooperative, appropriate mood and behavior. Height and weight reviewed. Alert and oriented x3.  Auditory function intact.  No acute distress.  Intact ocular function, RICARDO, EOMI. Breathing is unlabored .  There is no evidence of jugular venous distension. Skin appearance is normal without evidence of rash or other lesions. 2+ radial pulses bilaterally, fingers pink and wwp, good capillary refill, no pitting edema. No appreciable lymphadenopathy in bilateral upper extremities. SILT throughout both upper extremities, median/radial/ulnar/musculocutaneous/axillary nerve motor and sensory intact (except for abnormalities noted in focused musculoskeletal exam section below).     Neck exam: Full range of motion of the neck in flexion/extension and rotational movements. No significant areas of tenderness to palpation in the neck.    On exam of bilateral upper extremities, swelling about the right shoulder has improved.  Actively, the forward flexion about 130, external rotated 30. Neurovascular intact.    Imaging:  Radiographs of the right shoulder performed today.  First interpreted by myself.  Minimally displaced proximal humerus fracture, appears to be healed    Assessment: Right proximal humerus fracture healed    Plan: Proximal humerus fracture has now healed.  She is actually functioning quite well.  Recommend continue therapy and exercises for the next few months.  Will see her back in 3 months for final radiographs.  Can use the arm as tolerated.    Dragon software was used to dictate this note, please be aware that minor errors in transcription may be present.    Rajeev Cooper MD    Shoulder/Elbow Surgery  [facility] [place]

## 2025-05-27 NOTE — PROGRESS NOTES
1. Advanced POAG OU   - Prev cared for by Dr. Dave (private practice ophthalmologist in [place]) but never diagnosed with glaucoma   - Tmax 21/24 per our records, /564, FH neg   - No trauma, prolonged steroid use, cbd oil   - Gonio open 360   - HVF 24-2 (8/22/23): OD inf>sup arcuate; OS dense sup>inf arcuate . both stable from previous  OCT, Optic Nerve - OU - Both Eyes          Severe thinning OU, worse than 23 but within standard deviation  Likely floor effect           - s/p SLT OU in August 2022, -repeat SLT OU 2024   -goal IOP <15  IOP above goal toda but patient is not taking drops correctly  Per daughter recent uti may be contributing to confusion     -continue latan qhs OU and qam timolol ou - written instructions given   -PATIENT is poor surgical candidate right now given recent stroke  Rtc 3-4 months for DFE      2. Gilbert Bonnet phenomenon    - Pt describing visual hallucinations   - As per Dr. Dumont      3. Pseudophakia OU   - Monitor      4. AMD OU   - Mac OCT (3/17/22): flat OU   - As per Dr. Pickens

## 2025-06-09 DIAGNOSIS — I63.9 ACUTE ISCHEMIC STROKE (MULTI): ICD-10-CM

## 2025-06-09 RX ORDER — ATORVASTATIN CALCIUM 40 MG/1
40 TABLET, FILM COATED ORAL NIGHTLY
Qty: 90 TABLET | Refills: 3 | Status: SHIPPED | OUTPATIENT
Start: 2025-06-09

## 2025-06-23 DIAGNOSIS — I63.9 ACUTE ISCHEMIC STROKE (MULTI): ICD-10-CM

## 2025-06-23 DIAGNOSIS — I10 BENIGN ESSENTIAL HYPERTENSION: ICD-10-CM

## 2025-06-23 RX ORDER — HYDRALAZINE HYDROCHLORIDE 25 MG/1
25 TABLET, FILM COATED ORAL 2 TIMES DAILY
Qty: 90 TABLET | Refills: 3 | Status: SHIPPED | OUTPATIENT
Start: 2025-06-23

## 2025-06-23 RX ORDER — ATORVASTATIN CALCIUM 40 MG/1
40 TABLET, FILM COATED ORAL NIGHTLY
Qty: 90 TABLET | Refills: 3 | Status: SHIPPED | OUTPATIENT
Start: 2025-06-23

## 2025-07-14 ENCOUNTER — HOSPITAL ENCOUNTER (OUTPATIENT)
Dept: CARDIOLOGY | Facility: CLINIC | Age: 80
Discharge: HOME | End: 2025-07-14
Payer: MEDICARE

## 2025-07-14 DIAGNOSIS — I63.9 CEREBRAL INFARCTION, UNSPECIFIED: ICD-10-CM

## 2025-07-14 PROCEDURE — 93298 REM INTERROG DEV EVAL SCRMS: CPT

## 2025-07-22 ENCOUNTER — APPOINTMENT (OUTPATIENT)
Dept: ORTHOPEDIC SURGERY | Facility: CLINIC | Age: 80
End: 2025-07-22
Payer: MEDICARE

## 2025-07-22 ENCOUNTER — HOSPITAL ENCOUNTER (OUTPATIENT)
Dept: RADIOLOGY | Facility: CLINIC | Age: 80
Discharge: HOME | End: 2025-07-22
Payer: MEDICARE

## 2025-07-22 DIAGNOSIS — M25.511 RIGHT SHOULDER PAIN, UNSPECIFIED CHRONICITY: ICD-10-CM

## 2025-07-22 DIAGNOSIS — S42.202D CLOSED FRACTURE OF PROXIMAL END OF LEFT HUMERUS WITH ROUTINE HEALING, UNSPECIFIED FRACTURE MORPHOLOGY, SUBSEQUENT ENCOUNTER: ICD-10-CM

## 2025-07-22 DIAGNOSIS — H40.1133 PRIMARY OPEN ANGLE GLAUCOMA (POAG) OF BOTH EYES, SEVERE STAGE: Primary | ICD-10-CM

## 2025-07-22 PROCEDURE — 1125F AMNT PAIN NOTED PAIN PRSNT: CPT | Performed by: STUDENT IN AN ORGANIZED HEALTH CARE EDUCATION/TRAINING PROGRAM

## 2025-07-22 PROCEDURE — 73030 X-RAY EXAM OF SHOULDER: CPT | Mod: LT

## 2025-07-22 PROCEDURE — 99212 OFFICE O/P EST SF 10 MIN: CPT | Performed by: STUDENT IN AN ORGANIZED HEALTH CARE EDUCATION/TRAINING PROGRAM

## 2025-07-22 PROCEDURE — 99214 OFFICE O/P EST MOD 30 MIN: CPT | Performed by: STUDENT IN AN ORGANIZED HEALTH CARE EDUCATION/TRAINING PROGRAM

## 2025-07-22 PROCEDURE — 73030 X-RAY EXAM OF SHOULDER: CPT | Mod: LEFT SIDE | Performed by: RADIOLOGY

## 2025-07-22 PROCEDURE — 1159F MED LIST DOCD IN RCRD: CPT | Performed by: STUDENT IN AN ORGANIZED HEALTH CARE EDUCATION/TRAINING PROGRAM

## 2025-07-22 RX ORDER — TIMOLOL MALEATE 5 MG/ML
1 SOLUTION/ DROPS OPHTHALMIC DAILY
Qty: 30 ML | Refills: 3 | Status: SHIPPED | OUTPATIENT
Start: 2025-07-22 | End: 2026-07-22

## 2025-07-22 ASSESSMENT — PAIN SCALES - GENERAL: PAINLEVEL_OUTOF10: 3

## 2025-07-22 ASSESSMENT — PAIN - FUNCTIONAL ASSESSMENT: PAIN_FUNCTIONAL_ASSESSMENT: 0-10

## 2025-07-22 NOTE — PROGRESS NOTES
CHIEF COMPLAINT:   Chief Complaint   Patient presents with    Left Shoulder - Pain     History: 79 y.o. female returns to clinic today for follow-up of her right shoulder as well as a new injury to the left.  The right shoulder is doing well.  Her right proximal humerus fracture is healed.  Unfortunately, 3 weeks ago she was on a cruise in [place] and fell and injured her left shoulder.  She is now found to have a left proximal humerus fracture.  Is been in a sling since that time.  Accompanied by her daughter.    5/27?25: returns to clinic for follow up of her R proximal humerus fracture, now more than 2 months from injury.  Has been working with physical therapy.  Overall is feeling much better.    4/8/25: presents to the office today for evaluation of her right shoulder.  The patient is accompanied by her daughter.  She does have aphasia due to her previous stroke.  Unfortunately, she was walking across the parking and fell about 4 weeks ago.  She has been in a sling for a proximal humerus fracture.  Prior to this her shoulder was functioning well.    Past medical history, past surgical history, medications, allergies, family history, social history, and review of systems were reviewed today.    A 12 point review of systems was negative other than as stated in the HPI.    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) 02/27/2024    Cataract extraction status, left eye 03/09/2016    Status post left cataract extraction    Cataract extraction status, right eye 03/09/2016    Status post right cataract extraction    COPD (chronic obstructive pulmonary disease) (Multi)     Hypertension     Pain in leg, unspecified 03/18/2015    Leg pain    Peripheral vascular disease, unspecified 06/08/2016    Claudication    Personal history of nicotine dependence 03/18/2015    Quit smoking    Sciatica, unspecified side  2015    Acute sciatica    Tobacco use 2016    Tobacco abuse        No Known Allergies     Past Surgical History:   Procedure Laterality Date    CARDIAC ELECTROPHYSIOLOGY PROCEDURE Left 2024    Procedure: Loop Insertion;  Surgeon: Odilon Arreaga MD;  Location: [place] [address] [facility];  Service: Electrophysiology;  Laterality: Left;  Norbert to do. Pt aphasic/crypto sroke    CATARACT EXTRACTION      EYE SURGERY      JOINT REPLACEMENT      OTHER SURGICAL HISTORY  2022    Knee replacement    OTHER SURGICAL HISTORY  2022    Cataract surgery        No family history on file.     Social History     Socioeconomic History    Marital status: Single     Spouse name: Not on file    Number of children: Not on file    Years of education: Not on file    Highest education level: Not on file   Occupational History    Not on file   Tobacco Use    Smoking status: Former     Current packs/day: 0.00     Average packs/day: 0.5 packs/day for 44.0 years (22.0 ttl pk-yrs)     Types: Cigarettes     Start date:      Quit date:      Years since quittin.5     Passive exposure: Past    Smokeless tobacco: Never   Vaping Use    Vaping status: Never Used   Substance and Sexual Activity    Alcohol use: Never    Drug use: Never    Sexual activity: Not Currently     Partners: Male   Other Topics Concern    Not on file   Social History Narrative    Not on file     Social Drivers of Health     Financial Resource Strain: Low Risk  (3/9/2025)    Overall Financial Resource Strain (CARDIA)     Difficulty of Paying Living Expenses: Not hard at all   Food Insecurity: No Food Insecurity (3/9/2025)    Hunger Vital Sign     Worried About Running Out of Food in the Last Year: Never true     Ran Out of Food in the Last Year: Never true   Transportation Needs: No Transportation Needs (3/9/2025)    PRAPARE - Transportation     Lack of Transportation (Medical): No     Lack of Transportation (Non-Medical): No   Physical  Activity: Inactive (3/9/2025)    Exercise Vital Sign     Days of Exercise per Week: 0 days     Minutes of Exercise per Session: 0 min   Stress: No Stress Concern Present (3/9/2025)    [employer] - Occupational Stress Questionnaire     Feeling of Stress : Not at all   Social Connections: Unknown (3/9/2025)    Social Connection and Isolation Panel     Frequency of Communication with Friends and Family: Once a week     Frequency of Social Gatherings with Friends and Family: Not on file     Attends Caodaism Services: Not on file     Active Member of Clubs or Organizations: Not on file     Attends Club or Organization Meetings: Not on file     Marital Status: Not on file   Intimate Partner Violence: Not At Risk (3/9/2025)    Humiliation, Afraid, Rape, and Kick questionnaire     Fear of Current or Ex-Partner: No     Emotionally Abused: No     Physically Abused: No     Sexually Abused: No   Housing Stability: Low Risk  (3/9/2025)    Housing Stability Vital Sign     Unable to Pay for Housing in the Last Year: No     Number of Times Moved in the Last Year: 0     Homeless in the Last Year: No        CURRENT MEDICATIONS:   Current Outpatient Medications   Medication Sig Dispense Refill    aspirin 81 mg EC tablet Take 1 tablet (81 mg) by mouth once daily. Do not start before January 19, 2024. 90 tablet 3    atorvastatin (Lipitor) 40 mg tablet Take 1 tablet (40 mg) by mouth once daily at bedtime. 90 tablet 3    carvedilol (Coreg) 12.5 mg tablet Take 1 tablet (12.5 mg) by mouth 2 times a day. 180 tablet 3    coenzyme Q-10 100 mg capsule Take 1 capsule (100 mg) by mouth once daily.      cyanocobalamin, vitamin B-12, (VITAMIN B-12 ORAL) Take 1 tablet by mouth once daily.      hydrALAZINE (Apresoline) 25 mg tablet Take 1 tablet (25 mg) by mouth 2 times a day. 90 tablet 3    latanoprost (Xalatan) 0.005 % ophthalmic solution Administer 1 drop into both eyes once daily at bedtime. 7.5 mL 3    losartan  (Cozaar) 100 mg tablet Take 0.5 tablets (50 mg) by mouth once daily. 90 tablet 3    timolol (Timoptic) 0.5 % ophthalmic solution Administer 1 drop into both eyes once daily. 15 mL 3    venlafaxine XR (Effexor-XR) 150 mg 24 hr capsule Take 1 capsule (150 mg) by mouth once daily. Do not crush or chew. 90 capsule 3    venlafaxine XR (Effexor-XR) 75 mg 24 hr capsule Take 1 capsule (75 mg) by mouth once daily. Take with food. 90 capsule 3     No current facility-administered medications for this visit.       Physical Examination:      3/12/2025     1:34 PM 3/12/2025     8:53 PM 3/13/2025     5:43 AM 3/13/2025     9:00 AM 3/13/2025     1:43 PM 3/27/2025     1:41 PM 4/24/2025    10:56 AM   Vitals   Systolic 107 123 145 142 114 110 110   Diastolic 49 54 73 85 61 74 68   BP Location       Left arm   Heart Rate 67 63 70 78 70 78 62   Temp 35.8 °C (96.4 °F) 36 °C (96.8 °F) 36.3 °C (97.3 °F)  36.2 °C (97.2 °F) 36.5 °C (97.7 °F) 37.6 °C (99.6 °F)   Resp  16 17   14    Height      1.524 m (5') 1.524 m (5')   Weight (lb)      137 135.3   BMI      26.76 kg/m2 26.42 kg/m2   BSA (m2)      1.62 m2 1.61 m2   Visit Report      Report Report      There is no height or weight on file to calculate BMI.    Well-appearing, appears stated age, pleasant and cooperative, appropriate mood and behavior. Height and weight reviewed. Alert and oriented x3.  Auditory function intact.  No acute distress.  Intact ocular function, RICARDO, EOMI. Breathing is unlabored .  There is no evidence of jugular venous distension. Skin appearance is normal without evidence of rash or other lesions. 2+ radial pulses bilaterally, fingers pink and wwp, good capillary refill, no pitting edema. No appreciable lymphadenopathy in bilateral upper extremities. SILT throughout both upper extremities, median/radial/ulnar/musculocutaneous/axillary nerve motor and sensory intact (except for abnormalities noted in focused musculoskeletal exam section below).     Neck exam: Full  range of motion of the neck in flexion/extension and rotational movements. No significant areas of tenderness to palpation in the neck.    On exam of bilateral upper extremities, on the right side she has forward flexion to about 130, external Tatian of 30.  On the left, there is swelling and bruising about the left shoulder.  Range of motion was deferred secondary to fracture    Imaging: Radiographs of the left shoulder performed today.  Personally interpreted by myself.  There is a left proximal humerus fracture with some displacement.  There is already callus formation    Assessment: Left proximal humerus fracture healing    Plan: Patient had a right proximal humerus fracture that is healed and is doing well on that side, unfortunately she fell again and now has a left proximal humerus fracture.  Injury was about 3 weeks ago.  There are early signs of fracture healing.  The fracture of the left is a bit more significant than the 1 on the right but I do think we can continue with nonoperative treatment.  Will keep her in a sling for a few more weeks and get new x-rays at that time.    Dragon software was used to dictate this note, please be aware that minor errors in transcription may be present.    Rajeev Cooper MD    Shoulder/Elbow Surgery  [facility] [place]

## 2025-07-23 ENCOUNTER — APPOINTMENT (OUTPATIENT)
Dept: OPHTHALMOLOGY | Facility: CLINIC | Age: 80
End: 2025-07-23
Payer: MEDICARE

## 2025-07-23 DIAGNOSIS — E08.37X3: ICD-10-CM

## 2025-07-23 DIAGNOSIS — H53.8 BLURRY VISION: ICD-10-CM

## 2025-07-23 DIAGNOSIS — H35.3132 INTERMEDIATE STAGE NONEXUDATIVE AGE-RELATED MACULAR DEGENERATION OF BOTH EYES: ICD-10-CM

## 2025-07-23 DIAGNOSIS — H47.20 BILATERAL OPTIC NERVE ATROPHY: ICD-10-CM

## 2025-07-23 DIAGNOSIS — H35.3131 EARLY DRY STAGE NONEXUDATIVE AGE-RELATED MACULAR DEGENERATION OF BOTH EYES: Primary | ICD-10-CM

## 2025-07-23 PROCEDURE — 2023F DILAT RTA XM W/O RTNOPTHY: CPT | Performed by: OPHTHALMOLOGY

## 2025-07-23 PROCEDURE — 92134 CPTRZ OPH DX IMG PST SGM RTA: CPT | Mod: BILATERAL PROCEDURE | Performed by: OPHTHALMOLOGY

## 2025-07-23 PROCEDURE — 99213 OFFICE O/P EST LOW 20 MIN: CPT | Performed by: OPHTHALMOLOGY

## 2025-07-23 ASSESSMENT — VISUAL ACUITY
METHOD: SNELLEN - LINEAR
OD_CC: 20/25
OS_CC: 20/25
CORRECTION_TYPE: GLASSES

## 2025-07-23 ASSESSMENT — ENCOUNTER SYMPTOMS
PSYCHIATRIC NEGATIVE: 0
NEUROLOGICAL NEGATIVE: 0
ALLERGIC/IMMUNOLOGIC NEGATIVE: 0
MUSCULOSKELETAL NEGATIVE: 0
CONSTITUTIONAL NEGATIVE: 0
RESPIRATORY NEGATIVE: 0
ENDOCRINE NEGATIVE: 0
HEMATOLOGIC/LYMPHATIC NEGATIVE: 0
GASTROINTESTINAL NEGATIVE: 0
EYES NEGATIVE: 1
CARDIOVASCULAR NEGATIVE: 0

## 2025-07-23 ASSESSMENT — EXTERNAL EXAM - RIGHT EYE: OD_EXAM: NORMAL

## 2025-07-23 ASSESSMENT — PACHYMETRY
OS_CT(UM): 564
OD_CT(UM): 556

## 2025-07-23 ASSESSMENT — TONOMETRY
OD_IOP_MMHG: 16
OS_IOP_MMHG: 16
IOP_METHOD: GOLDMANN APPLANATION

## 2025-07-23 ASSESSMENT — CUP TO DISC RATIO
OD_RATIO: .9
OS_RATIO: .95

## 2025-07-23 ASSESSMENT — EXTERNAL EXAM - LEFT EYE: OS_EXAM: NORMAL

## 2025-07-23 ASSESSMENT — SLIT LAMP EXAM - LIDS
COMMENTS: GOOD POSITION, 2+ MGD
COMMENTS: GOOD POSITION, 2+ MGD

## 2025-07-23 NOTE — PROGRESS NOTES
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  3 H31.111 Age-related choroidal atrophy of right eye-Stable  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-Pchlqb  7 H40.1130 Primary open angle glaucoma (poag) of both eyes-Stable    DIAGNOSTIC PROCEDURE DONE    OCT DONE OD/OS            REASON FOR TEST: will help address and tailor  therapy by detecting subclinical CME SRF     Hi quality OCT  scans obtained  signal good    OCT OD - Normal Foveal Contour, No Edema, IS/OS Junction Normal  OCT OS - Normal Foveal Contour, No Edema, IS/OS Junction Normal    additional commnents:        Discussion    would reccommend exercise more  no other intervention    this has improved her cognition and hallucinations    She has myopic degeneration more than AMD    Hi quality OCT  scans obtained  signal good    OCT OD - Normal Foveal Contour, No Edema, IS/OS Junction Normal drusenoid lesion large  OCT OS - Normal Foveal Contour, No Edema, IS/OS Junction Normal    additional commnents: created by:Miller Pickens     6m

## 2025-07-25 ENCOUNTER — APPOINTMENT (OUTPATIENT)
Dept: RADIOLOGY | Facility: HOSPITAL | Age: 80
End: 2025-07-25
Payer: MEDICARE

## 2025-07-25 ENCOUNTER — HOSPITAL ENCOUNTER (OUTPATIENT)
Facility: HOSPITAL | Age: 80
Setting detail: OBSERVATION
Discharge: HOME | End: 2025-07-26
Attending: STUDENT IN AN ORGANIZED HEALTH CARE EDUCATION/TRAINING PROGRAM
Payer: MEDICARE

## 2025-07-25 ENCOUNTER — APPOINTMENT (OUTPATIENT)
Dept: CARDIOLOGY | Facility: HOSPITAL | Age: 80
End: 2025-07-25
Payer: MEDICARE

## 2025-07-25 ENCOUNTER — APPOINTMENT (OUTPATIENT)
Dept: ORTHOPEDIC SURGERY | Facility: CLINIC | Age: 80
End: 2025-07-25
Payer: MEDICARE

## 2025-07-25 DIAGNOSIS — Z86.73 HISTORY OF CVA (CEREBROVASCULAR ACCIDENT): ICD-10-CM

## 2025-07-25 DIAGNOSIS — Z79.899 POLYPHARMACY: ICD-10-CM

## 2025-07-25 DIAGNOSIS — N39.0 UTI (URINARY TRACT INFECTION) WITH PYURIA: ICD-10-CM

## 2025-07-25 DIAGNOSIS — I63.412 STROKE DUE TO EMBOLISM OF LEFT MIDDLE CEREBRAL ARTERY (MULTI): ICD-10-CM

## 2025-07-25 DIAGNOSIS — R42 DIZZINESS: Primary | ICD-10-CM

## 2025-07-25 DIAGNOSIS — G45.9 TIA (TRANSIENT ISCHEMIC ATTACK): ICD-10-CM

## 2025-07-25 LAB
ALBUMIN SERPL BCP-MCNC: 3.7 G/DL (ref 3.4–5)
ALP SERPL-CCNC: 147 U/L (ref 33–136)
ALT SERPL W P-5'-P-CCNC: 9 U/L (ref 7–45)
ANION GAP SERPL CALC-SCNC: 12 MMOL/L (ref 10–20)
APPEARANCE UR: ABNORMAL
AST SERPL W P-5'-P-CCNC: 28 U/L (ref 9–39)
BACTERIA #/AREA URNS AUTO: ABNORMAL /HPF
BASOPHILS # BLD AUTO: 0.04 X10*3/UL (ref 0–0.1)
BASOPHILS NFR BLD AUTO: 0.5 %
BILIRUB SERPL-MCNC: 0.5 MG/DL (ref 0–1.2)
BILIRUB UR STRIP.AUTO-MCNC: NEGATIVE MG/DL
BUN SERPL-MCNC: 40 MG/DL (ref 6–23)
CALCIUM SERPL-MCNC: 8.9 MG/DL (ref 8.6–10.3)
CARDIAC TROPONIN I PNL SERPL HS: 10 NG/L (ref 0–13)
CARDIAC TROPONIN I PNL SERPL HS: 11 NG/L (ref 0–13)
CHLORIDE SERPL-SCNC: 110 MMOL/L (ref 98–107)
CHOLEST SERPL-MCNC: 132 MG/DL (ref 0–199)
CHOLESTEROL/HDL RATIO: 2.7
CO2 SERPL-SCNC: 20 MMOL/L (ref 21–32)
COLOR UR: ABNORMAL
CREAT SERPL-MCNC: 1.36 MG/DL (ref 0.5–1.05)
EGFRCR SERPLBLD CKD-EPI 2021: 40 ML/MIN/1.73M*2
EOSINOPHIL # BLD AUTO: 0.18 X10*3/UL (ref 0–0.4)
EOSINOPHIL NFR BLD AUTO: 2.1 %
ERYTHROCYTE [DISTWIDTH] IN BLOOD BY AUTOMATED COUNT: 15.4 % (ref 11.5–14.5)
GLUCOSE SERPL-MCNC: 119 MG/DL (ref 74–99)
GLUCOSE UR STRIP.AUTO-MCNC: NORMAL MG/DL
HCT VFR BLD AUTO: 31.8 % (ref 36–46)
HDLC SERPL-MCNC: 48.1 MG/DL
HGB BLD-MCNC: 10.1 G/DL (ref 12–16)
HOLD SPECIMEN: NORMAL
HYALINE CASTS #/AREA URNS AUTO: ABNORMAL /LPF
IMM GRANULOCYTES # BLD AUTO: 0.02 X10*3/UL (ref 0–0.5)
IMM GRANULOCYTES NFR BLD AUTO: 0.2 % (ref 0–0.9)
KETONES UR STRIP.AUTO-MCNC: NEGATIVE MG/DL
LDLC SERPL CALC-MCNC: 55 MG/DL
LEUKOCYTE ESTERASE UR QL STRIP.AUTO: ABNORMAL
LYMPHOCYTES # BLD AUTO: 1.41 X10*3/UL (ref 0.8–3)
LYMPHOCYTES NFR BLD AUTO: 16.6 %
MCH RBC QN AUTO: 29 PG (ref 26–34)
MCHC RBC AUTO-ENTMCNC: 31.8 G/DL (ref 32–36)
MCV RBC AUTO: 91 FL (ref 80–100)
MONOCYTES # BLD AUTO: 0.97 X10*3/UL (ref 0.05–0.8)
MONOCYTES NFR BLD AUTO: 11.4 %
NEUTROPHILS # BLD AUTO: 5.89 X10*3/UL (ref 1.6–5.5)
NEUTROPHILS NFR BLD AUTO: 69.2 %
NITRITE UR QL STRIP.AUTO: NEGATIVE
NON HDL CHOLESTEROL: 84 MG/DL (ref 0–149)
NRBC BLD-RTO: 0 /100 WBCS (ref 0–0)
PH UR STRIP.AUTO: 5 [PH]
PLATELET # BLD AUTO: 260 X10*3/UL (ref 150–450)
POTASSIUM SERPL-SCNC: 4.6 MMOL/L (ref 3.5–5.3)
POTASSIUM SERPL-SCNC: 6.1 MMOL/L (ref 3.5–5.3)
PROT SERPL-MCNC: 6 G/DL (ref 6.4–8.2)
PROT UR STRIP.AUTO-MCNC: NEGATIVE MG/DL
RBC # BLD AUTO: 3.48 X10*6/UL (ref 4–5.2)
RBC # UR STRIP.AUTO: NEGATIVE MG/DL
RBC #/AREA URNS AUTO: ABNORMAL /HPF
SODIUM SERPL-SCNC: 136 MMOL/L (ref 136–145)
SP GR UR STRIP.AUTO: 1.01
SQUAMOUS #/AREA URNS AUTO: ABNORMAL /HPF
TRIGL SERPL-MCNC: 144 MG/DL (ref 0–149)
UROBILINOGEN UR STRIP.AUTO-MCNC: NORMAL MG/DL
VLDL: 29 MG/DL (ref 0–40)
WBC # BLD AUTO: 8.5 X10*3/UL (ref 4.4–11.3)
WBC #/AREA URNS AUTO: ABNORMAL /HPF

## 2025-07-25 PROCEDURE — G0378 HOSPITAL OBSERVATION PER HR: HCPCS

## 2025-07-25 PROCEDURE — 70450 CT HEAD/BRAIN W/O DYE: CPT

## 2025-07-25 PROCEDURE — 36415 COLL VENOUS BLD VENIPUNCTURE: CPT | Performed by: STUDENT IN AN ORGANIZED HEALTH CARE EDUCATION/TRAINING PROGRAM

## 2025-07-25 PROCEDURE — 85025 COMPLETE CBC W/AUTO DIFF WBC: CPT | Performed by: STUDENT IN AN ORGANIZED HEALTH CARE EDUCATION/TRAINING PROGRAM

## 2025-07-25 PROCEDURE — 96365 THER/PROPH/DIAG IV INF INIT: CPT

## 2025-07-25 PROCEDURE — 2500000005 HC RX 250 GENERAL PHARMACY W/O HCPCS

## 2025-07-25 PROCEDURE — 99285 EMERGENCY DEPT VISIT HI MDM: CPT | Mod: 25 | Performed by: STUDENT IN AN ORGANIZED HEALTH CARE EDUCATION/TRAINING PROGRAM

## 2025-07-25 PROCEDURE — 80061 LIPID PANEL: CPT

## 2025-07-25 PROCEDURE — 93005 ELECTROCARDIOGRAM TRACING: CPT

## 2025-07-25 PROCEDURE — 83036 HEMOGLOBIN GLYCOSYLATED A1C: CPT | Mod: STJLAB

## 2025-07-25 PROCEDURE — 99285 EMERGENCY DEPT VISIT HI MDM: CPT | Performed by: STUDENT IN AN ORGANIZED HEALTH CARE EDUCATION/TRAINING PROGRAM

## 2025-07-25 PROCEDURE — 2500000004 HC RX 250 GENERAL PHARMACY W/ HCPCS (ALT 636 FOR OP/ED)

## 2025-07-25 PROCEDURE — 70450 CT HEAD/BRAIN W/O DYE: CPT | Performed by: RADIOLOGY

## 2025-07-25 PROCEDURE — 2500000001 HC RX 250 WO HCPCS SELF ADMINISTERED DRUGS (ALT 637 FOR MEDICARE OP)

## 2025-07-25 PROCEDURE — 81001 URINALYSIS AUTO W/SCOPE: CPT | Performed by: STUDENT IN AN ORGANIZED HEALTH CARE EDUCATION/TRAINING PROGRAM

## 2025-07-25 PROCEDURE — 87086 URINE CULTURE/COLONY COUNT: CPT | Mod: STJLAB | Performed by: STUDENT IN AN ORGANIZED HEALTH CARE EDUCATION/TRAINING PROGRAM

## 2025-07-25 PROCEDURE — 84075 ASSAY ALKALINE PHOSPHATASE: CPT | Performed by: STUDENT IN AN ORGANIZED HEALTH CARE EDUCATION/TRAINING PROGRAM

## 2025-07-25 PROCEDURE — 2500000004 HC RX 250 GENERAL PHARMACY W/ HCPCS (ALT 636 FOR OP/ED): Performed by: STUDENT IN AN ORGANIZED HEALTH CARE EDUCATION/TRAINING PROGRAM

## 2025-07-25 PROCEDURE — 84484 ASSAY OF TROPONIN QUANT: CPT | Performed by: STUDENT IN AN ORGANIZED HEALTH CARE EDUCATION/TRAINING PROGRAM

## 2025-07-25 PROCEDURE — 99223 1ST HOSP IP/OBS HIGH 75: CPT

## 2025-07-25 PROCEDURE — 96372 THER/PROPH/DIAG INJ SC/IM: CPT

## 2025-07-25 PROCEDURE — 84132 ASSAY OF SERUM POTASSIUM: CPT | Mod: 59 | Performed by: STUDENT IN AN ORGANIZED HEALTH CARE EDUCATION/TRAINING PROGRAM

## 2025-07-25 RX ORDER — TIMOLOL MALEATE 5 MG/ML
1 SOLUTION/ DROPS OPHTHALMIC DAILY
Status: DISCONTINUED | OUTPATIENT
Start: 2025-07-26 | End: 2025-07-26 | Stop reason: HOSPADM

## 2025-07-25 RX ORDER — ACETAMINOPHEN 325 MG/1
650 TABLET ORAL EVERY 4 HOURS PRN
Status: DISCONTINUED | OUTPATIENT
Start: 2025-07-25 | End: 2025-07-26

## 2025-07-25 RX ORDER — HEPARIN SODIUM 5000 [USP'U]/ML
5000 INJECTION, SOLUTION INTRAVENOUS; SUBCUTANEOUS EVERY 8 HOURS SCHEDULED
Status: DISCONTINUED | OUTPATIENT
Start: 2025-07-25 | End: 2025-07-26 | Stop reason: HOSPADM

## 2025-07-25 RX ORDER — ACETAMINOPHEN 650 MG/1
650 SUPPOSITORY RECTAL EVERY 4 HOURS PRN
Status: DISCONTINUED | OUTPATIENT
Start: 2025-07-25 | End: 2025-07-26

## 2025-07-25 RX ORDER — CEFTRIAXONE 1 G/50ML
1 INJECTION, SOLUTION INTRAVENOUS EVERY 24 HOURS
Status: DISCONTINUED | OUTPATIENT
Start: 2025-07-25 | End: 2025-07-26

## 2025-07-25 RX ORDER — LOSARTAN POTASSIUM 50 MG/1
50 TABLET ORAL DAILY
Status: DISCONTINUED | OUTPATIENT
Start: 2025-07-26 | End: 2025-07-26 | Stop reason: HOSPADM

## 2025-07-25 RX ORDER — LATANOPROST 50 UG/ML
1 SOLUTION/ DROPS OPHTHALMIC NIGHTLY
Status: DISCONTINUED | OUTPATIENT
Start: 2025-07-25 | End: 2025-07-26 | Stop reason: HOSPADM

## 2025-07-25 RX ORDER — VENLAFAXINE HYDROCHLORIDE 75 MG/1
75 CAPSULE, EXTENDED RELEASE ORAL DAILY
Status: DISCONTINUED | OUTPATIENT
Start: 2025-07-26 | End: 2025-07-26 | Stop reason: HOSPADM

## 2025-07-25 RX ORDER — HYDRALAZINE HYDROCHLORIDE 25 MG/1
25 TABLET, FILM COATED ORAL 2 TIMES DAILY
Status: DISCONTINUED | OUTPATIENT
Start: 2025-07-25 | End: 2025-07-26 | Stop reason: HOSPADM

## 2025-07-25 RX ORDER — POLYETHYLENE GLYCOL 3350 17 G/17G
17 POWDER, FOR SOLUTION ORAL DAILY PRN
Status: DISCONTINUED | OUTPATIENT
Start: 2025-07-25 | End: 2025-07-26 | Stop reason: HOSPADM

## 2025-07-25 RX ORDER — TALC
3 POWDER (GRAM) TOPICAL NIGHTLY
Status: DISCONTINUED | OUTPATIENT
Start: 2025-07-25 | End: 2025-07-26 | Stop reason: HOSPADM

## 2025-07-25 RX ORDER — ASPIRIN 81 MG/1
81 TABLET ORAL DAILY
Status: DISCONTINUED | OUTPATIENT
Start: 2025-07-26 | End: 2025-07-26 | Stop reason: HOSPADM

## 2025-07-25 RX ORDER — ATORVASTATIN CALCIUM 40 MG/1
40 TABLET, FILM COATED ORAL NIGHTLY
Status: DISCONTINUED | OUTPATIENT
Start: 2025-07-25 | End: 2025-07-26 | Stop reason: HOSPADM

## 2025-07-25 RX ORDER — ONDANSETRON HYDROCHLORIDE 2 MG/ML
4 INJECTION, SOLUTION INTRAVENOUS EVERY 8 HOURS PRN
Status: DISCONTINUED | OUTPATIENT
Start: 2025-07-25 | End: 2025-07-26 | Stop reason: HOSPADM

## 2025-07-25 RX ORDER — CARVEDILOL 12.5 MG/1
12.5 TABLET ORAL 2 TIMES DAILY
Status: DISCONTINUED | OUTPATIENT
Start: 2025-07-25 | End: 2025-07-26 | Stop reason: HOSPADM

## 2025-07-25 RX ORDER — ONDANSETRON 4 MG/1
4 TABLET, FILM COATED ORAL EVERY 8 HOURS PRN
Status: DISCONTINUED | OUTPATIENT
Start: 2025-07-25 | End: 2025-07-26 | Stop reason: HOSPADM

## 2025-07-25 RX ORDER — VENLAFAXINE HYDROCHLORIDE 75 MG/1
150 CAPSULE, EXTENDED RELEASE ORAL DAILY
Status: DISCONTINUED | OUTPATIENT
Start: 2025-07-26 | End: 2025-07-26 | Stop reason: HOSPADM

## 2025-07-25 RX ORDER — ACETAMINOPHEN 160 MG/5ML
650 SOLUTION ORAL EVERY 4 HOURS PRN
Status: DISCONTINUED | OUTPATIENT
Start: 2025-07-25 | End: 2025-07-26

## 2025-07-25 RX ADMIN — Medication 3 MG: at 22:42

## 2025-07-25 RX ADMIN — SODIUM CHLORIDE, SODIUM LACTATE, POTASSIUM CHLORIDE, AND CALCIUM CHLORIDE 1000 ML: .6; .31; .03; .02 INJECTION, SOLUTION INTRAVENOUS at 15:47

## 2025-07-25 RX ADMIN — HEPARIN SODIUM 5000 UNITS: 5000 INJECTION, SOLUTION INTRAVENOUS; SUBCUTANEOUS at 22:42

## 2025-07-25 RX ADMIN — ATORVASTATIN CALCIUM 40 MG: 40 TABLET, FILM COATED ORAL at 21:56

## 2025-07-25 RX ADMIN — CEFTRIAXONE 1 G: 1 INJECTION, SOLUTION INTRAVENOUS at 19:19

## 2025-07-25 ASSESSMENT — COGNITIVE AND FUNCTIONAL STATUS - GENERAL
DRESSING REGULAR UPPER BODY CLOTHING: A LITTLE
MOBILITY SCORE: 24
DAILY ACTIVITIY SCORE: 23
PATIENT BASELINE BEDBOUND: NO

## 2025-07-25 ASSESSMENT — ACTIVITIES OF DAILY LIVING (ADL)
FEEDING YOURSELF: INDEPENDENT
BATHING: INDEPENDENT
HEARING - RIGHT EAR: FUNCTIONAL
HEARING - LEFT EAR: FUNCTIONAL
DRESSING YOURSELF: INDEPENDENT
JUDGMENT_ADEQUATE_SAFELY_COMPLETE_DAILY_ACTIVITIES: YES
GROOMING: INDEPENDENT
ADEQUATE_TO_COMPLETE_ADL: YES
PATIENT'S MEMORY ADEQUATE TO SAFELY COMPLETE DAILY ACTIVITIES?: YES
WALKS IN HOME: INDEPENDENT
TOILETING: INDEPENDENT

## 2025-07-25 ASSESSMENT — PAIN SCALES - GENERAL
PAINLEVEL_OUTOF10: 3
PAINLEVEL_OUTOF10: 1

## 2025-07-25 ASSESSMENT — LIFESTYLE VARIABLES
HAVE PEOPLE ANNOYED YOU BY CRITICIZING YOUR DRINKING: NO
TOTAL SCORE: 0
EVER HAD A DRINK FIRST THING IN THE MORNING TO STEADY YOUR NERVES TO GET RID OF A HANGOVER: NO
EVER FELT BAD OR GUILTY ABOUT YOUR DRINKING: NO
HAVE YOU EVER FELT YOU SHOULD CUT DOWN ON YOUR DRINKING: NO

## 2025-07-25 ASSESSMENT — PAIN - FUNCTIONAL ASSESSMENT: PAIN_FUNCTIONAL_ASSESSMENT: 0-10

## 2025-07-25 NOTE — ED PROVIDER NOTES
EMERGENCY DEPARTMENT ENCOUNTER      Pt Name: Roslyn Cabrera  MRN: [id number]  Birthdate 1945  Date of evaluation: 7/25/2025  Provider: Malia Gonzalez MD    CHIEF COMPLAINT       Chief Complaint   Patient presents with    Dizziness     Pt arrived via EMS with complaint of on and off dizziness for last 5 days.  Pt has HX of stroke with left sided defecit, slurred speech and difficulty finding words.  Pt arrived with injured left arm that she hurt 4 weeks prior.  Denies Chest pain, N/V.         HISTORY OF PRESENT ILLNESS    A 79-year-old female with past medical history of CVA with residual left-side deficit, apraxia/aphasia, HLD, essential HTN, CKD and diabetes mellitus presents with 2 weeks of dizziness, described as a lightheadedness and spinning room sensation.  Her daughter, who is at bedside reports that the patient nearly fainted earlier today and also noticed transient slower speech than her usual baseline.  She expresses concern about possible hypotension due to overmedication, as the patient is on multiple antihypertensives (carvedilol twice daily, hydralazine twice daily and losartan daily).  The daughter also voiced concern for possible UTI given the patient history of frequent UTIs.  The daughter reports that patient is on her baseline mentation currently.  The patient denies fever, chills, chest or abdominal pain, nausea, vomiting, hematuria or melena at this time.      History provided by:  Patient and relative   used: No        Nursing Notes were reviewed.    PAST MEDICAL HISTORY   Medical History[1]      SURGICAL HISTORY     Surgical History[2]      CURRENT MEDICATIONS       Previous Medications    ASPIRIN 81 MG EC TABLET    Take 1 tablet (81 mg) by mouth once daily. Do not start before January 19, 2024.    ATORVASTATIN (LIPITOR) 40 MG TABLET    Take 1 tablet (40 mg) by mouth once daily at bedtime.    CARVEDILOL (COREG) 12.5 MG TABLET    Take 1 tablet (12.5 mg) by  mouth 2 times a day.    COENZYME Q-10 100 MG CAPSULE    Take 1 capsule (100 mg) by mouth once daily.    CYANOCOBALAMIN, VITAMIN B-12, (VITAMIN B-12 ORAL)    Take 1 tablet by mouth once daily.    HYDRALAZINE (APRESOLINE) 25 MG TABLET    Take 1 tablet (25 mg) by mouth 2 times a day.    LATANOPROST (XALATAN) 0.005 % OPHTHALMIC SOLUTION    Administer 1 drop into both eyes once daily at bedtime.    LOSARTAN (COZAAR) 100 MG TABLET    Take 0.5 tablets (50 mg) by mouth once daily.    TIMOLOL (TIMOPTIC) 0.5 % OPHTHALMIC SOLUTION    Administer 1 drop into both eyes once daily.    VENLAFAXINE XR (EFFEXOR-XR) 150 MG 24 HR CAPSULE    Take 1 capsule (150 mg) by mouth once daily. Do not crush or chew.    VENLAFAXINE XR (EFFEXOR-XR) 75 MG 24 HR CAPSULE    Take 1 capsule (75 mg) by mouth once daily. Take with food.       ALLERGIES     Patient has no known allergies.    FAMILY HISTORY     Family History[3]       SOCIAL HISTORY     Social History[4]    SCREENINGS                        PHYSICAL EXAM    (up to 7 for level 4, 8 or more for level 5)     ED Triage Vitals [07/25/25 1407]   Temperature Heart Rate Respirations BP   36.6 °C (97.9 °F) 59 18 (!) 99/48      Pulse Ox Temp Source Heart Rate Source Patient Position   96 % Temporal Monitor Lying      BP Location FiO2 (%)     Right arm --       Physical Exam  Vitals and nursing note reviewed.   Constitutional:       General: She is not in acute distress.     Appearance: Normal appearance. She is not ill-appearing, toxic-appearing or diaphoretic.   HENT:      Head: Normocephalic and atraumatic.     Eyes:      General: No visual field deficit.     Extraocular Movements: Extraocular movements intact.       Cardiovascular:      Rate and Rhythm: Normal rate and regular rhythm.      Pulses: Normal pulses.      Heart sounds: Normal heart sounds.   Pulmonary:      Effort: Pulmonary effort is normal. No respiratory distress.      Breath sounds: Normal breath sounds. No stridor. No wheezing,  rhonchi or rales.   Chest:      Chest wall: No tenderness.   Abdominal:      General: Abdomen is flat. There is no distension.      Palpations: Abdomen is soft.      Tenderness: There is no abdominal tenderness. There is no guarding or rebound.     Musculoskeletal:         General: No swelling, tenderness or deformity. Normal range of motion.      Cervical back: Normal range of motion and neck supple. No rigidity or tenderness.      Right lower leg: No edema.      Left lower leg: No edema.      Comments: Limited right arm ROM due to known humeral head fracture, on sling.     Skin:     General: Skin is warm and dry.      Capillary Refill: Capillary refill takes less than 2 seconds.      Coloration: Skin is not jaundiced or pale.      Findings: No bruising, lesion or rash.     Neurological:      Mental Status: She is alert and oriented to person, place, and time.      GCS: GCS eye subscore is 4. GCS verbal subscore is 5. GCS motor subscore is 6.      Cranial Nerves: Dysarthria present. No cranial nerve deficit or facial asymmetry.      Sensory: No sensory deficit.      Motor: No weakness.      Comments: Known aphasia and apraxia   Psychiatric:         Mood and Affect: Mood normal.         Behavior: Behavior normal.          DIAGNOSTIC RESULTS     LABS:  Labs Reviewed   CBC WITH AUTO DIFFERENTIAL - Abnormal       Result Value    WBC 8.5      nRBC 0.0      RBC 3.48 (*)     Hemoglobin 10.1 (*)     Hematocrit 31.8 (*)     MCV 91      MCH 29.0      MCHC 31.8 (*)     RDW 15.4 (*)     Platelets 260      Neutrophils % 69.2      Immature Granulocytes %, Automated 0.2      Lymphocytes % 16.6      Monocytes % 11.4      Eosinophils % 2.1      Basophils % 0.5      Neutrophils Absolute 5.89 (*)     Immature Granulocytes Absolute, Automated 0.02      Lymphocytes Absolute 1.41      Monocytes Absolute 0.97 (*)     Eosinophils Absolute 0.18      Basophils Absolute 0.04     COMPREHENSIVE METABOLIC PANEL - Abnormal    Glucose 119 (*)      Sodium 136      Potassium 6.1 (*)     Chloride 110 (*)     Bicarbonate 20 (*)     Anion Gap 12      Urea Nitrogen 40 (*)     Creatinine 1.36 (*)     eGFR 40 (*)     Calcium 8.9      Albumin 3.7      Alkaline Phosphatase 147 (*)     Total Protein 6.0 (*)     AST 28      Bilirubin, Total 0.5      ALT 9     URINALYSIS WITH REFLEX CULTURE AND MICROSCOPIC - Abnormal    Color, Urine Light-Yellow      Appearance, Urine Turbid (*)     Specific Gravity, Urine 1.014      pH, Urine 5.0      Protein, Urine NEGATIVE      Glucose, Urine Normal      Blood, Urine NEGATIVE      Ketones, Urine NEGATIVE      Bilirubin, Urine NEGATIVE      Urobilinogen, Urine Normal      Nitrite, Urine NEGATIVE      Leukocyte Esterase, Urine 75 Neo/uL (*)    MICROSCOPIC ONLY, URINE - Abnormal    WBC, Urine 1-5      RBC, Urine NONE      Squamous Epithelial Cells, Urine 1-9 (SPARSE)      Bacteria, Urine 1+ (*)     Hyaline Casts, Urine 2+ (*)    SERIAL TROPONIN-INITIAL - Normal    Troponin I, High Sensitivity 10      Narrative:     Less than 99th percentile of normal range cutoff-  Female and children under 18 years old <14 ng/L; Male <21 ng/L: Negative  Repeat testing should be performed if clinically indicated.     Female and children under 18 years old 14-50 ng/L; Male 21-50 ng/L:  Consistent with possible cardiac damage and possible increased clinical   risk. Serial measurements may help to assess extent of myocardial damage.     >50 ng/L: Consistent with cardiac damage, increased clinical risk and  myocardial infarction. Serial measurements may help assess extent of   myocardial damage.      NOTE: Children less than 1 year old may have higher baseline troponin   levels and results should be interpreted in conjunction with the overall   clinical context.     NOTE: Troponin I testing is performed using a different   testing methodology at [facility] than at other   [facility]. Direct result comparisons should only   be made within  the same method.   SERIAL TROPONIN, 1 HOUR - Normal    Troponin I, High Sensitivity 11      Narrative:     Less than 99th percentile of normal range cutoff-  Female and children under 18 years old <14 ng/L; Male <21 ng/L: Negative  Repeat testing should be performed if clinically indicated.     Female and children under 18 years old 14-50 ng/L; Male 21-50 ng/L:  Consistent with possible cardiac damage and possible increased clinical   risk. Serial measurements may help to assess extent of myocardial damage.     >50 ng/L: Consistent with cardiac damage, increased clinical risk and  myocardial infarction. Serial measurements may help assess extent of   myocardial damage.      NOTE: Children less than 1 year old may have higher baseline troponin   levels and results should be interpreted in conjunction with the overall   clinical context.     NOTE: Troponin I testing is performed using a different   testing methodology at [facility] than at other   [facility]. Direct result comparisons should only   be made within the same method.   POTASSIUM - Normal    Potassium 4.6     URINE CULTURE   TROPONIN SERIES- (INITIAL, 1 HR)    Narrative:     The following orders were created for panel order Troponin Series, (0, 1 HR).  Procedure                               Abnormality         Status                     ---------                               -----------         ------                     Troponin I, High Sensiti...[[id number]]  Normal              Final result               Troponin, High Sensitivi...[[id number]]  Normal              Final result                 Please view results for these tests on the individual orders.   URINALYSIS WITH REFLEX CULTURE AND MICROSCOPIC    Narrative:     The following orders were created for panel order Urinalysis with Reflex Culture and Microscopic.  Procedure                               Abnormality         Status                     ---------                                -----------         ------                     Urinalysis with Reflex C...[[id number]]  Abnormal            Final result               Extra Urine Gray Tube[[id number]]                            In process                   Please view results for these tests on the individual orders.   EXTRA URINE GRAY TUBE   POTASSIUM       All other labs were within normal range or not returned as of this dictation.    Imaging  CT head wo IV contrast   Final Result   1. No acute intracranial abnormality.        2. Moderate-sized remote infarct in the left parietal lobe   posteriorly.             MACRO:   None        Signed by: Mason Chris 7/25/2025 4:10 PM   Dictation workstation:   [id number]           Procedures  Procedures     EMERGENCY DEPARTMENT COURSE/MDM:     ED Course as of 07/25/25 2007 Fri Jul 25, 2025   1604 CBC and Auto Differential(!)  No acute infectious or hematologic process on my interpretation [KE]   1637 CT head wo IV contrast  No acute intracranial process on my interpretation [KE]   1637 Troponin I, High Sensitivity: 10 [KE]   1637 POTASSIUM(!!): 6.1  Hemolyzed, will repeat [KE]   1637 Creatinine(!): 1.36  At baseline per chart review [KE]   1638 BP: 118/54  Improved from initial presentation [KE]   1757 POTASSIUM: 4.6 [KE]   1819 Urinalysis with Reflex Culture and Microscopic(!)  + bacteria, + WBC urine, + leukocytes. Patient presentation per daughter consistent with previous UTI. Will treat for cystitis [KE]   1822 Troponin I, High Sensitivity: 11 [KE]   1827 ECG 12 lead  EKG 1528: 62 bpm. Normal simus rhythm. Normal axis, normal intervals, no acute ST elevations or depressions [KE]      ED Course User Index  [KE] Last Estrella DO         Diagnoses as of 07/25/25 2007   Dizziness   Polypharmacy   History of CVA (cerebrovascular accident)   UTI (urinary tract infection) with pyuria        Medical Decision Making  A 79-year-old female with past medical history of CVA with known aphasia/  apraxia presenting to the ED for 2-week dizziness with near syncope episode accompanied by pronounced dysarthria for about 2 or 3 minutes.  Currently asymptomatic.  The patient presented hypotensive (99/48mmHG), but nontachycardic, nontachypneic, afebrile and saturating well on room air.  Physical exam is unremarkable, besides known residual deficit and right arm fracture.  Basic lab along with EKG and serial Trop workup was initiated to evaluate for arrhythmia, ischemia or ACS.  An UA was obtained to assess for possible UTI, given patient's history and recent change in baseline function. Additionally, CT head was ordered to evaluate for acute intracranial changes post CVA.    CBC showed a drop in hemoglobin level from 11-10 but no leukocytosis.  Initially CMP showed hyperkalemia, however, repeat potassium level showed 4.6.  Additionally workup revealed kidney function on her baseline.  Serial trops within normal parameters and CXR:  UA revealed pyuria and bacteriuria, concerning for UTI. The patient was initiated in IV antibiotics. CT head revealed no acute intracranial abnormality.  Results and findings were discussed with patient's daughter at bedside.  The patient's daughter at bedside reported that she does not feel comfortable sending her mother home due to concerns of medication noncompliance and ongoing UTI's since May.   Shared decision making was performed and the patient was admitted under medicine for further management and potential TIA workup.  Patient and or family in agreement and understanding of treatment plan.  All questions answered.      I reviewed the case with the attending ED physician. The attending ED physician agrees with the plan. Patient and/or patient´s representative was counseled regarding labs, imaging, likely diagnosis, and plan. All questions were answered.    ED Medications administered this visit:    Medications   cefTRIAXone (Rocephin) 1 g in dextrose (iso) IV 50 mL (1 g  intravenous New Bag 7/25/25 1919)   lactated Ringer's bolus 1,000 mL (1,000 mL intravenous New Bag 7/25/25 1547)       New Prescriptions from this visit:    New Prescriptions    No medications on file       Follow-up:  No follow-up provider specified.      Final Impression:   1. Dizziness    2. Polypharmacy    3. History of CVA (cerebrovascular accident)    4. UTI (urinary tract infection) with pyuria          (Please note that portions of this note were completed with a voice recognition program.  Efforts were made to edit the dictations but occasionally words are mis-transcribed.)       [1]   Past Medical History:  Diagnosis Date    Aphasia as late effect of cerebrovascular accident 02/27/2024    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) 02/27/2024    Cataract extraction status, left eye 03/09/2016    Status post left cataract extraction    Cataract extraction status, right eye 03/09/2016    Status post right cataract extraction    COPD (chronic obstructive pulmonary disease) (Multi)     Hypertension     Pain in leg, unspecified 03/18/2015    Leg pain    Peripheral vascular disease, unspecified 06/08/2016    Claudication    Personal history of nicotine dependence 03/18/2015    Quit smoking    Sciatica, unspecified side 09/16/2015    Acute sciatica    Tobacco use 06/08/2016    Tobacco abuse   [2]   Past Surgical History:  Procedure Laterality Date    CARDIAC ELECTROPHYSIOLOGY PROCEDURE Left 01/16/2024    Procedure: Loop Insertion;  Surgeon: Odilon Arreaga MD;  Location: [place] [address] Cardiac Cath Lab;  Service: Electrophysiology;  Laterality: Left;  Norbert to do. Pt aphasic/crypto sroke    CATARACT EXTRACTION      EYE SURGERY      JOINT REPLACEMENT      OTHER SURGICAL HISTORY  02/24/2022    Knee replacement    OTHER SURGICAL HISTORY  02/24/2022    Cataract surgery   [3] No family history on file.  [4]   Social History  Socioeconomic History    Marital status: Single   Tobacco Use    Smoking status:  Former     Current packs/day: 0.00     Average packs/day: 0.5 packs/day for 44.0 years (22.0 ttl pk-yrs)     Types: Cigarettes     Start date:      Quit date:      Years since quittin.5     Passive exposure: Past    Smokeless tobacco: Never   Vaping Use    Vaping status: Never Used   Substance and Sexual Activity    Alcohol use: Never    Drug use: Never    Sexual activity: Not Currently     Partners: Male     Social Drivers of Health     Financial Resource Strain: Low Risk  (3/9/2025)    Overall Financial Resource Strain (CARDIA)     Difficulty of Paying Living Expenses: Not hard at all   Food Insecurity: No Food Insecurity (3/9/2025)    Hunger Vital Sign     Worried About Running Out of Food in the Last Year: Never true     Ran Out of Food in the Last Year: Never true   Transportation Needs: No Transportation Needs (3/9/2025)    PRAPARE - Transportation     Lack of Transportation (Medical): No     Lack of Transportation (Non-Medical): No   Physical Activity: Inactive (3/9/2025)    Exercise Vital Sign     Days of Exercise per Week: 0 days     Minutes of Exercise per Session: 0 min   Stress: No Stress Concern Present (3/9/2025)    [employer] [place] [employer] - Occupational Stress Questionnaire     Feeling of Stress : Not at all   Social Connections: Unknown (3/9/2025)    Social Connection and Isolation Panel     Frequency of Communication with Friends and Family: Once a week   Intimate Partner Violence: Not At Risk (3/9/2025)    Humiliation, Afraid, Rape, and Kick questionnaire     Fear of Current or Ex-Partner: No     Emotionally Abused: No     Physically Abused: No     Sexually Abused: No   Housing Stability: Low Risk  (3/9/2025)    Housing Stability Vital Sign     Unable to Pay for Housing in the Last Year: No     Number of Times Moved in the Last Year: 0     Homeless in the Last Year: No        Malia Gonzalez MD  Resident  25

## 2025-07-25 NOTE — ED TRIAGE NOTES
Pt arrived via EMS with complaint of on and off dizziness for last 5 days.  Pt has HX of stroke with left sided defecit, slurred speech and difficulty finding words.  Pt arrived with injured left arm that she hurt 4 weeks prior.  Denies Chest pain, N/V or pain at this time.

## 2025-07-26 ENCOUNTER — APPOINTMENT (OUTPATIENT)
Dept: RADIOLOGY | Facility: HOSPITAL | Age: 80
End: 2025-07-26
Payer: MEDICARE

## 2025-07-26 VITALS
HEIGHT: 60 IN | DIASTOLIC BLOOD PRESSURE: 81 MMHG | OXYGEN SATURATION: 96 % | BODY MASS INDEX: 26.66 KG/M2 | TEMPERATURE: 98.2 F | RESPIRATION RATE: 18 BRPM | WEIGHT: 135.8 LBS | HEART RATE: 78 BPM | SYSTOLIC BLOOD PRESSURE: 157 MMHG

## 2025-07-26 LAB
ALBUMIN SERPL BCP-MCNC: 3.8 G/DL (ref 3.4–5)
ANION GAP SERPL CALC-SCNC: 13 MMOL/L (ref 10–20)
ATRIAL RATE: 62 BPM
BUN SERPL-MCNC: 35 MG/DL (ref 6–23)
CALCIUM SERPL-MCNC: 9.1 MG/DL (ref 8.6–10.3)
CHLORIDE SERPL-SCNC: 111 MMOL/L (ref 98–107)
CO2 SERPL-SCNC: 18 MMOL/L (ref 21–32)
CREAT SERPL-MCNC: 1.18 MG/DL (ref 0.5–1.05)
EGFRCR SERPLBLD CKD-EPI 2021: 47 ML/MIN/1.73M*2
ERYTHROCYTE [DISTWIDTH] IN BLOOD BY AUTOMATED COUNT: 15 % (ref 11.5–14.5)
EST. AVERAGE GLUCOSE BLD GHB EST-MCNC: 100 MG/DL
GLUCOSE SERPL-MCNC: 89 MG/DL (ref 74–99)
HBA1C MFR BLD: 5.1 % (ref ?–5.7)
HCT VFR BLD AUTO: 33.4 % (ref 36–46)
HGB BLD-MCNC: 10.3 G/DL (ref 12–16)
MAGNESIUM SERPL-MCNC: 2.26 MG/DL (ref 1.6–2.4)
MCH RBC QN AUTO: 28.8 PG (ref 26–34)
MCHC RBC AUTO-ENTMCNC: 30.8 G/DL (ref 32–36)
MCV RBC AUTO: 93 FL (ref 80–100)
NRBC BLD-RTO: 0 /100 WBCS (ref 0–0)
P AXIS: 63 DEGREES
P OFFSET: 201 MS
P ONSET: 145 MS
PHOSPHATE SERPL-MCNC: 4.5 MG/DL (ref 2.5–4.9)
PLATELET # BLD AUTO: 210 X10*3/UL (ref 150–450)
POTASSIUM SERPL-SCNC: 4.6 MMOL/L (ref 3.5–5.3)
PR INTERVAL: 162 MS
Q ONSET: 226 MS
QRS COUNT: 11 BEATS
QRS DURATION: 80 MS
QT INTERVAL: 414 MS
QTC CALCULATION(BAZETT): 420 MS
QTC FREDERICIA: 418 MS
R AXIS: 39 DEGREES
RBC # BLD AUTO: 3.58 X10*6/UL (ref 4–5.2)
SODIUM SERPL-SCNC: 137 MMOL/L (ref 136–145)
T AXIS: 62 DEGREES
T OFFSET: 433 MS
VENTRICULAR RATE: 62 BPM
WBC # BLD AUTO: 8.2 X10*3/UL (ref 4.4–11.3)

## 2025-07-26 PROCEDURE — 96372 THER/PROPH/DIAG INJ SC/IM: CPT

## 2025-07-26 PROCEDURE — 36415 COLL VENOUS BLD VENIPUNCTURE: CPT

## 2025-07-26 PROCEDURE — 97165 OT EVAL LOW COMPLEX 30 MIN: CPT | Mod: GO | Performed by: OCCUPATIONAL THERAPIST

## 2025-07-26 PROCEDURE — 2500000004 HC RX 250 GENERAL PHARMACY W/ HCPCS (ALT 636 FOR OP/ED)

## 2025-07-26 PROCEDURE — 97161 PT EVAL LOW COMPLEX 20 MIN: CPT | Mod: GP

## 2025-07-26 PROCEDURE — 99238 HOSP IP/OBS DSCHRG MGMT 30/<: CPT | Performed by: INTERNAL MEDICINE

## 2025-07-26 PROCEDURE — 70553 MRI BRAIN STEM W/O & W/DYE: CPT | Performed by: RADIOLOGY

## 2025-07-26 PROCEDURE — A9575 INJ GADOTERATE MEGLUMI 0.1ML: HCPCS | Mod: JW | Performed by: INTERNAL MEDICINE

## 2025-07-26 PROCEDURE — 70553 MRI BRAIN STEM W/O & W/DYE: CPT

## 2025-07-26 PROCEDURE — 2550000001 HC RX 255 CONTRASTS: Mod: JW | Performed by: INTERNAL MEDICINE

## 2025-07-26 PROCEDURE — 83735 ASSAY OF MAGNESIUM: CPT

## 2025-07-26 PROCEDURE — 80069 RENAL FUNCTION PANEL: CPT

## 2025-07-26 PROCEDURE — 85027 COMPLETE CBC AUTOMATED: CPT

## 2025-07-26 PROCEDURE — G0378 HOSPITAL OBSERVATION PER HR: HCPCS

## 2025-07-26 PROCEDURE — 2500000001 HC RX 250 WO HCPCS SELF ADMINISTERED DRUGS (ALT 637 FOR MEDICARE OP)

## 2025-07-26 PROCEDURE — 2500000002 HC RX 250 W HCPCS SELF ADMINISTERED DRUGS (ALT 637 FOR MEDICARE OP, ALT 636 FOR OP/ED)

## 2025-07-26 RX ORDER — CLOPIDOGREL BISULFATE 75 MG/1
75 TABLET ORAL DAILY
Qty: 21 TABLET | Refills: 0 | Status: SHIPPED | OUTPATIENT
Start: 2025-07-26 | End: 2025-08-16

## 2025-07-26 RX ORDER — GADOTERATE MEGLUMINE 376.9 MG/ML
12 INJECTION INTRAVENOUS
Status: COMPLETED | OUTPATIENT
Start: 2025-07-26 | End: 2025-07-26

## 2025-07-26 RX ORDER — ACETAMINOPHEN 325 MG/1
975 TABLET ORAL EVERY 8 HOURS PRN
Status: DISCONTINUED | OUTPATIENT
Start: 2025-07-26 | End: 2025-07-26 | Stop reason: HOSPADM

## 2025-07-26 RX ADMIN — GADOTERATE MEGLUMINE 12 ML: 376.9 INJECTION INTRAVENOUS at 13:38

## 2025-07-26 RX ADMIN — VENLAFAXINE HYDROCHLORIDE 150 MG: 75 CAPSULE, EXTENDED RELEASE ORAL at 08:31

## 2025-07-26 RX ADMIN — ASPIRIN 81 MG: 81 TABLET, COATED ORAL at 08:32

## 2025-07-26 RX ADMIN — VENLAFAXINE HYDROCHLORIDE 75 MG: 75 CAPSULE, EXTENDED RELEASE ORAL at 08:32

## 2025-07-26 RX ADMIN — HEPARIN SODIUM 5000 UNITS: 5000 INJECTION, SOLUTION INTRAVENOUS; SUBCUTANEOUS at 05:37

## 2025-07-26 SDOH — ECONOMIC STABILITY: FOOD INSECURITY: WITHIN THE PAST 12 MONTHS, YOU WORRIED THAT YOUR FOOD WOULD RUN OUT BEFORE YOU GOT THE MONEY TO BUY MORE.: NEVER TRUE

## 2025-07-26 SDOH — HEALTH STABILITY: MENTAL HEALTH: HOW MANY DRINKS CONTAINING ALCOHOL DO YOU HAVE ON A TYPICAL DAY WHEN YOU ARE DRINKING?: PATIENT DOES NOT DRINK

## 2025-07-26 SDOH — ECONOMIC STABILITY: FOOD INSECURITY: WITHIN THE PAST 12 MONTHS, THE FOOD YOU BOUGHT JUST DIDN'T LAST AND YOU DIDN'T HAVE MONEY TO GET MORE.: NEVER TRUE

## 2025-07-26 SDOH — ECONOMIC STABILITY: HOUSING INSECURITY: AT ANY TIME IN THE PAST 12 MONTHS, WERE YOU HOMELESS OR LIVING IN A SHELTER (INCLUDING NOW)?: NO

## 2025-07-26 SDOH — ECONOMIC STABILITY: FOOD INSECURITY: HOW HARD IS IT FOR YOU TO PAY FOR THE VERY BASICS LIKE FOOD, HOUSING, MEDICAL CARE, AND HEATING?: NOT VERY HARD

## 2025-07-26 SDOH — SOCIAL STABILITY: SOCIAL INSECURITY: WITHIN THE LAST YEAR, HAVE YOU BEEN HUMILIATED OR EMOTIONALLY ABUSED IN OTHER WAYS BY YOUR PARTNER OR EX-PARTNER?: NO

## 2025-07-26 SDOH — ECONOMIC STABILITY: HOUSING INSECURITY: IN THE PAST 12 MONTHS, HOW MANY TIMES HAVE YOU MOVED WHERE YOU WERE LIVING?: 0

## 2025-07-26 SDOH — ECONOMIC STABILITY: HOUSING INSECURITY: IN THE LAST 12 MONTHS, WAS THERE A TIME WHEN YOU WERE NOT ABLE TO PAY THE MORTGAGE OR RENT ON TIME?: NO

## 2025-07-26 SDOH — SOCIAL STABILITY: SOCIAL INSECURITY: DO YOU FEEL UNSAFE GOING BACK TO THE PLACE WHERE YOU ARE LIVING?: NO

## 2025-07-26 SDOH — ECONOMIC STABILITY: INCOME INSECURITY: IN THE PAST 12 MONTHS HAS THE ELECTRIC, GAS, OIL, OR WATER COMPANY THREATENED TO SHUT OFF SERVICES IN YOUR HOME?: NO

## 2025-07-26 SDOH — SOCIAL STABILITY: SOCIAL INSECURITY: HAVE YOU HAD ANY THOUGHTS OF HARMING ANYONE ELSE?: NO

## 2025-07-26 SDOH — SOCIAL STABILITY: SOCIAL INSECURITY: WITHIN THE LAST YEAR, HAVE YOU BEEN AFRAID OF YOUR PARTNER OR EX-PARTNER?: NO

## 2025-07-26 SDOH — SOCIAL STABILITY: SOCIAL INSECURITY: DOES ANYONE TRY TO KEEP YOU FROM HAVING/CONTACTING OTHER FRIENDS OR DOING THINGS OUTSIDE YOUR HOME?: NO

## 2025-07-26 SDOH — HEALTH STABILITY: MENTAL HEALTH: HOW OFTEN DO YOU HAVE A DRINK CONTAINING ALCOHOL?: NEVER

## 2025-07-26 SDOH — SOCIAL STABILITY: SOCIAL INSECURITY: ABUSE: ADULT

## 2025-07-26 SDOH — HEALTH STABILITY: MENTAL HEALTH: HOW OFTEN DO YOU HAVE SIX OR MORE DRINKS ON ONE OCCASION?: NEVER

## 2025-07-26 SDOH — SOCIAL STABILITY: SOCIAL INSECURITY: HAVE YOU HAD THOUGHTS OF HARMING ANYONE ELSE?: NO

## 2025-07-26 SDOH — SOCIAL STABILITY: SOCIAL INSECURITY: HAS ANYONE EVER THREATENED TO HURT YOUR FAMILY OR YOUR PETS?: NO

## 2025-07-26 SDOH — SOCIAL STABILITY: SOCIAL INSECURITY: ARE THERE ANY APPARENT SIGNS OF INJURIES/BEHAVIORS THAT COULD BE RELATED TO ABUSE/NEGLECT?: NO

## 2025-07-26 SDOH — SOCIAL STABILITY: SOCIAL INSECURITY: DO YOU FEEL ANYONE HAS EXPLOITED OR TAKEN ADVANTAGE OF YOU FINANCIALLY OR OF YOUR PERSONAL PROPERTY?: NO

## 2025-07-26 SDOH — ECONOMIC STABILITY: TRANSPORTATION INSECURITY: IN THE PAST 12 MONTHS, HAS LACK OF TRANSPORTATION KEPT YOU FROM MEDICAL APPOINTMENTS OR FROM GETTING MEDICATIONS?: NO

## 2025-07-26 SDOH — SOCIAL STABILITY: SOCIAL INSECURITY: WERE YOU ABLE TO COMPLETE ALL THE BEHAVIORAL HEALTH SCREENINGS?: YES

## 2025-07-26 SDOH — SOCIAL STABILITY: SOCIAL INSECURITY: ARE YOU OR HAVE YOU BEEN THREATENED OR ABUSED PHYSICALLY, EMOTIONALLY, OR SEXUALLY BY ANYONE?: NO

## 2025-07-26 ASSESSMENT — PAIN - FUNCTIONAL ASSESSMENT
PAIN_FUNCTIONAL_ASSESSMENT: 0-10
PAIN_FUNCTIONAL_ASSESSMENT: 0-10

## 2025-07-26 ASSESSMENT — COGNITIVE AND FUNCTIONAL STATUS - GENERAL
DAILY ACTIVITIY SCORE: 17
TOILETING: A LOT
CLIMB 3 TO 5 STEPS WITH RAILING: A LOT
PERSONAL GROOMING: A LITTLE
MOVING TO AND FROM BED TO CHAIR: A LITTLE
MOBILITY SCORE: 17
MOBILITY SCORE: 24
DRESSING REGULAR UPPER BODY CLOTHING: A LITTLE
TURNING FROM BACK TO SIDE WHILE IN FLAT BAD: A LITTLE
STANDING UP FROM CHAIR USING ARMS: A LITTLE
MOVING FROM LYING ON BACK TO SITTING ON SIDE OF FLAT BED WITH BEDRAILS: A LITTLE
DRESSING REGULAR LOWER BODY CLOTHING: A LITTLE
WALKING IN HOSPITAL ROOM: A LITTLE
HELP NEEDED FOR BATHING: A LOT
DAILY ACTIVITIY SCORE: 24

## 2025-07-26 ASSESSMENT — ACTIVITIES OF DAILY LIVING (ADL)
LACK_OF_TRANSPORTATION: NO
LACK_OF_TRANSPORTATION: NO

## 2025-07-26 ASSESSMENT — PAIN SCALES - GENERAL
PAINLEVEL_OUTOF10: 0 - NO PAIN

## 2025-07-26 ASSESSMENT — LIFESTYLE VARIABLES
AUDIT-C TOTAL SCORE: 0
HOW MANY STANDARD DRINKS CONTAINING ALCOHOL DO YOU HAVE ON A TYPICAL DAY: PATIENT DOES NOT DRINK
HOW OFTEN DO YOU HAVE A DRINK CONTAINING ALCOHOL: NEVER
AUDIT-C TOTAL SCORE: 0
SKIP TO QUESTIONS 9-10: 1
SKIP TO QUESTIONS 9-10: 1
AUDIT-C TOTAL SCORE: 0
HOW OFTEN DO YOU HAVE 6 OR MORE DRINKS ON ONE OCCASION: NEVER

## 2025-07-26 NOTE — PROGRESS NOTES
07/26/25 1232   Discharge Planning   Living Arrangements Alone   Support Systems Family members   Type of Residence Private residence   Number of Stairs to Enter Residence 0   Number of Stairs Within Residence 0   Home or Post Acute Services None   Expected Discharge Disposition Home   Does the patient need discharge transport arranged? No   Financial Resource Strain   How hard is it for you to pay for the very basics like food, housing, medical care, and heating? Not very   Intensity of Service   Intensity of Service 0-30 min     Met with pt to review her dc plan. Pt plans to go home and states she does not need HHC at this time. States her family assists with transport/groceries and she uses no assistant devices to get around her condo. PCP is Dr. Milner and prescriptions are filled at a local pharmacy but pt unable to recall which one. Family to transport pt home. Pt aware she can contact her PCP if she would like HHC after returning home. ADOD later today.

## 2025-07-26 NOTE — PROGRESS NOTES
"Occupational Therapy    Evaluation    Patient Name: Roslyn Cabrera \"Pam\"  MRN: [id number]  Today's Date: 7/26/2025  Time Calculation  Start Time: 0851  Stop Time: 0908  Time Calculation (min): 17 min  3111/3111-A  Eval only     Assessment  IP OT Assessment  Prognosis: Good  End of Session Communication: Bedside nurse  End of Session Patient Position: Bed, 3 rail up, Alarm on  Patient presents with decline in ADLs, functional transfers, and functional mobility tasks and would benefit from OT during acute stay to improve functional independence and safety.  Patient currently requires 24 hour hands on assistance. Recommend high intensity OT to maximize functional independence and safety.      Plan:  Treatment Interventions: ADL retraining, Functional transfer training, Patient/family training, Equipment evaluation/education, Compensatory technique education  OT Frequency: 5 times per week  OT Discharge Recommendations: High intensity level of continued care  OT - OK to Discharge: Yes from acute care OT services to the next level of care when cleared by medical team      Subjective   Current Problem:  1. Dizziness  Carotid duplex bilateral    Carotid duplex bilateral      2. Polypharmacy        3. History of CVA (cerebrovascular accident)        4. UTI (urinary tract infection) with pyuria        5. Stroke due to embolism of left middle cerebral artery (Multi)  Carotid duplex bilateral    Carotid duplex bilateral          General:  General  Reason for Referral: ADLs  Referred By: Rommel Frye MD  Past Medical History Relevant to Rehab: Admitted 7/25/2025 with dizziness for 5 days, slurred speech, word finding difficulty and left sided deficit. PMH: CVA, HLD, HTN, CKD, DM  Co-Treatment: PT  Co-Treatment Reason: for safety  Prior to Session Communication: Bedside nurse  Patient Position Received: Alarm on (seated at edge of bed)  Preferred Learning Style: verbal  General Comment: Patient seated at edge of bed and " agreeable to participate in OT evaluation. Patient with word finding difficulty.    Precautions:  UE Weight Bearing Status: Left Non-Weight Bearing (with sling secondary to subacute slightly displaced surgical neck left proximal humeral fracture with callus)  Medical Precautions: Fall precautions    Pain:  Pain Assessment  Pain Assessment: 0-10  0-10 (Numeric) Pain Score: 0 - No pain    Objective   Cognition:  Overall Cognitive Status:  (difficult to assess secondary expressive aphasia)  Safety/Judgement:  (decreased safety awareness)  Insight: Moderate  Impulsive: Moderately    Home Living:  Type of Home: Apartment  Lives With: Alone  Bathroom Shower/Tub: Tub/shower unit  Bathroom Equipment:  (shower chair, GB)     Prior Function:  ADL Assistance:  (reports daughter assisted daily with bathing and dressing secondary to recent fracture)  Ambulatory Assistance: Independent    ADL:  LE Dressing Assistance: Minimal    Activity Tolerance:  Endurance: Endurance does not limit participation in activity    Bed Mobility/Transfers:   Bed Mobility  Bed Mobility:  (SBA sit to supine)  Transfers  Transfer:  (CGA sit <>stand with min verbal cues for safety.  Despite patients arm in sling, patient does attempt to use left UE at times. )    Ambulation/Gait Training:  Functional Mobility  Functional Mobility Performed:  (CGA/min assist with functional mobility task without AD. Patient required increased assistance with change in direction  secondary to lateral LOB)    Extremities: RUE   RUE : Within Functional Limits and LUE   LUE:  (not assessed secondary to history of recent fracture)    Outcome Measures: [facility]  Putting on and taking off regular lower body clothing: A little  Bathing (including washing, rinsing, drying): A lot  Putting on and taking off regular upper body clothing: A little  Toileting, which includes using toilet, bedpan or urinal: A lot  Taking care of personal grooming such as brushing teeth: A  little  Eating Meals: None  Daily Activity - Total Score: 17    EDUCATION:  Education  Individual(s) Educated: Patient  Education Provided: Fall precautons  Patient Response to Education:  (needs additional instruction)    Goals:   Encounter Problems       Encounter Problems (Active)       Dressings Lower Extremities       STG - Patient will complete lower body dressing with min assist with comp strategies  (Progressing)       Start:  07/26/25    Expected End:  08/09/25               Functional Balance       STG-Patient will be SBA with assistive device dynamic stand task >5 minutes for ADL completion   (Progressing)       Start:  07/26/25    Expected End:  08/09/25               Functional Mobility       STG-Patient will be SBA with assistive device functional mobility tasks   (Progressing)       Start:  07/26/25    Expected End:  08/09/25               Grooming       STG - Patient completes grooming with SBA (Progressing)       Start:  07/26/25    Expected End:  08/09/25               OT Transfers       STG-Patient will be SBA with functional transfers demonstrating good safety   (Progressing)       Start:  07/26/25    Expected End:  08/09/25

## 2025-07-26 NOTE — CARE PLAN
The patient's goals for the shift include      The clinical goals for the shift include To complete all diagnostics

## 2025-07-26 NOTE — CARE PLAN
The patient's goals for the shift include Pt will remain safe from injury     The clinical goals for the shift include Pt will remain hemodynamically stable

## 2025-07-26 NOTE — DISCHARGE SUMMARY
Discharge Diagnosis  Dizziness       Issues Requiring Follow-Up  Patient should follow up with neurology/Dr. Marshall since she had slight setback with dysarthria  Follow up with SLP    Discharge Meds     Medication List      START taking these medications     clopidogrel 75 mg tablet; Commonly known as: Plavix; Take 1 tablet (75   mg) by mouth once daily for 21 days.     CONTINUE taking these medications     aspirin 81 mg EC tablet; Take 1 tablet (81 mg) by mouth once daily. Do   not start before January 19, 2024.   atorvastatin 40 mg tablet; Commonly known as: Lipitor; Take 1 tablet (40   mg) by mouth once daily at bedtime.   carvedilol 12.5 mg tablet; Commonly known as: Coreg; Take 1 tablet (12.5   mg) by mouth 2 times a day.   coenzyme Q-10 100 mg capsule   hydrALAZINE 25 mg tablet; Commonly known as: Apresoline; Take 1 tablet   (25 mg) by mouth 2 times a day.   latanoprost 0.005 % ophthalmic solution; Commonly known as: Xalatan;   Administer 1 drop into both eyes once daily at bedtime.   losartan 100 mg tablet; Commonly known as: Cozaar; Take 0.5 tablets (50   mg) by mouth once daily.   timolol 0.5 % ophthalmic solution; Commonly known as: Timoptic;   Administer 1 drop into both eyes once daily.   * venlafaxine  mg 24 hr capsule; Commonly known as: Effexor-XR;   Take 1 capsule (150 mg) by mouth once daily. Do not crush or chew.   * venlafaxine XR 75 mg 24 hr capsule; Commonly known as: Effexor-XR;   Take 1 capsule (75 mg) by mouth once daily. Take with food.   VITAMIN B-12 ORAL  * This list has 2 medication(s) that are the same as other medications   prescribed for you. Read the directions carefully, and ask your doctor or   other care provider to review them with you.       Test Results Pending At Discharge  Pending Labs       Order Current Status    Extra Urine Gray Tube In process    Urinalysis with Reflex Culture and Microscopic In process    Urine Culture In process    Extra Tubes Preliminary result     Light Blue Top Preliminary result            Hospital Course     Expand All Collapse All    INTERNAL MEDICINE HISTORY AND PHYSICAL     Subjective  Roslyn Cabrera is a 79 y.o. 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  She called her daughter earlier today due to not feeling well and went to her house, upon arrival she started to almost pass out and appeared very sweaty.  Her daughter helped her back up and took her to the emergency room for further evaluation.  She notes that she has had nothing to eat or drink all day today.  She does note that she lives independently and daughter has concerns for her safety at home and ability to care for herself.    The patient currently denies dizziness but does endorse that she is still having difficulty speaking and finding her words.  She has not had any dysuria, hematuria, urgency, increased frequency, or abdominal pain.  She states that she has had UTIs in the past and initially thought she may be having another chronic UTI.  Per chart review, previous cultures have been negative.  She denies any pain, shortness of breath, chest pain, nausea, vomiting, other associated symptoms.  She does not recall exactly how she felt with her prior stroke but believes it was mostly language related symptoms, unsure of dizziness at the time. She states that she would like to go home and follow-up outpatient for further care.          Hospital course: Patient was observed in the hospital due to worsening dysarthria which she had previously from her prior CVA.  Patient also had some dizziness.  The dizziness resolved quickly but she continued to have some ongoing expressive aphasia.  Patient had CT of her brain which was unremarkable.  MRI was pursued which showed her prior CVA with expected sequelae of the infarction but no acute pathologies.  Opted to  treat patient as a TIA since her dysarthria also improved throughout her hospitalization.  Patient given prescription for Plavix for 21 days.  She is to continue her aspirin and statin.  She also should follow-up with her neurologist.  She is being referred back to speech therapy in the outpatient setting as well.  Patient was seen by physical therapy and Occupational Therapy and acute rehab was recommended however she strongly preferred not to and wished to go home instead.  Patient was recommended to have a cane which she will have to purchase on her own.  Patient was discharged back to her home with only the Plavix as her new medication.  She was discharged in stable condition.    Pertinent Physical Exam At Time of Discharge  Physical Exam  Constitutional:       General: She is not in acute distress.     Appearance: Normal appearance.   HENT:      Head: Normocephalic and atraumatic.      Mouth/Throat:      Mouth: Mucous membranes are moist.     Eyes:      Extraocular Movements: Extraocular movements intact.      Conjunctiva/sclera: Conjunctivae normal.       Cardiovascular:      Rate and Rhythm: Normal rate and regular rhythm.      Heart sounds: Normal heart sounds.   Pulmonary:      Effort: Pulmonary effort is normal.      Breath sounds: Normal breath sounds. No wheezing, rhonchi or rales.   Abdominal:      General: Abdomen is flat. Bowel sounds are normal.      Palpations: Abdomen is soft.     Musculoskeletal:         General: No swelling or tenderness. Normal range of motion.      Cervical back: Normal range of motion and neck supple.     Skin:     General: Skin is warm and dry.      Findings: No rash.     Neurological:      General: No focal deficit present.      Mental Status: She is alert and oriented to person, place, and time.      Cranial Nerves: No cranial nerve deficit.      Sensory: No sensory deficit.      Comments: Expressive aphasia; seems to be improving throughout the day today   Psychiatric:          Mood and Affect: Mood normal.         Behavior: Behavior normal.         Outpatient Follow-Up  Future Appointments   Date Time Provider Department [place]   8/6/2025  1:00 PM Miller Pickens MD [id number] [place]   8/12/2025 11:00 AM Rajeev Cooper MD [id number] [place]   8/26/2025 11:00 AM Rajeev Cooper MD [id number] [place]   10/28/2025  2:30 PM Joan Wells MD [id number] [place]   11/18/2025 11:20 AM Bhumi Carl, APRN-CNP [id number] [place]   12/3/2025 11:30 AM Corina Marshall MD [id number] [place]   1/16/2026 10:00 AM Oscar Milner MD [id number] [facility]         Rommel Frye MD

## 2025-07-26 NOTE — H&P
INTERNAL MEDICINE HISTORY AND PHYSICAL    Subjective   Roslyn Cabrera is a 79 y.o. 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  She called her daughter earlier today due to not feeling well and went to her house, upon arrival she started to almost pass out and appeared very sweaty.  Her daughter helped her back up and took her to the emergency room for further evaluation.  She notes that she has had nothing to eat or drink all day today.  She does note that she lives independently and daughter has concerns for her safety at home and ability to care for herself.    The patient currently denies dizziness but does endorse that she is still having difficulty speaking and finding her words.  She has not had any dysuria, hematuria, urgency, increased frequency, or abdominal pain.  She states that she has had UTIs in the past and initially thought she may be having another chronic UTI.  Per chart review, previous cultures have been negative.  She denies any pain, shortness of breath, chest pain, nausea, vomiting, other associated symptoms.  She does not recall exactly how she felt with her prior stroke but believes it was mostly language related symptoms, unsure of dizziness at the time. She states that she would like to go home and follow-up outpatient for further care.    ED Course:  Vitals: Initially hypotensive 99/48 with improvement to 118/54, afebrile, HR 70, RR 20, SpO2 100% RA  Labs: Anemia hemoglobin 10.1, initial potassium 6.1 hemolyzed with repeat 4.6, BUN/creatinine 40/1.36  UA +1 bacteria with leuk esterase  Imaging: CT head without showed moderate size remote infarct in the left parietal lobe posteriorly but no acute intracranial abnormality  EKG: NSR, QTc 420, rate 62, no evidence of acute ischemia  Interventions: 1 L bolus, Rocephin    Medical History[1]  Surgical  History[2]  Prescriptions Prior to Admission[3]  Patient has no known allergies.  Social History[4]  Family History[5]        Objective   Vitals:  Most Recent:  Vitals:    07/25/25 2046   BP: 131/74   Pulse: 76   Resp: 16   Temp:    SpO2: 97%       24hr Min/Max:  Temp  Min: 36.6 °C (97.9 °F)  Max: 36.6 °C (97.9 °F)  Pulse  Min: 59  Max: 76  BP  Min: 99/48  Max: 131/74  Resp  Min: 16  Max: 20  SpO2  Min: 96 %  Max: 100 %    Hemodynamic parameters for last 24 hours:       Lab/Radiology/Diagnostic Review:  Results for orders placed or performed during the hospital encounter of 07/25/25 (from the past 24 hours)   Light Blue Top   Result Value Ref Range    Extra Tube Hold for add-ons.    CBC and Auto Differential   Result Value Ref Range    WBC 8.5 4.4 - 11.3 x10*3/uL    nRBC 0.0 0.0 - 0.0 /100 WBCs    RBC 3.48 (L) 4.00 - 5.20 x10*6/uL    Hemoglobin 10.1 (L) 12.0 - 16.0 g/dL    Hematocrit 31.8 (L) 36.0 - 46.0 %    MCV 91 80 - 100 fL    MCH 29.0 26.0 - 34.0 pg    MCHC 31.8 (L) 32.0 - 36.0 g/dL    RDW 15.4 (H) 11.5 - 14.5 %    Platelets 260 150 - 450 x10*3/uL    Neutrophils % 69.2 40.0 - 80.0 %    Immature Granulocytes %, Automated 0.2 0.0 - 0.9 %    Lymphocytes % 16.6 13.0 - 44.0 %    Monocytes % 11.4 2.0 - 10.0 %    Eosinophils % 2.1 0.0 - 6.0 %    Basophils % 0.5 0.0 - 2.0 %    Neutrophils Absolute 5.89 (H) 1.60 - 5.50 x10*3/uL    Immature Granulocytes Absolute, Automated 0.02 0.00 - 0.50 x10*3/uL    Lymphocytes Absolute 1.41 0.80 - 3.00 x10*3/uL    Monocytes Absolute 0.97 (H) 0.05 - 0.80 x10*3/uL    Eosinophils Absolute 0.18 0.00 - 0.40 x10*3/uL    Basophils Absolute 0.04 0.00 - 0.10 x10*3/uL   Comprehensive metabolic panel   Result Value Ref Range    Glucose 119 (H) 74 - 99 mg/dL    Sodium 136 136 - 145 mmol/L    Potassium 6.1 (HH) 3.5 - 5.3 mmol/L    Chloride 110 (H) 98 - 107 mmol/L    Bicarbonate 20 (L) 21 - 32 mmol/L    Anion Gap 12 10 - 20 mmol/L    Urea Nitrogen 40 (H) 6 - 23 mg/dL    Creatinine 1.36 (H) 0.50 -  1.05 mg/dL    eGFR 40 (L) >60 mL/min/1.73m*2    Calcium 8.9 8.6 - 10.3 mg/dL    Albumin 3.7 3.4 - 5.0 g/dL    Alkaline Phosphatase 147 (H) 33 - 136 U/L    Total Protein 6.0 (L) 6.4 - 8.2 g/dL    AST 28 9 - 39 U/L    Bilirubin, Total 0.5 0.0 - 1.2 mg/dL    ALT 9 7 - 45 U/L   Troponin I, High Sensitivity, Initial   Result Value Ref Range    Troponin I, High Sensitivity 10 0 - 13 ng/L   Troponin, High Sensitivity, 1 Hour   Result Value Ref Range    Troponin I, High Sensitivity 11 0 - 13 ng/L   Potassium   Result Value Ref Range    Potassium 4.6 3.5 - 5.3 mmol/L   Urinalysis with Reflex Culture and Microscopic   Result Value Ref Range    Color, Urine Light-Yellow Light-Yellow, Yellow, Dark-Yellow    Appearance, Urine Turbid (N) Clear    Specific Gravity, Urine 1.014 1.005 - 1.035    pH, Urine 5.0 5.0, 5.5, 6.0, 6.5, 7.0, 7.5, 8.0    Protein, Urine NEGATIVE NEGATIVE, 10 (TRACE), 20 (TRACE) mg/dL    Glucose, Urine Normal Normal mg/dL    Blood, Urine NEGATIVE NEGATIVE mg/dL    Ketones, Urine NEGATIVE NEGATIVE mg/dL    Bilirubin, Urine NEGATIVE NEGATIVE mg/dL    Urobilinogen, Urine Normal Normal mg/dL    Nitrite, Urine NEGATIVE NEGATIVE    Leukocyte Esterase, Urine 75 Neo/uL (A) NEGATIVE   Microscopic Only, Urine   Result Value Ref Range    WBC, Urine 1-5 1-5, NONE /HPF    RBC, Urine NONE NONE, 1-2, 3-5 /HPF    Squamous Epithelial Cells, Urine 1-9 (SPARSE) Reference range not established. /HPF    Bacteria, Urine 1+ (A) NONE SEEN /HPF    Hyaline Casts, Urine 2+ (A) NONE /LPF     *Note: Due to a large number of results and/or encounters for the requested time period, some results have not been displayed. A complete set of results can be found in Results Review.     Imaging  CT head wo IV contrast  Result Date: 7/25/2025  1. No acute intracranial abnormality.   2. Moderate-sized remote infarct in the left parietal lobe posteriorly.     MACRO: None   Signed by: Mason Chris 7/25/2025 4:10 PM Dictation workstation:    [id number]    XR shoulder left 2+ views  Result Date: 7/23/2025  Healing left proximal humeral fracture.   Signed by: Sebastien Weaver 7/23/2025 10:25 AM Dictation workstation:   [id number]      Cardiology, Vascular, and Other Imaging  No other imaging results found for the past 7 days       Intake/Output:  No intake or output data in the 24 hours ending 07/25/25 2056    Physical exam:    Physical Exam  Constitutional:       Appearance: She is not ill-appearing.   HENT:      Mouth/Throat:      Mouth: Mucous membranes are dry.     Eyes:      General: No scleral icterus.     Conjunctiva/sclera: Conjunctivae normal.       Cardiovascular:      Rate and Rhythm: Normal rate and regular rhythm.   Pulmonary:      Effort: Pulmonary effort is normal.      Breath sounds: No wheezing, rhonchi or rales.   Abdominal:      General: There is no distension.      Palpations: Abdomen is soft.      Tenderness: There is no abdominal tenderness.     Musculoskeletal:      Right lower leg: No edema.      Left lower leg: No edema.     Skin:     General: Skin is warm and dry.      Coloration: Skin is not jaundiced.     Neurological:      Mental Status: She is alert and oriented to person, place, and time.      Cranial Nerves: Dysarthria present. No facial asymmetry.      Sensory: No sensory deficit.      Motor: No weakness.      Comments: Mild dysarthria with some word finding difficulty   Psychiatric:         Mood and Affect: Mood normal.            Assessment /Plan    Assessment & Plan  Dizziness      Assessment/Plan:  This is a 79 y.o. female who initially presented to the ED with dizziness, dysarthria for 2 weeks.  Currently she is asymptomatic except for mildly worsened dysarthria beyond baseline, but reports feeling better after IV fluids.  There are concerns about her ability to care for self/polypharmacy as a cause for her dizziness especially with her described near syncope event earlier today.  No cardiac symptoms and normal EKG.   Suspect most likely this is orthostatic hypotension in the setting of multiple blood pressure agents which could be exacerbating prior stroke sequelae versus new TIA/CVA versus less likely infection.  Planning for TIA workup with anticipated discharge tomorrow.    # Dizziness  # Acute on chronic dysarthria  # H/O CVA 2024  -Initial vitals significant for hypotension  - Labs with anemia slightly worsened from apparent baseline  - UA not convincing for UTI; culture pending  -CT head with no acute abnormality  - Symptoms generally resolved following fluid resuscitation except for ongoing dysarthria  Plan  -TIA workup including MRI brain, carotid ultrasound  -Hold home hypertensives in the setting of possible orthostatic hypotension as well as permissive hypertension given concern for TIA/CVA  - Orthostatic vitals and continue fluid bolus as indicated; outpatient follow-up for BP med titration  - Lipid, A1c for risk stratification  - PT, OT, speech  - Likely can discontinue antibiotics but defer to day team    # Anemia  - Hemoglobin 10.1 (BL ~11-12)  - No apparent active bleeding  - Continue to trend CBC and monitor for further drop in hemoglobin  - Could benefit from outpatient anemia workup with vitamin levels and iron studies especially as this may be contributing to her recent dizziness    # HTN  # CKD  # Mac Degen  -Holding home BP meds  - Continue other home medications as appropriate  - Avoid nephrotoxic medications/renally dose meds as appropriate    Diet: Full  Code Status: DNR, okay with elective intubation  DVT Prophylaxis: Heparin  Disposition: Anticipated discharge tomorrow      Anabell Mclean DO  Family Medicine, PGY-3      I saw and evaluated the patient. I personally obtained the key and critical portions of the history and physical exam or was physically present for key and critical portions performed by the resident/fellow. I reviewed the resident/fellow's documentation and discussed the patient with  the resident/fellow. I agree with the resident/fellow's medical decision making as documented in the note.     79 YOF PMH CVA with residual dysarthira presenting to [facility] ED C/O intermittent dizziness and worsening dysarthria. Daughter concerned patient unable to take care of herself and wants placement, but patient requesting to go home. She does have capacity to make her own medical decisions at time of evaluation. Workup significant for UTI, rocephin started, F/U UCX. Worsening dysarthria possible 2/2 UTI vs polypharmacy vs acute CVA, but CT head negative. Will obtain MRI brain, continue ASA and high intensity statin. If MRI positive can consult neurology.    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) 02/27/2024    Cataract extraction status, left eye 03/09/2016    Status post left cataract extraction    Cataract extraction status, right eye 03/09/2016    Status post right cataract extraction    COPD (chronic obstructive pulmonary disease) (Multi)     Hypertension     Pain in leg, unspecified 03/18/2015    Leg pain    Peripheral vascular disease, unspecified 06/08/2016    Claudication    Personal history of nicotine dependence 03/18/2015    Quit smoking    Sciatica, unspecified side 09/16/2015    Acute sciatica    Tobacco use 06/08/2016    Tobacco abuse   [2]   Past Surgical History:  Procedure Laterality Date    CARDIAC ELECTROPHYSIOLOGY PROCEDURE Left 01/16/2024    Procedure: Loop Insertion;  Surgeon: Odilon Arreaga MD;  Location: [place] [address] Cardiac Cath Lab;  Service: Electrophysiology;  Laterality: Left;  [place] to do. Pt aphasic/crypto sroke    CATARACT EXTRACTION      EYE SURGERY      JOINT REPLACEMENT      OTHER SURGICAL HISTORY  02/24/2022    Knee  replacement    OTHER SURGICAL HISTORY  2022    Cataract surgery   [3] (Not in a hospital admission)  [4]   Social History  Tobacco Use    Smoking status: Former     Current packs/day: 0.00     Average packs/day: 0.5 packs/day for 44.0 years (22.0 ttl pk-yrs)     Types: Cigarettes     Start date:      Quit date:      Years since quittin.5     Passive exposure: Past    Smokeless tobacco: Never   Vaping Use    Vaping status: Never Used   Substance Use Topics    Alcohol use: Never    Drug use: Never   [5] No family history on file.

## 2025-07-26 NOTE — PROGRESS NOTES
"Physical Therapy    Physical Therapy Evaluation    Patient Name: Roslyn Cabrera \"Pam\"  MRN: [id number]  Today's Date: 7/26/2025   Time Calculation  Start Time: 0853  Stop Time: 0908  Time Calculation (min): 15 min  3111/3111-A    Assessment/Plan   PT Assessment: Pt demonstrates impairments listed below.  Pt appears below baseline level of function and based on current level of function, pt would benefit from continued skilled therapy while in the hospital to ensure safety, decrease risk of falls, and regain strength/mobility back to baseline.  Once stable enough for discharge, pt would benefit from high intensity therapy or low intensity with 24 hr supervision.     PT Assessment Results: Decreased strength, Decreased range of motion, Impaired balance, Decreased mobility, Impaired judgement, Decreased safety awareness  Rehab Prognosis: Good  Barriers to Discharge Home: Caregiver assistance, Physical needs  Caregiver Assistance: Patient lives alone and/or does not have reliable caregiver assistance  Physical Needs: Intermittent mobility assistance needed, Intermittent ADL assistance needed, High falls risk due to function or environment  Evaluation/Treatment Tolerance: Patient tolerated treatment well  Medical Staff Made Aware: Yes  End of Session Communication: Bedside nurse  End of Session Patient Position: Bed, 3 rail up, Alarm on  IP OR SWING BED PT PLAN  Inpatient or Swing Bed: Inpatient  PT Plan  Treatment/Interventions: Bed mobility, Transfer training, Gait training, Balance training, Neuromuscular re-education, Strengthening, Range of motion, Therapeutic exercise, Therapeutic activity, Home exercise program  PT Plan: Ongoing PT  PT Frequency: 5 times per week  PT Discharge Recommendations: High intensity level of continued care  Equipment Recommended upon Discharge: Straight cane  PT Recommended Transfer Status: Assist x1 (CGA-Min A)  PT - OK to Discharge: Yes - To next level of care when cleared by medical " team    Subjective     Current Problem:  1. Dizziness  Carotid duplex bilateral    Carotid duplex bilateral      2. Polypharmacy        3. History of CVA (cerebrovascular accident)        4. UTI (urinary tract infection) with pyuria        5. Stroke due to embolism of left middle cerebral artery (Multi)  Carotid duplex bilateral    Carotid duplex bilateral          Past Medical History:  Problem List[1]    General Visit Information:  Per EMR: pt presented for 2 weeks of dizziness as well as worsening dysarthria.  She called her daughter earlier today due to not feeling well and went to her house, upon arrival she started to almost pass out and appeared very sweaty.  Her daughter helped her back up and took her to the emergency room for further evaluation.  She notes that she has had nothing to eat or drink all day today.  She does note that she lives independently and daughter has concerns for her safety at home and ability to care for herself.    The patient currently denies dizziness but does endorse that she is still having difficulty speaking and finding her words.  She has not had any dysuria, hematuria, urgency, increased frequency, or abdominal pain.  She states that she has had UTIs in the past and initially thought she may be having another chronic UTI.  Per chart review, previous cultures have been negative.  She denies any pain, shortness of breath, chest pain, nausea, vomiting, other associated symptoms.  She does not recall exactly how she felt with her prior stroke but believes it was mostly language related symptoms, unsure of dizziness at the time. She states that she would like to go home and follow-up outpatient for further care.      On arrival, pt sitting EOB.  Pt in no apparent distress and agreeable to therapy.    General  Reason for Referral: impaired mobility  Referred By: Rommel Frye  Co-Treatment: OT  Co-Treatment Reason: safety  Prior to Session Communication: Bedside nurse  Patient  "Position Received: Alarm on (sitting EOB)  General Comment: pt have mild expressive aphasia and difficulty with word finding    Home Living/PLOF:  Pt lives alone in apt with elevator access. 1 floor set up.  Has tub shower with chair and Gbs.  IND with mobility and ADLs at baseline but has required assist from daughter for ADLs 2/2 fracture.  Daughters live nearby and 1 daughter visits daily.  Reports x2 falls (March and July).  Pt drives and shops.    Precautions:  Precautions  UE Weight Bearing Status: Left Non-Weight Bearing (with sling 2/2 subacute slightly displaced surgical neck left proximal humeral  fracture with callus)  Medical Precautions: Fall precautions     Objective     Pain:  Pain Assessment  Pain Assessment: 0-10  0-10 (Numeric) Pain Score: 0 - No pain    Cognition:  Cognition  Overall Cognitive Status: Within Functional Limits  Orientation Level: Oriented X4    General Assessments:      Activity Tolerance  Endurance: Endurance does not limit participation in activity  Sensation  Sensation Comment: denies any numbness/tingling    Static Sitting Balance  Static Sitting-Comment/Number of Minutes: good  Dynamic Sitting Balance  Dynamic Sitting-Comments: good  Static Standing Balance  Static Standing-Comment/Number of Minutes: fair plus  Dynamic Standing Balance  Dynamic Standing-Comments: fair; decreased balance with turning    Extremity/Trunk Assessments:  BLE strength: grossly WFL    Functional Mobility:  Bed mobility  Supine to sit: NT 2/2 sitting EOB on arrival   Sit to supine: CGA    Transfers  Sit to stand: CGA; pt reaches for OT as she stands; reminded of proper hand placement   Stand to sit: CGA    Ambulation/Stairs  Pt ambulated 60 ft with CGA-Min A; pt with decreased balance especially with turns; NBOS; refuses to use cane or HHA; encouraged to slow down but pt states \"I can't slow down.\"    Outcome Measures:  [facility] Basic Mobility  Turning from your back to your side while in a flat bed " without using bedrails: A little  Moving from lying on your back to sitting on the side of a flat bed without using bedrails: A little  Moving to and from bed to chair (including a wheelchair): A little  Standing up from a chair using your arms (e.g. wheelchair or bedside chair): A little  To walk in hospital room: A little  Climbing 3-5 steps with railing: A lot  Basic Mobility - Total Score: 17    Goals:  Encounter Problems       Encounter Problems (Active)       PT Problem       Pt will be able to perform all bed mobility tasks with Mod I.  (Progressing)       Start:  07/26/25    Expected End:  08/09/25            Pt will perform all transfers with Mod I and proper safety mechanics.   (Progressing)       Start:  07/26/25    Expected End:  08/09/25            Pt will ambulate 100 ft with Mod I using QC for improved functional independence.  (Progressing)       Start:  07/26/25    Expected End:  08/09/25                 Education Documentation  Body Mechanics, taught by Carolina Verma PT at 7/26/2025 11:04 AM.  Learner: Patient  Readiness: Acceptance  Method: Explanation  Response: Verbalizes Understanding    Home Exercise Program, taught by Carolina Verma PT at 7/26/2025 11:04 AM.  Learner: Patient  Readiness: Acceptance  Method: Explanation  Response: Verbalizes Understanding    Precautions, taught by Carolina Verma PT at 7/26/2025 11:04 AM.  Learner: Patient  Readiness: Acceptance  Method: Explanation  Response: Verbalizes Understanding    Mobility Training, taught by Carolina Verma PT at 7/26/2025 11:04 AM.  Learner: Patient  Readiness: Acceptance  Method: Explanation  Response: Verbalizes Understanding    Education Comments  No comments found.             [1]   Patient Active Problem List  Diagnosis    Depression    Benign essential HTN    Bilateral optic nerve atrophy    Blurry vision    Dyslipidemia    Vitamin D insufficiency    Tobacco abuse    Ankle edema    Cellulitis of leg, left     Cellulitis of right lower extremity    Cobalamin deficiency    Hallucinatory illusions    Hemangioma of skin and subcutaneous tissue    Hyperlipidemia    Intermediate stage nonexudative age-related macular degeneration of both eyes    Traumatic injury of right lower extremity    Nail abnormality    OP (osteoporosis)    Osteoarthritis of both knees    Osteopenia    Other melanin hyperpigmentation    Inflamed seborrheic keratosis    Primary open angle glaucoma (POAG) of both eyes    Rheumatoid arthritis    Right inguinal hernia    Sensorineural hearing loss, bilateral    Tuberculosis    Uncontrolled hypertension    Vision impairment    Visual distortions    Wound cellulitis    Wound dehiscence    Anxiety and depression    Stroke due to embolism of left middle cerebral artery (Multi)    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)    Aphasia as late effect of cerebrovascular accident    Malignant neoplasm of cervix, unspecified site (Multi)    Pulmonary emphysema, unspecified emphysema type (Multi)    History of CVA (cerebrovascular accident)    Strain of lumbar region    Hemiplegia affecting right dominant side, unspecified etiology, unspecified hemiplegia type (Multi)    Chronic diastolic (congestive) heart failure    Diabetes mellitus due to underlying condition with diabetic macular edema of both eyes resolved after treatment, without long-term current use of insulin    Chronic kidney disease, stage 3a (Multi)    Routine general medical examination at health care facility    Comminuted fracture of humerus, right, closed, initial encounter    Fracture of right shoulder with routine healing    Acute cystitis without hematuria    Dizziness

## 2025-07-27 LAB — BACTERIA UR CULT: NORMAL

## 2025-07-28 ENCOUNTER — PATIENT OUTREACH (OUTPATIENT)
Dept: CARE COORDINATION | Facility: CLINIC | Age: 80
End: 2025-07-28
Payer: MEDICARE

## 2025-07-28 DIAGNOSIS — I63.412 STROKE DUE TO EMBOLISM OF LEFT MIDDLE CEREBRAL ARTERY (MULTI): Primary | ICD-10-CM

## 2025-07-28 DIAGNOSIS — G31.84 MILD COGNITIVE IMPAIRMENT: ICD-10-CM

## 2025-07-28 LAB
HOLD SPECIMEN: NORMAL
HOLD SPECIMEN: NORMAL

## 2025-07-28 RX ORDER — DONEPEZIL HYDROCHLORIDE 10 MG/1
TABLET, FILM COATED ORAL
Qty: 30 TABLET | Refills: 5 | Status: SHIPPED | OUTPATIENT
Start: 2025-07-28

## 2025-07-28 NOTE — PROGRESS NOTES
Outreach call to patient to support a smooth transition of care from recent admission.  Left voicemail message for patient with my contact information.    Fabiana MATHUR, RN, [facility]  O: [phone]

## 2025-08-06 ENCOUNTER — APPOINTMENT (OUTPATIENT)
Dept: OPHTHALMOLOGY | Facility: CLINIC | Age: 80
End: 2025-08-06
Payer: MEDICARE

## 2025-08-12 ENCOUNTER — OFFICE VISIT (OUTPATIENT)
Dept: ORTHOPEDIC SURGERY | Facility: CLINIC | Age: 80
End: 2025-08-12
Payer: MEDICARE

## 2025-08-12 ENCOUNTER — HOSPITAL ENCOUNTER (OUTPATIENT)
Dept: RADIOLOGY | Facility: CLINIC | Age: 80
Discharge: HOME | End: 2025-08-12
Payer: MEDICARE

## 2025-08-12 DIAGNOSIS — S42.202D CLOSED FRACTURE OF PROXIMAL END OF LEFT HUMERUS WITH ROUTINE HEALING, UNSPECIFIED FRACTURE MORPHOLOGY, SUBSEQUENT ENCOUNTER: Primary | ICD-10-CM

## 2025-08-12 DIAGNOSIS — S42.202D CLOSED FRACTURE OF PROXIMAL END OF LEFT HUMERUS WITH ROUTINE HEALING, UNSPECIFIED FRACTURE MORPHOLOGY, SUBSEQUENT ENCOUNTER: ICD-10-CM

## 2025-08-12 PROCEDURE — 73030 X-RAY EXAM OF SHOULDER: CPT | Mod: LT

## 2025-08-12 PROCEDURE — 1159F MED LIST DOCD IN RCRD: CPT | Performed by: STUDENT IN AN ORGANIZED HEALTH CARE EDUCATION/TRAINING PROGRAM

## 2025-08-12 PROCEDURE — 73030 X-RAY EXAM OF SHOULDER: CPT | Mod: LEFT SIDE | Performed by: RADIOLOGY

## 2025-08-12 PROCEDURE — 99213 OFFICE O/P EST LOW 20 MIN: CPT | Performed by: STUDENT IN AN ORGANIZED HEALTH CARE EDUCATION/TRAINING PROGRAM

## 2025-08-12 PROCEDURE — 99212 OFFICE O/P EST SF 10 MIN: CPT | Performed by: STUDENT IN AN ORGANIZED HEALTH CARE EDUCATION/TRAINING PROGRAM

## 2025-08-13 ENCOUNTER — APPOINTMENT (OUTPATIENT)
Dept: OPHTHALMOLOGY | Facility: CLINIC | Age: 80
End: 2025-08-13
Payer: MEDICARE

## 2025-08-13 DIAGNOSIS — H47.20 BILATERAL OPTIC NERVE ATROPHY: ICD-10-CM

## 2025-08-13 DIAGNOSIS — E08.37X3: ICD-10-CM

## 2025-08-13 DIAGNOSIS — H35.3132 INTERMEDIATE STAGE NONEXUDATIVE AGE-RELATED MACULAR DEGENERATION OF BOTH EYES: ICD-10-CM

## 2025-08-13 DIAGNOSIS — H35.3221 EXUDATIVE AGE-RELATED MACULAR DEGENERATION OF LEFT EYE WITH ACTIVE CHOROIDAL NEOVASCULARIZATION: ICD-10-CM

## 2025-08-13 DIAGNOSIS — H35.3131 EARLY DRY STAGE NONEXUDATIVE AGE-RELATED MACULAR DEGENERATION OF BOTH EYES: Primary | ICD-10-CM

## 2025-08-13 PROCEDURE — 99213 OFFICE O/P EST LOW 20 MIN: CPT | Performed by: OPHTHALMOLOGY

## 2025-08-13 PROCEDURE — 2023F DILAT RTA XM W/O RTNOPTHY: CPT | Performed by: OPHTHALMOLOGY

## 2025-08-13 PROCEDURE — 92134 CPTRZ OPH DX IMG PST SGM RTA: CPT | Mod: BILATERAL PROCEDURE | Performed by: OPHTHALMOLOGY

## 2025-08-13 ASSESSMENT — ENCOUNTER SYMPTOMS
RESPIRATORY NEGATIVE: 0
MUSCULOSKELETAL NEGATIVE: 0
NEUROLOGICAL NEGATIVE: 0
HEMATOLOGIC/LYMPHATIC NEGATIVE: 0
PSYCHIATRIC NEGATIVE: 0
EYES NEGATIVE: 1
GASTROINTESTINAL NEGATIVE: 0
ENDOCRINE NEGATIVE: 1
ALLERGIC/IMMUNOLOGIC NEGATIVE: 0
CONSTITUTIONAL NEGATIVE: 0
CARDIOVASCULAR NEGATIVE: 1

## 2025-08-13 ASSESSMENT — PACHYMETRY
OS_CT(UM): 564
OD_CT(UM): 556

## 2025-08-13 ASSESSMENT — TONOMETRY
IOP_METHOD: GOLDMANN APPLANATION
OS_IOP_MMHG: 14
OD_IOP_MMHG: 14

## 2025-08-13 ASSESSMENT — SLIT LAMP EXAM - LIDS
COMMENTS: GOOD POSITION, 2+ MGD
COMMENTS: GOOD POSITION, 2+ MGD

## 2025-08-13 ASSESSMENT — CUP TO DISC RATIO
OS_RATIO: .95
OD_RATIO: .9

## 2025-08-13 ASSESSMENT — EXTERNAL EXAM - RIGHT EYE: OD_EXAM: NORMAL

## 2025-08-13 ASSESSMENT — EXTERNAL EXAM - LEFT EYE: OS_EXAM: NORMAL

## 2025-08-13 ASSESSMENT — VISUAL ACUITY: METHOD: SNELLEN - LINEAR

## 2025-08-14 ENCOUNTER — PATIENT OUTREACH (OUTPATIENT)
Dept: CARE COORDINATION | Facility: CLINIC | Age: 80
End: 2025-08-14
Payer: MEDICARE

## 2025-08-19 ENCOUNTER — APPOINTMENT (OUTPATIENT)
Dept: PRIMARY CARE | Facility: CLINIC | Age: 80
End: 2025-08-19
Payer: MEDICARE

## 2025-08-19 VITALS
DIASTOLIC BLOOD PRESSURE: 66 MMHG | WEIGHT: 126 LBS | RESPIRATION RATE: 16 BRPM | HEIGHT: 60 IN | TEMPERATURE: 97.5 F | SYSTOLIC BLOOD PRESSURE: 110 MMHG | BODY MASS INDEX: 24.74 KG/M2 | HEART RATE: 78 BPM

## 2025-08-19 DIAGNOSIS — E78.2 MODERATE MIXED HYPERLIPIDEMIA NOT REQUIRING STATIN THERAPY: ICD-10-CM

## 2025-08-19 DIAGNOSIS — I69.390 APRAXIA AS LATE EFFECT OF CEREBROVASCULAR ACCIDENT (CVA): ICD-10-CM

## 2025-08-19 DIAGNOSIS — E78.5 DYSLIPIDEMIA: ICD-10-CM

## 2025-08-19 DIAGNOSIS — I10 BENIGN ESSENTIAL HTN: ICD-10-CM

## 2025-08-19 DIAGNOSIS — R26.81 UNSTEADY GAIT: Primary | ICD-10-CM

## 2025-08-19 DIAGNOSIS — Z86.73 HISTORY OF CVA (CEREBROVASCULAR ACCIDENT): ICD-10-CM

## 2025-08-19 PROCEDURE — 1159F MED LIST DOCD IN RCRD: CPT | Performed by: INTERNAL MEDICINE

## 2025-08-19 PROCEDURE — 3078F DIAST BP <80 MM HG: CPT | Performed by: INTERNAL MEDICINE

## 2025-08-19 PROCEDURE — 99214 OFFICE O/P EST MOD 30 MIN: CPT | Performed by: INTERNAL MEDICINE

## 2025-08-19 PROCEDURE — 3074F SYST BP LT 130 MM HG: CPT | Performed by: INTERNAL MEDICINE

## 2025-08-19 PROCEDURE — 1160F RVW MEDS BY RX/DR IN RCRD: CPT | Performed by: INTERNAL MEDICINE

## 2025-08-19 PROCEDURE — 1125F AMNT PAIN NOTED PAIN PRSNT: CPT | Performed by: INTERNAL MEDICINE

## 2025-08-19 PROCEDURE — G2211 COMPLEX E/M VISIT ADD ON: HCPCS | Performed by: INTERNAL MEDICINE

## 2025-08-19 ASSESSMENT — PAIN SCALES - GENERAL: PAINLEVEL_OUTOF10: 2

## 2025-08-19 ASSESSMENT — ENCOUNTER SYMPTOMS
DEPRESSION: 0
LOSS OF SENSATION IN FEET: 0
OCCASIONAL FEELINGS OF UNSTEADINESS: 1

## 2025-08-26 ENCOUNTER — HOSPITAL ENCOUNTER (OUTPATIENT)
Dept: CARDIOLOGY | Facility: CLINIC | Age: 80
Discharge: HOME | End: 2025-08-26
Payer: MEDICARE

## 2025-08-26 ENCOUNTER — APPOINTMENT (OUTPATIENT)
Dept: ORTHOPEDIC SURGERY | Facility: CLINIC | Age: 80
End: 2025-08-26
Payer: MEDICARE

## 2025-08-26 DIAGNOSIS — I63.9 CEREBRAL INFARCTION, UNSPECIFIED: ICD-10-CM

## 2025-08-27 ENCOUNTER — PATIENT OUTREACH (OUTPATIENT)
Dept: CARE COORDINATION | Facility: CLINIC | Age: 80
End: 2025-08-27
Payer: MEDICARE

## 2025-08-28 ENCOUNTER — HOSPITAL ENCOUNTER (OUTPATIENT)
Dept: CARDIOLOGY | Facility: CLINIC | Age: 80
Discharge: HOME | End: 2025-08-28
Payer: MEDICARE

## 2025-08-28 DIAGNOSIS — I63.9 CEREBRAL INFARCTION, UNSPECIFIED: ICD-10-CM

## 2025-08-28 PROCEDURE — 93298 REM INTERROG DEV EVAL SCRMS: CPT

## 2025-08-29 DIAGNOSIS — I10 BENIGN ESSENTIAL HYPERTENSION: ICD-10-CM

## 2025-08-29 RX ORDER — CARVEDILOL 12.5 MG/1
12.5 TABLET ORAL 2 TIMES DAILY
Qty: 180 TABLET | Refills: 0 | Status: SHIPPED | OUTPATIENT
Start: 2025-08-29 | End: 2025-11-27

## 2025-08-31 LAB
ATRIAL RATE: 62 BPM
P AXIS: 63 DEGREES
P OFFSET: 201 MS
P ONSET: 145 MS
PR INTERVAL: 162 MS
Q ONSET: 226 MS
QRS COUNT: 11 BEATS
QRS DURATION: 80 MS
QT INTERVAL: 414 MS
QTC CALCULATION(BAZETT): 420 MS
QTC FREDERICIA: 418 MS
R AXIS: 39 DEGREES
T AXIS: 62 DEGREES
T OFFSET: 433 MS
VENTRICULAR RATE: 62 BPM

## 2025-09-15 ENCOUNTER — APPOINTMENT (OUTPATIENT)
Dept: CARDIOLOGY | Facility: CLINIC | Age: 80
End: 2025-09-15
Payer: MEDICARE

## 2025-09-24 ENCOUNTER — APPOINTMENT (OUTPATIENT)
Dept: NEUROLOGY | Facility: CLINIC | Age: 80
End: 2025-09-24
Payer: MEDICARE

## 2025-10-01 ENCOUNTER — APPOINTMENT (OUTPATIENT)
Dept: OPHTHALMOLOGY | Facility: CLINIC | Age: 80
End: 2025-10-01
Payer: MEDICARE

## 2025-10-28 ENCOUNTER — APPOINTMENT (OUTPATIENT)
Dept: OPHTHALMOLOGY | Facility: CLINIC | Age: 80
End: 2025-10-28
Payer: MEDICARE

## 2025-11-26 ENCOUNTER — APPOINTMENT (OUTPATIENT)
Dept: NEUROLOGY | Facility: CLINIC | Age: 80
End: 2025-11-26
Payer: MEDICARE

## 2025-12-03 ENCOUNTER — APPOINTMENT (OUTPATIENT)
Dept: NEUROLOGY | Facility: CLINIC | Age: 80
End: 2025-12-03
Payer: MEDICARE

## 2026-01-16 ENCOUNTER — APPOINTMENT (OUTPATIENT)
Dept: PRIMARY CARE | Facility: CLINIC | Age: 81
End: 2026-01-16
Payer: MEDICARE

## (undated) DEVICE — MONITOR, MYCARELINK, PATIENT MONITOR, 24960